# Patient Record
Sex: FEMALE | Race: BLACK OR AFRICAN AMERICAN | Employment: OTHER | URBAN - METROPOLITAN AREA
[De-identification: names, ages, dates, MRNs, and addresses within clinical notes are randomized per-mention and may not be internally consistent; named-entity substitution may affect disease eponyms.]

---

## 2017-02-24 ENCOUNTER — OFFICE VISIT (OUTPATIENT)
Dept: INTERNAL MEDICINE CLINIC | Age: 62
End: 2017-02-24

## 2017-02-24 VITALS
TEMPERATURE: 98 F | DIASTOLIC BLOOD PRESSURE: 84 MMHG | WEIGHT: 178 LBS | BODY MASS INDEX: 32.76 KG/M2 | SYSTOLIC BLOOD PRESSURE: 148 MMHG | OXYGEN SATURATION: 98 % | HEIGHT: 62 IN | HEART RATE: 60 BPM | RESPIRATION RATE: 18 BRPM

## 2017-02-24 DIAGNOSIS — M79.7 FIBROMYOSITIS: ICD-10-CM

## 2017-02-24 DIAGNOSIS — R73.03 PREDIABETES: ICD-10-CM

## 2017-02-24 DIAGNOSIS — R26.81 GAIT INSTABILITY: ICD-10-CM

## 2017-02-24 DIAGNOSIS — G89.4 CHRONIC PAIN SYNDROME: ICD-10-CM

## 2017-02-24 DIAGNOSIS — I10 ESSENTIAL HYPERTENSION, BENIGN: ICD-10-CM

## 2017-02-24 DIAGNOSIS — Z98.890 S/P COLONOSCOPY: ICD-10-CM

## 2017-02-24 DIAGNOSIS — F32.A DEPRESSION, UNSPECIFIED DEPRESSION TYPE: ICD-10-CM

## 2017-02-24 DIAGNOSIS — I26.99 PULMONARY EMBOLISM ON RIGHT (HCC): ICD-10-CM

## 2017-02-24 DIAGNOSIS — Z13.39 SCREENING FOR ALCOHOLISM: ICD-10-CM

## 2017-02-24 DIAGNOSIS — K21.9 GASTROESOPHAGEAL REFLUX DISEASE WITHOUT ESOPHAGITIS: ICD-10-CM

## 2017-02-24 DIAGNOSIS — Z00.00 ROUTINE GENERAL MEDICAL EXAMINATION AT A HEALTH CARE FACILITY: Primary | ICD-10-CM

## 2017-02-24 DIAGNOSIS — K58.9 IRRITABLE BOWEL SYNDROME WITHOUT DIARRHEA: ICD-10-CM

## 2017-02-24 DIAGNOSIS — G25.81 RESTLESS LEG SYNDROME: ICD-10-CM

## 2017-02-24 DIAGNOSIS — N64.4 MASTODYNIA: ICD-10-CM

## 2017-02-24 RX ORDER — ZOSTER VACCINE LIVE 19400 [PFU]/.65ML
INJECTION, POWDER, LYOPHILIZED, FOR SUSPENSION SUBCUTANEOUS
Refills: 0 | COMMUNITY
Start: 2016-12-06 | End: 2017-06-22 | Stop reason: ALTCHOICE

## 2017-02-24 RX ORDER — TETANUS TOXOID, REDUCED DIPHTHERIA TOXOID AND ACELLULAR PERTUSSIS VACCINE, ADSORBED 5; 2.5; 8; 8; 2.5 [IU]/.5ML; [IU]/.5ML; UG/.5ML; UG/.5ML; UG/.5ML
SUSPENSION INTRAMUSCULAR
Refills: 0 | COMMUNITY
Start: 2016-12-06 | End: 2017-06-22 | Stop reason: ALTCHOICE

## 2017-02-24 RX ORDER — LORAZEPAM 1 MG/1
TABLET ORAL
COMMUNITY
Start: 2016-12-21 | End: 2017-02-24 | Stop reason: DRUGHIGH

## 2017-02-24 RX ORDER — TRAZODONE HYDROCHLORIDE 50 MG/1
TABLET ORAL
COMMUNITY
Start: 2016-12-21 | End: 2017-02-24 | Stop reason: SDUPTHER

## 2017-02-24 RX ORDER — TRAZODONE HYDROCHLORIDE 50 MG/1
50 TABLET ORAL
Qty: 90 TAB | Refills: 2 | Status: SHIPPED | OUTPATIENT
Start: 2017-02-24 | End: 2017-06-22 | Stop reason: SDUPTHER

## 2017-02-24 RX ORDER — LORAZEPAM 0.5 MG/1
0.5 TABLET ORAL
Qty: 90 TAB | Refills: 2 | Status: SHIPPED | OUTPATIENT
Start: 2017-02-24 | End: 2017-06-22

## 2017-02-24 RX ORDER — ZALEPLON 10 MG/1
CAPSULE ORAL
COMMUNITY
Start: 2016-12-21 | End: 2017-02-24 | Stop reason: ALTCHOICE

## 2017-02-24 NOTE — PROGRESS NOTES
1. Have you been to the ER, urgent care clinic since your last visit? Hospitalized since your last visit? No    2. Have you seen or consulted any other health care providers outside of the [address] [place] [address] since your last visit? Include any pap smears or colon screening. No    This is a \"Welcome to [employer]"  Initial Preventive Physical Examination (IPPE) providing Personalized Prevention Plan Services (Performed in the first 12 months of enrollment)    I have reviewed the patient's medical history in detail and updated the computerized patient record. HistoryPatient returns today ambulatory with assistive device, alert and appropriate and has the capacity to give an accurate history. She comes in for a welcome to Medicare initial preventive physical examination. Since we last saw her Dr. Rito Vazquez who is her psychiatrist is no longer in practice and apparently writes a prescription for Lorazepam 1 mg, Trazodone. She was also on Sonata but is not requesting that medication. She is not particularly interested in seeing the psychiatrist.  However she needs the Lorazepam.  She is willing to try to get off of it if possible and go to a different drug with the same qualities. We are in agreement with that. Patient's neurologist, Dr. Janeth Nugent has been supplying her with her Roxicet for pain management however she cannot get through to his office. The most recent medication that he gave her was sulfa compound. She is allergic to sulfa. She resorted to acupuncture which she found to be somewhat helpful. Insurance was initially paying for it but now they are no longer paying for it. The acupuncturist is attempting to help her get insurance payment. Patient is also concerned about her balance. If she did not have the cane or the rollabout walker she feels that she would have frequent falls. Patient is seen for evaluation. Patient remains under stress as discussed previously. Past Medical History:   Diagnosis Date    Depression with anxiety     Fibromyalgia     Gait instability     GERD (gastroesophageal reflux disease)     Hypertension     IBS (irritable bowel syndrome)     Mastodynia, left greater than right 8/17/2011    Normal cardiac stress test 3.14    Positive D dimer 9-23-15    Prediabetes     Pulmonary embolism ([facility] [address] 03/2013    rll    Restless leg syndrome     S/P colonoscopy 01/27/2011    kenny hernandez md    Sleep apnea     cpap 9cm    SOB (shortness of breath)     White coat hypertension       Past Surgical History:   Procedure Laterality Date    BREAST SURGERY PROCEDURE UNLISTED  2006    left breast [id number]    HX COLONOSCOPY      HX ORTHOPAEDIC  2009    foot surgery-left    HX OTHER SURGICAL      right and left leg muscle biopsies     Current Outpatient Prescriptions   Medication Sig Dispense Refill    traZODone (DESYREL) 50 mg tablet Take 1 Tab by mouth nightly. 90 Tab 2    LORazepam (ATIVAN) 0.5 mg tablet Take 1 Tab by mouth three (3) times daily as needed for Anxiety. 90 Tab 2    bisoprolol-hydroCHLOROthiazide (ZIAC) 10-6.25 mg per tablet Take 1 Tab by mouth daily. 90 Tab 2    amLODIPine (NORVASC) 10 mg tablet Take 1 Tab by mouth daily. 90 Tab 2    MICROLET LANCET misc USE TO TEST BLOOD SUGAR EVERY  Each 11    CONTOUR NEXT STRIPS strip USE TO TEST BLOOD SUGARS ONCE DAILY 50 Strip 11    cyanocobalamin (VITAMIN B-12) 1,000 mcg tablet Take 1,000 mcg by mouth daily.  mometasone (NASONEX) 50 mcg/actuation nasal spray 2 sprays daily.  magnesium hydroxide (MILK OF MAGNESIA) 400 mg/5 mL suspension Take 30 mL by mouth as needed for Constipation.  118 mL 11    BOOSTRIX TDAP 2.5-8-5 Lf-mcg-Lf/0.5mL syrg ADM 0.5ML IM UTD  0    VARICELLA-ZOSTER VACINE LIVE 19,400 unit/0.65 mL susr injection ADM 0.65ML SC UTD  0     Allergies   Allergen Reactions    Aleve [Naproxen Sodium] Hoarseness    Dilaudid [Hydromorphone (Bulk)] Anaphylaxis     Respiratory arrest and throat closes    Diovan [Valsartan] Palpitations    Morphine Other (comments)     Patch-vomiting,weakness, fainting    Sulfa (Sulfonamide Antibiotics) Hives, Rash and Other (comments)     fainting     Family History   Problem Relation Age of Onset    Hypertension Mother     Kidney Disease Mother      1 kidney    Heart Disease Father      Social History   Substance Use Topics    Smoking status: Never Smoker    Smokeless tobacco: Not on file    Alcohol use No     Diet, Lifestyle: generally follows a low fat low cholesterol diet    Exercise level: moderately active    Depression Risk Screen     PHQ 2 / 9, over the last two weeks 2/24/2017   Little interest or pleasure in doing things Not at all   Feeling down, depressed or hopeless Not at all   Total Score PHQ 2 0     Alcohol Risk Screen   On any occasion during the past 3 months, have you had more than 3 drinks containing alcohol? No    Do you average more than 7 drinks per week? No    Functional Ability and Level of Safety     Hearing Loss   borderline normal    Activities of Daily Living   Partial assistance     Fall Risk Screen   No flowsheet data found. Abuse Screen   None  Patient is not abused    Review of Systems   A comprehensive review of systems was negative except for that written in the HPI. Physical Examination     No exam data present     Visit Vitals    /84 (BP 1 Location: Left arm, BP Patient Position: Sitting)    Pulse 60    Temp 98 °F (36.7 °C) (Oral)    Resp 18    Ht 5' 2\" (1.575 m)    Wt 178 lb (80.7 kg)    SpO2 98%    BMI 32.56 kg/m2     General:  Alert, cooperative, no distress, appears stated age. Head:  Normocephalic, without obvious abnormality, atraumatic. Eyes:  Conjunctivae/corneas clear. PERRL, EOMs intact. Fundi benign. Ears:  Normal TMs and external ear canals both ears. Nose: Nares normal. Septum midline. Mucosa normal. No drainage or sinus tenderness. Throat: Lips, mucosa, and tongue normal. Teeth and gums normal.   Neck: Supple, symmetrical, trachea midline, no adenopathy, thyroid: no enlargement/tenderness/nodules, no carotid bruit and no JVD. Back:   Symmetric, no curvature. ROM normal. No CVA tenderness. Lungs:   Clear to auscultation bilaterally. Chest wall:  No tenderness or deformity. Heart:  Regular rate and rhythm, S1, S2 normal, no murmur, click, rub or gallop. Breast Exam:  Na   Abdomen:   Soft, non-tender. Bowel sounds normal. No masses,  No organomegaly. Genitalia:  na   Rectal:     Extremities: Extremities normal, atraumatic, no cyanosis or edema. Pulses: 2+ and symmetric all extremities. Skin: Skin color, texture, turgor normal. No rashes or lesions. Lymph nodes: Cervical, supraclavicular, and axillary nodes normal.   Neurologic: CNII-XII intact. Normal strength, sensation and reflexes throughout. .     Patient Care Team:  Wilbert Sims MD as PCP - General (Internal Medicine)     End-of-life planning  Advanced Directive discussed and documented: YES      Assessment/Plan   Education and counseling provided:  Are appropriate based on today's review and evaluation    ICD-10-CM ICD-9-CM    1. Routine general medical examination at a health care facility Z00.00 V70.0    2. Screening for alcoholism Z13.89 V79.1    3. Pulmonary embolism on right ([address] I26.99 415.19    4. Chronic pain syndrome G89.4 338.4    5. Fibromyositis M79.7 729.1    6. Essential hypertension, benign I10 401.1    7. Gastroesophageal reflux disease without esophagitis K21.9 530.81    8. Restless leg syndrome G25.81 333.94    9. Mastodynia, left greater than right N64.4 611.71    10. Prediabetes R73.03 790.29    11. Irritable bowel syndrome without diarrhea K58.9 564.1    12. Gait instability R26.81 781.2    13. Normal cardiac stress test Z13.6 V81.2    14. White coat hypertension R03.0 796.2    15. S/P colonoscopy Z98.890 V45.89    16.  Depression, unspecified depression type F32.9 [place] [id number]   . Advance Care Planning (ACP) Provider Conversation Snapshot    Date of ACP Conversation: 02/24/17  Persons included in Conversation:  patient  Length of ACP Conversation in minutes:  <16 minutes (Non-Billable)    Authorized Decision Maker (if patient is incapable of making informed decisions): This person is:   Healthcare Agent/Medical Power of  under Advance Directive  Surrogate decision makers are her  and daughter. For Patients with Decision Making Capacity: Patient is requesting a full code status. If there is permanent brain damage or eminent death she would still like to be resuscitated. Conversation Outcomes / Follow-Up Plan:   Recommended completion of Advance Directive form after review of ACP materials and conversation with prospective healthcare agent       Patient's medical status is stable. We continue to encourage activity for at least 30 minutes five days a week and keep with recommendations regarding treatment of fibromyalgia. We advise her that we will refer her to the psychiatrist realizing that it will be several months before she she can see another psychiatrist.  She does not want to particularly be referred as she has had a 19 year relationship with Dr. Gary Hamilton. She is willing to try to get off the Ativan and we will therefore give her a dose of Ativan 0.5 mg b.i.d until we see her again. Any time we see her if she is in agreement we will gradually decrease the dose until she is off of it completely. After she has been off of it if she finds that she still needs medicine for anxiety we will use Buspar. We will continue the Trazodone at its current dose. We suggest she actually go by   regarding pain management since he gave her a prescription that was not appropriate because of her sulfa allergy since she is not able to get him on the phone.   She will return to our office in two months. At that time we will make another change in the Lorazepam if she is in agreement at that time.

## 2017-02-24 NOTE — MR AVS SNAPSHOT
Visit Information Date & Time Provider Department Dept. Phone Encounter #  
 2/24/2017 11:00 AM [facility] [phone] [id number] Follow-up Instructions Follow-up and Disposition History Your Appointments 3/14/2017  9:45 AM  
Any with Sonja Jara MD  
[address] [place] [address] and Primary Care [facility]) Appt Note: f/u 3 months [address] [place]  
  
   
 [address] [id number] Upcoming Health Maintenance Date Due DTaP/Tdap/Td series (1 - Tdap) 12/5/1974 FOBT Q 1 YEAR AGE 50-75 4/26/2017 PAP AKA CERVICAL CYTOLOGY 9/9/2017 BREAST CANCER SCRN MAMMOGRAM 10/12/2018 Allergies as of 2/24/2017  Review Complete On: 2/24/2017 By: Sonja Jara MD  
  
 Severity Noted Reaction Type Reactions Aleve [Naproxen Sodium] High 07/22/2016    Hoarseness Dilaudid [Hydromorphone (Bulk)] High 08/15/2011    Anaphylaxis Respiratory arrest and throat closes Diovan [Valsartan]  09/22/2015    Palpitations Morphine  08/15/2011    Other (comments) Patch-vomiting,weakness, fainting Sulfa (Sulfonamide Antibiotics)  08/15/2011    Hives, Rash, Other (comments)  
 fainting Current Immunizations  Reviewed on 4/2/2014 No immunizations on file. Not reviewed this visit You Were Diagnosed With   
  
 Codes Comments Routine general medical examination at a health care facility    -  Primary ICD-10-CM: Z00.00 ICD-9-CM: V70.0 Screening for alcoholism     ICD-10-CM: Z13.89 ICD-9-CM: V79.1 Pulmonary embolism on right [facility])     ICD-10-CM: I26.99 
ICD-9-CM: 415.19 Chronic pain syndrome     ICD-10-CM: G89.4 ICD-9-CM: 338.4 Fibromyositis     ICD-10-CM: M79.7 ICD-9-CM: 729.1 Essential hypertension, benign     ICD-10-CM: I10 
ICD-9-CM: 401.1 Gastroesophageal reflux disease without esophagitis     ICD-10-CM: K21.9 ICD-9-CM: 530.81 Restless leg syndrome     ICD-10-CM: G25.81 ICD-9-CM: 333.94 Mastodynia     ICD-10-CM: N64.4 ICD-9-CM: 611.71 Prediabetes     ICD-10-CM: R73.03 
ICD-9-CM: 790.29 Irritable bowel syndrome without diarrhea     ICD-10-CM: K58.9 ICD-9-CM: 949.8 Gait instability     ICD-10-CM: R26.81 
ICD-9-CM: 781.2 Normal cardiac stress test     ICD-10-CM: Z13.6 ICD-9-CM: V81.2 White coat hypertension     ICD-10-CM: R03.0 ICD-9-CM: 796.2 S/P colonoscopy     ICD-10-CM: J38.038 ICD-9-CM: V45.89 Depression, unspecified depression type     ICD-10-CM: F32.9 ICD-9-CM: 160 Vitals BP  
  
  
  
  
  
 148/84 (BP 1 Location: Left arm, BP Patient Position: Sitting) BMI and BSA Data Body Mass Index Body Surface Area 32.56 kg/m 2 1.88 m 2 Preferred Pharmacy Pharmacy Name Phone [address] [facility] [phone] Your Updated Medication List  
  
   
This list is accurate as of: 2/24/17  1:40 PM.  Always use your most recent med list. amLODIPine 10 mg tablet Commonly known as:  [facility] Take 1 Tab by mouth daily. bisoprolol-hydroCHLOROthiazide 10-6.25 mg per tablet Commonly known as:  Pending sale to [facility] Take 1 Tab by mouth daily. BOOSTRIX TDAP 2.5-8-5 Lf-mcg-Lf/0.5mL Syrg Generic drug:  Diphth, Pertus(Acell), Tetanus ADM 0.5ML IM UTD CONTOUR NEXT STRIPS strip Generic drug:  glucose blood VI test strips USE TO TEST BLOOD SUGARS ONCE DAILY LORazepam 0.5 mg tablet Commonly known as:  ATIVAN Take 1 Tab by mouth three (3) times daily as needed for Anxiety. magnesium hydroxide 400 mg/5 mL suspension Commonly known as:  MILK OF MAGNESIA Take 30 mL by mouth as needed for Constipation. [place] Generic drug:  Lancets USE TO TEST BLOOD SUGAR EVERY DAY  
 NASONEX 50 mcg/actuation nasal spray Generic drug:  mometasone 2 sprays daily. traZODone 50 mg tablet Commonly known as:  Fahad Daniel Take 1 Tab by mouth nightly. varicella-zoster vacine live 19,400 unit/0.65 mL Susr injection Generic drug:  varicella zoster vacine live ADM 0.65ML SC UTD  
  
 VITAMIN B-12 1,000 mcg tablet Generic drug:  cyanocobalamin Take 1,000 mcg by mouth daily. Prescriptions Printed Refills LORazepam (ATIVAN) 0.5 mg tablet 2 Sig: Take 1 Tab by mouth three (3) times daily as needed for Anxiety. Class: Print Route: Oral  
  
Prescriptions Sent to Pharmacy Refills  
 traZODone (DESYREL) 50 mg tablet 2 Sig: Take 1 Tab by mouth nightly. Class: Normal  
 Pharmacy: [address], [address] Ph #: [phone] Route: Oral  
  
We Performed the Following REFERRAL TO PSYCHIATRY [REF91 Custom] Comments: PLEASE SCHEDULE WITH DR Jose Angel Russo Referral Information Referral ID Referred By Referred To  
  
 [id number] Ralph Hutchins MD   
   [address] [place] [address] Phone: [phone] Fax: [phone] Visits Status Start Date End Date 1 New Request 2/24/17 2/24/18 If your referral has a status of pending review or denied, additional information will be sent to support the outcome of this decision. Introducing hospitals & HEALTH SERVICES! Cris Yadav introduces [employer] patient portal. Now you can access parts of your medical record, email your doctor's office, and request medication refills online. 1. In your internet browser, go to [email or web address] "[email or web address]/Privacy Analytics 2. Click on the First Time User? Click Here link in the Sign In box. You will see the New Member Sign Up page. 3. Enter your [employer] Access Code exactly as it appears below.  You will not need to use this code after youve completed the sign-up process. If you do not sign up before the expiration date, you must request a new code. · [employer] Access Code: [email or web address] Expires: 3/6/2017  9:49 AM 
 
4. Enter the last four digits of your Social Security Number (xxxx) and Date of Birth (mm/dd/yyyy) as indicated and click Submit. You will be taken to the next sign-up page. 5. Create a [employer] ID. This will be your [employer] login ID and cannot be changed, so think of one that is secure and easy to remember. 6. Create a [employer] password. You can change your password at any time. 7. Enter your Password Reset Question and Answer. This can be used at a later time if you forget your password. 8. Enter your e-mail address. You will receive e-mail notification when new information is available in [address] 9. Click Sign Up. You can now view and download portions of your medical record. 10. Click the Download Summary menu link to download a portable copy of your medical information. If you have questions, please visit the Frequently Asked Questions section of the [employer] website. Remember, [employer] is NOT to be used for urgent needs. For medical emergencies, dial 911. Now available from your iPhone and Android! Please provide this summary of care documentation to your next provider. Your primary care clinician is listed as Carlos Dunn. If you have any questions after today's visit, please call [phone].

## 2017-03-14 ENCOUNTER — OFFICE VISIT (OUTPATIENT)
Dept: INTERNAL MEDICINE CLINIC | Age: 62
End: 2017-03-14

## 2017-03-14 VITALS
WEIGHT: 176 LBS | HEIGHT: 62 IN | TEMPERATURE: 98.4 F | RESPIRATION RATE: 17 BRPM | SYSTOLIC BLOOD PRESSURE: 135 MMHG | HEART RATE: 72 BPM | BODY MASS INDEX: 32.39 KG/M2 | OXYGEN SATURATION: 96 % | DIASTOLIC BLOOD PRESSURE: 74 MMHG

## 2017-03-14 DIAGNOSIS — M79.7 FIBROMYOSITIS: Primary | ICD-10-CM

## 2017-03-14 DIAGNOSIS — K58.9 IRRITABLE BOWEL SYNDROME WITHOUT DIARRHEA: ICD-10-CM

## 2017-03-14 DIAGNOSIS — I10 ESSENTIAL HYPERTENSION, BENIGN: ICD-10-CM

## 2017-03-14 DIAGNOSIS — G89.4 CHRONIC PAIN SYNDROME: ICD-10-CM

## 2017-03-14 NOTE — PROGRESS NOTES
SPORTS MEDICINE AND PRIMARY CARE  Roxanna Escalante MD, [place] [address] [address]  Phone:  [phone]  Fax: [phone]       Chief Complaint   Patient presents with    Follow-up     3 month visit   . SUBJECTIVE:    Ryan Gill is a 59 y.o. female Patient returns today ambulatory, alert and appropriate and has the capacity to give an accurate history. She is ambulatory with assistive device. She has a known history of fibromyalgia, gait instability, GERD, primary hypertension, irritable bowel syndrome and prediabetes. Patient returns today stating she indeed tried acupuncture and it has helped a little she states, it is better than the narcotics and particularly since there are no side effects. However she is having a problem with the insurance paying for it. Each acupuncture visit cost around 150 dollars but it really depends on what she gets she states. Patient is requesting a letter to appeal because the insurance company does not want to pay for acupuncture and the letter we dictate is attached. Patient notes that her tolerance level is not good lately. That is to say she notes she is more irritable lately as she is trying to get off the Lorazepam.  We had suggested that she wean herself off the Lorazepam and not stop abruptly because of that very side effect and other effects from stopping the Benzodiazepines quickly. Patient would like to stop seeing Dr. Seun Quiros and would like to try a neurologist with [facility]. The new neurologist she would like to see is DASH Uriostegui. Patient states that she continues to \"struggle with the pain. \"             Current Outpatient Prescriptions   Medication Sig Dispense Refill    traZODone (DESYREL) 50 mg tablet Take 1 Tab by mouth nightly. 90 Tab 2    LORazepam (ATIVAN) 0.5 mg tablet Take 1 Tab by mouth three (3) times daily as needed for Anxiety.  90 Tab 2    bisoprolol-hydroCHLOROthiazide [facility]) 10-6.25 mg per tablet Take 1 Tab by mouth daily. 90 Tab 2    amLODIPine (NORVASC) 10 mg tablet Take 1 Tab by mouth daily. 90 Tab 2    MICROLET LANCET misc USE TO TEST BLOOD SUGAR EVERY  Each 11    CONTOUR NEXT STRIPS strip USE TO TEST BLOOD SUGARS ONCE DAILY 50 Strip 11    cyanocobalamin (VITAMIN B-12) 1,000 mcg tablet Take 1,000 mcg by mouth daily.  mometasone (NASONEX) 50 mcg/actuation nasal spray 2 sprays daily.  magnesium hydroxide (MILK OF MAGNESIA) 400 mg/5 mL suspension Take 30 mL by mouth as needed for Constipation. 118 mL 11    BOOSTRIX TDAP 2.5-8-5 Lf-mcg-Lf/0.5mL syrg ADM 0.5ML IM UTD  0    VARICELLA-ZOSTER VACINE LIVE 19,400 unit/0.65 mL susr injection ADM 0.65ML SC UTD  0     Past Medical History:   Diagnosis Date    Depression with anxiety     Fibromyalgia     Gait instability     GERD (gastroesophageal reflux disease)     Hypertension     IBS (irritable bowel syndrome)     Mastodynia, left greater than right 8/17/2011    Normal cardiac stress test 3.14    Positive D dimer 9-23-15    Prediabetes     Pulmonary embolism ([facility] [address] 03/2013    rll    Restless leg syndrome     S/P colonoscopy 01/27/2011    kenny hernandez md    Sleep apnea     cpap 9cm    SOB (shortness of breath)     White coat hypertension      Past Surgical History:   Procedure Laterality Date    BREAST SURGERY PROCEDURE UNLISTED  2006    left breast [id number]-LKDR. Josie Ramires    HX COLONOSCOPY      HX ORTHOPAEDIC  2009    foot surgery-left    HX OTHER SURGICAL      right and left leg muscle biopsies     Allergies   Allergen Reactions    Aleve [Naproxen Sodium] Hoarseness    Dilaudid [Hydromorphone (Bulk)] Anaphylaxis     Respiratory arrest and throat closes    Diovan [Valsartan] Palpitations    Morphine Other (comments)     Patch-vomiting,weakness, fainting    Sulfa (Sulfonamide Antibiotics) Hives, Rash and Other (comments)     fainting         REVIEW OF SYSTEMS:  General: negative for - chills or fever  ENT: negative for - headaches, nasal congestion or tinnitus  Respiratory: negative for - cough, hemoptysis, shortness of breath or wheezing  Cardiovascular : negative for - chest pain, edema, palpitations or shortness of breath  Gastrointestinal: negative for - abdominal pain, blood in stools, heartburn or nausea/vomiting  Genito-Urinary: no dysuria, trouble voiding, or hematuria  Musculoskeletal: negative for - gait disturbance, joint pain, joint stiffness or joint swelling  Neurological: no TIA or stroke symptoms  Hematologic: no bruises, no bleeding, no swollen glands  Integument: no lumps, mole changes, nail changes or rash  Endocrine: no malaise/lethargy or unexpected weight changes      Social History     Social History    Marital status:      Spouse name: N/A    Number of children: N/A    Years of education: N/A     Social History Main Topics    Smoking status: Never Smoker    Smokeless tobacco: None    Alcohol use No    Drug use: None    Sexual activity: Not Asked     Other Topics Concern    None     Social History Narrative         Family History: Mother: alive 80 yrs, High Blood Pressure, cva with cognitive deficitsFather:  36 yrs, myocardial infarctionSister(s): aliveBrother(s): unknow n2    sister(s) . 40 daughter no choildren -  walked out works for board [employer] for [employer] . Social History: Alcohol Use Patient does not use alcohol. Smoking Status Patient is a never smoker. Caffeine: occasional. Drugs:    prescription narcotics. Diet: Regular. Exercise: None. Marital Status: . Lives w ith: spouse. Children: children. Sikh: Lindle Anna. Patient is  living w ith her  she is on disability related to her fibromyositis.      Family History   Problem Relation Age of Onset    Hypertension Mother     Kidney Disease Mother      1 kidney    Heart Disease Father        OBJECTIVE:    Visit Vitals    /74 (BP 1 Location: Left arm, BP Patient Position: Sitting)    Pulse 72    Temp 98.4 °F (36.9 °C) (Oral)    Resp 17    Ht 5' 2\" (1.575 m)    Wt 176 lb (79.8 kg)    SpO2 96%    BMI 32.19 kg/m2     CONSTITUTIONAL: well , well nourished, appears age appropriate  EYES: perrla, eom intact  ENMT:moist mucous membranes, pharynx clear  NECK: supple. Thyroid normal  RESPIRATORY: Chest: clear bilaterally   CARDIOVASCULAR: Heart: regular rate and rhythm  GASTROINTESTINAL: Abdomen: soft, bowel sounds active  HEMATOLOGIC: no pathological lymph nodes palpated  MUSCULOSKELETAL: Extremities: no edema, pulse 1+   INTEGUMENT: No unusual rashes or suspicious skin lesions noted. Nails appear normal.  NEUROLOGIC: non-focal exam   MENTAL STATUS: alert and oriented, appropriate affect           ASSESSMENT:  1. Fibromyositis    2. Chronic pain syndrome    3. Essential hypertension, benign    4. Irritable bowel syndrome without diarrhea      See attached letter that we have dictated regarding the acupuncture. We give her a referral to see the neurologist at [facility]. Blood pressure control is adequate. BMI is 32.19 which reflects a two pound weight loss since we last saw her and we congratulate her. She will return to see us in three or four months, sooner if she needs to. PLAN:  .  Orders Placed This Encounter    REFERRAL TO NEUROLOGY       Follow-up Disposition:  Return in about 4 months (around 7/14/2017). ATTENTION:   This medical record was transcribed using an electronic medical records system. Although proofread, it may and can contain electronic and spelling errors. Other human spelling and other errors may be present. Corrections may be executed at a later time. Please feel free to contact us for any clarifications as needed.

## 2017-03-14 NOTE — LETTER
3/14/2017 11:22 AM 
 
Ms. Jerone Goldmann [address] [place] [address] [address] [id number] To Whom it May Concern: The above named patient is under my care for multiple medical problems including primary hypertension, irritable bowel syndrome, prediabetes, and fibromyalgia. Patient has attempted numerous therapies for treatment of her fibromyalgia including a narcotics, physical therapy, occupational therapy, NSAID's, acetaminophen, all without adequate success. She is currently attempting to use acupuncture which she has found to be more effective than the narcotics. It is the intent of this letter to respectfully request authorization of acupuncture for treatment of her fibromyalgia. If I can be of any further assistance to not hesitate to call. Sincerely,  
 
 
RALPH Harrison MD, FACP, CMD.

## 2017-03-14 NOTE — PROGRESS NOTES
1. Have you been to the ER, urgent care clinic since your last visit? Hospitalized since your last visit? No    2. Have you seen or consulted any other health care providers outside of the [address] [place] [address] since your last visit? Include any pap smears or colon screening.  No

## 2017-03-14 NOTE — MR AVS SNAPSHOT
Visit Information Date & Time Provider Department Dept. Phone Encounter #  
 3/14/2017  9:45 AM Anthony Knight MD [address] [place] [address] Sports Medicine and Primary Care [phone] [id number] Follow-up Instructions Return in about 4 months (around 7/14/2017). Follow-up and Disposition History Upcoming Health Maintenance Date Due DTaP/Tdap/Td series (1 - Tdap) 12/5/1974 FOBT Q 1 YEAR AGE 50-75 4/26/2017 PAP AKA CERVICAL CYTOLOGY 9/9/2017 BREAST CANCER SCRN MAMMOGRAM 10/12/2018 Allergies as of 3/14/2017  Review Complete On: 3/14/2017 By: Anthony Knight MD  
  
 Severity Noted Reaction Type Reactions Aleve [Naproxen Sodium] High 07/22/2016    Hoarseness Dilaudid [Hydromorphone (Bulk)] High 08/15/2011    Anaphylaxis Respiratory arrest and throat closes Diovan [Valsartan]  09/22/2015    Palpitations Morphine  08/15/2011    Other (comments) Patch-vomiting,weakness, fainting Sulfa (Sulfonamide Antibiotics)  08/15/2011    Hives, Rash, Other (comments)  
 fainting Current Immunizations  Reviewed on 4/2/2014 No immunizations on file. Not reviewed this visit You Were Diagnosed With   
  
 Codes Comments Fibromyositis    -  Primary ICD-10-CM: M79.7 ICD-9-CM: 729.1 Chronic pain syndrome     ICD-10-CM: G89.4 ICD-9-CM: 338.4 Essential hypertension, benign     ICD-10-CM: I10 
ICD-9-CM: 401.1 Irritable bowel syndrome without diarrhea     ICD-10-CM: K58.9 ICD-9-CM: 440.5 Vitals BP Pulse Temp Resp Height(growth percentile) Weight(growth percentile) 135/74 (BP 1 Location: Left arm, BP Patient Position: Sitting) 72 98.4 °F (36.9 °C) (Oral) 17 5' 2\" (1.575 m) 176 lb (79.8 kg) SpO2 BMI OB Status Smoking Status 96% 32.19 kg/m2 Postmenopausal Never Smoker BMI and BSA Data Body Mass Index Body Surface Area  
 32.19 kg/m 2 1.87 m 2 [phone] Your Updated Medication List  
  
   
This list is accurate as of: 3/14/17 11:30 AM.  Always use your most recent med list. amLODIPine 10 mg tablet Commonly known as:  Elyn Coffer Take 1 Tab by mouth daily. bisoprolol-hydroCHLOROthiazide 10-6.25 mg per tablet Commonly known as:  [facility] Take 1 Tab by mouth daily. BOOSTRIX TDAP 2.5-8-5 Lf-mcg-Lf/0.5mL Syrg Generic drug:  Diphth, Pertus(Acell), Tetanus ADM 0.5ML IM UTD CONTOUR NEXT STRIPS strip Generic drug:  glucose blood VI test strips USE TO TEST BLOOD SUGARS ONCE DAILY LORazepam 0.5 mg tablet Commonly known as:  ATIVAN Take 1 Tab by mouth three (3) times daily as needed for Anxiety. magnesium hydroxide 400 mg/5 mL suspension Commonly known as:  MILK OF MAGNESIA Take 30 mL by mouth as needed for Constipation. [facility] Generic drug:  Lancets USE TO TEST BLOOD SUGAR EVERY DAY  
  
 NASONEX 50 mcg/actuation nasal spray Generic drug:  mometasone 2 sprays daily. traZODone 50 mg tablet Commonly known as:  [facility] [place] Take 1 Tab by mouth nightly. varicella-zoster vacine live 19,400 unit/0.65 mL Susr injection Generic drug:  varicella zoster vacine live ADM 0.65ML SC UTD  
  
 VITAMIN B-12 1,000 mcg tablet Generic drug:  cyanocobalamin Take 1,000 mcg by mouth daily. We Performed the Following REFERRAL TO NEUROLOGY [EMN50 Custom] Comments:  
 Please evaluate patient for fibromyalgia. Follow-up Instructions Return in about 4 months (around 7/14/2017). Referral Information Referral ID Referred By Referred To  
  
 [id number] Karyle Abed, MD   
   [address] [place] [address] Phone: [phone] Fax: [phone] Visits Status Start Date End Date 1 New Request 3/14/17 3/14/18 If your referral has a status of pending review or denied, additional information will be sent to support the outcome of this decision. Introducing [facility] & HEALTH SERVICES! Bill Escalona introduces [employer] patient portal. Now you can access parts of your medical record, email your doctor's office, and request medication refills online. 1. In your internet browser, go to [email or web address] 2. Click on the First Time User? Click Here link in the Sign In box. You will see the New Member Sign Up page. 3. Enter your [employer] Access Code exactly as it appears below. You will not need to use this code after youve completed the sign-up process. If you do not sign up before the expiration date, you must request a new code. · [employer] Access Code: [email or web address] Expires: 6/12/2017 11:29 AM 
 
4. Enter the last four digits of your Social Security Number (xxxx) and Date of Birth (mm/dd/yyyy) as indicated and click Submit. You will be taken to the next sign-up page. 5. Create a [employer] ID. This will be your [employer] login ID and cannot be changed, so think of one that is secure and easy to remember. 6. Create a [employer] password. You can change your password at any time. 7. Enter your Password Reset Question and Answer. This can be used at a later time if you forget your password. 8. Enter your e-mail address. You will receive e-mail notification when new information is available in [address] 9. Click Sign Up. You can now view and download portions of your medical record. 10. Click the Download Summary menu link to download a portable copy of your medical information. If you have questions, please visit the Frequently Asked Questions section of the [employer] website. Remember, [employer] is NOT to be used for urgent needs. For medical emergencies, dial 911. Now available from your iPhone and Android! Please provide this summary of care documentation to your next provider. Your primary care clinician is listed as Jose Mccall. If you have any questions after today's visit, please call [phone].

## 2017-06-21 PROBLEM — I82.811: Status: ACTIVE | Noted: 2017-05-31

## 2017-06-22 ENCOUNTER — OFFICE VISIT (OUTPATIENT)
Dept: INTERNAL MEDICINE CLINIC | Age: 62
End: 2017-06-22

## 2017-06-22 VITALS
HEIGHT: 62 IN | HEART RATE: 71 BPM | SYSTOLIC BLOOD PRESSURE: 146 MMHG | WEIGHT: 181 LBS | DIASTOLIC BLOOD PRESSURE: 85 MMHG | BODY MASS INDEX: 33.31 KG/M2 | TEMPERATURE: 97.8 F | OXYGEN SATURATION: 97 % | RESPIRATION RATE: 16 BRPM

## 2017-06-22 DIAGNOSIS — R79.89 POSITIVE D DIMER: ICD-10-CM

## 2017-06-22 DIAGNOSIS — R79.9 ABNORMAL FINDING OF BLOOD CHEMISTRY: ICD-10-CM

## 2017-06-22 DIAGNOSIS — M79.7 FIBROMYOSITIS: ICD-10-CM

## 2017-06-22 DIAGNOSIS — R26.81 GAIT INSTABILITY: ICD-10-CM

## 2017-06-22 DIAGNOSIS — G89.4 CHRONIC PAIN SYNDROME: ICD-10-CM

## 2017-06-22 DIAGNOSIS — R45.82 WORRIES: ICD-10-CM

## 2017-06-22 DIAGNOSIS — E78.5 DYSLIPIDEMIA: ICD-10-CM

## 2017-06-22 DIAGNOSIS — I82.811: Primary | ICD-10-CM

## 2017-06-22 DIAGNOSIS — K58.9 IRRITABLE BOWEL SYNDROME WITHOUT DIARRHEA: ICD-10-CM

## 2017-06-22 RX ORDER — GLUCOSAMINE SULFATE 1500 MG
POWDER IN PACKET (EA) ORAL
COMMUNITY
End: 2018-04-09

## 2017-06-22 RX ORDER — BLOOD-GLUCOSE CONTROL, NORMAL
EACH MISCELLANEOUS
COMMUNITY
Start: 2017-03-31 | End: 2021-06-16

## 2017-06-22 RX ORDER — BISOPROLOL FUMARATE AND HYDROCHLOROTHIAZIDE 5; 6.25 MG/1; MG/1
TABLET ORAL
COMMUNITY
End: 2017-06-22 | Stop reason: DRUGHIGH

## 2017-06-22 RX ORDER — WARFARIN SODIUM 5 MG/1
TABLET ORAL
COMMUNITY
End: 2017-06-22 | Stop reason: ALTCHOICE

## 2017-06-22 RX ORDER — LORAZEPAM 0.5 MG/1
TABLET ORAL
COMMUNITY
End: 2018-03-14

## 2017-06-22 RX ORDER — OXYCODONE AND ACETAMINOPHEN 5; 325 MG/1; MG/1
TABLET ORAL
COMMUNITY
End: 2017-06-22

## 2017-06-22 RX ORDER — TRAZODONE HYDROCHLORIDE 50 MG/1
TABLET ORAL
COMMUNITY
End: 2017-06-22 | Stop reason: SDUPTHER

## 2017-06-22 RX ORDER — WARFARIN 2.5 MG/1
TABLET ORAL
COMMUNITY
End: 2017-06-22 | Stop reason: ALTCHOICE

## 2017-06-22 RX ORDER — RABEPRAZOLE SODIUM 20 MG/1
TABLET, DELAYED RELEASE ORAL
COMMUNITY
End: 2017-06-22

## 2017-06-22 RX ORDER — TIZANIDINE HYDROCHLORIDE 2 MG/1
CAPSULE, GELATIN COATED ORAL
Refills: 1 | COMMUNITY
Start: 2017-05-04 | End: 2018-03-14 | Stop reason: ALTCHOICE

## 2017-06-22 RX ORDER — TRAZODONE HYDROCHLORIDE 50 MG/1
50 TABLET ORAL
Qty: 30 TAB | Refills: 5 | Status: SHIPPED | OUTPATIENT
Start: 2017-06-22 | End: 2018-01-16 | Stop reason: SDUPTHER

## 2017-06-22 RX ORDER — LANCETS 33 GAUGE
EACH MISCELLANEOUS
COMMUNITY
Start: 2017-03-31 | End: 2021-06-16

## 2017-06-22 RX ORDER — DIPHENHYDRAMINE HCL 25 MG
CAPSULE ORAL
COMMUNITY
End: 2017-06-22

## 2017-06-22 RX ORDER — BLOOD-GLUCOSE METER
KIT MISCELLANEOUS
COMMUNITY
Start: 2017-03-31

## 2017-06-22 RX ORDER — AMLODIPINE BESYLATE 2.5 MG/1
TABLET ORAL
COMMUNITY
End: 2017-06-22 | Stop reason: SDUPTHER

## 2017-06-22 RX ORDER — DICLOFENAC SODIUM 10 MG/G
GEL TOPICAL
Refills: 1 | COMMUNITY
Start: 2017-05-31 | End: 2018-03-26 | Stop reason: ALTCHOICE

## 2017-06-22 NOTE — MR AVS SNAPSHOT
Visit Information Date & Time Provider Department Dept. Phone Encounter #  
 6/22/2017  9:15 AM Merlinda Brandt, [address] Sports Medicine and Primary Care [phone] [id number] Follow-up Instructions Return in about 2 months (around 8/22/2017). Follow-up and Disposition History Your Appointments 8/17/2017  9:00 AM  
Any with Merlinda Brandt, MD  
[address] [place] [address] and Primary Care [address] [place] [address]) Appt Note: 4 MONTH FOLLOW UP; 2 week follow up  
 [address]mie Johns [address] [facility]  
  
   
 [address] [id number]  
  
    
 10/18/2017  9:00 AM  
Any with Merlinda Brandt, MD  
[address] [place] [address] and Primary Care [address] [place] [address]) Appt Note: 3 month follow up  
 [address] [place] [address]  
[phone] Upcoming Health Maintenance Date Due DTaP/Tdap/Td series (1 - Tdap) 12/5/1974 FOBT Q 1 YEAR AGE 50-75 4/26/2017 PAP AKA CERVICAL CYTOLOGY 9/9/2017 INFLUENZA AGE 9 TO ADULT 8/1/2017 BREAST CANCER SCRN MAMMOGRAM 10/12/2018 Allergies as of 6/22/2017  Review Complete On: 6/22/2017 By: Merlinda Brandt, MD  
  
 Severity Noted Reaction Type Reactions Aleve [Naproxen Sodium] High 07/22/2016    Hoarseness Dilaudid [Hydromorphone (Bulk)] High 08/15/2011    Anaphylaxis Respiratory arrest and throat closes Diovan [Valsartan]  09/22/2015    Palpitations Morphine  08/15/2011    Other (comments) Patch-vomiting,weakness, fainting Sulfa (Sulfonamide Antibiotics)  08/15/2011    Hives, Rash, Other (comments)  
 fainting Current Immunizations  Reviewed on 4/2/2014 No immunizations on file. Not reviewed this visit You Were Diagnosed With   
  
 Codes Comments Saphenous vein occlusion, right    -  Primary ICD-10-CM: X25.276 ICD-9-CM: 453.6 Positive D dimer     ICD-10-CM: R79.89 ICD-9-CM: 790.92   
 Gait instability     ICD-10-CM: R26.81 
ICD-9-CM: 781.2 Irritable bowel syndrome without diarrhea     ICD-10-CM: K58.9 ICD-9-CM: 002.2 Chronic pain syndrome     ICD-10-CM: G89.4 ICD-9-CM: 338.4 Fibromyositis     ICD-10-CM: M79.7 ICD-9-CM: 729.1 Dyslipidemia     ICD-10-CM: E78.5 ICD-9-CM: 272.4 Worries     ICD-10-CM: R45.82 ICD-9-CM: 799.89 Abnormal finding of blood chemistry     ICD-10-CM: R79.9 ICD-9-CM: 790.6 Vitals BP Pulse Temp Resp Height(growth percentile) Weight(growth percentile) 146/85 (BP 1 Location: Right arm, BP Patient Position: Sitting) 71 97.8 °F (36.6 °C) (Oral) 16 5' 2\" (1.575 m) 181 lb (82.1 kg) SpO2 BMI OB Status Smoking Status 97% 33.11 kg/m2 Postmenopausal Never Smoker Vitals History BMI and BSA Data Body Mass Index Body Surface Area  
 33.11 kg/m 2 1.9 m 2 Preferred Pharmacy Pharmacy Name Phone [address] Ch[address] Dagoberto [facility] [phone] Your Updated Medication List  
  
   
This list is accurate as of: 6/22/17 10:32 AM.  Always use your most recent med list. amLODIPine 10 mg tablet Commonly known as:  Benjiman Floro Take 1 Tab by mouth daily. bisoprolol-hydroCHLOROthiazide 10-6.25 mg per tablet Commonly known as:  [facility] Take 1 Tab by mouth daily. cholecalciferol 1,000 unit Cap Commonly known as:  VITAMIN D3 Take 1 tablet by mouth daily, CONTOUR NEXT STRIPS strip Generic drug:  glucose blood VI test strips USE TO TEST BLOOD SUGARS ONCE DAILY  
  
 diclofenac 1 % Gel Commonly known as:  VOLTAREN  
APPLY 2 GRAMS TO THE AFFECTED JOINT 3 TO 4 TIMES PER DAY LORazepam 0.5 mg tablet Commonly known as:  ATIVAN Take 0.5 mg by mouth every 8 hours as needed for Anxiety,  
  
 * [facility] Generic drug:  Lancets USE TO TEST BLOOD SUGAR EVERY DAY  
  
 * One Touch Delica 33 gauge Misc Generic drug:  lancets ONETOUCH ULTRA2 monitoring kit Generic drug:  Blood-Glucose Meter OT Ultra/FastTrack Control Soln Generic drug:  Blood Glucose Control, Normal  
  
 tiZANidine 2 mg capsule Commonly known as:  Echo Hodge TK 1 C PO BID PRN  
  
 traZODone 50 mg tablet Commonly known as:  Jose Benton Take 1 Tab by mouth nightly. VITAMIN B-12 1,000 mcg tablet Generic drug:  cyanocobalamin Take 1,000 mcg by mouth daily. * Notice: This list has 2 medication(s) that are the same as other medications prescribed for you. Read the directions carefully, and ask your doctor or other care provider to review them with you. Prescriptions Sent to Pharmacy Refills  
 traZODone (DESYREL) 50 mg tablet 5 Sig: Take 1 Tab by mouth nightly. Class: Normal  
 Pharmacy: [address], [address] Ph #: [phone] Route: Oral  
  
We Performed the Following D DIMER [id number] CPT(R)] HEMOGLOBIN A1C WITH EAG [42996 CPT(R)] LIPID PANEL [31022 CPT(R)] MAGNESIUM [id number] CPT(R)] OCCULT BLOOD, IMMUNOASSAY (FIT) [id number] CPT(R)] TSH 3RD GENERATION [11051 CPT(R)] URINALYSIS W/ RFLX MICROSCOPIC [92512 CPT(R)] Follow-up Instructions Return in about 2 months (around 8/22/2017). Introducing hospitals & HEALTH SERVICES! [place] [employer] introduces [employer] patient portal. Now you can access parts of your medical record, email your doctor's office, and request medication refills online. 1. In your internet browser, go to [email or web address] 2. Click on the First Time User? Click Here link in the Sign In box. You will see the New Member Sign Up page. 3. Enter your [employer] Access Code exactly as it appears below. You will not need to use this code after youve completed the sign-up process. If you do not sign up before the expiration date, you must request a new code. · [employer] Access Code: [email or web address] Expires: 9/20/2017 10:32 AM 
 
4. Enter the last four digits of your Social Security Number (xxxx) and Date of Birth (mm/dd/yyyy) as indicated and click Submit. You will be taken to the next sign-up page. 5. Create a [employer] ID. This will be your [employer] login ID and cannot be changed, so think of one that is secure and easy to remember. 6. Create a [employer] password. You can change your password at any time. 7. Enter your Password Reset Question and Answer. This can be used at a later time if you forget your password. 8. Enter your e-mail address. You will receive e-mail notification when new information is available in [address] 9. Click Sign Up. You can now view and download portions of your medical record. 10. Click the Download Summary menu link to download a portable copy of your medical information. If you have questions, please visit the Frequently Asked Questions section of the [employer] website. Remember, [employer] is NOT to be used for urgent needs. For medical emergencies, dial 911. Now available from your iPhone and Android! Please provide this summary of care documentation to your next provider. Your primary care clinician is listed as Jass Kinsey. If you have any questions after today's visit, please call [phone].

## 2017-06-22 NOTE — PROGRESS NOTES
SPORTS MEDICINE AND PRIMARY CARE  Shantal Major MD, [address] [address]  Phone:  [phone]  Fax: [phone]       Chief Complaint   Patient presents with    Follow-up     3 month visit    Leg Swelling     wants to discuss test results from vein center   . SUBJECTIVE:    Elva Ansari is a 59 y.o. female Patient returns today ambulatory, alert and appropriate and has the capacity to give an accurate history. Since we last saw her she was seen at [facility] for evaluation and treatment of her leg pain and swelling. She had a right leg superficial venous study performed that revealed thrombus greater saphenous vein from the mid calf to the proximal thigh. They applied compression bandage to the infected area and recommended one week of conservative therapy for superficial thrombophlebitis. They were going to work her up for venous ablation of the proximal segment of the GSV to prevent further propagation of symptoms. She was seen by Hannah Zimmerman MD for that evaluation. She saw Brandee Knapp MD who was also concerned about the swelling and referred her to Rafita Desai. He was follow her for fibromyalgia and elevated CPK, polyneuropathy, depression. Patient states that Dr. Josep Funez did not give her analgesics. He gave her a cream to use but the cream she noted had side effects of a stroke or heart attack. He has also requested a serologic evaluation for rheumatologic issues. We therefore will not reproduce the labs that have already been requested. Patient is making a decision of a recommendation for a right and left VV closure and endovenous ablation. Also complains of extreme fatigue in spite of the use of her CPAP machine. She also notes discomfort and swelling of the medial aspect of her right ankle.       In 2014 we recall that she had a positive D-Dimer with a right lower lobe acute pulmonary embolus that was treated with three months of anticoagulants. At that time repeatedly the venous doppler exams were negative. Patient would like to have a D-Dimer performed. We advise that it is likely to be positive with the findings of a superficial saphenous vein thrombosis. Patient is seen for evaluation. Current Outpatient Prescriptions   Medication Sig Dispense Refill    cholecalciferol (VITAMIN D3) 1,000 unit cap Take 1 tablet by mouth daily,      LORazepam (ATIVAN) 0.5 mg tablet Take 0.5 mg by mouth every 8 hours as needed for Anxiety,      OT ULTRA/FASTTRACK CONTROL soln       ONETOUCH ULTRA2 monitoring kit       diclofenac (VOLTAREN) 1 % gel APPLY 2 GRAMS TO THE AFFECTED JOINT 3 TO 4 TIMES PER DAY  1    ONE TOUCH DELICA 33 gauge misc       tiZANidine (ZANAFLEX) 2 mg capsule TK 1 C PO BID PRN  1    traZODone (DESYREL) 50 mg tablet Take 1 Tab by mouth nightly. 30 Tab 5    bisoprolol-hydroCHLOROthiazide (ZIAC) 10-6.25 mg per tablet Take 1 Tab by mouth daily. 90 Tab 2    amLODIPine (NORVASC) 10 mg tablet Take 1 Tab by mouth daily. 90 Tab 2    MICROLET LANCET misc USE TO TEST BLOOD SUGAR EVERY  Each 11    CONTOUR NEXT STRIPS strip USE TO TEST BLOOD SUGARS ONCE DAILY 50 Strip 11    cyanocobalamin (VITAMIN B-12) 1,000 mcg tablet Take 1,000 mcg by mouth daily.        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 Nelta Penton    HX COLONOSCOPY      HX ORTHOPAEDIC  2009    foot surgery-left    HX OTHER SURGICAL      right and left leg muscle biopsies     Allergies   Allergen Reactions    Aleve [Naproxen Sodium] Hoarseness    Dilaudid [Hydromorphone (Bulk)] Anaphylaxis     Respiratory arrest and throat closes    Diovan [Valsartan] Palpitations    Morphine Other (comments)     Patch-vomiting,weakness, fainting    Sulfa (Sulfonamide Antibiotics) Hives, Rash and Other (comments)     fainting         REVIEW OF SYSTEMS:  General: negative for - chills or fever  ENT: negative for - headaches, nasal congestion or tinnitus  Respiratory: negative for - cough, hemoptysis, shortness of breath or wheezing  Cardiovascular : negative for - chest pain, edema, palpitations or shortness of breath  Gastrointestinal: negative for - abdominal pain, blood in stools, heartburn or nausea/vomiting  Genito-Urinary: no dysuria, trouble voiding, or hematuria  Musculoskeletal: negative for - gait disturbance, joint pain, joint stiffness or joint swelling  Neurological: no TIA or stroke symptoms  Hematologic: no bruises, no bleeding, no swollen glands  Integument: no lumps, mole changes, nail changes or rash  Endocrine: no malaise/lethargy or unexpected weight changes      Social History     Social History    Marital status:      Spouse name: N/A    Number of children: N/A    Years of education: N/A     Social History Main Topics    Smoking status: Never Smoker    Smokeless tobacco: None    Alcohol use No    Drug use: None    Sexual activity: Not Asked     Other Topics Concern    None     Social History Narrative         Family History:  Mother: alive 80 yrs, High Blood Pressure, cva with cognitive deficitsFather:  36 yrs, myocardial infarctionSister(s): aliveBrother(s): unknow n2    sister(s) . 40 daughter no choildren -  walked out works for board of directors for [employer] . Social History: Alcohol Use Patient does not use alcohol. Smoking Status Patient is a never smoker. Caffeine: occasional. Drugs:    prescription narcotics. Diet: Regular. Exercise: None. Marital Status: . Lives w ith: spouse. Children: children. Scientologist: Danial Spurling. Patient is  living w ith her  she is on disability related to her fibromyositis. Family History   Problem Relation Age of Onset    Hypertension Mother     Kidney Disease Mother      1 kidney    Heart Disease Father        OBJECTIVE:    Visit Vitals    /85 (BP 1 Location: Right arm, BP Patient Position: Sitting)    Pulse 71    Temp 97.8 °F (36.6 °C) (Oral)    Resp 16    Ht 5' 2\" (1.575 m)    Wt 181 lb (82.1 kg)    SpO2 97%    BMI 33.11 kg/m2     CONSTITUTIONAL: well , well nourished, appears age appropriate  EYES: perrla, eom intact  ENMT:moist mucous membranes, pharynx clear  NECK: supple. Thyroid normal  RESPIRATORY: Chest: clear bilaterally   CARDIOVASCULAR: Heart: regular rate and rhythm  GASTROINTESTINAL: Abdomen: soft, bowel sounds active  HEMATOLOGIC: no pathological lymph nodes palpated  MUSCULOSKELETAL: Extremities: no edema, pulse 1+   INTEGUMENT: No unusual rashes or suspicious skin lesions noted. Nails appear normal.  NEUROLOGIC: non-focal exam   MENTAL STATUS: alert and oriented, appropriate affect           ASSESSMENT:  1. Saphenous vein occlusion, right    2. Positive D dimer    3. Gait instability    4. Irritable bowel syndrome without diarrhea    5. Chronic pain syndrome    6. Fibromyositis    7. Dyslipidemia    8. Worries     9. Abnormal finding of blood chemistry       We advise her that the D-Dimer would likely be positive because of the superficial saphenous vein thrombosis.   We will do it at her request.  We will also check a lipid panel, urinalysis and a hemoglobin A1C since those are studies that have not been ordered by her rheumatologist, Dr. Romy Scott. Regarding the ablation we advise her of the questions that she should ask and concerns she should have. They apparently have recommended a blood thinner but she declined. I think the chronic fatigue she is complaining of is probably related to fibromyalgia in addition to that diagnosis. We encourage her to be active as much as she can for 30 minutes five days a week and to continue to use a CPAP at night. PLAN:  .  Orders Placed This Encounter    OCCULT BLOOD, IMMUNOASSAY (FIT)    LIPID PANEL    TSH 3RD GENERATION    URINALYSIS W/ RFLX MICROSCOPIC    HEMOGLOBIN A1C WITH EAG    MAGNESIUM    D DIMER    cholecalciferol (VITAMIN D3) 1,000 unit cap    DISCONTD: diphenhydrAMINE (BENADRYL) 25 mg capsule    DISCONTD: oxyCODONE-acetaminophen (PERCOCET) 5-325 mg per tablet    DISCONTD: RABEprazole (ACIPHEX) 20 mg tablet    DISCONTD: warfarin (COUMADIN) 2.5 mg tablet    DISCONTD: warfarin (COUMADIN) 5 mg tablet    DISCONTD: amLODIPine (NORVASC) 2.5 mg tablet    DISCONTD: bisoprolol-hydroCHLOROthiazide (ZIAC) 5-6.25 mg per tablet    LORazepam (ATIVAN) 0.5 mg tablet    DISCONTD: traZODone (DESYREL) 50 mg tablet    OT ULTRA/FASTTRACK CONTROL soln    ONETOUCH ULTRA2 monitoring kit    diclofenac (VOLTAREN) 1 % gel    ONE TOUCH DELICA 33 gauge misc    tiZANidine (ZANAFLEX) 2 mg capsule    traZODone (DESYREL) 50 mg tablet       Follow-up Disposition:  Return in about 2 months (around 8/22/2017). ATTENTION:   This medical record was transcribed using an electronic medical records system. Although proofread, it may and can contain electronic and spelling errors. Other human spelling and other errors may be present. Corrections may be executed at a later time. Please feel free to contact us for any clarifications as needed.

## 2017-06-22 NOTE — PROGRESS NOTES
1. Have you been to the ER, urgent care clinic since your last visit? Hospitalized since your last visit? No    2. Have you seen or consulted any other health care providers outside of the [address] [place] [address] since your last visit? Include any pap smears or colon screening.  No

## 2017-06-23 DIAGNOSIS — I82.811: Primary | ICD-10-CM

## 2017-06-23 LAB
CHOLEST SERPL-MCNC: 194 MG/DL (ref 100–199)
D DIMER PPP FEU-MCNC: 1.92 MG/L FEU (ref 0–0.49)
EST. AVERAGE GLUCOSE BLD GHB EST-MCNC: 128 MG/DL
HBA1C MFR BLD: 6.1 % (ref 4.8–5.6)
HDLC SERPL-MCNC: 53 MG/DL
LDLC SERPL CALC-MCNC: 119 MG/DL (ref 0–99)
MAGNESIUM SERPL-MCNC: 2.1 MG/DL (ref 1.6–2.3)
TRIGL SERPL-MCNC: 110 MG/DL (ref 0–149)
TSH SERPL DL<=0.005 MIU/L-ACNC: 1.48 UIU/ML (ref 0.45–4.5)
VLDLC SERPL CALC-MCNC: 22 MG/DL (ref 5–40)

## 2017-06-23 RX ORDER — DABIGATRAN ETEXILATE 150 MG/1
150 CAPSULE ORAL 2 TIMES DAILY
Qty: 60 CAP | Refills: 3 | Status: SHIPPED | OUTPATIENT
Start: 2017-06-23 | End: 2018-03-14 | Stop reason: CLARIF

## 2017-06-28 PROBLEM — Z12.11 ENCOUNTER FOR HEMOCCULT SCREENING: Status: ACTIVE | Noted: 2017-06-28

## 2017-06-28 LAB — HEMOCCULT STL QL IA: NEGATIVE

## 2017-08-17 ENCOUNTER — OFFICE VISIT (OUTPATIENT)
Dept: INTERNAL MEDICINE CLINIC | Age: 62
End: 2017-08-17

## 2017-08-17 VITALS
TEMPERATURE: 97.1 F | WEIGHT: 180 LBS | HEART RATE: 67 BPM | DIASTOLIC BLOOD PRESSURE: 86 MMHG | HEIGHT: 62 IN | SYSTOLIC BLOOD PRESSURE: 143 MMHG | RESPIRATION RATE: 16 BRPM | OXYGEN SATURATION: 96 % | BODY MASS INDEX: 33.13 KG/M2

## 2017-08-17 DIAGNOSIS — I26.99 PULMONARY EMBOLISM ON RIGHT (HCC): ICD-10-CM

## 2017-08-17 DIAGNOSIS — I10 ESSENTIAL HYPERTENSION: ICD-10-CM

## 2017-08-17 DIAGNOSIS — E78.5 DYSLIPIDEMIA: ICD-10-CM

## 2017-08-17 DIAGNOSIS — M79.7 FIBROMYOSITIS: Primary | ICD-10-CM

## 2017-08-17 DIAGNOSIS — I82.811: ICD-10-CM

## 2017-08-17 DIAGNOSIS — G89.4 CHRONIC PAIN SYNDROME: ICD-10-CM

## 2017-08-17 NOTE — PROGRESS NOTES
SPORTS MEDICINE AND PRIMARY CARE  Cristiano Siegel MD, [address] [place] [address],[address] [address]  Phone:  [phone]  Fax: [phone]       Chief Complaint   Patient presents with    Blood Clot     elevated D dimer. Patient has questions reguarding lab results    Fibromyalgia     follow up    . SUBJECTIVE:    Mariella Howell is a 59 y.o. female Patient returns today alert, appropriate, ambulatory, with an assistive device, and has the capacity to give an accurate history. Since we last saw her she was seen by rheumatology, Daren Noe M.D. with saphenous vein phlebitis, history of pulmonary embolus, fibromyalgia and hypertension. Blood studies were requested and apparently were unremarkable. She has a known history of white coat hypertension in addition, sleep apnea, restless leg syndrome, prediabetes and primary hypertension. The rheumatologist recommended hematology referral to \"find out where the blood clots are coming from\". Patient is concerned because they found clots below her knee, as well as a superficial saphenous vein phlebitis and wonders if she should be on a blood thinner. We recall that she had an idiopathic pulmonary embolus in 2013, for which she was on Coumadin for six months. Hematological referral .    Patient continues to have fatigue and tiredness. She continues to have pain in her extremities and balance issues. The balance issues are the reason she is using the roll-about walker. Patient is seen for evaluation.            Current Outpatient Prescriptions   Medication Sig Dispense Refill    cholecalciferol (VITAMIN D3) 1,000 unit cap Take 1 tablet by mouth daily,      LORazepam (ATIVAN) 0.5 mg tablet Take 0.5 mg by mouth every 8 hours as needed for Anxiety,      OT ULTRA/FASTTRACK CONTROL soln       ONETOUCH ULTRA2 monitoring kit       diclofenac (VOLTAREN) 1 % gel APPLY 2 GRAMS TO THE AFFECTED JOINT 3 TO 4 TIMES PER DAY  1    ONE TOUCH DELICA 33 gauge misc       tiZANidine (ZANAFLEX) 2 mg capsule TK 1 C PO BID PRN  1    traZODone (DESYREL) 50 mg tablet Take 1 Tab by mouth nightly. 30 Tab 5    bisoprolol-hydroCHLOROthiazide (ZIAC) 10-6.25 mg per tablet Take 1 Tab by mouth daily. 90 Tab 2    amLODIPine (NORVASC) 10 mg tablet Take 1 Tab by mouth daily. 90 Tab 2    MICROLET LANCET misc USE TO TEST BLOOD SUGAR EVERY  Each 11    CONTOUR NEXT STRIPS strip USE TO TEST BLOOD SUGARS ONCE DAILY 50 Strip 11    cyanocobalamin (VITAMIN B-12) 1,000 mcg tablet Take 1,000 mcg by mouth daily.  dabigatran etexilate (PRADAXA) 150 mg capsule Take 1 Cap by mouth two (2) times a day. 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 Francis Esteves    HX COLONOSCOPY      HX ORTHOPAEDIC  2009    foot surgery-left    HX OTHER SURGICAL      right and left leg muscle biopsies     Allergies   Allergen Reactions    Aleve [Naproxen Sodium] Hoarseness    Dilaudid [Hydromorphone (Bulk)] Anaphylaxis     Respiratory arrest and throat closes    Diovan [Valsartan] Palpitations    Morphine Other (comments)     Patch-vomiting,weakness, fainting    Sulfa (Sulfonamide Antibiotics) Hives, Rash and Other (comments)     fainting         REVIEW OF SYSTEMS:  General: negative for - chills or fever  ENT: negative for - headaches, nasal congestion or tinnitus  Respiratory: negative for - cough, hemoptysis, shortness of breath or wheezing  Cardiovascular : negative for - chest pain, edema, palpitations or shortness of breath  Gastrointestinal: negative for - abdominal pain, blood in stools, heartburn or nausea/vomiting  Genito-Urinary: no dysuria, trouble voiding, or hematuria  Musculoskeletal: negative for - gait disturbance, joint pain, joint stiffness or joint swelling  Neurological: no TIA or stroke symptoms  Hematologic: no bruises, no bleeding, no swollen glands  Integument: no lumps, mole changes, nail changes or rash  Endocrine: no malaise/lethargy or unexpected weight changes      Social History     Social History    Marital status:      Spouse name: N/A    Number of children: N/A    Years of education: N/A     Social History Main Topics    Smoking status: Never Smoker    Smokeless tobacco: Never Used    Alcohol use No    Drug use: No    Sexual activity: Not Asked     Other Topics Concern    None     Social History Narrative         Family History: Mother: alive 80 yrs, High Blood Pressure, cva with cognitive deficitsFather:  36 yrs, myocardial infarctionSister(s): aliveBrother(s): unknow n2    sister(s) . 40 daughter no choildren -  walked out works for board of directors for [employer] . Social History: Alcohol Use Patient does not use alcohol. Smoking Status Patient is a never smoker. Caffeine: occasional. Drugs:    prescription narcotics. Diet: Regular. Exercise: None. Marital Status: . Lives w ith: spouse. Children: children. Temple: [employer]. Patient is  living w ith her  she is on disability related to her fibromyositis.      Family History   Problem Relation Age of Onset    Hypertension Mother     Kidney Disease Mother      1 kidney    Heart Disease Father        OBJECTIVE:    Visit Vitals    /86 (BP 1 Location: Left arm, BP Patient Position: Sitting)    Pulse 67    Temp 97.1 °F (36.2 °C) (Oral)    Resp 16    Ht 5' 2\" (1.575 m)    Wt 180 lb (81.6 kg)    SpO2 96%    BMI 32.92 kg/m2     CONSTITUTIONAL: well , well nourished, appears age appropriate  EYES: perrla, eom intact  ENMT:moist mucous membranes, pharynx clear  NECK: supple. Thyroid normal  RESPIRATORY: Chest: clear bilaterally   CARDIOVASCULAR: Heart: regular rate and rhythm  GASTROINTESTINAL: Abdomen: soft, bowel sounds active  HEMATOLOGIC: no pathological lymph nodes palpated  MUSCULOSKELETAL: Extremities: no edema, pulse 1+   INTEGUMENT: No unusual rashes or suspicious skin lesions noted. Nails appear normal.  NEUROLOGIC: non-focal exam   MENTAL STATUS: alert and oriented, appropriate affect           ASSESSMENT:  1. Fibromyositis    2. Pulmonary embolism on right ([address]    3. Essential hypertension    4. Chronic pain syndrome    5. Saphenous vein occlusion, right    6. Dyslipidemia      Patient has know history pulmonary embolus and a saphenous vein phlebitis. Question of lifetime anticoagulant is raised and we will ask the hematologist to assist in making that decision. Blood pressure is at the upper limits of normal.    She is tolerating the pain. She'd prefer not to be on chronic narcotics. She'll return to see us in two months, sooner if needed. We note that her Hgb A1c is compatible with prediabetes and appropriate precautions are noted. PLAN:  .  Orders Placed This Encounter    REFERRAL TO HEMATOLOGY ONCOLOGY       Follow-up Disposition:  Return in about 3 months (around 11/17/2017). ATTENTION:   This medical record was transcribed using an electronic medical records system. Although proofread, it may and can contain electronic and spelling errors.   Other human spelling and other errors may be present. Corrections may be executed at a later time. Please feel free to contact us for any clarifications as needed.

## 2017-08-17 NOTE — MR AVS SNAPSHOT
Visit Information Date & Time Provider Department Dept. Phone Encounter #  
 8/17/2017  9:00 AM Martínez Balderramadenise Bean 80 Sports Medicine and [place] [id number] Follow-up Instructions Return in about 3 months (around 11/17/2017). Follow-up and Disposition History Your Appointments 10/18/2017  9:00 AM  
Any with Loraine Arriaga MD  
[address] [place] [address] and Primary Care [facility] Appt Note: 3 month follow up  
 [facility]  
  
   
 Kimmie Johns 90 [id number] Upcoming Health Maintenance Date Due  
 PAP AKA CERVICAL CYTOLOGY 9/9/2017 DTaP/Tdap/Td series (1 - Tdap) 12/31/2017* INFLUENZA AGE 9 TO ADULT 12/31/2017* FOBT Q 1 YEAR AGE 50-75 6/22/2018 BREAST CANCER SCRN MAMMOGRAM 10/12/2018 *Topic was postponed. The date shown is not the original due date. Allergies as of 8/17/2017  Review Complete On: 8/17/2017 By: Loraine Arriaga MD  
  
 Severity Noted Reaction Type Reactions Aleve [Naproxen Sodium] High 07/22/2016    Hoarseness Dilaudid [Hydromorphone (Bulk)] High 08/15/2011    Anaphylaxis Respiratory arrest and throat closes Diovan [Valsartan]  09/22/2015    Palpitations Morphine  08/15/2011    Other (comments) Patch-vomiting,weakness, fainting Sulfa (Sulfonamide Antibiotics)  08/15/2011    Hives, Rash, Other (comments)  
 fainting Current Immunizations  Reviewed on 4/2/2014 No immunizations on file. Not reviewed this visit You Were Diagnosed With   
  
 Codes Comments Fibromyositis    -  Primary ICD-10-CM: M79.7 ICD-9-CM: 729.1 Pulmonary embolism on right [facility])     ICD-10-CM: I26.99 
ICD-9-CM: 415.19 Essential hypertension     ICD-10-CM: I10 
ICD-9-CM: 401.9 Chronic pain syndrome     ICD-10-CM: G89.4 ICD-9-CM: 338.4  Saphenous vein occlusion, right     ICD-10-CM: J03.926 
 ICD-9-CM: 453.6 Dyslipidemia     ICD-10-CM: E78.5 ICD-9-CM: 272.4 Vitals BP Pulse Temp Resp Height(growth percentile) Weight(growth percentile) 143/86 (BP 1 Location: Left arm, BP Patient Position: Sitting) 67 97.1 °F (36.2 °C) (Oral) 16 5' 2\" (1.575 m) 180 lb (81.6 kg) SpO2 BMI OB Status Smoking Status 96% 32.92 kg/m2 Postmenopausal Never Smoker BMI and BSA Data Body Mass Index Body Surface Area  
 32.92 kg/m 2 1.89 m 2 Preferred Pharmacy Pharmacy Name Phone [facility] [phone] - [address], [address] AT [place] [address] [phone] Your Updated Medication List  
  
   
This list is accurate as of: 8/17/17 10:44 AM.  Always use your most recent med list. amLODIPine 10 mg tablet Commonly known as:  Erenest Renetta Take 1 Tab by mouth daily. bisoprolol-hydroCHLOROthiazide 10-6.25 mg per tablet Commonly known as:  [facility] Take 1 Tab by mouth daily. cholecalciferol 1,000 unit Cap Commonly known as:  VITAMIN D3 Take 1 tablet by mouth daily, CONTOUR NEXT STRIPS strip Generic drug:  glucose blood VI test strips USE TO TEST BLOOD SUGARS ONCE DAILY  
  
 dabigatran etexilate 150 mg capsule Commonly known as:  PRADAXA Take 1 Cap by mouth two (2) times a day. diclofenac 1 % Gel Commonly known as:  VOLTAREN  
APPLY 2 GRAMS TO THE AFFECTED JOINT 3 TO 4 TIMES PER DAY LORazepam 0.5 mg tablet Commonly known as:  ATIVAN Take 0.5 mg by mouth every 8 hours as needed for Anxiety,  
  
 * [facility] Generic drug:  Lancets USE TO TEST BLOOD SUGAR EVERY DAY  
  
 * One Touch Delica 33 gauge Misc Generic drug:  lancets ONETOUCH ULTRA2 monitoring kit Generic drug:  Blood-Glucose Meter OT Ultra/FastTrack Control Soln Generic drug:  Blood Glucose Control, Normal  
  
 tiZANidine 2 mg capsule Commonly known as:  Bleacher Report Player TK 1 C PO BID PRN  
  
 traZODone 50 mg tablet Commonly known as:  Samule Grills Take 1 Tab by mouth nightly. VITAMIN B-12 1,000 mcg tablet Generic drug:  cyanocobalamin Take 1,000 mcg by mouth daily. * Notice: This list has 2 medication(s) that are the same as other medications prescribed for you. Read the directions carefully, and ask your doctor or other care provider to review them with you. We Performed the Following REFERRAL TO HEMATOLOGY ONCOLOGY [WUV24 Custom] Comments:  
 Please evaluate patient for dvt. Follow-up Instructions Return in about 3 months (around 11/17/2017). Referral Information Referral ID Referred By Referred To  
  
 [id number] Clyde Hutchins MD   
   [address] [facility], [address] Phone: [phone] Fax: [phone] Visits Status Start Date End Date 1 New Request 8/17/17 8/17/18 If your referral has a status of pending review or denied, additional information will be sent to support the outcome of this decision. Introducing [facility] & HEALTH SERVICES! Sivakumar Toscano introduces [employer] patient portal. Now you can access parts of your medical record, email your doctor's office, and request medication refills online. 1. In your internet browser, go to [email or web address] 2. Click on the First Time User? Click Here link in the Sign In box. You will see the New Member Sign Up page. 3. Enter your [employer] Access Code exactly as it appears below. You will not need to use this code after youve completed the sign-up process. If you do not sign up before the expiration date, you must request a new code. · [employer] Access Code: [email or web address] Expires: 9/20/2017 10:32 AM 
 
4. Enter the last four digits of your Social Security Number (xxxx) and Date of Birth (mm/dd/yyyy) as indicated and click Submit.  You will be taken to the next sign-up page. 5. Create a [employer] ID. This will be your [employer] login ID and cannot be changed, so think of one that is secure and easy to remember. 6. Create a [employer] password. You can change your password at any time. 7. Enter your Password Reset Question and Answer. This can be used at a later time if you forget your password. 8. Enter your e-mail address. You will receive e-mail notification when new information is available in [address] 9. Click Sign Up. You can now view and download portions of your medical record. 10. Click the Download Summary menu link to download a portable copy of your medical information. If you have questions, please visit the Frequently Asked Questions section of the [employer] website. Remember, [employer] is NOT to be used for urgent needs. For medical emergencies, dial 911. Now available from your iPhone and Android! Please provide this summary of care documentation to your next provider. Your primary care clinician is listed as [place]. If you have any questions after today's visit, please call [phone].

## 2017-10-18 ENCOUNTER — OFFICE VISIT (OUTPATIENT)
Dept: INTERNAL MEDICINE CLINIC | Age: 62
End: 2017-10-18

## 2017-10-18 VITALS
WEIGHT: 186 LBS | RESPIRATION RATE: 18 BRPM | DIASTOLIC BLOOD PRESSURE: 81 MMHG | HEIGHT: 62 IN | BODY MASS INDEX: 34.23 KG/M2 | OXYGEN SATURATION: 95 % | SYSTOLIC BLOOD PRESSURE: 122 MMHG | TEMPERATURE: 97.9 F | HEART RATE: 68 BPM

## 2017-10-18 DIAGNOSIS — R79.89 POSITIVE D DIMER: ICD-10-CM

## 2017-10-18 DIAGNOSIS — M79.7 FIBROMYOSITIS: Primary | ICD-10-CM

## 2017-10-18 DIAGNOSIS — I10 ESSENTIAL HYPERTENSION, BENIGN: ICD-10-CM

## 2017-10-18 DIAGNOSIS — I82.811: ICD-10-CM

## 2017-10-18 DIAGNOSIS — G89.4 CHRONIC PAIN SYNDROME: ICD-10-CM

## 2017-10-18 NOTE — MR AVS SNAPSHOT
Visit Information Date & Time Provider Department Dept. Phone Encounter #  
 10/18/2017  9:00 AM Marely Godinez 80 Sports Medicine and Primary Care [phone] [id number] Follow-up Instructions Return in about 2 months (around 12/18/2017). Your Appointments 12/13/2017  9:00 AM  
Any with Domingo Granger MD  
[address] [place] [address] and Primary Care [facility]) Appt Note: f/u check up Kimmie Johns [address] [facility]  
  
   
 Kimmie Johns 90 [id number] Upcoming Health Maintenance Date Due  
 PAP AKA CERVICAL CYTOLOGY 9/9/2017 DTaP/Tdap/Td series (1 - Tdap) 12/31/2017* INFLUENZA AGE 9 TO ADULT 12/31/2017* FOBT Q 1 YEAR AGE 50-75 6/22/2018 BREAST CANCER SCRN MAMMOGRAM 10/12/2018 *Topic was postponed. The date shown is not the original due date. Allergies as of 10/18/2017  Review Complete On: 10/18/2017 By: Domingo Granger MD  
  
 Severity Noted Reaction Type Reactions Aleve [Naproxen Sodium] High 07/22/2016    Hoarseness Dilaudid [Hydromorphone (Bulk)] High 08/15/2011    Anaphylaxis Respiratory arrest and throat closes Diovan [Valsartan]  09/22/2015    Palpitations Morphine  08/15/2011    Other (comments) Patch-vomiting,weakness, fainting Sulfa (Sulfonamide Antibiotics)  08/15/2011    Hives, Rash, Other (comments)  
 fainting Current Immunizations  Reviewed on 4/2/2014 No immunizations on file. Not reviewed this visit You Were Diagnosed With   
  
 Codes Comments Fibromyositis    -  Primary ICD-10-CM: M79.7 ICD-9-CM: 729.1 Essential hypertension, benign     ICD-10-CM: I10 
ICD-9-CM: 412. 1 Chronic pain syndrome     ICD-10-CM: G89.4 ICD-9-CM: 338.4 Saphenous vein occlusion, right     ICD-10-CM: H45.942 ICD-9-CM: 453.6 Positive D dimer     ICD-10-CM: R79.89 ICD-9-CM: 790.92 Vitals BP Pulse Temp Resp Height(growth percentile) Weight(growth percentile) 122/81 68 97.9 °F (36.6 °C) (Oral) 18 5' 2\" (1.575 m) 186 lb (84.4 kg) SpO2 BMI OB Status Smoking Status 95% 34.02 kg/m2 Postmenopausal Never Smoker BMI and BSA Data Body Mass Index Body Surface Area 34.02 kg/m 2 1.92 m 2 Preferred Pharmacy Pharmacy Name Phone [facility] [address] [phone] - [address], [address] AT [place] [address] [phone] Your Updated Medication List  
  
   
This list is accurate as of: 10/18/17 10:11 AM.  Always use your most recent med list. amLODIPine 10 mg tablet Commonly known as:  Colin Crump Take 1 Tab by mouth daily. bisoprolol-hydroCHLOROthiazide 10-6.25 mg per tablet Commonly known as:  [facility] Take 1 Tab by mouth daily. cholecalciferol 1,000 unit Cap Commonly known as:  VITAMIN D3 Take 1 tablet by mouth daily, CONTOUR NEXT STRIPS strip Generic drug:  glucose blood VI test strips USE TO TEST BLOOD SUGARS ONCE DAILY  
  
 dabigatran etexilate 150 mg capsule Commonly known as:  PRADAXA Take 1 Cap by mouth two (2) times a day. diclofenac 1 % Gel Commonly known as:  VOLTAREN  
APPLY 2 GRAMS TO THE AFFECTED JOINT 3 TO 4 TIMES PER DAY LORazepam 0.5 mg tablet Commonly known as:  ATIVAN Take 0.5 mg by mouth every 8 hours as needed for Anxiety,  
  
 * [facility] Generic drug:  Lancets USE TO TEST BLOOD SUGAR EVERY DAY  
  
 * One Touch Delica 33 gauge Misc Generic drug:  lancets ONETOUCH ULTRA2 monitoring kit Generic drug:  Blood-Glucose Meter OT Ultra/FastTrack Control Soln Generic drug:  Blood Glucose Control, Normal  
  
 tiZANidine 2 mg capsule Commonly known as:  Gerald Crespo TK 1 C PO BID PRN  
  
 traZODone 50 mg tablet Commonly known as:  Jack Joe Take 1 Tab by mouth nightly. VITAMIN B-12 1,000 mcg tablet Generic drug:  cyanocobalamin Take 1,000 mcg by mouth daily. * Notice: This list has 2 medication(s) that are the same as other medications prescribed for you. Read the directions carefully, and ask your doctor or other care provider to review them with you. Follow-up Instructions Return in about 2 months (around 12/18/2017). Introducing [facility] SERVICES! Raymond Spencer introduces [employer] patient portal. Now you can access parts of your medical record, email your doctor's office, and request medication refills online. 1. In your internet browser, go to [email or web address] 2. Click on the First Time User? Click Here link in the Sign In box. You will see the New Member Sign Up page. 3. Enter your [employer] Access Code exactly as it appears below. You will not need to use this code after youve completed the sign-up process. If you do not sign up before the expiration date, you must request a new code. · [employer] Access Code: [email or web address] Expires: 1/16/2018 10:10 AM 
 
4. Enter the last four digits of your Social Security Number (xxxx) and Date of Birth (mm/dd/yyyy) as indicated and click Submit. You will be taken to the next sign-up page. 5. Create a [employer] ID. This will be your [employer] login ID and cannot be changed, so think of one that is secure and easy to remember. 6. Create a [employer] password. You can change your password at any time. 7. Enter your Password Reset Question and Answer. This can be used at a later time if you forget your password. 8. Enter your e-mail address. You will receive e-mail notification when new information is available in [address] 9. Click Sign Up. You can now view and download portions of your medical record. 10. Click the Download Summary menu link to download a portable copy of your medical information.  
 
If you have questions, please visit the Frequently Asked Questions section of the [employer] Remember, [facility] is NOT to be used for urgent needs. For medical emergencies, dial 911. Now available from your iPhone and Android! Please provide this summary of care documentation to your next provider. Your primary care clinician is listed as Hemant Lizarraga. If you have any questions after today's visit, please call [phone].

## 2017-10-18 NOTE — PROGRESS NOTES
SPORTS MEDICINE AND PRIMARY CARE  Stephan Wise MD, [address] [address]  Phone:  [phone]  Fax: [phone]       Chief Complaint   Patient presents with    Fibromyalgia     f/u   . SUBJECTIVE:    Joni Koyanagi is a 59 y.o. female Patient returns today alert, appropriate, ambulatory and has the capacity to give an accurate history. She is ambulatory with an assistive device. Patient has a known history of depression, anxiety, fibromyalgia, gait instability, hypertension, IBS, white coat hypertension, and is seen for evaluation. Since we last saw her she was seen by RICCARDO Robles, noting that Dr. Haylie Peng had been considering lifelong anticoagulation and doppler studies on 09/21/17 showed right greater saphenous vein thrombosis, as well as DVT in the left tibial vein, both of the thrombosis appeared acute. D-dimer was also elevated at 1.89 and  Eliquis for anticoagulation. Patient's primary concern is related to pain. She is using Tylenol, but that is not really doing the trick. She is still having the pain. She is walking, but is not walking as well as she used to. Because of the number of medical problems that she has and other issues in her life, at times she notes she gets despondent. Current Outpatient Prescriptions   Medication Sig Dispense Refill    cholecalciferol (VITAMIN D3) 1,000 unit cap Take 1 tablet by mouth daily,      OT ULTRA/FASTTRACK CONTROL soln       ONETOUCH ULTRA2 monitoring kit       diclofenac (VOLTAREN) 1 % gel APPLY 2 GRAMS TO THE AFFECTED JOINT 3 TO 4 TIMES PER DAY  1    ONE TOUCH DELICA 33 gauge misc       tiZANidine (ZANAFLEX) 2 mg capsule TK 1 C PO BID PRN  1    traZODone (DESYREL) 50 mg tablet Take 1 Tab by mouth nightly. 30 Tab 5    bisoprolol-hydroCHLOROthiazide (ZIAC) 10-6.25 mg per tablet Take 1 Tab by mouth daily. 90 Tab 2    amLODIPine (NORVASC) 10 mg tablet Take 1 Tab by mouth daily.  90 Tab 2    MICROLET LANCET misc USE TO TEST BLOOD SUGAR EVERY  Each 11    CONTOUR NEXT STRIPS strip USE TO TEST BLOOD SUGARS ONCE DAILY 50 Strip 11    cyanocobalamin (VITAMIN B-12) 1,000 mcg tablet Take 1,000 mcg by mouth daily.  dabigatran etexilate (PRADAXA) 150 mg capsule Take 1 Cap by mouth two (2) times a day. 60 Cap 3    LORazepam (ATIVAN) 0.5 mg tablet Take 0.5 mg by mouth every 8 hours as needed for Anxiety,       Past Medical History:   Diagnosis Date    Depression with anxiety     Dyslipidemia     Encounter for Hemoccult screening 06/28/2017    neg    Fibromyalgia     Gait instability     GERD (gastroesophageal reflux disease)     Hypertension     IBS (irritable bowel syndrome)     Mastodynia, left greater than right 8/17/2011    Normal cardiac stress test 3.14    Positive D dimer 9-23-15    Prediabetes     Pulmonary embolism ([facility] [address] 03/2013    rll    Restless leg syndrome     S/P colonoscopy 01/27/2011    kenny hernandez md    Saphenous vein occlusion, 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 Namita Waters    HX COLONOSCOPY      HX ORTHOPAEDIC  2009    foot surgery-left    HX OTHER SURGICAL      right and left leg muscle biopsies     Allergies   Allergen Reactions    Aleve [Naproxen Sodium] Hoarseness    Dilaudid [Hydromorphone (Bulk)] Anaphylaxis     Respiratory arrest and throat closes    Diovan [Valsartan] Palpitations    Morphine Other (comments)     Patch-vomiting,weakness, fainting    Sulfa (Sulfonamide Antibiotics) Hives, Rash and Other (comments)     fainting         REVIEW OF SYSTEMS:  General: negative for - chills or fever  ENT: negative for - headaches, nasal congestion or tinnitus  Respiratory: negative for - cough, hemoptysis, shortness of breath or wheezing  Cardiovascular : negative for - chest pain, edema, palpitations or shortness of breath  Gastrointestinal: negative for - abdominal pain, blood in stools, heartburn or nausea/vomiting  Genito-Urinary: no dysuria, trouble voiding, or hematuria  Musculoskeletal: negative for - gait disturbance, joint pain, joint stiffness or joint swelling  Neurological: no TIA or stroke symptoms  Hematologic: no bruises, no bleeding, no swollen glands  Integument: no lumps, mole changes, nail changes or rash  Endocrine: no malaise/lethargy or unexpected weight changes      Social History     Social History    Marital status:      Spouse name: N/A    Number of children: N/A    Years of education: N/A     Social History Main Topics    Smoking status: Never Smoker    Smokeless tobacco: Never Used    Alcohol use No    Drug use: No    Sexual activity: Not Asked     Other Topics Concern    None     Social History Narrative         Family History: Mother: alive 80 yrs, High Blood Pressure, cva with cognitive deficitsFather:  36 yrs, myocardial infarctionSister(s): aliveBrother(s): unknow n2    sister(s) . 40 daughter no choildren -  walked out works for board of directors for [employer] . Social History: Alcohol Use Patient does not use alcohol. Smoking Status Patient is a never smoker. Caffeine: occasional. Drugs:    prescription narcotics. Diet: Regular. Exercise: None. Marital Status: . Lives w ith: spouse. Children: children. Yarsani: Loel Maul. Patient is  living w ith her  she is on disability related to her fibromyositis.      Family History   Problem Relation Age of Onset    Hypertension Mother     Kidney Disease Mother      1 kidney    Heart Disease Father        OBJECTIVE:    Visit Vitals    /81    Pulse 68    Temp 97.9 °F (36.6 °C) (Oral)    Resp 18    Ht 5' 2\" (1.575 m)    Wt 186 lb (84.4 kg)    SpO2 95%    BMI 34.02 kg/m2     CONSTITUTIONAL: well , well nourished, appears age appropriate  EYES: perrla, eom intact  ENMT:moist mucous membranes, pharynx clear  NECK: supple. Thyroid normal  RESPIRATORY: Chest: clear bilaterally   CARDIOVASCULAR: Heart: regular rate and rhythm  GASTROINTESTINAL: Abdomen: soft, bowel sounds active  HEMATOLOGIC: no pathological lymph nodes palpated  MUSCULOSKELETAL: Extremities: no edema, pulse 1+   INTEGUMENT: No unusual rashes or suspicious skin lesions noted. Nails appear normal.  NEUROLOGIC: non-focal exam   MENTAL STATUS: alert and oriented, appropriate affect           ASSESSMENT:  1. Fibromyositis    2. Essential hypertension, benign    3. Chronic pain syndrome    4. Saphenous vein occlusion, right    5. Positive D dimer      We agree with the need for lifetime anticoagulation with two separate episodes of DVT. The more recent study is noted above. The newer OACs would be the drugs of choice rather than Warfarin , and we discussed this with her. The lower extremity pain is related to her fibromyositis and will continue her management with Tylenol. Her blood pressure control is excellent. BMI continues to increase. We encourage continued physical activity and a heart healthy, weight reducing diet. She'll return to the office as previously scheduled. PLAN:    Follow-up Disposition:  Return in about 2 months (around 12/18/2017). ATTENTION:   This medical record was transcribed using an electronic medical records system. Although proofread, it may and can contain electronic and spelling errors. Other human spelling and other errors may be present. Corrections may be executed at a later time. Please feel free to contact us for any clarifications as needed.

## 2017-10-18 NOTE — PROGRESS NOTES
1. Have you been to the ER, urgent care clinic since your last visit? Hospitalized since your last visit? No    2. Have you seen or consulted any other health care providers outside of the [address] [place] [address] since your last visit? Include any pap smears or colon screening.  No     Complaints of body pain

## 2017-12-13 ENCOUNTER — OFFICE VISIT (OUTPATIENT)
Dept: INTERNAL MEDICINE CLINIC | Age: 62
End: 2017-12-13

## 2017-12-13 VITALS
BODY MASS INDEX: 34.17 KG/M2 | HEART RATE: 68 BPM | HEIGHT: 62 IN | SYSTOLIC BLOOD PRESSURE: 142 MMHG | OXYGEN SATURATION: 95 % | WEIGHT: 185.7 LBS | RESPIRATION RATE: 18 BRPM | DIASTOLIC BLOOD PRESSURE: 86 MMHG | TEMPERATURE: 97.9 F

## 2017-12-13 DIAGNOSIS — M79.7 FIBROMYOSITIS: Primary | ICD-10-CM

## 2017-12-13 DIAGNOSIS — I26.99 PULMONARY EMBOLISM ON RIGHT (HCC): ICD-10-CM

## 2017-12-13 DIAGNOSIS — M19.042 PRIMARY OSTEOARTHRITIS OF LEFT HAND: ICD-10-CM

## 2017-12-13 DIAGNOSIS — R73.02 IGT (IMPAIRED GLUCOSE TOLERANCE): ICD-10-CM

## 2017-12-13 NOTE — PROGRESS NOTES
SPORTS MEDICINE AND PRIMARY CARE  Khoi Briones MD, [address] [address]  Phone:  [phone]  Fax: [phone]       Chief Complaint   Patient presents with    Hypertension     f/u   . SUBJECTIVE:    Onelia Byrnes is a 57 y.o. female Patient returns today with known history of dyslipidemia, depression with anxiety, fibromyalgia, gait instability, GERD, hypertension, IBS, pulmonary embolism and obstructive sleep apnea, on CPAP, and white coat hypertension. Patient returns today not feeling well this morning. She notes that her left thumb is painful and does not have full ROM. She continues to have leg pains and shooting pains in her feet. She gets exhausted when she tries to do exercise, for example walking, and she remains on a blood thinner, Eliquis. Current Outpatient Prescriptions   Medication Sig Dispense Refill    cholecalciferol (VITAMIN D3) 1,000 unit cap Take 1 tablet by mouth daily,      OT ULTRA/FASTTRACK CONTROL soln       ONETOUCH ULTRA2 monitoring kit       diclofenac (VOLTAREN) 1 % gel APPLY 2 GRAMS TO THE AFFECTED JOINT 3 TO 4 TIMES PER DAY  1    ONE TOUCH DELICA 33 gauge misc       tiZANidine (ZANAFLEX) 2 mg capsule TK 1 C PO BID PRN  1    traZODone (DESYREL) 50 mg tablet Take 1 Tab by mouth nightly. 30 Tab 5    bisoprolol-hydroCHLOROthiazide (ZIAC) 10-6.25 mg per tablet Take 1 Tab by mouth daily. 90 Tab 2    amLODIPine (NORVASC) 10 mg tablet Take 1 Tab by mouth daily. 90 Tab 2    MICROLET LANCET misc USE TO TEST BLOOD SUGAR EVERY  Each 11    CONTOUR NEXT STRIPS strip USE TO TEST BLOOD SUGARS ONCE DAILY 50 Strip 11    cyanocobalamin (VITAMIN B-12) 1,000 mcg tablet Take 1,000 mcg by mouth daily.  dabigatran etexilate (PRADAXA) 150 mg capsule Take 1 Cap by mouth two (2) times a day.  60 Cap 3    LORazepam (ATIVAN) 0.5 mg tablet Take 0.5 mg by mouth every 8 hours as needed for Anxiety,       Past Medical History: Diagnosis Date    Depression with anxiety     DJD (degenerative joint disease)     Dyslipidemia     Encounter for Hemoccult screening 06/28/2017    neg    Fibromyalgia     Gait instability     GERD (gastroesophageal reflux disease)     Hypertension     IBS (irritable bowel syndrome)     Mastodynia, left greater than right 8/17/2011    Normal cardiac stress test 3.14    Positive D dimer 9-23-15    Prediabetes     Pulmonary embolism ([facility] [address] 03/2013    rll    Restless leg syndrome     S/P colonoscopy 01/27/2011    kenny hernandez md    Saphenous vein occlusion, 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 Shannon Rosas    HX COLONOSCOPY      HX ORTHOPAEDIC  2009    foot surgery-left    HX OTHER SURGICAL      right and left leg muscle biopsies     Allergies   Allergen Reactions    Aleve [Naproxen Sodium] Hoarseness    Dilaudid [Hydromorphone (Bulk)] Anaphylaxis     Respiratory arrest and throat closes    Diovan [Valsartan] Palpitations    Morphine Other (comments)     Patch-vomiting,weakness, fainting    Sulfa (Sulfonamide Antibiotics) Hives, Rash and Other (comments)     fainting         REVIEW OF SYSTEMS:  General: negative for - chills or fever  ENT: negative for - headaches, nasal congestion or tinnitus  Respiratory: negative for - cough, hemoptysis, shortness of breath or wheezing  Cardiovascular : negative for - chest pain, edema, palpitations or shortness of breath  Gastrointestinal: negative for - abdominal pain, blood in stools, heartburn or nausea/vomiting  Genito-Urinary: no dysuria, trouble voiding, or hematuria  Musculoskeletal: negative for - gait disturbance, joint pain, joint stiffness or joint swelling  Neurological: no TIA or stroke symptoms  Hematologic: no bruises, no bleeding, no swollen glands  Integument: no lumps, mole changes, nail changes or rash  Endocrine: no malaise/lethargy or unexpected weight changes      Social History     Social History    Marital status:      Spouse name: N/A    Number of children: N/A    Years of education: N/A     Social History Main Topics    Smoking status: Never Smoker    Smokeless tobacco: Never Used    Alcohol use No    Drug use: No    Sexual activity: Not Asked     Other Topics Concern    None     Social History Narrative         Family History: Mother: alive 80 yrs, High Blood Pressure, cva with cognitive deficitsFather:  36 yrs, myocardial infarctionSister(s): aliveBrother(s): unknow n2    sister(s) . 40 daughter no choildren -  walked out works for board of directors for [employer] . Social History: Alcohol Use Patient does not use alcohol. Smoking Status Patient is a never smoker. Caffeine: occasional. Drugs:    prescription narcotics. Diet: Regular. Exercise: None. Marital Status: . Lives w ith: spouse. Children: children. Anabaptism: Kulwatn Prier. Patient is  living w ith her  she is on disability related to her fibromyositis. Family History   Problem Relation Age of Onset    Hypertension Mother     Kidney Disease Mother      1 kidney    Heart Disease Father        OBJECTIVE:    Visit Vitals    /86    Pulse 68    Temp 97.9 °F (36.6 °C) (Oral)    Resp 18    Ht 5' 2\" (1.575 m)    Wt 185 lb 11.2 oz (84.2 kg)    SpO2 95%    BMI 33.96 kg/m2     CONSTITUTIONAL: well , well nourished, appears age appropriate  EYES: perrla, eom intact  ENMT:moist mucous membranes, pharynx clear  NECK: supple.  Thyroid normal  RESPIRATORY: Chest: clear bilaterally   CARDIOVASCULAR: Heart: regular rate and rhythm  GASTROINTESTINAL: Abdomen: soft, bowel sounds active  HEMATOLOGIC: no pathological lymph nodes palpated  MUSCULOSKELETAL: Extremities: no edema, pulse 1+   INTEGUMENT: No unusual rashes or suspicious skin lesions noted. Nails appear normal.  NEUROLOGIC: non-focal exam   MENTAL STATUS: alert and oriented, appropriate affect           ASSESSMENT:  1. Fibromyositis    2. Primary osteoarthritis of left hand    3. Pulmonary embolism on right ([address]    4. IGT (impaired glucose tolerance)      Patient's thumb is compatible with osteoarthritis. She has a Heberden's node and limitation of motion I think related to the osteoarthritis. It is unusual in that it is affecting only the thumb and therefore will actually ask for an xray of the thumb and left hand. Unfortunately she is also left handed, which has impeded her function. Her blood pressure is at the upper limits of normal.  Reminded the normal blood pressure is 130/80. BMI is 33.96, which represents a 1 pound weight loss since we last saw her. She'd like to know the status of her prediabetes and therefore will ask for a Hgb A1c. She'll return to see us in three to four months, sooner if she needs to. PLAN:  .  Orders Placed This Encounter    XR THUMB LT MIN 2 V    XR HAND LT MIN 3 V    HEMOGLOBIN A1C WITH EAG       Follow-up Disposition:  Return in about 3 months (around 3/13/2018). ATTENTION:   This medical record was transcribed using an electronic medical records system. Although proofread, it may and can contain electronic and spelling errors. Other human spelling and other errors may be present. Corrections may be executed at a later time. Please feel free to contact us for any clarifications as needed.

## 2017-12-13 NOTE — MR AVS SNAPSHOT
Visit Information Date & Time Provider Department Dept. Phone Encounter #  
 12/13/2017  9:00 AM Marely Leigh [facility] Sports Medicine and Primary Care [phone] [id number] Follow-up Instructions Return in about 3 months (around 3/13/2018). Follow-up and Disposition History Your Appointments 3/14/2018  9:30 AM  
Any with Shaggy Cheek MD  
[address] [place] [address] and Primary Care [facility] Appt Note: f/u  
 [address] Johns [address] [facility]  
  
   
 [address] [id number] Upcoming Health Maintenance Date Due  
 PAP AKA CERVICAL CYTOLOGY 9/9/2017 DTaP/Tdap/Td series (1 - Tdap) 12/31/2017* Influenza Age 5 to Adult 12/31/2017* FOBT Q 1 YEAR AGE 50-75 6/22/2018 *Topic was postponed. The date shown is not the original due date. Allergies as of 12/13/2017  Review Complete On: 12/13/2017 By: Shaggy Cheek MD  
  
 Severity Noted Reaction Type Reactions Aleve [Naproxen Sodium] High 07/22/2016    Hoarseness Dilaudid [Hydromorphone (Bulk)] High 08/15/2011    Anaphylaxis Respiratory arrest and throat closes Diovan [Valsartan]  09/22/2015    Palpitations Morphine  08/15/2011    Other (comments) Patch-vomiting,weakness, fainting Sulfa (Sulfonamide Antibiotics)  08/15/2011    Hives, Rash, Other (comments)  
 fainting Current Immunizations  Reviewed on 4/2/2014 No immunizations on file. Not reviewed this visit You Were Diagnosed With   
  
 Codes Comments Fibromyositis    -  Primary ICD-10-CM: M79.7 ICD-9-CM: 729.1 Primary osteoarthritis of left hand     ICD-10-CM: M19.042 ICD-9-CM: 715.14 Pulmonary embolism on right [facility])     ICD-10-CM: I26.99 
ICD-9-CM: 415.19 IGT (impaired glucose tolerance)     ICD-10-CM: R73.02 
ICD-9-CM: 790.22 Vitals BP Pulse Temp Resp Height(growth percentile) Weight(growth percentile) 142/86 68 97.9 °F (36.6 °C) (Oral) 18 5' 2\" (1.575 m) 185 lb 11.2 oz (84.2 kg) SpO2 BMI OB Status Smoking Status 95% 33.96 kg/m2 Postmenopausal Never Smoker Vitals History BMI and BSA Data Body Mass Index Body Surface Area  
 33.96 kg/m 2 1.92 m 2 Preferred Pharmacy Pharmacy Name Phone [facility] [phone] - [phone], [address] AT [place] [address] [phone] Your Updated Medication List  
  
   
This list is accurate as of: 12/13/17 10:32 AM.  Always use your most recent med list. amLODIPine 10 mg tablet Commonly known as:  Saulo Suero Take 1 Tab by mouth daily. bisoprolol-hydroCHLOROthiazide 10-6.25 mg per tablet Commonly known as:  [facility] Take 1 Tab by mouth daily. cholecalciferol 1,000 unit Cap Commonly known as:  VITAMIN D3 Take 1 tablet by mouth daily, CONTOUR NEXT STRIPS strip Generic drug:  glucose blood VI test strips USE TO TEST BLOOD SUGARS ONCE DAILY  
  
 dabigatran etexilate 150 mg capsule Commonly known as:  PRADAXA Take 1 Cap by mouth two (2) times a day. diclofenac 1 % Gel Commonly known as:  VOLTAREN  
APPLY 2 GRAMS TO THE AFFECTED JOINT 3 TO 4 TIMES PER DAY LORazepam 0.5 mg tablet Commonly known as:  ATIVAN Take 0.5 mg by mouth every 8 hours as needed for Anxiety,  
  
 * [facility] Generic drug:  Lancets USE TO TEST BLOOD SUGAR EVERY DAY  
  
 * One Touch Delica 33 gauge Misc Generic drug:  lancets ONETOUCH ULTRA2 monitoring kit Generic drug:  Blood-Glucose Meter OT Ultra/FastTrack Control Soln Generic drug:  Blood Glucose Control, Normal  
  
 tiZANidine 2 mg capsule Commonly known as:  Ophelia Peace TK 1 C PO BID PRN  
  
 traZODone 50 mg tablet Commonly known as:  Freddy Lipps Take 1 Tab by mouth nightly. VITAMIN B-12 1,000 mcg tablet Generic drug:  cyanocobalamin Take 1,000 mcg by mouth daily. * Notice: This list has 2 medication(s) that are the same as other medications prescribed for you. Read the directions carefully, and ask your doctor or other care provider to review them with you. We Performed the Following HEMOGLOBIN A1C WITH EAG [21102 CPT(R)] MO COLLECTION VENOUS BLOOD,VENIPUNCTURE N3217816 CPT(R)] Follow-up Instructions Return in about 3 months (around 3/13/2018). To-Do List   
 12/13/2017 Imaging:  XR HAND LT MIN 3 V   
  
 12/13/2017 Imaging:  XR THUMB LT MIN 2 V Introducing hospitals & HEALTH SERVICES! Cris Yadav introduces [employer] patient portal. Now you can access parts of your medical record, email your doctor's office, and request medication refills online. 1. In your internet browser, go to [email or web address] 2. Click on the First Time User? Click Here link in the Sign In box. You will see the New Member Sign Up page. 3. Enter your [employer] Access Code exactly as it appears below. You will not need to use this code after youve completed the sign-up process. If you do not sign up before the expiration date, you must request a new code. · [employer] Access Code: [email or web address] Expires: 1/16/2018  9:10 AM 
 
4. Enter the last four digits of your Social Security Number (xxxx) and Date of Birth (mm/dd/yyyy) as indicated and click Submit. You will be taken to the next sign-up page. 5. Create a [employer] ID. This will be your [employer] login ID and cannot be changed, so think of one that is secure and easy to remember. 6. Create a [employer] password. You can change your password at any time. 7. Enter your Password Reset Question and Answer. This can be used at a later time if you forget your password. 8. Enter your e-mail address. You will receive e-mail notification when new information is available in [address] 9. Click Sign Up.  You can now view and download portions of your medical record. 10. Click the Download Summary menu link to download a portable copy of your medical information. If you have questions, please visit the Frequently Asked Questions section of the [email or web address] website. Remember, [email or web address] is NOT to be used for urgent needs. For medical emergencies, dial 911. Now available from your iPhone and Android! Please provide this summary of care documentation to your next provider. Your primary care clinician is listed as Lobito Kaur. If you have any questions after today's visit, please call [phone].

## 2017-12-13 NOTE — PROGRESS NOTES
1. Have you been to the ER, urgent care clinic since your last visit? Hospitalized since your last visit? No    2. Have you seen or consulted any other health care providers outside of the [address] [place] [address] since your last visit? Include any pap smears or colon screening. No     Complaints of pain all over. Pain in left thumb.  SOB, fatigue

## 2017-12-14 LAB
EST. AVERAGE GLUCOSE BLD GHB EST-MCNC: 126 MG/DL
HBA1C MFR BLD: 6 % (ref 4.8–5.6)

## 2017-12-28 DIAGNOSIS — I10 ESSENTIAL HYPERTENSION: ICD-10-CM

## 2017-12-28 RX ORDER — AMLODIPINE BESYLATE 10 MG/1
TABLET ORAL
Qty: 90 TAB | Refills: 2 | Status: SHIPPED | OUTPATIENT
Start: 2017-12-28 | End: 2018-09-17 | Stop reason: SDUPTHER

## 2017-12-28 RX ORDER — BISOPROLOL FUMARATE AND HYDROCHLOROTHIAZIDE 10; 6.25 MG/1; MG/1
TABLET ORAL
Qty: 90 TAB | Refills: 2 | Status: SHIPPED | OUTPATIENT
Start: 2017-12-28 | End: 2018-11-17 | Stop reason: SDUPTHER

## 2018-01-16 RX ORDER — TRAZODONE HYDROCHLORIDE 50 MG/1
TABLET ORAL
Qty: 90 TAB | Refills: 2 | Status: SHIPPED | OUTPATIENT
Start: 2018-01-16 | End: 2018-04-09

## 2018-03-14 ENCOUNTER — OFFICE VISIT (OUTPATIENT)
Dept: INTERNAL MEDICINE CLINIC | Age: 63
End: 2018-03-14

## 2018-03-14 VITALS
BODY MASS INDEX: 35.02 KG/M2 | RESPIRATION RATE: 18 BRPM | HEART RATE: 71 BPM | HEIGHT: 62 IN | DIASTOLIC BLOOD PRESSURE: 86 MMHG | OXYGEN SATURATION: 97 % | WEIGHT: 190.3 LBS | SYSTOLIC BLOOD PRESSURE: 138 MMHG | TEMPERATURE: 97.7 F

## 2018-03-14 DIAGNOSIS — Z13.39 SCREENING FOR ALCOHOLISM: ICD-10-CM

## 2018-03-14 DIAGNOSIS — Z13.31 SCREENING FOR DEPRESSION: ICD-10-CM

## 2018-03-14 DIAGNOSIS — I10 ESSENTIAL HYPERTENSION: ICD-10-CM

## 2018-03-14 DIAGNOSIS — M79.7 FIBROMYALGIA: ICD-10-CM

## 2018-03-14 DIAGNOSIS — I10 ESSENTIAL HYPERTENSION, BENIGN: ICD-10-CM

## 2018-03-14 DIAGNOSIS — Z98.890 S/P COLONOSCOPY: ICD-10-CM

## 2018-03-14 DIAGNOSIS — Z00.00 MEDICARE ANNUAL WELLNESS VISIT, SUBSEQUENT: Primary | ICD-10-CM

## 2018-03-14 DIAGNOSIS — G89.4 CHRONIC PAIN SYNDROME: ICD-10-CM

## 2018-03-14 DIAGNOSIS — G25.81 RESTLESS LEG SYNDROME: ICD-10-CM

## 2018-03-14 DIAGNOSIS — R73.03 PREDIABETES: ICD-10-CM

## 2018-03-14 DIAGNOSIS — K21.9 GASTROESOPHAGEAL REFLUX DISEASE WITHOUT ESOPHAGITIS: ICD-10-CM

## 2018-03-14 DIAGNOSIS — M79.7 FIBROMYOSITIS: ICD-10-CM

## 2018-03-14 NOTE — PATIENT INSTRUCTIONS
Medicare Wellness Visit, Female    The best way to live healthy is to have a healthy lifestyle by eating a well-balanced diet, exercising regularly, limiting alcohol and stopping smoking. Regular physical exams and screening tests are another way to keep healthy. Preventive exams provided by your health care provider can find health problems before they become diseases or illnesses. Preventive services including immunizations, screening tests, monitoring and exams can help you take care of your own health. All people over age 72 should have a pneumovax  and and a prevnar shot to prevent pneumonia. These are once in a lifetime unless you and your provider decide differently. All people over 65 should have a yearly flu shot and a tetanus vaccine every 10 years. A bone mass density to screen for osteoporosis or thinning of the bones should be done every 2 years after 65. Screening for diabetes mellitus with a blood sugar test should be done every year. Glaucoma is a disease of the eye due to increased ocular pressure that can lead to blindness and it should be done every year by an eye professional.    Cardiovascular screening tests that check for elevated lipids (fatty part of blood) which can lead to heart disease and strokes should be done every 5 years. Colorectal screening that evaluates for blood or polyps in your colon should be done yearly as a stool test or every five years as a flexible sigmoidoscope or every 10 years as a colonoscopy up to age 76. Breast cancer screening with a mammogram is recommended biennially  for women age 54-69. Screening for cervical cancer with a pap smear and pelvic exam is recommended for women after age 72 years every 2 years up to age 79 or when the provider and patient decide to stop. If there is a history of cervical abnormalities or other increased risk for cancer then the test is recommended yearly.     Hepatitis C screening is also recommended for anyone born between [birth year] [facility]. A shingles vaccine is also recommended once in a lifetime after age 61. Your Medicare Wellness Exam is recommended annually. Here is a list of your current Health Maintenance items with a due date:  Health Maintenance Due   Topic Date Due    Cervical Cancer Screening  09/09/2017              Body Mass Index: Care Instructions  Your Care Instructions    Body mass index (BMI) can help you see if your weight is raising your risk for health problems. It uses a formula to compare how much you weigh with how tall you are. · A BMI lower than 18.5 is considered underweight. · A BMI between 18.5 and 24.9 is considered healthy. · A BMI between 25 and 29.9 is considered overweight. A BMI of 30 or higher is considered obese. If your BMI is in the normal range, it means that you have a lower risk for weight-related health problems. If your BMI is in the overweight or obese range, you may be at increased risk for weight-related health problems, such as high blood pressure, heart disease, stroke, arthritis or joint pain, and diabetes. If your BMI is in the underweight range, you may be at increased risk for health problems such as fatigue, lower protection (immunity) against illness, muscle loss, bone loss, hair loss, and hormone problems. BMI is just one measure of your risk for weight-related health problems. You may be at higher risk for health problems if you are not active, you eat an unhealthy diet, or you drink too much alcohol or use tobacco products. Follow-up care is a key part of your treatment and safety. Be sure to make and go to all appointments, and call your doctor if you are having problems. It's also a good idea to know your test results and keep a list of the medicines you take. How can you care for yourself at home? · Practice healthy eating habits. This includes eating plenty of fruits, vegetables, whole grains, lean protein, and low-fat dairy.   · If your doctor recommends it, get more exercise. Walking is a good choice. Bit by bit, increase the amount you walk every day. Try for at least 30 minutes on most days of the week. · Do not smoke. Smoking can increase your risk for health problems. If you need help quitting, talk to your doctor about stop-smoking programs and medicines. These can increase your chances of quitting for good. · Limit alcohol to 2 drinks a day for men and 1 drink a day for women. Too much alcohol can cause health problems. If you have a BMI higher than 25  · Your doctor may do other tests to check your risk for weight-related health problems. This may include measuring the distance around your waist. A waist measurement of more than 40 inches in men or 35 inches in women can increase the risk of weight-related health problems. · Talk with your doctor about steps you can take to stay healthy or improve your health. You may need to make lifestyle changes to lose weight and stay healthy, such as changing your diet and getting regular exercise. If you have a BMI lower than 18.5  · Your doctor may do other tests to check your risk for health problems. · Talk with your doctor about steps you can take to stay healthy or improve your health. You may need to make lifestyle changes to gain or maintain weight and stay healthy, such as getting more healthy foods in your diet and doing exercises to build muscle. Where can you learn more? Go to http://henri-moon.info/. Enter S176 in the search box to learn more about \"Body Mass Index: Care Instructions. \"  Current as of: October 13, 2016  Content Version: 11.4  © 1167-5796 [employer]. Care instructions adapted under license by [employer] (which disclaims liability or warranty for this information).  If you have questions about a medical condition or this instruction, always ask your healthcare professional. Olivia Monzon any warranty or liability for your use of this information.

## 2018-03-14 NOTE — PROGRESS NOTES
Chief Complaint   Patient presents with   [facility] Annual Wellness Visit     1. Have you been to the ER, urgent care clinic since your last visit? Hospitalized since your last visit? No    2. Have you seen or consulted any other health care providers outside of the [address] [place] [address] since your last visit? Include any pap smears or colon screening. No      This is the Subsequent Medicare Annual Wellness Exam, performed 12 months or more after the Initial AWV or the last Subsequent AWV    I have reviewed the patient's medical history in detail and updated the computerized patient record. History     Past Medical History:   Diagnosis Date    Depression with anxiety     DJD (degenerative joint disease)     Dyslipidemia     Encounter for Hemoccult screening 06/28/2017    neg    Fibromyalgia     Gait instability     GERD (gastroesophageal reflux disease)     Hypertension     IBS (irritable bowel syndrome)     Mastodynia, left greater than right 8/17/2011    Normal cardiac stress test 3.14    Positive D dimer 9-23-15    Prediabetes     Pulmonary embolism (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 Udell Stabs    HX COLONOSCOPY      HX ORTHOPAEDIC  2009    foot surgery-left    HX OTHER SURGICAL      right and left leg muscle biopsies     Current Outpatient Prescriptions   Medication Sig Dispense Refill    apixaban (ELIQUIS) 5 mg tablet Take 1 Tab by mouth two (2) times a day. 60 Tab 6    traZODone (DESYREL) 50 mg tablet TAKE 1 TABLET NIGHTLY 90 Tab 2    bisoprolol-hydroCHLOROthiazide (ZIAC) 10-6.25 mg per tablet TAKE 1 TABLET DAILY 90 Tab 2    amLODIPine (NORVASC) 10 mg tablet TAKE 1 TABLET DAILY 90 Tab 2    cholecalciferol (VITAMIN D3) 1,000 unit cap Take 1 tablet by mouth daily,      OT ULTRA/FASTTRACK CONTROL soln       ONETOUCH ULTRA2 monitoring kit       diclofenac (VOLTAREN) 1 % gel APPLY 2 GRAMS TO THE AFFECTED JOINT 3 TO 4 TIMES PER DAY  1    ONE TOUCH DELICA 33 gauge misc       MICROLET LANCET misc USE TO TEST BLOOD SUGAR EVERY  Each 11    CONTOUR NEXT STRIPS strip USE TO TEST BLOOD SUGARS ONCE DAILY 50 Strip 11    cyanocobalamin (VITAMIN B-12) 1,000 mcg tablet Take 1,000 mcg by mouth daily.        Allergies   Allergen Reactions    Aleve [Naproxen Sodium] Hoarseness    Dilaudid [Hydromorphone (Bulk)] Anaphylaxis     Respiratory arrest and throat closes    Diovan [Valsartan] Palpitations    Morphine Other (comments)     Patch-vomiting,weakness, fainting    Sulfa (Sulfonamide Antibiotics) Hives, Rash and Other (comments)     fainting     Family History   Problem Relation Age of Onset    Hypertension Mother     Kidney Disease Mother      1 kidney    Heart Disease Father      Social History   Substance Use Topics    Smoking status: Never Smoker    Smokeless tobacco: Never Used    Alcohol use No     Patient Active Problem List   Diagnosis Code    Sleep apnea G47.30    Hypertension I10    GERD (gastroesophageal reflux disease) K21.9    Restless leg syndrome G25.81    Fibromyalgia M79.7    Mastodynia, left greater than right N64.4    Altered mental status R41.82    Chronic pain G89.29    Fibromyositis M79.7    Essential hypertension, benign I10    Prediabetes R73.03    IBS (irritable bowel syndrome) K58.9    Gait instability R26.81    Normal cardiac stress test Z13.6    White coat hypertension [id number]    Positive D dimer R79.89    SOB (shortness of breath) R06.02    S/P colonoscopy Z98.890    Saphenous vein occlusion, right I82.811    Dyslipidemia E78.5    Encounter for Hemoccult screening Z12.11    DJD (degenerative joint disease) M19.90       Depression Risk Factor Screening:     PHQ over the last two weeks 3/14/2018   PHQ Not Done -   Little interest or pleasure in doing things Several days   Feeling down, depressed or hopeless Several days   Total Score PHQ 2 2     Alcohol Risk Factor Screening: You do not drink alcohol or very rarely. Functional Ability and Level of Safety:   Hearing Loss  Hearing is good. Activities of Daily Living  The home contains: grab bars  Patient does total self care    Fall Risk  No flowsheet data found. Abuse Screen  Patient is not abused    Cognitive Screening   Evaluation of Cognitive Function:  Has your family/caregiver stated any concerns about your memory: no  Normal    Patient Care Team   Patient Care Team:  Cristóbal Jauregui MD as PCP - General (Internal Medicine)    Assessment/Plan   Education and counseling provided:  Are appropriate based on today's review and evaluation    Diagnoses and all orders for this visit:    1. Medicare annual wellness visit, subsequent    2. Screening for alcoholism  -     Annual  Alcohol Screen 15 min ()    3. Screening for depression  -     Depression Screen Annual    4. Chronic pain syndrome    5. Fibromyositis    6. Essential hypertension, benign    7. Essential hypertension    8. Gastroesophageal reflux disease without esophagitis    9. Restless leg syndrome    10. Fibromyalgia    11. Prediabetes    12. S/P colonoscopy  -     REFERRAL TO GASTROENTEROLOGY    Other orders  -     apixaban (ELIQUIS) 5 mg tablet; Take 1 Tab by mouth two (2) times a day. Health Maintenance Due   Topic Date Due    PAP AKA CERVICAL CYTOLOGY  09/09/2017   Diagnoses and all orders for this visit:    1.  Medicare annual wellness visit, subsequent    2. Screening for alcoholism  -     Annual  Alcohol Screen 15 min ()    3. Screening for depression  -     Depression Screen Annual    4. Chronic pain syndrome    5. Fibromyositis    6. Essential hypertension, benign    7. Essential hypertension    8. Gastroesophageal reflux disease without esophagitis    9. Restless leg syndrome    10. Fibromyalgia    11. Prediabetes    12. S/P colonoscopy  -     REFERRAL TO GASTROENTEROLOGY    Other orders  -     apixaban (ELIQUIS) 5 mg tablet; Take 1 Tab by mouth two (2) times a day. [facility]  Asuncion Conn MD, [address] [address]  Phone:  [phone]  Fax: [phone]       Chief Complaint   Patient presents with   [facility] Wellness Visit   . SUBJECTIVE:    Ilya Bradshaw is a 57 y.o. female Patient returns today with known history of dyslipidemia, degenerative joint disease, fibromyalgia, gait instability, GERD, hypertension, IBS, MAURIZIO, white coat hypertension. Since we last saw her she had bilateral lower extremity venous duplex exam on 02/09/18 at [facility] that revealed no evidence of thrombosis. She was seen by Vicente Spear MD.  Lab studies from the [facility] that were ordered by her hematologist are reviewed and are remarkable for a creatinine of 1.07 and ALT of 37. Patient returns today concerned about her ALT elevation. She is no longer taking Pradaxa, is now on Eliquis. Wonders if she has to take it from now and and we advised her that yes she does. She is turning 70 and is concerned about her disability insurance. She remains totally and completely disabled permanently. She ambulates only with the assistance of a walker and is at increased risk for falls. Current Outpatient Prescriptions   Medication Sig Dispense Refill    apixaban (ELIQUIS) 5 mg tablet Take 1 Tab by mouth two (2) times a day.  60 Tab 6    traZODone (DESYREL) 50 mg tablet TAKE 1 TABLET NIGHTLY 90 Tab 2    bisoprolol-hydroCHLOROthiazide (ZIAC) 10-6.25 mg per tablet TAKE 1 TABLET DAILY 90 Tab 2    amLODIPine (NORVASC) 10 mg tablet TAKE 1 TABLET DAILY 90 Tab 2    cholecalciferol (VITAMIN D3) 1,000 unit cap Take 1 tablet by mouth daily,      OT ULTRA/FASTTRACK CONTROL soln       ONETOUCH ULTRA2 monitoring kit       diclofenac (VOLTAREN) 1 % gel APPLY 2 GRAMS TO THE AFFECTED JOINT 3 TO 4 TIMES PER DAY  1    ONE TOUCH DELICA 33 gauge misc       MICROLET LANCET misc USE TO TEST BLOOD SUGAR EVERY  Each 11    CONTOUR NEXT STRIPS strip USE TO TEST BLOOD SUGARS ONCE DAILY 50 Strip 11    cyanocobalamin (VITAMIN B-12) 1,000 mcg tablet Take 1,000 mcg by mouth daily. Past Medical History:   Diagnosis Date    Depression with anxiety     DJD (degenerative joint disease)     Dyslipidemia     Encounter for Hemoccult screening 06/28/2017    neg    Fibromyalgia     Gait instability     GERD (gastroesophageal reflux disease)     Hypertension     IBS (irritable bowel syndrome)     Mastodynia, left greater than right 8/17/2011    Normal cardiac stress test 3.14    Positive D dimer 9-23-15    Prediabetes     Pulmonary embolism ([facility] [address] 03/2013    rll    Restless leg syndrome     S/P colonoscopy 01/27/2011    kenny hernandez md    Saphenous vein occlusion, 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 Caro Hoof    HX COLONOSCOPY      HX ORTHOPAEDIC  2009    foot surgery-left    HX OTHER SURGICAL      right and left leg muscle biopsies     Allergies   Allergen Reactions    Aleve [Naproxen Sodium] Hoarseness    Dilaudid [Hydromorphone (Bulk)] Anaphylaxis     Respiratory arrest and throat closes    Diovan [Valsartan] Palpitations    Morphine Other (comments)     Patch-vomiting,weakness, fainting    Sulfa (Sulfonamide Antibiotics) Hives, Rash and Other (comments)     fainting         REVIEW OF SYSTEMS:  General: negative for - chills or fever  ENT: negative for - headaches, nasal congestion or tinnitus  Respiratory: negative for - cough, hemoptysis, shortness of breath or wheezing  Cardiovascular : negative for - chest pain, edema, palpitations or shortness of breath  Gastrointestinal: negative for - abdominal pain, blood in stools, heartburn or nausea/vomiting  Genito-Urinary: no dysuria, trouble voiding, or hematuria  Musculoskeletal: negative for - gait disturbance, joint pain, joint stiffness or joint swelling  Neurological: no TIA or stroke symptoms  Hematologic: no bruises, no bleeding, no swollen glands  Integument: no lumps, mole changes, nail changes or rash  Endocrine: no malaise/lethargy or unexpected weight changes      Social History     Social History    Marital status:      Spouse name: N/A    Number of children: N/A    Years of education: N/A     Social History Main Topics    Smoking status: Never Smoker    Smokeless tobacco: Never Used    Alcohol use No    Drug use: No    Sexual activity: Not Currently     Other Topics Concern    None     Social History Narrative         Family History: Mother: alive 80 yrs, High Blood Pressure, cva with cognitive deficitsFather:  36 yrs, myocardial infarctionSister(s): aliveBrother(s): unknow n2    sister(s) . 40 daughter no choildren -  walked out works for board of directors for [employer] . Social History: Alcohol Use Patient does not use alcohol. Smoking Status Patient is a never smoker.  Caffeine: occasional. Drugs: prescription narcotics. Diet: Regular. Exercise: None. Marital Status: . Lives w ith: spouse. Children: children. Mosque: [employer]. Patient is  living w ith her  she is on disability related to her fibromyositis. Family History   Problem Relation Age of Onset    Hypertension Mother     Kidney Disease Mother      1 kidney    Heart Disease Father        OBJECTIVE:    Visit Vitals    /86 (BP 1 Location: Left arm, BP Patient Position: Sitting)    Pulse 71    Temp 97.7 °F (36.5 °C) (Oral)    Resp 18    Ht 5' 2\" (1.575 m)    Wt 190 lb 4.8 oz (86.3 kg)    SpO2 97%    BMI 34.81 kg/m2     CONSTITUTIONAL: well , well nourished, appears age appropriate  EYES: perrla, eom intact  ENMT:moist mucous membranes, pharynx clear  NECK: supple. Thyroid normal  RESPIRATORY: Chest: clear bilaterally   CARDIOVASCULAR: Heart: regular rate and rhythm  GASTROINTESTINAL: Abdomen: soft, bowel sounds active  HEMATOLOGIC: no pathological lymph nodes palpated  MUSCULOSKELETAL: Extremities: no edema, pulse 1+   INTEGUMENT: No unusual rashes or suspicious skin lesions noted. Nails appear normal.  NEUROLOGIC: non-focal exam   MENTAL STATUS: alert and oriented, appropriate affect           ASSESSMENT:  1. Medicare annual wellness visit, subsequent    2. Screening for alcoholism    3. Screening for depression    4. Chronic pain syndrome    5. Fibromyositis    6. Essential hypertension, benign    7. Essential hypertension    8. Gastroesophageal reflux disease without esophagitis    9. Restless leg syndrome    10. Fibromyalgia    11. Prediabetes    12. S/P colonoscopy      Patient's medical status is stable. We have completed her wellness visit. We note that her BMI is discussed as listed below. BP control is approaching goal, 493 systolic. We certainly would like to see it less than 130.     We have a discussion regarding the use of NOACs and whether she needs to be on them and we suggest that with two separate episodes of DVT she should be on them for life. Discussed her disability and the need to continue the disability as she is completely incapacitated  walker. She'll return to the office in four months, sooner if she has any problems. We discussed the use of Lorazepam.  She wonders if she  rx. We had weaned her off it and stopped it completely and are not willing to restart it  such a medication we suggest she see a psychiatrist. She prefers not at this time. Her previous GI physician recommended a repeat colonoscopy in five years because a polyp was removed. Will schedule that for her. We also advised her of the 10% risk of GI bleed with the use of Voltaren, even in gel form. She declines to stop using it as she states she uses it very rarely. Discussed the patient's BMI with her. The BMI follow up plan is as follows:     dietary management education, guidance, and counseling  encourage exercise  monitor weight  prescribed dietary intake    An After Visit Summary was printed and given to the patient. PLAN:  .  Orders Placed This Encounter    Depression Screen Annual    REFERRAL TO GASTROENTEROLOGY    apixaban (ELIQUIS) 5 mg tablet       Follow-up Disposition:  Return in about 4 months (around 7/14/2018). ATTENTION:   This medical record was transcribed using an electronic medical records system. Although proofread, it may and can contain electronic and spelling errors. Other human spelling and other errors may be present. Corrections may be executed at a later time. Please feel free to contact us for any clarifications as needed.

## 2018-03-14 NOTE — MR AVS SNAPSHOT
[address] [facility] 
 
 
 [address] [id number] 
[phone] Patient: Jacobo Montelongo MRN: [id number] NHL:[id number] Visit Information Date & Time Provider Department Dept. Phone Encounter #  
 3/14/2018  9:30 AM Marely Hart [facility] [address] [id number] Your Appointments 6/20/2018  9:30 AM  
Any with Nellie Oropeza MD  
[address] [place] [address] and Primary Care [address] [place] [address]) Appt Note: 3 month f/u  
 [address] [facility]  
  
   
 [address] [id number]  
  
    
 9/19/2018  9:30 AM  
Any with Nellie Oropeza MD  
[address] [place] [address] and Primary Care [address] [place] [address]) Appt Note: 3 month f/u  
 [address] [place] [address]  
[phone]  
  
    
 12/19/2018  9:30 AM  
Any with Nellie Oropeza MD  
[address] [place] [address] and Primary Care [address] [place] [address]) Appt Note: 3 month f/u  
 [address] [place] [address]  
[phone] Upcoming Health Maintenance Date Due  
 PAP AKA CERVICAL CYTOLOGY 9/9/2017 BREAST CANCER SCRN MAMMOGRAM 10/13/2019 COLONOSCOPY 1/27/2021 DTaP/Tdap/Td series (2 - Td) 3/14/2028 Allergies as of 3/14/2018  Review Complete On: 3/14/2018 By: Aj Preston Severity Noted Reaction Type Reactions Aleve [Naproxen Sodium] High 07/22/2016    Hoarseness Dilaudid [Hydromorphone (Bulk)] High 08/15/2011    Anaphylaxis Respiratory arrest and throat closes Diovan [Valsartan]  09/22/2015    Palpitations Morphine  08/15/2011    Other (comments) Patch-vomiting,weakness, fainting Sulfa (Sulfonamide Antibiotics)  08/15/2011    Hives, Rash, Other (comments)  
 fainting Current Immunizations  Reviewed on 4/2/2014 No immunizations on file. Not reviewed this visit You Were Diagnosed With   
  
 Codes Comments Chronic pain syndrome    -  Primary ICD-10-CM: G89.4 ICD-9-CM: 338.4 Medicare annual wellness visit, subsequent     ICD-10-CM: Z00.00 ICD-9-CM: V70.0 Screening for alcoholism     ICD-10-CM: Z13.89 ICD-9-CM: V79.1 Screening for depression     ICD-10-CM: Z13.89 ICD-9-CM: V79.0 Fibromyositis     ICD-10-CM: M79.7 ICD-9-CM: 729.1 Vitals BP Pulse Temp Resp Height(growth percentile) Weight(growth percentile) 138/86 (BP 1 Location: Left arm, BP Patient Position: Sitting) 71 97.7 °F (36.5 °C) (Oral) 18 5' 2\" (1.575 m) 190 lb 4.8 oz (86.3 kg) SpO2 BMI OB Status Smoking Status 97% 34.81 kg/m2 Postmenopausal Never Smoker Vitals History BMI and BSA Data Body Mass Index Body Surface Area 34.81 kg/m 2 1.94 m 2 Preferred Pharmacy Pharmacy Name Phone [address] [facility] [phone] Your Updated Medication List  
  
   
This list is accurate as of 3/14/18 10:37 AM.  Always use your most recent med list. amLODIPine 10 mg tablet Commonly known as:  Myrna Serra TAKE 1 TABLET DAILY  
  
 bisoprolol-hydroCHLOROthiazide 10-6.25 mg per tablet Commonly known as:  [facility] TAKE 1 TABLET DAILY  
  
 cholecalciferol 1,000 unit Cap Commonly known as:  VITAMIN D3 Take 1 tablet by mouth daily, CONTOUR NEXT STRIPS strip Generic drug:  glucose blood VI test strips USE TO TEST BLOOD SUGARS ONCE DAILY  
  
 diclofenac 1 % Gel Commonly known as:  VOLTAREN  
APPLY 2 GRAMS TO THE AFFECTED JOINT 3 TO 4 TIMES PER DAY  
  
 * MICROLET LANCET Misc Generic drug:  Lancets USE TO TEST BLOOD SUGAR EVERY DAY  
  
 * One Touch Delica 33 gauge Misc Generic drug:  lancets ONETOUCH ULTRA2 monitoring kit Generic drug:  Blood-Glucose Meter OT Ultra/FastTrack Control Soln Generic drug:  Blood Glucose Control, Normal  
  
 traZODone 50 mg tablet Commonly known as:  Artemio Mendiola  
 TAKE 1 TABLET NIGHTLY  
  
 VITAMIN B-12 1,000 mcg tablet Generic drug:  cyanocobalamin Take 1,000 mcg by mouth daily. * Notice: This list has 2 medication(s) that are the same as other medications prescribed for you. Read the directions carefully, and ask your doctor or other care provider to review them with you. Patient Instructions Medicare Wellness Visit, Female The best way to live healthy is to have a healthy lifestyle by eating a well-balanced diet, exercising regularly, limiting alcohol and stopping smoking. Regular physical exams and screening tests are another way to keep healthy. Preventive exams provided by your health care provider can find health problems before they become diseases or illnesses. Preventive services including immunizations, screening tests, monitoring and exams can help you take care of your own health. All people over age 72 should have a pneumovax  and and a prevnar shot to prevent pneumonia. These are once in a lifetime unless you and your provider decide differently. All people over 65 should have a yearly flu shot and a tetanus vaccine every 10 years. A bone mass density to screen for osteoporosis or thinning of the bones should be done every 2 years after 65. Screening for diabetes mellitus with a blood sugar test should be done every year. Glaucoma is a disease of the eye due to increased ocular pressure that can lead to blindness and it should be done every year by an eye professional. 
 
Cardiovascular screening tests that check for elevated lipids (fatty part of blood) which can lead to heart disease and strokes should be done every 5 years. Colorectal screening that evaluates for blood or polyps in your colon should be done yearly as a stool test or every five years as a flexible sigmoidoscope or every 10 years as a colonoscopy up to age 76.  
 
Breast cancer screening with a mammogram is recommended biennially  for women age 54-69. Screening for cervical cancer with a pap smear and pelvic exam is recommended for women after age 72 years every 2 years up to age 79 or when the provider and patient decide to stop. If there is a history of cervical abnormalities or other increased risk for cancer then the test is recommended yearly. Hepatitis C screening is also recommended for anyone born between 80 through [address]. A shingles vaccine is also recommended once in a lifetime after age 61. Your Medicare Wellness Exam is recommended annually. Here is a list of your current Health Maintenance items with a due date: 
Health Maintenance Due Topic Date Due  Cervical Cancer Screening  09/09/2017 Body Mass Index: Care Instructions Your Care Instructions Body mass index (BMI) can help you see if your weight is raising your risk for health problems. It uses a formula to compare how much you weigh with how tall you are. · A BMI lower than 18.5 is considered underweight. · A BMI between 18.5 and 24.9 is considered healthy. · A BMI between 25 and 29.9 is considered overweight. A BMI of 30 or higher is considered obese. If your BMI is in the normal range, it means that you have a lower risk for weight-related health problems. If your BMI is in the overweight or obese range, you may be at increased risk for weight-related health problems, such as high blood pressure, heart disease, stroke, arthritis or joint pain, and diabetes. If your BMI is in the underweight range, you may be at increased risk for health problems such as fatigue, lower protection (immunity) against illness, muscle loss, bone loss, hair loss, and hormone problems. BMI is just one measure of your risk for weight-related health problems. You may be at higher risk for health problems if you are not active, you eat an unhealthy diet, or you drink too much alcohol or use tobacco products. Follow-up care is a key part of your treatment and safety. Be sure to make and go to all appointments, and call your doctor if you are having problems. It's also a good idea to know your test results and keep a list of the medicines you take. How can you care for yourself at home? · Practice healthy eating habits. This includes eating plenty of fruits, vegetables, whole grains, lean protein, and low-fat dairy. · If your doctor recommends it, get more exercise. Walking is a good choice. Bit by bit, increase the amount you walk every day. Try for at least 30 minutes on most days of the week. · Do not smoke. Smoking can increase your risk for health problems. If you need help quitting, talk to your doctor about stop-smoking programs and medicines. These can increase your chances of quitting for good. · Limit alcohol to 2 drinks a day for men and 1 drink a day for women. Too much alcohol can cause health problems. If you have a BMI higher than 25 · Your doctor may do other tests to check your risk for weight-related health problems. This may include measuring the distance around your waist. A waist measurement of more than 40 inches in men or 35 inches in women can increase the risk of weight-related health problems. · Talk with your doctor about steps you can take to stay healthy or improve your health. You may need to make lifestyle changes to lose weight and stay healthy, such as changing your diet and getting regular exercise. If you have a BMI lower than 18.5 · Your doctor may do other tests to check your risk for health problems. · Talk with your doctor about steps you can take to stay healthy or improve your health. You may need to make lifestyle changes to gain or maintain weight and stay healthy, such as getting more healthy foods in your diet and doing exercises to build muscle. Where can you learn more? Go to http://henri-moon.info/. Enter S176 in the search box to learn more about \"Body Mass Index: Care Instructions. \" Current as of: October 13, 2016 Content Version: 11.4 © 7788-9782 Healthwise, [employer]. Care instructions adapted under license by [employer] (which disclaims liability or warranty for this information). If you have questions about a medical condition or this instruction, always ask your healthcare professional. [address] any warranty or liability for your use of this information. Introducing [facility]! Russ Dukes introduces OB10 patient portal. Now you can access parts of your medical record, email your doctor's office, and request medication refills online. 1. In your internet browser, go to [email or web address] 2. Click on the First Time User? Click Here link in the Sign In box. You will see the New Member Sign Up page. 3. Enter your OB10 Access Code exactly as it appears below. You will not need to use this code after youve completed the sign-up process. If you do not sign up before the expiration date, you must request a new code. · OB10 Access Code: [email or web address] Expires: 6/12/2018 10:10 AM 
 
4. Enter the last four digits of your Social Security Number (xxxx) and Date of Birth (mm/dd/yyyy) as indicated and click Submit. You will be taken to the next sign-up page. 5. Create a OB10 ID. This will be your OB10 login ID and cannot be changed, so think of one that is secure and easy to remember. 6. Create a OB10 password. You can change your password at any time. 7. Enter your Password Reset Question and Answer. This can be used at a later time if you forget your password. 8. Enter your e-mail address. You will receive e-mail notification when new information is available in [address] 9. Click Sign Up. You can now view and download portions of your medical record. 10. Click the Download Summary menu link to download a portable copy of your medical information. If you have questions, please visit the Frequently Asked Questions section of the [employer] website. Remember, [employer] is NOT to be used for urgent needs. For medical emergencies, dial 911. Now available from your iPhone and Android! Please provide this summary of care documentation to your next provider. Your primary care clinician is listed as Andreia Manriquez. If you have any questions after today's visit, please call [phone].

## 2018-03-19 ENCOUNTER — PATIENT OUTREACH (OUTPATIENT)
Dept: INTERNAL MEDICINE CLINIC | Age: 63
End: 2018-03-19

## 2018-03-22 ENCOUNTER — PATIENT OUTREACH (OUTPATIENT)
Dept: INTERNAL MEDICINE CLINIC | Age: 63
End: 2018-03-22

## 2018-03-22 NOTE — LETTER
3/22/2018 9:33 PM 
 
Ms. Alexei Bradley [address] [facility] [address] [id number] Dear Ms. Lyonsue Denver am the [facility] Nurse Navigator working with Dr. Jair Gregory. We are glad to hear you are home and hope you are feeling much better. Part of my job is to follow up with patients who have been to the hospital to see how they are feeling, answer any questions they may have about their visit and also make sure they have a follow-up appointment to see their primary care doctor. I have been unable to reach you by telephone and wanted to make sure that you scheduled a follow-up appointment to come in and talk to Dr. Jair Gregory about your recent visit to the hospital.  Please call our office to schedule your appointment at the number above. In the meantime, if you have any questions or concerns, please feel free to call me on my direct line at [phone]. Thank you for allowing us to provide you with excellent care. Our goal is to address all of your concerns and needs. We strive to provide excellent quality care at our Medical Practice here at [facility]. We strive to be rated as excellent, as anything less than excellent does not meet our expectations. Thank you again for choosing us for your continued health care needs. And remember, Sincerely, Stu Cabezas RN R.N. Nurse Navigator

## 2018-03-23 ENCOUNTER — PATIENT OUTREACH (OUTPATIENT)
Dept: INTERNAL MEDICINE CLINIC | Age: 63
End: 2018-03-23

## 2018-03-23 RX ORDER — FAMOTIDINE 40 MG/1
40 TABLET, FILM COATED ORAL DAILY
COMMUNITY
End: 2018-03-26 | Stop reason: ALTCHOICE

## 2018-03-23 NOTE — PROGRESS NOTES
Yolanda Hargrove Discharge Follow-Up      Date/Time:  3/23/2018 4:08 PM    Patient listed on dischargeOutside Facilities report on 3/19/2018. Patient discharged from [facility]  3/18/2018  for Chest Pain/epigastric pain. Medical History:     Past Medical History:   Diagnosis Date    Depression with anxiety     DJD (degenerative joint disease)     Dyslipidemia     Encounter for Hemoccult screening 2017    neg    Fibromyalgia     Gait instability     GERD (gastroesophageal reflux disease)     Hypertension     IBS (irritable bowel syndrome)     Mastodynia, left greater than right 2011    Normal cardiac stress test 3.14    Positive D dimer 9-23-15    Prediabetes     Pulmonary embolism ([facility] [address] 2013    rll    Restless leg syndrome     S/P colonoscopy 2011    kenny hernandez md    Saphenous vein occlusion, right 2017    [facility] acute occlusive superificial vein thrombosis - Melody Dyer md    Saphenous vein thrombophlebitis, right 2018    and acute l posterior tibial vein - 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 Verified  and address with patient as identifiers. Provided introduction to self, and explanation of the Nurses Navigator role. Diet:   Patient reports: Diabetic Diet    Activity:    Patient reports: mostly moving around the house    Medication:   Performed medication reconciliation with patient, and patient verbalizes understanding of administration of home medications. There were no barriers to obtaining medications identified at this time. Support system:  patient and spouse    Discharge Instructions :  Reviewed discharge instructions with patient.   Patient verbalizes understanding of discharge instructions and follow-up care. Red Flags:  Chest Pain. SOB    [facility] Labs Reviewed:  INR 1.2  PT 13.8    [facility]:  Imaging results reviewed:  Chest CT:  Mild cardiomegally; Hepatic Steatosis. Advance Care Planning:   Patient was offered the opportunity to discuss advance care planning:  yes     Does patient have an Advance Directive:  no   If no, did you provide information on Caring Connections? yes     PCP/Specialist follow up: Patient scheduled to follow up with Doreen Burgos MD on 3/27/2018. Case management Impression/ plan:    Goals Addressed             Most Recent     Self-schedules and keeps appointments    On track (3/23/2018)             Patient is scheduled to f/u with  4/10/2018. Patient also asked to keep 4/11/2018 w/ PCP if needed not, then will cancel on 3/27.             Goal completion date:  4/11/2018

## 2018-03-23 NOTE — PROGRESS NOTES
[facility]ED [facility] 3/18/2018:  Chest Pain     Patient on Outside Facilities Report dated 3/19/2018. Left message on voicemail. Will attempt to contact again. Need to complete post-discharge assessment.

## 2018-03-26 ENCOUNTER — OFFICE VISIT (OUTPATIENT)
Dept: INTERNAL MEDICINE CLINIC | Age: 63
End: 2018-03-26

## 2018-03-26 VITALS
TEMPERATURE: 98.2 F | HEIGHT: 62 IN | SYSTOLIC BLOOD PRESSURE: 126 MMHG | RESPIRATION RATE: 16 BRPM | DIASTOLIC BLOOD PRESSURE: 81 MMHG | HEART RATE: 63 BPM | WEIGHT: 187.3 LBS | BODY MASS INDEX: 34.47 KG/M2

## 2018-03-26 DIAGNOSIS — K21.9 GASTROESOPHAGEAL REFLUX DISEASE WITHOUT ESOPHAGITIS: Primary | ICD-10-CM

## 2018-03-26 DIAGNOSIS — M79.7 FIBROMYOSITIS: ICD-10-CM

## 2018-03-26 DIAGNOSIS — I10 ESSENTIAL HYPERTENSION: ICD-10-CM

## 2018-03-26 RX ORDER — PANTOPRAZOLE SODIUM 40 MG/1
40 TABLET, DELAYED RELEASE ORAL 2 TIMES DAILY
Qty: 60 TAB | Refills: 3 | Status: SHIPPED | OUTPATIENT
Start: 2018-03-26 | End: 2019-04-25 | Stop reason: ALTCHOICE

## 2018-03-26 NOTE — PROGRESS NOTES
[facility]  Lisa Lee MD, [address] [address]  Phone:  [phone]  Fax: [phone]       Chief Complaint   Patient presents with   [facility] Follow Up   . SUBJECTIVE:    Mateo Casillas is a 57 y.o. female Patient returns today with known history of primary hypertension, dyslipidemia, fibromyalgia, GERD, IBS, pulmonary embolus, restless leg syndrome, obstructive sleep apnea and white coat hypertension. Since we last saw her she's been to [facility] ER on [facility] and had a CT of her chest with IV contrast, which revealed no evidence of pulmonary embolus, mild cardiomegaly, a fluid filled  esophagus versus esophageal diverticula, hepatic steatosis, chest xray that revealed no acute process and final diagnosis of esophageal foreign body. She was referred to Dr. Sunil Sanchez for follow up. She's going to have an upper endoscopy by Dr. Sunil Sanchez April 10th. Patient returns today saying she is just miserable. She cannot eat solid foods, if it touches her stomach she has severe pain. She also has a burning sensation in her esophagus. Patient believes that the Eliquis is the cause for her reflux and symptomatology that she has. Patient is seen for evaluation. Current Outpatient Prescriptions   Medication Sig Dispense Refill    pantoprazole (PROTONIX) 40 mg tablet Take 1 Tab by mouth two (2) times a day. 60 Tab 3    apixaban (ELIQUIS) 5 mg tablet Take 1 Tab by mouth two (2) times a day.  60 Tab 6    traZODone (DESYREL) 50 mg tablet TAKE 1 TABLET NIGHTLY 90 Tab 2    amLODIPine (NORVASC) 10 mg tablet TAKE 1 TABLET DAILY 90 Tab 2    cholecalciferol (VITAMIN D3) 1,000 unit cap Take 1 tablet by mouth daily,      OT ULTRA/FASTTRACK CONTROL soln       ONETOUCH ULTRA2 monitoring kit       ONE TOUCH DELICA 33 gauge misc       MICROLET LANCET misc USE TO TEST BLOOD SUGAR EVERY  Each 11    CONTOUR NEXT STRIPS strip USE TO TEST BLOOD SUGARS ONCE DAILY 50 Strip 11    cyanocobalamin (VITAMIN B-12) 1,000 mcg tablet Take 1,000 mcg by mouth daily.  bisoprolol-hydroCHLOROthiazide (ZIAC) 10-6.25 mg per tablet TAKE 1 TABLET DAILY 90 Tab 2     Past Medical History:   Diagnosis Date    Depression with anxiety     DJD (degenerative joint disease)     Dyslipidemia     Encounter for Hemoccult screening 06/28/2017    neg    Fibromyalgia     Gait instability     GERD (gastroesophageal reflux disease)     Hypertension     IBS (irritable bowel syndrome)     Mastodynia, left greater than right 8/17/2011    Normal cardiac stress test 3.14    Positive D dimer 9-23-15    Prediabetes     Pulmonary embolism ([facility] [address] 03/2013    rll    Restless leg syndrome     S/P colonoscopy 01/27/2011    kenny hernandez md    Saphenous vein occlusion, 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    [facility] acute occlusive superificial vein thrombosis - Melody Dyer md    Saphenous vein thrombophlebitis, right 02/2018    and acute l posterior tibial vein - this is the 2nd DVT putting her on lifelong anticoagualtion / Vassar Salvia    Sleep apnea     cpap 9cm    SOB (shortness of breath)     White coat hypertension      Past Surgical History:   Procedure Laterality Date    BREAST SURGERY PROCEDURE UNLISTED  2006    left breast [id number]. Mike Tirado    HX COLONOSCOPY      HX ORTHOPAEDIC  2009    foot surgery-left    HX OTHER SURGICAL      right and left leg muscle biopsies     Allergies   Allergen Reactions    Aleve [Naproxen Sodium] Hoarseness    Dilaudid [Hydromorphone (Bulk)] Anaphylaxis     Respiratory arrest and throat closes    Diovan [Valsartan] Palpitations    Morphine Other (comments)     Patch-vomiting,weakness, fainting    Sulfa (Sulfonamide Antibiotics) Hives, Rash and Other (comments)     fainting         REVIEW OF SYSTEMS:  General: negative for - chills or fever  ENT: negative for - headaches, nasal congestion or tinnitus  Respiratory: negative for - cough, hemoptysis, shortness of breath or wheezing  Cardiovascular : negative for - chest pain, edema, palpitations or shortness of breath  Gastrointestinal: negative for - abdominal pain, blood in stools, heartburn or nausea/vomiting  Genito-Urinary: no dysuria, trouble voiding, or hematuria  Musculoskeletal: negative for - gait disturbance, joint pain, joint stiffness or joint swelling  Neurological: no TIA or stroke symptoms  Hematologic: no bruises, no bleeding, no swollen glands  Integument: no lumps, mole changes, nail changes or rash  Endocrine: no malaise/lethargy or unexpected weight changes      Social History     Social History    Marital status:      Spouse name: N/A    Number of children: N/A    Years of education: N/A     Social History Main Topics    Smoking status: Never Smoker    Smokeless tobacco: Never Used    Alcohol use No    Drug use: No    Sexual activity: Not Currently     Other Topics Concern    None     Social History Narrative         Family History: Mother: alive 80 yrs, High Blood Pressure, cva with cognitive deficitsFather:  36 yrs, myocardial infarctionSister(s): aliveBrother(s): unknow n2    sister(s) . 40 daughter no choildren -  walked out works for board of directors for [employer] . Social History: Alcohol Use Patient does not use alcohol. Smoking Status Patient is a never smoker. Caffeine: occasional. Drugs:    prescription narcotics. Diet: Regular. Exercise: None. Marital Status: . Lives w ith: spouse. Children: children. Mosque: [employer]. Patient is  living w ith her  she is on disability related to her fibromyositis.      Family History   Problem Relation Age of Onset    Hypertension Mother     Kidney Disease Mother      1 kidney    Heart Disease Father        OBJECTIVE:    Visit Vitals    /81    Pulse 63    Temp 98.2 °F (36.8 °C) (Oral)    Resp 16    Ht 5' 2\" (1.575 m)    Wt 187 lb 4.8 oz (85 kg)    BMI 34.26 kg/m2     CONSTITUTIONAL: well , well nourished, appears age appropriate  EYES: perrla, eom intact  ENMT:moist mucous membranes, pharynx clear  NECK: supple. Thyroid normal  RESPIRATORY: Chest: clear bilaterally   CARDIOVASCULAR: Heart: regular rate and rhythm  GASTROINTESTINAL: Abdomen: soft, bowel sounds active  HEMATOLOGIC: no pathological lymph nodes palpated  MUSCULOSKELETAL: Extremities: no edema, pulse 1+   INTEGUMENT: No unusual rashes or suspicious skin lesions noted. Nails appear normal.  NEUROLOGIC: non-focal exam   MENTAL STATUS: alert and oriented, appropriate affect           ASSESSMENT:  1. Gastroesophageal reflux disease without esophagitis    2. Essential hypertension    3. Fibromyositis      She has esophagitis and will begin with Protonix 40 mg twice daily. If that fails to respond adequately will add Carafate. We tell her it would be ill advised to stop Eliquis and give her the reasoning. We agree with the EGD. Her blood pressure control is at goal.  She'll return to the office as previously scheduled after she's had her EGD. Patient has an area of muscle spasm in the upper right arm. She actually has a knot-like area, consistent with an area of muscle spasm. We suggest local treatment with Biofreeze and heat. PLAN:  .  Orders Placed This Encounter    pantoprazole (PROTONIX) 40 mg tablet       Follow-up Disposition: Not on File      ATTENTION:   This medical record was transcribed using an electronic medical records system. Although proofread, it may and can contain electronic and spelling errors. Other human spelling and other errors may be present. Corrections may be executed at a later time. Please feel free to contact us for any clarifications as needed.

## 2018-03-26 NOTE — PROGRESS NOTES
1. Have you been to the ER, urgent care clinic since your last visit? Hospitalized since your last visit? Yes When: 3-18-18 Reason for visit: throat pain    2. Have you seen or consulted any other health care providers outside of the [facility] since your last visit? Include any pap smears or colon screening.  Yes Where: [facility]     Complaints of throat and right arm pain

## 2018-03-26 NOTE — MR AVS SNAPSHOT
[address] [facility], [place] 
 
 
 Kimmie [address] [address] 
[phone] Patient: Acie Baumgarten MRN: [id number] KRX:76[id number] Visit Information Date & Time Provider Department Dept. Phone Encounter #  
 3/26/2018  4:30 PM Norma Mendosa, [facility] [phone] [id number] Follow-up Instructions Follow-up and Disposition History Your Appointments 4/11/2018  9:45 AM  
Any with Norma Mendosa MD  
[address] [place] [address] and Primary Care [address] [place] [address]) Appt Note: EDG/Colonoscopy f/u  
 Kimmie Jess[address]ona [address] 1 [facility]  
  
   
 Kimmie Andreas [address] [address]  
  
    
 6/20/2018  9:30 AM  
Any with Norma Mendosa MD  
[address] [place] [address] and Primary Care [address] [place] [address]) Appt Note: 3 month f/u  
 [address]  
[phone] 9/19/2018  9:30 AM  
Any with Norma Mendosa MD  
[address] [place] [address] and Primary Care [address] [place] [address]) Appt Note: 3 month f/u  
 [address]  
[phone]  
  
    
 12/19/2018  9:30 AM  
Any with Norma Mendosa MD  
[address] [place] [address] and Primary Care [address] [place] [address]) Appt Note: 3 month f/u  
 [address]  
[phone] Upcoming Health Maintenance Date Due  
 PAP AKA CERVICAL CYTOLOGY 9/9/2017 MEDICARE YEARLY EXAM 3/15/2019 BREAST CANCER SCRN MAMMOGRAM 10/13/2019 COLONOSCOPY 1/27/2021 DTaP/Tdap/Td series (2 - Td) 3/14/2028 Allergies as of 3/26/2018  Review Complete On: 3/26/2018 By: Norma Mendosa MD  
  
 Severity Noted Reaction Type Reactions Aleve [Naproxen Sodium] High 07/22/2016    Hoarseness Dilaudid [Hydromorphone (Bulk)] High 08/15/2011    Anaphylaxis Respiratory arrest and throat closes Diovan [Valsartan]  09/22/2015    Palpitations Morphine  08/15/2011    Other (comments) Patch-vomiting,weakness, fainting Sulfa (Sulfonamide Antibiotics)  08/15/2011    Hives, Rash, Other (comments)  
 fainting Current Immunizations  Reviewed on 4/2/2014 No immunizations on file. Not reviewed this visit You Were Diagnosed With   
  
 Codes Comments Gastroesophageal reflux disease without esophagitis    -  Primary ICD-10-CM: K21.9 ICD-9-CM: 530.81 Essential hypertension     ICD-10-CM: I10 
ICD-9-CM: 401.9 Fibromyositis     ICD-10-CM: M79.7 ICD-9-CM: 729.1 Vitals BP Pulse Temp Resp Height(growth percentile) Weight(growth percentile) 126/81 63 98.2 °F (36.8 °C) (Oral) 16 5' 2\" (1.575 m) 187 lb 4.8 oz (85 kg) BMI OB Status Smoking Status 34.26 kg/m2 Postmenopausal Never Smoker Vitals History BMI and BSA Data Body Mass Index Body Surface Area  
 34.26 kg/m 2 1.93 m 2 Preferred Pharmacy Pharmacy Name [facility], [address] [phone] Your Updated Medication List  
  
   
This list is accurate as of 3/26/18  5:25 PM.  Always use your most recent med list. amLODIPine 10 mg tablet Commonly known as:  Saintclair Rickers TAKE 1 TABLET DAILY  
  
 apixaban 5 mg tablet Commonly known as:  Michelle Beckford Take 1 Tab by mouth two (2) times a day. bisoprolol-hydroCHLOROthiazide 10-6.25 mg per tablet Commonly known as:  Pending sale to [facility] TAKE 1 TABLET DAILY  
  
 cholecalciferol 1,000 unit Cap Commonly known as:  VITAMIN D3 Take 1 tablet by mouth daily, CONTOUR NEXT STRIPS strip Generic drug:  glucose blood VI test strips USE TO TEST BLOOD SUGARS ONCE DAILY  
  
 * [place] LANCET Misc Generic drug:  Lancets USE TO TEST BLOOD SUGAR EVERY DAY  
  
 * One Touch Delica 33 gauge Misc Generic drug:  lancets ONETOUCH ULTRA2 monitoring kit Generic drug:  Blood-Glucose Meter OT Ultra/FastTrack Control Soln Generic drug:  Blood Glucose Control, Normal  
  
 pantoprazole 40 mg tablet Commonly known as:  PROTONIX Take 1 Tab by mouth two (2) times a day. traZODone 50 mg tablet Commonly known as:  DESYREL  
TAKE 1 TABLET NIGHTLY  
  
 VITAMIN B-12 1,000 mcg tablet Generic drug:  cyanocobalamin Take 1,000 mcg by mouth daily. * Notice: This list has 2 medication(s) that are the same as other medications prescribed for you. Read the directions carefully, and ask your doctor or other care provider to review them with you. Prescriptions Sent to Pharmacy Refills  
 pantoprazole (PROTONIX) 40 mg tablet 3 Sig: Take 1 Tab by mouth two (2) times a day. Class: Normal  
 Pharmacy: [address] [place], [place] [address] [facility] [address] #: [phone] Route: Oral  
  
Introducing [facility]! [employer] introduces Pic[employer]um patient portal. Now you can access parts of your medical record, email your doctor's office, and request medication refills online. 1. In your internet browser, go to [email or web address] 2. Click on the First Time User? Click Here link in the Sign In box. You will see the New Member Sign Up page. 3. Enter your Picplum Access Code exactly as it appears below. You will not need to use this code after youve completed the sign-up process. If you do not sign up before the expiration date, you must request a new code. · Picplum Access Code: [email or web address] Expires: 6/12/2018 10:10 AM 
 
4. Enter the last four digits of your Social Security Number (xxxx) and Date of Birth (mm/dd/yyyy) as indicated and click Submit. You will be taken to the next sign-up page. 5. Create a TechDevilst ID. This will be your Picplum login ID and cannot be changed, so think of one that is secure and easy to remember. 6. Create a Tech[employer] password. You can change your password at any time. 7. Enter your Password Reset Question and Answer. This can be used at a later time if you forget your password. 8. Enter your e-mail address. You will receive e-mail notification when new information is available in [address] 9. Click Sign Up. You can now view and download portions of your medical record. 10. Click the Download Summary menu link to download a portable copy of your medical information. If you have questions, please visit the Frequently Asked Questions section of the [employer] website. Remember, [employer] is NOT to be used for urgent needs. For medical emergencies, dial 911. Now available from your iPhone and Android! Please provide this summary of care documentation to your next provider. Your primary care clinician is listed as Roel Sanchez. If you have any questions after today's visit, please call [phone].

## 2018-04-09 RX ORDER — ASCORBIC ACID 500 MG
500 TABLET ORAL AS NEEDED
COMMUNITY
End: 2019-05-07 | Stop reason: ALTCHOICE

## 2018-04-09 RX ORDER — TRAZODONE HYDROCHLORIDE 50 MG/1
50 TABLET ORAL
COMMUNITY
End: 2018-11-19 | Stop reason: SDUPTHER

## 2018-04-09 RX ORDER — GABAPENTIN 100 MG/1
100 CAPSULE ORAL
COMMUNITY
End: 2019-05-07 | Stop reason: ALTCHOICE

## 2018-04-09 RX ORDER — DICLOFENAC SODIUM 10 MG/G
GEL TOPICAL AS NEEDED
COMMUNITY
End: 2019-05-07 | Stop reason: ALTCHOICE

## 2018-04-09 RX ORDER — PAPAYA
TABLET ORAL
COMMUNITY

## 2018-04-10 ENCOUNTER — ANESTHESIA EVENT (OUTPATIENT)
Dept: ENDOSCOPY | Age: 63
End: 2018-04-10
Payer: MEDICARE

## 2018-04-10 ENCOUNTER — ANESTHESIA (OUTPATIENT)
Dept: ENDOSCOPY | Age: 63
End: 2018-04-10
Payer: MEDICARE

## 2018-04-10 ENCOUNTER — HOSPITAL ENCOUNTER (OUTPATIENT)
Age: 63
Setting detail: OUTPATIENT SURGERY
Discharge: HOME | End: 2018-04-10
Attending: INTERNAL MEDICINE | Admitting: INTERNAL MEDICINE
Payer: MEDICARE

## 2018-04-10 VITALS
TEMPERATURE: 97 F | HEIGHT: 62 IN | OXYGEN SATURATION: 97 % | SYSTOLIC BLOOD PRESSURE: 122 MMHG | BODY MASS INDEX: 33.31 KG/M2 | DIASTOLIC BLOOD PRESSURE: 70 MMHG | HEART RATE: 59 BPM | WEIGHT: 181 LBS | RESPIRATION RATE: 17 BRPM

## 2018-04-10 LAB
GLUCOSE BLD STRIP.AUTO-MCNC: 103 MG/DL (ref 65–100)
GLUCOSE BLD STRIP.AUTO-MCNC: 66 MG/DL (ref 65–100)
SERVICE CMNT-IMP: ABNORMAL
SERVICE CMNT-IMP: NORMAL

## 2018-04-10 PROCEDURE — 74011250636 HC RX REV CODE- 250/636: Performed by: INTERNAL MEDICINE

## 2018-04-10 PROCEDURE — 74011000250 HC RX REV CODE- 250

## 2018-04-10 PROCEDURE — 88305 TISSUE EXAM BY PATHOLOGIST: CPT | Performed by: INTERNAL MEDICINE

## 2018-04-10 PROCEDURE — 74011250636 HC RX REV CODE- 250/636

## 2018-04-10 PROCEDURE — C1726 CATH, BAL DIL, NON-VASCULAR: HCPCS | Performed by: INTERNAL MEDICINE

## 2018-04-10 PROCEDURE — 76040000007: Performed by: INTERNAL MEDICINE

## 2018-04-10 PROCEDURE — 82962 GLUCOSE BLOOD TEST: CPT

## 2018-04-10 PROCEDURE — 77030018712 HC DEV BLLN INFL BSC -B: Performed by: INTERNAL MEDICINE

## 2018-04-10 PROCEDURE — 76060000032 HC ANESTHESIA 0.5 TO 1 HR: Performed by: INTERNAL MEDICINE

## 2018-04-10 PROCEDURE — 77030009426 HC FCPS BIOP ENDOSC BSC -B: Performed by: INTERNAL MEDICINE

## 2018-04-10 PROCEDURE — 77030027957 HC TBNG IRR ENDOGTR BUSS -B: Performed by: INTERNAL MEDICINE

## 2018-04-10 PROCEDURE — 74011000250 HC RX REV CODE- 250: Performed by: ANESTHESIOLOGY

## 2018-04-10 RX ORDER — SODIUM CHLORIDE 9 MG/ML
100 INJECTION, SOLUTION INTRAVENOUS CONTINUOUS
Status: DISCONTINUED | OUTPATIENT
Start: 2018-04-10 | End: 2018-04-10 | Stop reason: HOSPADM

## 2018-04-10 RX ORDER — MIDAZOLAM HYDROCHLORIDE 1 MG/ML
.25-1 INJECTION, SOLUTION INTRAMUSCULAR; INTRAVENOUS
Status: DISCONTINUED | OUTPATIENT
Start: 2018-04-10 | End: 2018-04-10 | Stop reason: HOSPADM

## 2018-04-10 RX ORDER — ATROPINE SULFATE 0.1 MG/ML
0.5 INJECTION INTRAVENOUS
Status: DISCONTINUED | OUTPATIENT
Start: 2018-04-10 | End: 2018-04-10 | Stop reason: HOSPADM

## 2018-04-10 RX ORDER — FENTANYL CITRATE 50 UG/ML
200 INJECTION, SOLUTION INTRAMUSCULAR; INTRAVENOUS
Status: DISCONTINUED | OUTPATIENT
Start: 2018-04-10 | End: 2018-04-10 | Stop reason: HOSPADM

## 2018-04-10 RX ORDER — PROPOFOL 10 MG/ML
INJECTION, EMULSION INTRAVENOUS AS NEEDED
Status: DISCONTINUED | OUTPATIENT
Start: 2018-04-10 | End: 2018-04-10 | Stop reason: HOSPADM

## 2018-04-10 RX ORDER — SODIUM CHLORIDE 9 MG/ML
INJECTION, SOLUTION INTRAVENOUS
Status: DISCONTINUED | OUTPATIENT
Start: 2018-04-10 | End: 2018-04-10 | Stop reason: HOSPADM

## 2018-04-10 RX ORDER — DEXTROMETHORPHAN/PSEUDOEPHED 2.5-7.5/.8
1.2 DROPS ORAL
Status: DISCONTINUED | OUTPATIENT
Start: 2018-04-10 | End: 2018-04-10 | Stop reason: HOSPADM

## 2018-04-10 RX ORDER — NALOXONE HYDROCHLORIDE 0.4 MG/ML
0.4 INJECTION, SOLUTION INTRAMUSCULAR; INTRAVENOUS; SUBCUTANEOUS
Status: DISCONTINUED | OUTPATIENT
Start: 2018-04-10 | End: 2018-04-10 | Stop reason: HOSPADM

## 2018-04-10 RX ORDER — SODIUM CHLORIDE 0.9 % (FLUSH) 0.9 %
5-10 SYRINGE (ML) INJECTION AS NEEDED
Status: DISCONTINUED | OUTPATIENT
Start: 2018-04-10 | End: 2018-04-10 | Stop reason: HOSPADM

## 2018-04-10 RX ORDER — FLUMAZENIL 0.1 MG/ML
0.2 INJECTION INTRAVENOUS
Status: DISCONTINUED | OUTPATIENT
Start: 2018-04-10 | End: 2018-04-10 | Stop reason: HOSPADM

## 2018-04-10 RX ORDER — EPINEPHRINE 0.1 MG/ML
1 INJECTION INTRACARDIAC; INTRAVENOUS
Status: DISCONTINUED | OUTPATIENT
Start: 2018-04-10 | End: 2018-04-10 | Stop reason: HOSPADM

## 2018-04-10 RX ORDER — LIDOCAINE HYDROCHLORIDE 20 MG/ML
INJECTION, SOLUTION EPIDURAL; INFILTRATION; INTRACAUDAL; PERINEURAL AS NEEDED
Status: DISCONTINUED | OUTPATIENT
Start: 2018-04-10 | End: 2018-04-10 | Stop reason: HOSPADM

## 2018-04-10 RX ORDER — SODIUM CHLORIDE 0.9 % (FLUSH) 0.9 %
5-10 SYRINGE (ML) INJECTION EVERY 8 HOURS
Status: DISCONTINUED | OUTPATIENT
Start: 2018-04-10 | End: 2018-04-10 | Stop reason: HOSPADM

## 2018-04-10 RX ORDER — DEXMEDETOMIDINE HYDROCHLORIDE 4 UG/ML
INJECTION, SOLUTION INTRAVENOUS AS NEEDED
Status: DISCONTINUED | OUTPATIENT
Start: 2018-04-10 | End: 2018-04-10 | Stop reason: HOSPADM

## 2018-04-10 RX ORDER — DEXTROSE 50 % IN WATER (D50W) INTRAVENOUS SYRINGE
25
Status: COMPLETED | OUTPATIENT
Start: 2018-04-10 | End: 2018-04-10

## 2018-04-10 RX ADMIN — DEXTROSE MONOHYDRATE 12.5 G: 25 INJECTION, SOLUTION INTRAVENOUS at 10:17

## 2018-04-10 RX ADMIN — LIDOCAINE HYDROCHLORIDE 80 MG: 20 INJECTION, SOLUTION EPIDURAL; INFILTRATION; INTRACAUDAL; PERINEURAL at 09:14

## 2018-04-10 RX ADMIN — DEXMEDETOMIDINE HYDROCHLORIDE 4 MCG: 4 INJECTION, SOLUTION INTRAVENOUS at 09:18

## 2018-04-10 RX ADMIN — PROPOFOL 30 MG: 10 INJECTION, EMULSION INTRAVENOUS at 09:33

## 2018-04-10 RX ADMIN — DEXMEDETOMIDINE HYDROCHLORIDE 4 MCG: 4 INJECTION, SOLUTION INTRAVENOUS at 09:15

## 2018-04-10 RX ADMIN — DEXMEDETOMIDINE HYDROCHLORIDE 4 MCG: 4 INJECTION, SOLUTION INTRAVENOUS at 09:13

## 2018-04-10 RX ADMIN — PROPOFOL 30 MG: 10 INJECTION, EMULSION INTRAVENOUS at 09:26

## 2018-04-10 RX ADMIN — PROPOFOL 30 MG: 10 INJECTION, EMULSION INTRAVENOUS at 09:23

## 2018-04-10 RX ADMIN — PROPOFOL 30 MG: 10 INJECTION, EMULSION INTRAVENOUS at 09:14

## 2018-04-10 RX ADMIN — SODIUM CHLORIDE: 9 INJECTION, SOLUTION INTRAVENOUS at 08:49

## 2018-04-10 RX ADMIN — PROPOFOL 30 MG: 10 INJECTION, EMULSION INTRAVENOUS at 09:30

## 2018-04-10 RX ADMIN — PROPOFOL 30 MG: 10 INJECTION, EMULSION INTRAVENOUS at 09:36

## 2018-04-10 RX ADMIN — PROPOFOL 30 MG: 10 INJECTION, EMULSION INTRAVENOUS at 09:16

## 2018-04-10 RX ADMIN — PROPOFOL 30 MG: 10 INJECTION, EMULSION INTRAVENOUS at 09:21

## 2018-04-10 RX ADMIN — PROPOFOL 30 MG: 10 INJECTION, EMULSION INTRAVENOUS at 09:19

## 2018-04-10 NOTE — PERIOP NOTES
Patient has been evaluated by anesthesia pre-procedure. Patient alert and oriented. Vital signs will not be charted by the Endoscopy nurse. All vitals, airway, and loc are monitored by anesthesia staff throughout procedure. CRE balloon dilatation of the esophagus   15  mm Balloon inflated to 3 ATMs and held for 05 seconds. 16.5 mm Balloon inflated to 4.5 ATMs and held for 10 seconds. 18 mm Balloon inflated to 7 ATMs and held for 60 seconds. No subcutaneous crepitus of the chest or cervical region was noted post dilatation.     .Endoscopes  were pre-cleaned at bedside immediately following procedure by Jere Le

## 2018-04-10 NOTE — H&P
The patient is a 57year old female who presents with a complaint of Dysphagia. The patient presents for due to new symptoms. The onset of the dysphagia has been sudden and has been occurring in a persistent pattern for 5 days. The course has been increasing and  is described as moderate .  The dysphagia is described as being located in the neck and throat and substernal  area. The symptoms are aggravated by both liquids and solids. The symptoms have been associated with abdominal pain, chest pain, heartburn (nausea) and odynophagia, while the symptoms have not been associated with weight loss. The patient has been using no medications for this particular complaint/condition (has slight improvement on PPI in the past) and H2 antagonist (just started yesterday). The dysphagia is characterized as worse. The patient had an EGD 6 year(s) ago. Previous diagnostic tests have included CT scan (fluid-filled distal esophagus versus distal esophageal diverticulum), Hemoglobin/hematocrit/WBC/HCV/platelet count and basic metabolic panel. The patient has normal/adequate nutrition. Note for \"Dysphagia\": She was told to have gastroparesis by Dr. Vargas All several years ago. She reports that her last colonoscopy was 5 years ago and told to have every 5 years. She has abdominal pain, early satiety, and bloating. She feels like she wants to belch but can't. She is having regular bowel movements, she took a laxative 2 weeks ago. She was seen in the ER yesterday for these issues. She was given Pepcid. Problem List/Past Medical (Valescia A. 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 Norvasc  (10MG Tablet, 1 Oral daily) Active. Ziac  (10-6.25MG Tablet, 1 Oral daily) Active. Famotidine  (40MG Tablet, 1 Oral qhs) Active. Gabapentin  (100MG Tablet, 1 Oral tid) Active. Vitamin D3  (1000UNIT Tablet, Oral) Active. Vitamin B-12  (1000MCG Tablet, Oral as needed) Active. Probiotic  (250MG Capsule, Oral) Active. Diclofenac Sodium  (1% Gel, External) Active. Papaya and Enzymes  (Oral) Active. Family History (Valescia A. [place]; 3/19/2018 2:54 PM)  Hypertension   Mother. Heart attack (410.90  I21.9)   Father. Kidney Disease   Mother. Social History (Valescia A. [place]; 3/19/2018 2:54 PM)  Tobacco Use   Never smoker. Alcohol Use   Has never drank. Marital status   . Employment status   Disabled. Blood Transfusion   No.    Diagnostic Studies History (Valescia A. [place]; 3/19/2018 2:54 PM)  Endoscopy   Date: 5/2004. Colonoscopy   Date: 8/2007. Health Maintenance History (Valescia A. [place]; 3/19/2018 2:54 PM)  Flu Vaccine   Date: 2017. Pneumovax   none        Review of Systems (Valescia A. [place]; 3/19/2018 2:54 PM)  General Present- Weight Gain. Not Present- Chronic Fatigue, Poor Appetite and Weight Loss. Skin Not Present- Itching, Rash and Skin Color Changes. HEENT Not Present- Hearing Loss and Vertigo. Respiratory Not Present- TB exposure. Cardiovascular Present- Chest Pain, Hypertension, Shortness of Breath and Use of Blood Thinners.  Not Present- Use of Antibiotics before Dental Procedures. Gastrointestinal Present- See HPI. Musculoskeletal Not Present- Arthritis, Hip Replacement Surgery and Knee Replacement Surgery. Neurological Not Present- Weakness. Psychiatric Present- Anxiety, Depression and Difficulty sleeping. Endocrine Not Present- Diabetes and Thyroid Problems. Hematology Not Present- Anemia. Vitals (Boo Cherry; 3/19/2018 3:00 PM)  3/19/2018 2:53 PM  Weight: 185 lb   Height: 62 in   Body Surface Area: 1.85 m²   Body Mass Index: 33.84 kg/m²    Pulse: 60 (Regular)    Resp.: 20 (Unlabored)     BP: 106/60 (Sitting, Left Arm, Standard)              Physical Exam Janet Boyd AGAP; 3/19/2018 4:44 PM)  General  Mental Status - Alert. General Appearance - Cooperative, Pleasant, Not in acute distress. Orientation - Oriented X3. Build & Nutrition - Well nourished and Well developed. Note:  walks with a rollator      Integumentary  General Characteristics  Color - normal coloration of skin. Temperature - normal warmth is noted. Mobility & Turgor - normal mobility and turgor. Head and Neck  Head - normocephalic, atraumatic with no lesions or palpable masses. Neck  Global Assessment - full range of motion and supple. Trachea - midline. Eye  Eyeball - Bilateral - No Exophthalmos. Sclera/Conjunctiva - Left - No Jaundice. Sclera/Conjunctiva - Right - No Jaundice. Chest and Lung Exam  Chest and lung exam reveals  - quiet, even and easy respiratory effort with no use of accessory muscles. Auscultation  Breath sounds - Normal. Adventitious sounds - No Adventitious sounds. Cardiovascular  Auscultation  Rhythm - Regular, No Tachycardia, No Bradycardia . Heart Sounds - Normal heart sounds , S1 WNL and S2 WNL, No S3, No Summation Gallop. Murmurs & Other Heart Sounds - Auscultation of the heart reveals - No Murmurs. Abdomen  Inspection  Inspection of the abdomen reveals - Soft and Obese.   Palpation/Percussion  Tenderness - Epigastrium and Generalized. Rebound tenderness - No rebound. Rigidity (guarding) - No Rigidity. Dullness to percussion - No abnormal dullness to percussion. Liver - No hepatosplenomegaly. Abdominal Mass Palpable - No masses. Other Characteristics - No Ascites. Auscultation  Auscultation of the abdomen reveals - Bowel sounds normal, No Abdominal bruits and No Succussion splash. Rectal - Did not examine. Peripheral Vascular  Upper Extremity  Inspection - Left - Normal - No Clubbing, No Cyanosis, No Edema, Pulses Intact. Right - Normal - No Clubbing, No Cyanosis, No Edema, Pulses Intact. Palpation - Edema - Left - No edema. Right - No edema. Neurologic  Neurologic evaluation reveals  - Cranial nerves grossly intact, no focal neurologic deficits. Musculoskeletal  Global Assessment  Gait and Station - normal gait and station. Assessment & Plan (Caryle Pander. [facility]; 3/19/2018 4:48 PM)  Dysphagia (787.20  R13.10)  Story: CT Scan 3/18/18: fluid-filled distal esophagus versus distal esophageal diverticulum  Reportedly diagnosed with gastroparesis several years ago by Dr. Ivan Dewitt  reported colonoscopy around 5-6 years ago with polyps, recommended 5 year follow-up  Several EGD in the past, reportedly requiring stretching, last 5-6 years ago  2004 EGD: grade 1 reflux esophagitis, hiatal hernia, gastritis  2001 Colonoscopy: internal hemorrhoid; rectosigmoid small 1-2 mm hyperplastic polyps, mild diverticulosis  Impression: She has been having progressive dysphagia to solids and liquids for the past 7-10 days. She will continue Pepcid, can try PPI if no improvement. Will further evaluate with EGD. Will obtain clearance to hold Eliquis. Current Plans  Pt Education - How to access health information online: discussed with patient and provided information.   Endoscopy (097[address]) (Discussed risks and benefits with the patient to include: perforation,or post biopsy bleeding, missed lesions, and sedation reactions.)  Patient is to call me for any questions or concerns. Case discussed personally with a physician in the office regarding the presentation, pertinent physical findings and development of a diagnostic and therapeutic plan. Outside medical records reviewed and incorporated into the medical decision-making. Follow up office visit after procedure  This patient was reviewed and seen in collaboration with Dr. Nita Quintanilla. Personal history of colonic polyps (V12.72  Z86.010)  Impression: Reportedly due for colonoscopy. Last 5-6 years ago, told to have repeat every 5 years for polyps. Will obtain clearance to hold Eliquis. Current Plans  Colonoscopy ([address]1960) (Discussed risks and benefits with the patient to include:; perforation, post polypectomy, or post biopsy bleeding, missed lesions, and sedation reactions.)  Started PEG 3350/Electrolytes 240GM, 4000 Milliliter as directed by physician, 1 day starting 03/19/2018, No Refill. Local Order: Please purchase Bisacodyl tablets over the counter. Acid reflux (530.81  K21.9)  Impression: She will continue Pepcid. She was given GERD lifestyle modifications. Epigastric pain (789.06  R10.13)  Hepatic steatosis (571.8  K76.0)  Impression: noted on CT scan; LFT's normal  Pulmonary Embolism  Impression: On Eliquis with mildly reduced creatinine clearance. Will contact hematologist for clearance to stop Eliquis for 3 days prior to procedures. Early satiety (780.94  R68.81)  Current Plans  Next test to be considered:  gastric emptying scan  Bloating (787.3  R14.0)  Date of Surgery Update:  Yelena Daniels was seen and examined. History and physical has been reviewed. The patient has been examined.  There have been no significant clinical changes since the completion of the originally dated History and Physical.    Signed By: Nita Quintanilla MD     April 10, 2018 9:10 AM

## 2018-04-10 NOTE — PROCEDURES
[facility]  [place], [address]        Esophago- Gastroduodenoscopy (EGD) Procedure Note    Claudia Aiken  1955  [id number]      Procedure: Endoscopic Gastroduodenoscopy with biopsy, esophageal dilation    Indication:  Dysphagia/odynophagia     Pre-operative Diagnosis: see indication above    Post-operative Diagnosis: see findings below    : Alexandro Nava MD    Referring Provider:  Mike Lopez MD      Anesthesia/Sedation:  MAC anesthesia Propofol        Procedure Details     After infomed consent was obtained for the procedure, with all risks and benefits of procedure explained the patient was taken to the endoscopy suite and placed in the left lateral decubitus position. Following sequential administration of sedation as per above, the endoscope was inserted into the mouth and advanced under direct vision to third portion of the duodenum. A careful inspection was made as the gastroscope was withdrawn, including a retroflexed view of the proximal stomach; findings and interventions are described below. Findings:   Esophagus:there was schatzki ring at G-E junction, dilated gradually with CRE balloon 15, 16.5 then 18 mm, kept at 18 mm for 1 minute    There was around 4 cm in diameter diverticulum in distal esophagus, non obstructing, no food seen in it, located around 4 cm above G-E junction    Random esophageal biopsies taken to r/o eosinophilic esophagitis  Stomach: normal   Duodenum/jejunum: normal      Therapies:  As above    Specimens: as above         EBL: None      Complications:   None; patient tolerated the procedure well. Impression:    -See post-procedure diagnoses.     Recommendations:  -colonoscopy today  -if her swallowing does not improve then will need surgical removal of the esophageal divertiulum    Signed By: Alexandro Nava MD     4/10/2018  9:28 AM

## 2018-04-10 NOTE — DISCHARGE INSTRUCTIONS
[address] [address]  [address] [facility], [address] [place] [address]    EGD and COLON DISCHARGE INSTRUCTIONS    Denae Ritter  [id number]  1955    Discomfort:  Redness at IV site- apply warm compress to area; if redness or soreness persist- contact your physician  There may be a slight amount of blood passed from the rectum  Gaseous discomfort- walking, belching will help relieve any discomfort  You may not operate a vehicle for 12 hours  You may not engage in an occupation involving machinery or appliances for rest of today  You may not drink alcoholic beverages for at least 12 hours  Avoid making any critical decisions for at least 24 hour  DIET:  You may resume your regular diet - however -  remember your colon is empty and a heavy meal will produce gas. Avoid these foods:  vegetables, fried / greasy foods, carbonated drinks     ACTIVITY:  You may  resume your normal daily activities it is recommended that you spend the remainder of the day resting -  avoid any strenuous activity. CALL M.D. ANY SIGN OF:   Increasing pain, nausea, vomiting  Abdominal distension (swelling)  New increased bleeding (oral or rectal)  Fever (chills)  Pain in chest area  Bloody discharge from nose or mouth  Shortness of breath      Follow-up Instructions:   Call Dr. Aleksander Eastman for any questions or problems at [phone] and follow up with him in 2 months   Resume eliquis this Thursday on 4/12/18  High fiber diet          ENDOSCOPY FINDINGS:   Your colonoscopy showed diverticulosis   Your endoscopy showed esophageal ring/ stricture I dilated, there was also diverticulum in distal portion of your esophagus, biopsies taken from esophagus to r/o any inflammation .   Telephone # [phone]      Signed By: Aleksander Eastman MD     4/10/2018  9:53 AM       DISCHARGE SUMMARY from Nurse    The following personal items collected during your admission are returned to you:   Dental Appliance: Dental Appliances: None  Vision:    Hearing Aid:    Jewelry:    Clothing:    Other Valuables:    Valuables sent to safe:

## 2018-04-10 NOTE — ROUTINE PROCESS
Kira Munoz  1955  [id number]    Situation:  Verbal report received from: Tahir Almeida RN  Procedure: Procedure(s):  COLONOSCOPY,EGD  ESOPHAGOGASTRODUODENOSCOPY (EGD)  ESOPHAGEAL DILATION  ESOPHAGOGASTRODUODENAL (EGD) BIOPSY    Background:    Preoperative diagnosis: DIFFICULTY SWALLOWING, ABDOMINAL PAIN  Postoperative diagnosis: Esophagus diverticulum, Schatzki's ring, divertiuclosis    :  Dr. Melani Tsai  Assistant(s): Endoscopy Technician-1: Chidi Tripathi  Endoscopy RN-1: Shashank Luna RN    Specimens:   ID Type Source Tests Collected by Time Destination   1 : random esophagus bx Preservative   Merary Jenkins MD 4/10/2018 8972 Pathology     H. Pylori  no    Assessment:  Intra-procedure medications         Anesthesia gave intra-procedure sedation and medications, see anesthesia flow sheet yes    Intravenous fluids: NS@ KVO     Vital signs stable     Abdominal assessment: round and soft     Recommendation:  Discharge patient per MD order  Family or Friend Instructions given to pt   Permission to share finding with family or friend yes

## 2018-04-10 NOTE — ANESTHESIA PREPROCEDURE EVALUATION
Anesthetic History               Review of Systems / Medical History  Patient summary reviewed, nursing notes reviewed and pertinent labs reviewed    Pulmonary        Sleep apnea: CPAP           Neuro/Psych             Comments: fibromyalgia Cardiovascular    Hypertension              Exercise tolerance: >4 METS     GI/Hepatic/Renal     GERD          Comments: Dysphagia  Ab pain Endo/Other        Arthritis     Other Findings            Physical Exam    Airway  Mallampati: II  TM Distance: > 6 cm  Neck ROM: normal range of motion   Mouth opening: Normal     Cardiovascular    Rhythm: regular  Rate: normal         Dental  No notable dental hx       Pulmonary  Breath sounds clear to auscultation               Abdominal         Other Findings            Anesthetic Plan    ASA: 3  Anesthesia type: MAC          Induction: Intravenous  Anesthetic plan and risks discussed with: Patient

## 2018-04-10 NOTE — PROCEDURES
[address]  174 [facility], [address] [place] [address]      Colonoscopy Operative Report    Aldair Eastman  [id number]  1955      Procedure Type:   Colonoscopy --diagnostic     Indications:    Personal history of colon polyps (screening only), Screening colonoscopy   Date of last colonoscopy: 5 YEARS AGO, Polyps  Yes    Pre-operative Diagnosis: see indication above    Post-operative Diagnosis:  See findings below    :  Mercedes Israel MD      Referring Provider: Riaz Bustamante MD      Sedation:  MAC anesthesia Propofol      Procedure Details:  After informed consent was obtained with all risks and benefits of procedure explained and preoperative exam completed, the patient was taken to the endoscopy suite and placed in the left lateral decubitus position. Upon sequential sedation as per above, a digital rectal exam was performed demonstrating internal hemorrhoids. The Olympus videocolonoscope  was inserted in the rectum and carefully advanced to the cecum, which was identified by the ileocecal valve and appendiceal orifice. The cecum was identified by the ileocecal valve and appendiceal orifice. The quality of preparation was good. The colonoscope was slowly withdrawn with careful evaluation between folds. Retroflexion in the rectum was completed . Findings:   Rectum: normal  Sigmoid: MODERATE DIVERTICULOSIS  Descending Colon: MILD DIVERTICULOSIS  Transverse Colon: normal  Ascending Colon: normal  Cecum: normal  Terminal Ileum: normal      Specimen Removed:  none    Complications: None. EBL:  None. Impression:    SEE FINDINGS    Recommendations: --Repeat colonoscopy in 5 years.       HIGH FIBER DIET  F/U IN 2 MONTHS  RESUME ELIQUIS ON 4/12/18  Signed By: Mercedes Israel MD     4/10/2018  9:47 AM

## 2018-04-10 NOTE — ANESTHESIA POSTPROCEDURE EVALUATION
Post-Anesthesia Evaluation and Assessment    Patient: Stefani Chang MRN: [id number]  SSN: [id number]    YOB: 1955  Age: 57 y.o. Sex: female       Cardiovascular Function/Vital Signs  Visit Vitals    Pulse (!) 58    Temp 36.4 °C (97.5 °F)    Resp 16    Ht 5' 2\" (1.575 m)    Wt 82.1 kg (181 lb)    BMI 33.11 kg/m2       Patient is status post MAC anesthesia for Procedure(s):  COLONOSCOPY,EGD  ESOPHAGOGASTRODUODENOSCOPY (EGD)  ESOPHAGEAL DILATION  ESOPHAGOGASTRODUODENAL (EGD) BIOPSY. Nausea/Vomiting: None    Postoperative hydration reviewed and adequate. Pain:  Pain Scale 1: Numeric (0 - 10) (04/10/18 0850)  Pain Intensity 1: 5 (04/10/18 0850)   Managed    Neurological Status: At baseline    Mental Status and Level of Consciousness: Arousable    Pulmonary Status:   O2 Device: Room air (04/10/18 0850)   Adequate oxygenation and airway patent    Complications related to anesthesia: None    Post-anesthesia assessment completed.  No concerns    Signed By: Heidy Denney MD     April 10, 2018

## 2018-04-10 NOTE — IP AVS SNAPSHOT
[address] 
[phone] Patient: Tyler Oconnor MRN: [id number] About your hospitalization You were admitted on:  April 10, 2018 You last received care in the:  [facility] ENDOSCOPY You were discharged on:  April 10, 2018 Why you were hospitalized Your primary diagnosis was:  Not on File Follow-up Information None Discharge Orders None A check sergio indicates which time of day the medication should be taken. My Medications CONTINUE taking these medications Instructions Each Dose to Equal  
 Morning Noon Evening Bedtime  
 amLODIPine 10 mg tablet Commonly known as:  Jennifer Chao Your last dose was: Your next dose is: TAKE 1 TABLET DAILY  
     
   
   
   
  
 bisoprolol-hydroCHLOROthiazide 10-6.25 mg per tablet Commonly known as:  [facility] Your last dose was: Your next dose is: TAKE 1 TABLET DAILY CONTOUR NEXT STRIPS strip Generic drug:  glucose blood VI test strips Your last dose was: Your next dose is:    
   
   
 USE TO TEST BLOOD SUGARS ONCE DAILY  
     
   
   
   
  
 diclofenac 1 % Gel Commonly known as:  VOLTAREN Your last dose was: Your next dose is:    
   
   
 Apply  to affected area as needed. gabapentin 100 mg capsule Commonly known as:  NEURONTIN Your last dose was: Your next dose is: Take 100 mg by mouth every eight (8) hours as needed. 100 mg * [place] Generic drug:  Lancets Your last dose was: Your next dose is:    
   
   
 USE TO TEST BLOOD SUGAR EVERY DAY  
     
   
   
   
  
 * One Touch Delica 33 gauge Misc Generic drug:  lancets Your last dose was: Your next dose is: ONETOUCH ULTRA2 monitoring kit Generic drug:  Blood-Glucose Meter Your last dose was: Your next dose is:    
   
   
      
   
   
   
  
 OT Ultra/FastTrack Control Soln Generic drug:  Blood Glucose Control, Normal  
   
Your last dose was: Your next dose is: OTHER Your last dose was: Your next dose is:    
   
   
 Arnicare cream ex as needed. OTC for pain. OTHER Your last dose was: Your next dose is:    
   
   
 [place] sore muscle with cayenne and ginger topical cream as needed. OTC for pain. pantoprazole 40 mg tablet Commonly known as:  PROTONIX Your last dose was: Your next dose is: Take 1 Tab by mouth two (2) times a day. 40 mg  
    
   
   
   
  
 PAPAYA ENZYME Tab Generic drug:  carica papaya Your last dose was: Your next dose is: Take  by mouth. Takes 4 tabs po 1-2 times daily after meals. PROBIOTIC PO Your last dose was: Your next dose is: Take 1 Cap by mouth as needed. 1 Cap  
    
   
   
   
  
 traZODone 50 mg tablet Commonly known as:  Cedrick Arrieta Your last dose was: Your next dose is: Take 50 mg by mouth nightly as needed for Sleep. 50 mg  
    
   
   
   
  
 VITAMIN B-12 1,000 mcg tablet Generic drug:  cyanocobalamin Your last dose was: Your next dose is: Take 1,000 mcg by mouth as needed. 1000 mcg VITAMIN C 500 mg tablet Generic drug:  ascorbic acid (vitamin C) Your last dose was: Your next dose is: Take 500 mg by mouth as needed for Other (if getting sick). 500 mg  
    
   
   
   
  
 VITAMIN D3 PO Your last dose was: Your next dose is: Take  by mouth. Liquid form. 400 units per 4 drops. Takes 4 drops po once daily. * Notice: This list has 2 medication(s) that are the same as other medications prescribed for you. Read the directions carefully, and ask your doctor or other care provider to review them with you. STOP taking these medications   
 apixaban 5 mg tablet Commonly known as:  Billy Ga Discharge Instructions [address] 
[facility] [address] EGD and COLON DISCHARGE INSTRUCTIONS Jeana Randle [id number] 
1955 Discomfort: 
Redness at IV site- apply warm compress to area; if redness or soreness persist- contact your physician There may be a slight amount of blood passed from the rectum Gaseous discomfort- walking, belching will help relieve any discomfort You may not operate a vehicle for 12 hours You may not engage in an occupation involving machinery or appliances for rest of today You may not drink alcoholic beverages for at least 12 hours Avoid making any critical decisions for at least 24 hour DIET: 
You may resume your regular diet  however -  remember your colon is empty and a heavy meal will produce gas. Avoid these foods:  vegetables, fried / greasy foods, carbonated drinks ACTIVITY: 
You may  resume your normal daily activities it is recommended that you spend the remainder of the day resting -  avoid any strenuous activity. CALL M.D. ANY SIGN OF: Increasing pain, nausea, vomiting Abdominal distension (swelling) New increased bleeding (oral or rectal) Fever (chills) Pain in chest area Bloody discharge from nose or mouth Shortness of breath Follow-up Instructions: 
 Call Dr. Yoan Golden for any questions or problems at [phone] and follow up with him in 2 months Resume eliquis this Thursday on 4/12/18 High fiber diet ENDOSCOPY FINDINGS: 
 Your colonoscopy showed diverticulosis Your endoscopy showed esophageal ring/ stricture I dilated, there was also diverticulum in distal portion of your esophagus, biopsies taken from esophagus to r/o any inflammation . Telephone # [phone] Signed By: Beata Schwartz MD   
 4/10/2018  9:53 AM 
  
 
DISCHARGE SUMMARY from Nurse The following personal items collected during your admission are returned to you:  
Dental Appliance: Dental Appliances: None Vision:   
Hearing Aid:   
Jewelry:   
Clothing:   
Other Valuables:   
Valuables sent to safe:   
 
 
 
  
  
  
Introducing [facility] HEALTH [facility]! Tiffany Abreu introduces [employer] patient portal. Now you can access parts of your medical record, email your doctor's office, and request medication refills online. 1. In your internet browser, go to [email or web address] 2. Click on the First Time User? Click Here link in the Sign In box. You will see the New Member Sign Up page. 3. Enter your [employer] Access Code exactly as it appears below. You will not need to use this code after youve completed the sign-up process. If you do not sign up before the expiration date, you must request a new code. · [employer] Access Code: [email or web address] Expires: 6/12/2018 10:10 AM 
 
4. Enter the last four digits of your Social Security Number (xxxx) and Date of Birth (mm/dd/yyyy) as indicated and click Submit. You will be taken to the next sign-up page. 5. Create a [employer] ID. This will be your [employer] login ID and cannot be changed, so think of one that is secure and easy to remember. 6. Create a [employer] password. You can change your password at any time. 7. Enter your Password Reset Question and Answer. This can be used at a later time if you forget your password. 8. Enter your e-mail address. You will receive e-mail notification when new information is available in [address] 9. Click Sign Up. You can now view and download portions of your medical record. 10. Click the Download Summary menu link to download a portable copy of your medical information. If you have questions, please visit the Frequently Asked Questions section of the [employer] website. Remember, [employer] is NOT to be used for urgent needs. For medical emergencies, dial 911. Now available from your iPhone and Android! Introducing Hernesto Kirk As a [facility] patient, I wanted to make you aware of our electronic visit tool called Hernesto Kirk. [facility] [employer] allows you to connect within minutes with a medical provider 24 hours a day, seven days a week via a mobile device or tablet or logging into a secure website from your computer. You can access Hernesto Kirk from anywhere in the [place]. A virtual visit might be right for you when you have a simple condition and feel like you just dont want to get out of bed, or cant get away from work for an appointment, when your regular [facility] provider is not available (evenings, weekends or holidays), or when youre out of town and need minor care. Electronic visits cost only $49 and if the [facility] provider determines a prescription is needed to treat your condition, one can be electronically transmitted to a nearby pharmacy*. Please take a moment to enroll today if you have not already done so. The enrollment process is free and takes just a few minutes. To enroll, please download the [facility] [employer] shane to your tablet or phone, or visit [email or web address] to enroll on your computer. And, as an [address] [place] [address] patient with a [employer] account, the results of your visits will be scanned into your electronic medical record and your primary care provider will be able to view the scanned results.    
We urge you to continue to see your regular [facility] provider for your ongoing medical care. And while your primary care provider may not be the one available when you seek a Hernesto Nicholsfin virtual visit, the peace of mind you get from getting a real diagnosis real time can be priceless. For more information on Hernesto Kirk, view our Frequently Asked Questions (FAQs) at [email or web address]. Sincerely, 
 
Jordan Sullivan MD 
Chief Medical Officer Alma Arenas *:  certain medications cannot be prescribed via Hernesto Montoyabrooklynnfin Providers Seen During Your Hospitalization Provider Specialty Primary office phone Alexandro Nava MD Gastroenterology [phone] Your Primary Care Physician (PCP) Primary Care Physician Office Phone Office Fax Chani Mcgee [phone] You are allergic to the following Allergen Reactions Aleve (Naproxen Sodium) Hoarseness Dilaudid (Hydromorphone (Bulk)) Anaphylaxis Respiratory arrest and throat closes Diovan (Valsartan) Palpitations Morphine Other (comments) Patch-vomiting,weakness, fainting Sulfa (Sulfonamide Antibiotics) Hives Rash Other (comments)  
 fainting Recent Documentation Height Weight BMI OB Status Smoking Status 1.575 m 82.1 kg 33.11 kg/m2 Postmenopausal Never Smoker Emergency Contacts Name Discharge Info Relation Home Work Mobile [address] [facility] CAREGIVER [3] Spouse [3] [phone] Shama Dawkins DISCHARGE CAREGIVER [3] Daughter [21] [phone] Patient Belongings The following personal items are in your possession at time of discharge: 
  Dental Appliances: None Please provide this summary of care documentation to your next provider. Signatures-by signing, you are acknowledging that this After Visit Summary has been reviewed with you and you have received a copy. Patient Signature:  ____________________________________________________________ Date:  ____________________________________________________________  
  
Coco Fines Provider Signature:  ____________________________________________________________ Date:  ____________________________________________________________

## 2018-04-26 ENCOUNTER — PATIENT OUTREACH (OUTPATIENT)
Dept: INTERNAL MEDICINE CLINIC | Age: 63
End: 2018-04-26

## 2018-05-03 ENCOUNTER — PATIENT OUTREACH (OUTPATIENT)
Dept: INTERNAL MEDICINE CLINIC | Age: 63
End: 2018-05-03

## 2018-06-20 ENCOUNTER — OFFICE VISIT (OUTPATIENT)
Dept: INTERNAL MEDICINE CLINIC | Age: 63
End: 2018-06-20

## 2018-06-20 VITALS
BODY MASS INDEX: 33.2 KG/M2 | WEIGHT: 180.4 LBS | RESPIRATION RATE: 16 BRPM | SYSTOLIC BLOOD PRESSURE: 128 MMHG | OXYGEN SATURATION: 96 % | HEIGHT: 62 IN | HEART RATE: 60 BPM | DIASTOLIC BLOOD PRESSURE: 80 MMHG | TEMPERATURE: 97.9 F

## 2018-06-20 DIAGNOSIS — I10 ESSENTIAL HYPERTENSION, BENIGN: ICD-10-CM

## 2018-06-20 DIAGNOSIS — Z51.81 ENCOUNTER FOR MONITORING COUMADIN THERAPY: ICD-10-CM

## 2018-06-20 DIAGNOSIS — E78.5 DYSLIPIDEMIA: ICD-10-CM

## 2018-06-20 DIAGNOSIS — M19.042 PRIMARY OSTEOARTHRITIS OF LEFT HAND: ICD-10-CM

## 2018-06-20 DIAGNOSIS — G89.4 CHRONIC PAIN SYNDROME: ICD-10-CM

## 2018-06-20 DIAGNOSIS — Z79.01 ENCOUNTER FOR MONITORING COUMADIN THERAPY: ICD-10-CM

## 2018-06-20 DIAGNOSIS — M79.7 FIBROMYOSITIS: Primary | ICD-10-CM

## 2018-06-20 LAB
INR BLD: 2
PT POC: 24 SECONDS
VALID INTERNAL CONTROL?: YES

## 2018-06-20 RX ORDER — WARFARIN SODIUM 5 MG/1
TABLET ORAL
Refills: 3 | COMMUNITY
Start: 2018-05-17 | End: 2021-06-16

## 2018-06-20 NOTE — PROGRESS NOTES
1. Have you been to the ER, urgent care clinic since your last visit? Hospitalized since your last visit? No    2. Have you seen or consulted any other health care providers outside of the [address] [place] [address] since your last visit? Include any pap smears or colon screening.  No    Wants to discuss pain,  Coumadin, depression

## 2018-06-20 NOTE — PROGRESS NOTES
SPORTS MEDICINE AND PRIMARY CARE  Jose Samuel MD, [address] [address]  Phone:  [phone]  Fax: [phone]       Chief Complaint   Patient presents with    Hypertension   . SUBJECTIVE:    Jeana Randle is a 57 y.o. female Patient returns today with known history of fibromyalgia, chronic pain related to that, depression with anxiety, GERD, hypertension, IBS, history of pulmonary embolus, restless leg syndrome, and is seen for evaluation. Patient returns today complaining of a knot in the medial aspect of her right upper arm that is becoming uncomfortable for her. Her fibromyalgia continues to be a challenge. She is just living with the pain, she doesn't want to get back on narcotics and NSAIDs are out of order as she's on Coumadin. She stopped the Eliquis because of side effects she felt with GERD. She's now on Coumadin through Dr. Aby Hagen, her hematologist, who is adjusting her Coumadin to induce a therapeutic range INR. It is today. She also complains of ankle swelling, worse at the end of the day, left greater than right, and patient is seen for evaluation. Because of everything that is going on she becomes despondent, discouraged, other people would use the word depressed. Current Outpatient Prescriptions   Medication Sig Dispense Refill    warfarin (COUMADIN) 5 mg tablet TAKE ONE TABLET BY MOUTH ONCE DAILY  3    gabapentin (NEURONTIN) 100 mg capsule Take 100 mg by mouth every eight (8) hours as needed.  traZODone (DESYREL) 50 mg tablet Take 50 mg by mouth nightly as needed for Sleep.  LACTOBACILLUS ACIDOPHILUS (PROBIOTIC PO) Take 1 Cap by mouth as needed.  diclofenac (VOLTAREN) 1 % gel Apply  to affected area as needed.  OTHER Arnicare cream ex as needed. OTC for pain.  OTHER [place] sore muscle with cayenne and ginger topical cream as needed. OTC for pain.       CHOLECALCIFEROL, VITAMIN D3, (VITAMIN D3 PO) Take  by mouth. Liquid form. 400 units per 4 drops. Takes 4 drops po once daily.  ascorbic acid, vitamin C, (VITAMIN C) 500 mg tablet Take 500 mg by mouth as needed for Other (if getting sick).  pantoprazole (PROTONIX) 40 mg tablet Take 1 Tab by mouth two (2) times a day. 60 Tab 3    bisoprolol-hydroCHLOROthiazide (ZIAC) 10-6.25 mg per tablet TAKE 1 TABLET DAILY 90 Tab 2    amLODIPine (NORVASC) 10 mg tablet TAKE 1 TABLET DAILY 90 Tab 2    OT ULTRA/FASTTRACK CONTROL soln       ONETOUCH ULTRA2 monitoring kit       ONE TOUCH DELICA 33 gauge misc       MICROLET LANCET misc USE TO TEST BLOOD SUGAR EVERY  Each 11    CONTOUR NEXT STRIPS strip USE TO TEST BLOOD SUGARS ONCE DAILY 50 Strip 11    cyanocobalamin (VITAMIN B-12) 1,000 mcg tablet Take 1,000 mcg by mouth as needed.  carica papaya (PAPAYA ENZYME) tab Take  by mouth. Takes 4 tabs po 1-2 times daily after meals.        Past Medical History:   Diagnosis Date    Adverse effect of anesthesia     \"hard time waking me up\"    Chronic pain     d/t fibromyalgia    Coagulation disorder ([address]     on eliquis    Depression with anxiety     DJD (degenerative joint disease)     Dyslipidemia     Encounter for Hemoccult screening 06/28/2017    neg    Fibromyalgia     Gait instability     GERD (gastroesophageal reflux disease)     Hypertension     IBS (irritable bowel syndrome)     Ill-defined condition     gastroparesis    Ill-defined condition     CT - 3/18/2018 - esophageal diverticulum    Ill-defined condition     pericarditis    Mastodynia, left greater than right 8/17/2011    Normal cardiac stress test 3.14    Positive D dimer 9-23-15    Prediabetes     Pulmonary embolism (HCC) 03/2013    rll    Restless leg syndrome     S/P colonoscopy 01/27/2011    kenny hernandez md    Saphenous vein occlusion, 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 Amira Ghosh    COLONOSCOPY N/A 4/10/2018    COLONOS[facility]GD [facility] ENDOSCOPY    HX COLONOSCOPY      HX ORTHOPAEDIC  2009    foot surgery-left - bunionectomy    HX ORTHOPAEDIC      cyst removed from left hand - ganglion cyst    HX OTHER SURGICAL      right and left leg muscle biopsies - elevated CPK - muscle enzymes were elevated    HX TUBAL LIGATION       Allergies   Allergen Reactions    Aleve [Naproxen Sodium] Hoarseness    Dilaudid [Hydromorphone (Bulk)] Anaphylaxis     Respiratory arrest and throat closes    Diovan [Valsartan] Palpitations    Morphine Other (comments)     Patch-vomiting,weakness, fainting    Sulfa (Sulfonamide Antibiotics) Hives, Rash and Other (comments)     fainting         REVIEW OF SYSTEMS:  General: negative for - chills or fever  ENT: negative for - headaches, nasal congestion or tinnitus  Respiratory: negative for - cough, hemoptysis, shortness of breath or wheezing  Cardiovascular : negative for - chest pain, edema, palpitations or shortness of breath  Gastrointestinal: negative for - abdominal pain, blood in stools, heartburn or nausea/vomiting  Genito-Urinary: no dysuria, trouble voiding, or hematuria  Musculoskeletal: negative for - gait disturbance, joint pain, joint stiffness or joint swelling  Neurological: no TIA or stroke symptoms  Hematologic: no bruises, no bleeding, no swollen glands  Integument: no lumps, mole changes, nail changes or rash  Endocrine: no malaise/lethargy or unexpected weight changes      Social History Social History    Marital status:      Spouse name: N/A    Number of children: N/A    Years of education: N/A     Social History Main Topics    Smoking status: Never Smoker    Smokeless tobacco: Never Used    Alcohol use No    Drug use: No    Sexual activity: Yes     Partners: Male     Other Topics Concern    None     Social History Narrative         Family History: Mother: alive 80 yrs, High Blood Pressure, cva with cognitive deficitsFather:  36 yrs, myocardial infarctionSister(s): aliveBrother(s): unknow n2    sister(s) . 40 daughter no choildren -  walked out works for board of directors for [employer] . Social History: Alcohol Use Patient does not use alcohol. Smoking Status Patient is a never smoker. Caffeine: occasional. Drugs:    prescription narcotics. Diet: Regular. Exercise: None. Marital Status: . Lives w ith: spouse. Children: children. Orthodox: Haiclaudia Casillas. Patient is  living w ith her  she is on disability related to her fibromyositis. Family History   Problem Relation Age of Onset    Hypertension Mother     Kidney Disease Mother      1 kidney    Heart Disease Father     Heart Attack Father      late 35s or early 45s    Heart Attack Maternal Grandmother     Cancer Maternal Grandfather      ? lung or stomach       OBJECTIVE:    Visit Vitals    /80    Pulse 60    Temp 97.9 °F (36.6 °C) (Oral)    Resp 16    Ht 5' 2\" (1.575 m)    Wt 180 lb 6.4 oz (81.8 kg)    SpO2 96%    BMI 33 kg/m2     CONSTITUTIONAL: well , well nourished, appears age appropriate  EYES: perrla, eom intact  ENMT:moist mucous membranes, pharynx clear  NECK: supple.  Thyroid normal  RESPIRATORY: Chest: clear bilaterally   CARDIOVASCULAR: Heart: regular rate and rhythm  GASTROINTESTINAL: Abdomen: soft, bowel sounds active  HEMATOLOGIC: no pathological lymph nodes palpated  MUSCULOSKELETAL: Extremities: no edema, pulse 1+   INTEGUMENT: No unusual rashes or suspicious skin lesions noted. Nails appear normal.  NEUROLOGIC: non-focal exam   MENTAL STATUS: alert and oriented, appropriate affect           ASSESSMENT:  1. Fibromyositis    2. Encounter for monitoring coumadin therapy    3. Chronic pain syndrome    4. Essential hypertension, benign    5. Primary osteoarthritis of left hand    6. Dyslipidemia      She has subcutaneous nodules that are tender. We suggest she  from Dr. Colton Arreola since she sees him so frequently for adjustment of her Coumadin. Except for Tylenol, I have no other suggestion for pain management. Today her INR is therapeutic. BP control is adequate, but at the upper limits of normal.  Will ask her to check it outside the office to be sure it drops to less than 130/80. BP is usually 120/80s. She'll return to see us in about three months, sooner if she has any problems. Laboratory studies have been requested today. PLAN:  .  Orders Placed This Encounter    URINALYSIS W/ RFLX MICROSCOPIC    CBC WITH AUTOMATED DIFF    METABOLIC PANEL, COMPREHENSIVE    LIPID PANEL    TSH 3RD GENERATION    HEMOGLOBIN A1C WITH EAG    AMB POC PT/INR    warfarin (COUMADIN) 5 mg tablet       Follow-up Disposition:  Return in about 3 months (around 9/20/2018). ATTENTION:   This medical record was transcribed using an electronic medical records system. Although proofread, it may and can contain electronic and spelling errors. Other human spelling and other errors may be present. Corrections may be executed at a later time. Please feel free to contact us for any clarifications as needed.

## 2018-06-20 NOTE — MR AVS SNAPSHOT
[facility] 
 
 
 [address] [address] 
[phone] Patient: Claudia Aiken MRN: [id number] ZNS:[id number] Visit Information Date & Time Provider Department Dept. Phone Encounter #  
 6/20/2018  9:30 AM Marely Ellsworth 80 [facility] and [facility] [id number] Follow-up Instructions Return in about 3 months (around 9/20/2018). Follow-up and Disposition History Your Appointments 9/19/2018  9:30 AM  
Any with Mike Lopez MD  
[address] [place] [address] and Primary Care [address] [place] [address]) Appt Note: 3 month f/u  
 Kimmie Johns [address] 1 [facility]  
  
   
 [address]mie Johns [address] [id number]  
  
    
 12/19/2018  9:30 AM  
Any with Mike Lopez MD  
[address] [place] [address] and Primary Care [address] [place] [address]) Appt Note: 3 month f/u  
 [address] [facility]  
[phone] Upcoming Health Maintenance Date Due  
 PAP AKA CERVICAL CYTOLOGY 9/9/2017 Influenza Age 5 to Adult 8/1/2018 MEDICARE YEARLY EXAM 3/15/2019 BREAST CANCER SCRN MAMMOGRAM 10/13/2019 COLONOSCOPY 4/10/2023 DTaP/Tdap/Td series (2 - Td) 3/14/2028 Allergies as of 6/20/2018  Review Complete On: 6/20/2018 By: Mike Lopez MD  
  
 Severity Noted Reaction Type Reactions Aleve [Naproxen Sodium] High 07/22/2016    Hoarseness Dilaudid [Hydromorphone (Bulk)] High 08/15/2011    Anaphylaxis Respiratory arrest and throat closes Diovan [Valsartan]  09/22/2015    Palpitations Morphine  08/15/2011    Other (comments) Patch-vomiting,weakness, fainting Sulfa (Sulfonamide Antibiotics)  08/15/2011    Hives, Rash, Other (comments)  
 fainting Current Immunizations  Reviewed on 4/2/2014 No immunizations on file. Not reviewed this visit You Were Diagnosed With   
  
 Codes Comments Fibromyositis    -  Primary ICD-10-CM: M79.7 ICD-9-CM: 729.1 Encounter for monitoring coumadin therapy     ICD-10-CM: Z51.81, Z79.01 
ICD-9-CM: V58.83, V58.61 Chronic pain syndrome     ICD-10-CM: G89.4 ICD-9-CM: 338.4 Essential hypertension, benign     ICD-10-CM: I10 
ICD-9-CM: 401.1 Primary osteoarthritis of left hand     ICD-10-CM: M19.042 ICD-9-CM: 715.14 Dyslipidemia     ICD-10-CM: E78.5 ICD-9-CM: 272.4 Vitals BP Pulse Temp Resp Height(growth percentile) Weight(growth percentile) 143/80 60 97.9 °F (36.6 °C) (Oral) 16 5' 2\" (1.575 m) 180 lb 6.4 oz (81.8 kg) SpO2 BMI OB Status Smoking Status 96% 33 kg/m2 Postmenopausal Never Smoker Vitals History BMI and BSA Data Body Mass Index Body Surface Area  
 33 kg/m 2 1.89 m 2 Preferred Pharmacy Pharmacy Name Phone Damir Zuluaga, [facility] [phone] Your Updated Medication List  
  
   
This list is accurate as of 6/20/18 10:51 AM.  Always use your most recent med list. amLODIPine 10 mg tablet Commonly known as:  Derik Wesley TAKE 1 TABLET DAILY  
  
 bisoprolol-hydroCHLOROthiazide 10-6.25 mg per tablet Commonly known as:  [facility] TAKE 1 TABLET DAILY CONTOUR NEXT TEST STRIPS strip Generic drug:  glucose blood VI test strips USE TO TEST BLOOD SUGARS ONCE DAILY  
  
 diclofenac 1 % Gel Commonly known as:  VOLTAREN Apply  to affected area as needed. gabapentin 100 mg capsule Commonly known as:  NEURONTIN Take 100 mg by mouth every eight (8) hours as needed. * [place] [facility] Generic drug:  Lancets USE TO TEST BLOOD SUGAR EVERY DAY  
  
 * One Touch Delica 33 gauge Misc Generic drug:  lancets ONETOUCH ULTRA2 monitoring kit Generic drug:  Blood-Glucose Meter OT Ultra/FastTrack Control Soln Generic drug:  Blood Glucose Control, Normal  
  
 OTHER  
 Arnicare cream ex as needed. OTC for pain. OTHER Rheems sore muscle with cayenne and ginger topical cream as needed. OTC for pain. pantoprazole 40 mg tablet Commonly known as:  PROTONIX Take 1 Tab by mouth two (2) times a day. PAPAYA ENZYME Tab Generic drug:  carica papaya Take  by mouth. Takes 4 tabs po 1-2 times daily after meals. PROBIOTIC PO Take 1 Cap by mouth as needed. traZODone 50 mg tablet Commonly known as:  [place] Pradeep Take 50 mg by mouth nightly as needed for Sleep. VITAMIN B-12 1,000 mcg tablet Generic drug:  cyanocobalamin Take 1,000 mcg by mouth as needed. VITAMIN C 500 mg tablet Generic drug:  ascorbic acid (vitamin C) Take 500 mg by mouth as needed for Other (if getting sick). VITAMIN D3 PO Take  by mouth. Liquid form. 400 units per 4 drops. Takes 4 drops po once daily. warfarin 5 mg tablet Commonly known as:  COUMADIN  
TAKE ONE TABLET BY MOUTH ONCE DAILY * Notice: This list has 2 medication(s) that are the same as other medications prescribed for you. Read the directions carefully, and ask your doctor or other care provider to review them with you. We Performed the Following AMB POC PT/INR [22582 CPT(R)] CBC WITH AUTOMATED DIFF [42761 CPT(R)] COLLECTION VENOUS BLOOD,VENIPUNCTURE [id number] CPT(R)] HEMOGLOBIN A1C WITH EAG [66685 CPT(R)] LIPID PANEL [36409 CPT(R)] METABOLIC PANEL, COMPREHENSIVE [11077 CPT(R)] TSH 3RD GENERATION [78411 CPT(R)] URINALYSIS W/ RFLX MICROSCOPIC [50102 CPT(R)] Follow-up Instructions Return in about 3 months (around 9/20/2018). To-Do List   
 07/10/2018 1:00 PM  
  Appointment with [facility] NM RM 5 at [facility] ([phone]) NM GASTRIC STUDY - The patient must not eat or drink anything 4 hours prior to the procedure.   FOR [facility] Patients- The patient must not eat or drink anything after midnight prior to the procedure. The Patient will be given eggs and if allergic will be given oatmeal, then scanned to see how the digestive system is working. If patient takes Reglan, it is up to the physician to take or not take on the day of the procedure. [facility] -OATMEAL ONLY  1. Do not schedule an Upper GI or Small Bowel on the same day as this procedure. 2.  Do not schedule an Endoscopy procedure the same day as this procedure. 3.  A patient can only have one Nuclear Medicine test per day (don't schedule Pet Scan and Nuc Med on same day). If patient needs multiple Nuclear Medicine tests, do not schedule on consecutive days - there must be at least one full day in between the tasks. 4.  If U/S requested, should go BEFORE Gastric Emptying Introducing [facility] SERVICES! [facility] introduces [employer] patient portal. Now you can access parts of your medical record, email your doctor's office, and request medication refills online. 1. In your internet browser, go to [email or web address] 2. Click on the First Time User? Click Here link in the Sign In box. You will see the New Member Sign Up page. 3. Enter your [employer] Access Code exactly as it appears below. You will not need to use this code after youve completed the sign-up process. If you do not sign up before the expiration date, you must request a new code. · [employer] Access Code: [email or web address] Expires: 9/18/2018 10:51 AM 
 
4. Enter the last four digits of your Social Security Number (xxxx) and Date of Birth (mm/dd/yyyy) as indicated and click Submit. You will be taken to the next sign-up page. 5. Create a [employer] ID. This will be your [employer] login ID and cannot be changed, so think of one that is secure and easy to remember. 6. Create a [employer] password. You can change your password at any time. 7. Enter your Password Reset Question and Answer.  This can be used at a later time if you forget your password. 8. Enter your e-mail address. You will receive e-mail notification when new information is available in [address] 9. Click Sign Up. You can now view and download portions of your medical record. 10. Click the Download Summary menu link to download a portable copy of your medical information. If you have questions, please visit the Frequently Asked Questions section of the [employer] website. Remember, [employer] is NOT to be used for urgent needs. For medical emergencies, dial 911. Now available from your iPhone and Android! Please provide this summary of care documentation to your next provider. Your primary care clinician is listed as Laith Dos Santos. If you have any questions after today's visit, please call [phone].

## 2018-06-21 LAB
ALBUMIN SERPL-MCNC: 4.5 G/DL (ref 3.6–4.8)
ALBUMIN/GLOB SERPL: 1.6 {RATIO} (ref 1.2–2.2)
ALP SERPL-CCNC: 49 IU/L (ref 39–117)
ALT SERPL-CCNC: 33 IU/L (ref 0–32)
APPEARANCE UR: CLEAR
AST SERPL-CCNC: 24 IU/L (ref 0–40)
BACTERIA #/AREA URNS HPF: NORMAL /[HPF]
BASOPHILS # BLD AUTO: 0 X10E3/UL (ref 0–0.2)
BASOPHILS NFR BLD AUTO: 1 %
BILIRUB SERPL-MCNC: 0.2 MG/DL (ref 0–1.2)
BILIRUB UR QL STRIP: NEGATIVE
BUN SERPL-MCNC: 16 MG/DL (ref 8–27)
BUN/CREAT SERPL: 15 (ref 12–28)
CALCIUM SERPL-MCNC: 9.6 MG/DL (ref 8.7–10.3)
CASTS URNS QL MICRO: NORMAL /LPF
CHLORIDE SERPL-SCNC: 107 MMOL/L (ref 96–106)
CHOLEST SERPL-MCNC: 187 MG/DL (ref 100–199)
CO2 SERPL-SCNC: 21 MMOL/L (ref 20–29)
COLOR UR: YELLOW
CREAT SERPL-MCNC: 1.09 MG/DL (ref 0.57–1)
EOSINOPHIL # BLD AUTO: 0 X10E3/UL (ref 0–0.4)
EOSINOPHIL NFR BLD AUTO: 1 %
EPI CELLS #/AREA URNS HPF: NORMAL /HPF
ERYTHROCYTE [DISTWIDTH] IN BLOOD BY AUTOMATED COUNT: 15.2 % (ref 12.3–15.4)
EST. AVERAGE GLUCOSE BLD GHB EST-MCNC: 126 MG/DL
GFR SERPLBLD CREATININE-BSD FMLA CKD-EPI: 54 ML/MIN/1.73
GFR SERPLBLD CREATININE-BSD FMLA CKD-EPI: 62 ML/MIN/1.73
GLOBULIN SER CALC-MCNC: 2.9 G/DL (ref 1.5–4.5)
GLUCOSE SERPL-MCNC: 92 MG/DL (ref 65–99)
GLUCOSE UR QL: NEGATIVE
HBA1C MFR BLD: 6 % (ref 4.8–5.6)
HCT VFR BLD AUTO: 43.8 % (ref 34–46.6)
HDLC SERPL-MCNC: 55 MG/DL
HGB BLD-MCNC: 13.9 G/DL (ref 11.1–15.9)
HGB UR QL STRIP: NEGATIVE
IMM GRANULOCYTES # BLD: 0 X10E3/UL (ref 0–0.1)
IMM GRANULOCYTES NFR BLD: 0 %
KETONES UR QL STRIP: NEGATIVE
LDLC SERPL CALC-MCNC: 111 MG/DL (ref 0–99)
LEUKOCYTE ESTERASE UR QL STRIP: NEGATIVE
LYMPHOCYTES # BLD AUTO: 1.6 X10E3/UL (ref 0.7–3.1)
LYMPHOCYTES NFR BLD AUTO: 45 %
MCH RBC QN AUTO: 28.7 PG (ref 26.6–33)
MCHC RBC AUTO-ENTMCNC: 31.7 G/DL (ref 31.5–35.7)
MCV RBC AUTO: 90 FL (ref 79–97)
MICRO URNS: ABNORMAL
MONOCYTES # BLD AUTO: 0.4 X10E3/UL (ref 0.1–0.9)
MONOCYTES NFR BLD AUTO: 11 %
MUCOUS THREADS URNS QL MICRO: PRESENT
NEUTROPHILS # BLD AUTO: 1.5 X10E3/UL (ref 1.4–7)
NEUTROPHILS NFR BLD AUTO: 42 %
NITRITE UR QL STRIP: NEGATIVE
PH UR STRIP: 5.5 [PH] (ref 5–7.5)
PLATELET # BLD AUTO: 206 X10E3/UL (ref 150–379)
POTASSIUM SERPL-SCNC: 4.2 MMOL/L (ref 3.5–5.2)
PROT SERPL-MCNC: 7.4 G/DL (ref 6–8.5)
PROT UR QL STRIP: ABNORMAL
RBC # BLD AUTO: 4.85 X10E6/UL (ref 3.77–5.28)
RBC #/AREA URNS HPF: NORMAL /HPF
SODIUM SERPL-SCNC: 146 MMOL/L (ref 134–144)
SP GR UR: 1.02 (ref 1–1.03)
TRIGL SERPL-MCNC: 104 MG/DL (ref 0–149)
TSH SERPL DL<=0.005 MIU/L-ACNC: 1.2 UIU/ML (ref 0.45–4.5)
UROBILINOGEN UR STRIP-MCNC: 0.2 MG/DL (ref 0.2–1)
VLDLC SERPL CALC-MCNC: 21 MG/DL (ref 5–40)
WBC # BLD AUTO: 3.6 X10E3/UL (ref 3.4–10.8)
WBC #/AREA URNS HPF: NORMAL /HPF

## 2018-07-03 ENCOUNTER — HOSPITAL ENCOUNTER (OUTPATIENT)
Dept: ULTRASOUND IMAGING | Age: 63
Discharge: HOME | End: 2018-07-03
Attending: INTERNAL MEDICINE
Payer: MEDICARE

## 2018-07-03 DIAGNOSIS — M79.601 RIGHT ARM PAIN: ICD-10-CM

## 2018-07-03 PROCEDURE — 76882 US LMTD JT/FCL EVL NVASC XTR: CPT

## 2018-07-10 ENCOUNTER — HOSPITAL ENCOUNTER (OUTPATIENT)
Dept: NUCLEAR MEDICINE | Age: 63
Discharge: HOME | End: 2018-07-10
Attending: INTERNAL MEDICINE
Payer: MEDICARE

## 2018-07-10 DIAGNOSIS — R13.10 DYSPHAGIA: ICD-10-CM

## 2018-07-10 DIAGNOSIS — R14.0 BLOATING: ICD-10-CM

## 2018-07-10 DIAGNOSIS — R68.81 EARLY SATIETY: ICD-10-CM

## 2018-07-10 DIAGNOSIS — K21.9 ACID REFLUX: ICD-10-CM

## 2018-07-10 PROCEDURE — 78264 GASTRIC EMPTYING IMG STUDY: CPT

## 2018-08-29 ENCOUNTER — PATIENT OUTREACH (OUTPATIENT)
Dept: INTERNAL MEDICINE CLINIC | Age: 63
End: 2018-08-29

## 2018-08-29 NOTE — PROGRESS NOTES
Goals Addressed Most Recent  Knowledge and adherence of prescribed medication (ie. action, side effects, missed dose, etc.). On track (8/29/2018)  
       
  5/3/2018:   NN spoke with Rosalinda Varghese then transferred to [facility] who consulted w/ pharmacy re Trazedone refill. Stated there was a request submitted on 4/18/2018 that was needed to be picked up. Spoke with PharmD Junior Borjas  that amitriptyline is no more being taken;  Confirmation # invoice # [id number]; stated her confirmation ; stated medication w/ require a $10 co-pay but medication w/ be ready in 3-5 days and delivered; stated the process is completed. NN called patient to inform of the same. Bobby Jenkins Self-schedules and keeps appointments    On track (3/23/2018) Patient is scheduled to f/u with  4/10/2018. Patient also asked to keep 4/11/2018 w/ PCP if needed not, then will cancel on 3/27. 
 
8/29/2018:  Patient LM on VM while NN out of office;  States she has already gotten her answer; she is scheduled for 9/20/2018 office f/u. LOV 6/20/2018. Patient states she is seeing an endocrinologist for INR but the copays are high; requesting to get INRs through PCP's  Office. All questions answered: denied further need. EW 
 
 
  
  
Goal completion 9/20/2018

## 2018-09-17 DIAGNOSIS — I10 ESSENTIAL HYPERTENSION: ICD-10-CM

## 2018-09-17 RX ORDER — AMLODIPINE BESYLATE 10 MG/1
TABLET ORAL
Qty: 90 TAB | Refills: 2 | Status: SHIPPED | OUTPATIENT
Start: 2018-09-17 | End: 2018-09-19 | Stop reason: SDUPTHER

## 2018-09-19 ENCOUNTER — OFFICE VISIT (OUTPATIENT)
Dept: INTERNAL MEDICINE CLINIC | Age: 63
End: 2018-09-19

## 2018-09-19 VITALS
DIASTOLIC BLOOD PRESSURE: 80 MMHG | WEIGHT: 183.1 LBS | TEMPERATURE: 98.3 F | RESPIRATION RATE: 18 BRPM | BODY MASS INDEX: 33.69 KG/M2 | HEIGHT: 62 IN | OXYGEN SATURATION: 98 % | SYSTOLIC BLOOD PRESSURE: 130 MMHG | HEART RATE: 65 BPM

## 2018-09-19 DIAGNOSIS — M79.7 FIBROMYALGIA: ICD-10-CM

## 2018-09-19 DIAGNOSIS — R73.03 PREDIABETES: ICD-10-CM

## 2018-09-19 DIAGNOSIS — M79.7 FIBROMYOSITIS: ICD-10-CM

## 2018-09-19 DIAGNOSIS — I10 ESSENTIAL HYPERTENSION, BENIGN: ICD-10-CM

## 2018-09-19 DIAGNOSIS — M79.621 AXILLARY PAIN, RIGHT: ICD-10-CM

## 2018-09-19 DIAGNOSIS — K58.9 IRRITABLE BOWEL SYNDROME WITHOUT DIARRHEA: ICD-10-CM

## 2018-09-19 DIAGNOSIS — I10 ESSENTIAL HYPERTENSION: ICD-10-CM

## 2018-09-19 DIAGNOSIS — R35.0 FREQUENCY OF URINATION: ICD-10-CM

## 2018-09-19 DIAGNOSIS — Z79.01 ENCOUNTER FOR MONITORING COUMADIN THERAPY: Primary | ICD-10-CM

## 2018-09-19 DIAGNOSIS — Z51.81 ENCOUNTER FOR MONITORING COUMADIN THERAPY: Primary | ICD-10-CM

## 2018-09-19 DIAGNOSIS — M19.042 PRIMARY OSTEOARTHRITIS OF LEFT HAND: ICD-10-CM

## 2018-09-19 DIAGNOSIS — L65.9 HAIR LOSS: ICD-10-CM

## 2018-09-19 DIAGNOSIS — K21.9 GASTROESOPHAGEAL REFLUX DISEASE WITHOUT ESOPHAGITIS: ICD-10-CM

## 2018-09-19 DIAGNOSIS — E78.5 DYSLIPIDEMIA: ICD-10-CM

## 2018-09-19 LAB
INR BLD: 1.8
PT POC: 22.1 SECONDS
VALID INTERNAL CONTROL?: YES

## 2018-09-19 RX ORDER — AMLODIPINE BESYLATE 10 MG/1
TABLET ORAL
Qty: 90 TAB | Refills: 2 | Status: SHIPPED | OUTPATIENT
Start: 2018-09-19 | End: 2019-06-24 | Stop reason: SDUPTHER

## 2018-09-19 NOTE — PROGRESS NOTES
SPORTS MEDICINE AND PRIMARY CARE Debbie Mauricio MD, [address] [address] Phone:  [phone]  Fax: [phone] Chief Complaint Patient presents with  Hypertension  
  f/u Christal Carey SUBJECTIVE: 
  Paul Bah is a 57 y.o. female Patient returns today with known history of primary hypertension, fibromyalgia, depression with anxiety, degenerative joint disease, unstable gait, GERD, IBS, pulmonary embolus, repeated DVT, now under the care of Dr. Diana Fields for Coumadin adjustments, and is seen for evaluation. Patient comes in today disgusted. She's losing her hair and relates it to the Coumadin, although the hematologist did not necessarily believe it, though does note it does occur. She's wondering about her INR today. Patient's had ultrasound of her right arm, looking for DVT, and it was negative. She's also had an ultrasound of her right breast, which was negative. Mckinley Bourne at [facility] was advised to have her yearly breast MRI or ultrasound screening in addition to her normal mammography. The breast imaging studies on 08/22/18 were negative. Patient desire MRI of breast.  She's concerned about the knot that was palpated by her hematologist in her right axilla. Current Outpatient Prescriptions Medication Sig Dispense Refill  amLODIPine (NORVASC) 10 mg tablet TAKE 1 TABLET DAILY 90 Tab 2  warfarin (COUMADIN) 5 mg tablet TAKE ONE TABLET BY MOUTH ONCE DAILY  3  
 traZODone (DESYREL) 50 mg tablet Take 50 mg by mouth nightly as needed for Sleep.  LACTOBACILLUS ACIDOPHILUS (PROBIOTIC PO) Take 1 Cap by mouth as needed.  OTHER Arnicare cream ex as needed. OTC for pain.  OTHER Patricia sore muscle with cayenne and ginger topical cream as needed. OTC for pain.  CHOLECALCIFEROL, VITAMIN D3, (VITAMIN D3 PO) Take  by mouth. Liquid form. 400 units per 4 drops. Takes 4 drops po once daily.  carica papaya (PAPAYA ENZYME) tab Take  by mouth. Takes 4 tabs po 1-2 times daily after meals.  pantoprazole (PROTONIX) 40 mg tablet Take 1 Tab by mouth two (2) times a day. 60 Tab 3  
 bisoprolol-hydroCHLOROthiazide (ZIAC) 10-6.25 mg per tablet TAKE 1 TABLET DAILY 90 Tab 2  
 OT ULTRA/FASTTRACK CONTROL soln  ONETOUCH ULTRA2 monitoring kit 43 [facility] 33 gauge misc  MICROLET LANCET misc USE TO TEST BLOOD SUGAR EVERY  Each 11  
 CONTOUR NEXT STRIPS strip USE TO TEST BLOOD SUGARS ONCE DAILY 50 Strip 11  
 cyanocobalamin (VITAMIN B-12) 1,000 mcg tablet Take 1,000 mcg by mouth as needed.  gabapentin (NEURONTIN) 100 mg capsule Take 100 mg by mouth every eight (8) hours as needed.  diclofenac (VOLTAREN) 1 % gel Apply  to affected area as needed.  ascorbic acid, vitamin C, (VITAMIN C) 500 mg tablet Take 500 mg by mouth as needed for Other (if getting sick). Past Medical History:  
Diagnosis Date  Adverse effect of anesthesia \"hard time waking me up\"  Axillary pain, right  Chronic pain d/t fibromyalgia  Coagulation disorder ([address]   
 on eliquis  Depression with anxiety  DJD (degenerative joint disease)  Dyslipidemia  Encounter for Hemoccult screening 06/28/2017  
 neg  Fibromyalgia  Gait instability  GERD (gastroesophageal reflux disease)  Hypertension  IBS (irritable bowel syndrome)  Ill-defined condition   
 gastroparesis  Ill-defined condition CT - 3/18/2018 - esophageal diverticulum  Ill-defined condition   
 pericarditis  Mastodynia, left greater than right 8/17/2011  Normal cardiac stress test 3.14  
 Positive D dimer 9-23-15  Prediabetes  Pulmonary embolism ([address] 03/2013  
 rll  Restless leg syndrome  S/P colonoscopy 01/27/2011  
 kenny hernandez md  
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  
 [facility] acute occlusive superificial vein thrombosis - [facility] md Manisha  
 Saphenous vein thrombophlebitis, right 02/2018  
 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  
 Sleep apnea   
 cpap 9cm  SOB (shortness of breath)  White coat hypertension Past Surgical History:  
Procedure Laterality Date  ABDOMEN SURGERY PROC UNLISTED    
 exploratory years ago  BREAST SURGERY PROCEDURE UNLISTED  2006  
 left breast [id number]. Aldo Skains  COLONOSCOPY N/A 4/10/2018 COLONOSCOPY,EGD performed by Judi Gutierrez MD at [facility] ENDOSCOPY  
 HX COLONOSCOPY    
 HX ORTHOPAEDIC  2009  
 foot surgery-left - bunionectomy  HX ORTHOPAEDIC    
 cyst removed from left hand - ganglion cyst  
 HX OTHER SURGICAL    
 right and left leg muscle biopsies - elevated CPK - muscle enzymes were elevated  HX TUBAL LIGATION Allergies Allergen Reactions  Aleve [Naproxen Sodium] Hoarseness  Dilaudid [Hydromorphone (Bulk)] Anaphylaxis Respiratory arrest and throat closes  Diovan [Valsartan] Palpitations  Morphine Other (comments) Patch-vomiting,weakness, fainting  Sulfa (Sulfonamide Antibiotics) Hives, Rash and Other (comments)  
  fainting REVIEW OF SYSTEMS: 
General: negative for - chills or fever ENT: negative for - headaches, nasal congestion or tinnitus Respiratory: negative for - cough, hemoptysis, shortness of breath or wheezing Cardiovascular : negative for - chest pain, edema, palpitations or shortness of breath Gastrointestinal: negative for - abdominal pain, blood in stools, heartburn or nausea/vomiting Genito-Urinary: no dysuria, trouble voiding, or hematuria Musculoskeletal: negative for - gait disturbance, joint pain, joint stiffness or joint swelling Neurological: no TIA or stroke symptoms Hematologic: no bruises, no bleeding, no swollen glands Integument: no lumps, mole changes, nail changes or rash Endocrine: no malaise/lethargy or unexpected weight changes Social History Social History  Marital status:  Spouse name: N/A  
 Number of children: N/A  
 Years of education: N/A Social History Main Topics  Smoking status: Never Smoker  Smokeless tobacco: Never Used  Alcohol use No  
 Drug use: No  
 Sexual activity: Yes  
  Partners: Male Birth control/ protection: None Other Topics Concern  None Social History Narrative Family History: Mother: alive 80 yrs, High Blood Pressure, cva with cognitive deficitsFather:  36 yrs, myocardial infarctionSister(s): aliveBrother(s): unknow n2  
 sister(s) . 40 daughter no choildren -  walked out works for board of directors for [employer] . Social History: Alcohol Use Patient does not use alcohol. Smoking Status Patient is a never smoker. Caffeine: occasional. Drugs:  
 prescription narcotics. Diet: Regular. Exercise: None. Marital Status: . Lives w ith: spouse. Children: children. Church: [employer]. Patient is  living w ith her  she is on disability related to her fibromyositis. Family History Problem Relation Age of Onset  Hypertension Mother  Kidney Disease Mother 1 kidney  Heart Disease Father  Heart Attack Father   
  late 35s or early 45s  Heart Attack Maternal Grandmother  Cancer Maternal Grandfather   
  ? lung or stomach OBJECTIVE: 
 
Visit Vitals  /81  Pulse 65  Temp 98.3 °F (36.8 °C) (Oral)  Resp 18  Ht 5' 2\" (1.575 m)  Wt 183 lb 1.6 oz (83.1 kg)  SpO2 98%  BMI 33.49 kg/m2 CONSTITUTIONAL: well , well nourished, appears age appropriate EYES: perrla, eom intact ENMT:moist mucous membranes, pharynx clear NECK: supple. Thyroid normal 
RESPIRATORY: Chest: clear bilaterally CARDIOVASCULAR: Heart: regular rate and rhythm GASTROINTESTINAL: Abdomen: soft, bowel sounds active HEMATOLOGIC: no pathological lymph nodes palpated MUSCULOSKELETAL: Extremities: no edema, pulse 1+ INTEGUMENT: No unusual rashes or suspicious skin lesions noted. Nails appear normal. 
NEUROLOGIC: non-focal exam  
MENTAL STATUS: alert and oriented, appropriate affect ASSESSMENT: 
1. Encounter for monitoring coumadin therapy 2. Fibromyositis 3. Essential hypertension, benign 4. Essential hypertension 5. Gastroesophageal reflux disease without esophagitis 6. Dyslipidemia 7. Primary osteoarthritis of left hand 8. Fibromyalgia 9. Prediabetes 10. Irritable bowel syndrome without diarrhea 11. Axillary pain, right 12. Frequency of urination 13. Hair loss Patient returns today saying she'd like us to follow her Coumadin. She likes to avoid the co-pay with the hematologist.  We advised her of our rules regarding Coumadin, would like her INR to be between 1.8 and 2.8. She states it's been like that for the last two months through the hematologist.  She's taking Coumadin 2.5 mg daily and Coumadin tablet at that dosage. She is requesting an MRI of her breast, in particular looking at the right axilla where lumps have been palpated. BP is elevated, but she hasn't taken her BP pills this morning. Encouraged to check her blood pressure at least twice a month,b ring us the readings so we can make an adjustment to induce a blood pressure of 130/80 or less. We continue to encourage physical activity as much as possible for 30 minutes five days a week. She'll return to the office in one month for PT check, three months to see me. We also refer her to dermatology for hair loss. Patient also notes urinary frequency, for which we'll ask for UA and urine C&S. Her left ankle is puffy. I have discussed the diagnosis with the patient and the intended plan as seen in the 
orders above. The patient understands and agees with the plan. The patient has received an after visit summary and questions were answered concerning 
future plans Patient labs and/or xrays were reviewed Past records were reviewed. PLAN: 
. Orders Placed This Encounter  CULTURE, URINE  
 GLO MAMMO RT DX INCL CAD  MRI BREAST BI W CONT  
 URINALYSIS W/ RFLX MICROSCOPIC  REFERRAL TO DERMATOLOGY  AMB POC PT/INR  amLODIPine (NORVASC) 10 mg tablet Follow-up Disposition: 
Return in about 4 weeks (around 10/17/2018) for bp check, pt/inr. ATTENTION:  
This medical record was transcribed using an electronic medical records system. Although proofread, it may and can contain electronic and spelling errors. Other human spelling and other errors may be present. Corrections may be executed at a later time. Please feel free to contact us for any clarifications as needed.

## 2018-09-19 NOTE — MR AVS SNAPSHOT
Russ Clonts 
 
 
 [address] [id number] 
[phone] Patient: Jana Alanis MRN: [id number] OJF:[id number] Visit Information Date & Time Provider Department Dept. Phone Encounter #  
 9/19/2018  9:30 AM Marely Zamarripa 80 Sports Medicine and [facility] 34 [id number] Follow-up Instructions Return in about 4 weeks (around 10/17/2018) for bp check, pt/inr. Follow-up and Disposition History Your Appointments 12/19/2018  9:30 AM  
Any with Juwan Olson MD  
[address] [place] [address] and Primary Care [address] [place] [address]) Appt Note: 3 month f/u  
 [address] [phone]  
  
   
 [address] [id number]  
  
    
 3/13/2019  9:30 AM  
Any with Juwan Olson MD  
[address] [place] [address] and Primary Care [address] [place] [address]) Appt Note: 3 Month Follow Up  
 [address] [id number]  
[phone] Upcoming Health Maintenance Date Due  
 PAP AKA CERVICAL CYTOLOGY 9/9/2017 Bone Densitometry (Dexa) Screening 12/5/2018 Influenza Age 5 to Adult 9/19/2019* MEDICARE YEARLY EXAM 3/15/2019 BREAST CANCER SCRN MAMMOGRAM 10/13/2019 COLONOSCOPY 4/10/2023 DTaP/Tdap/Td series (2 - Td) 3/14/2028 *Topic was postponed. The date shown is not the original due date. Allergies as of 9/19/2018  Review Complete On: 9/19/2018 By: Juwan Olson MD  
  
 Severity Noted Reaction Type Reactions Aleve [Naproxen Sodium] High 07/22/2016    Hoarseness Dilaudid [Hydromorphone (Bulk)] High 08/15/2011    Anaphylaxis Respiratory arrest and throat closes Diovan [Valsartan]  09/22/2015    Palpitations Morphine  08/15/2011    Other (comments) Patch-vomiting,weakness, fainting Sulfa (Sulfonamide Antibiotics)  08/15/2011    Hives, Rash, Other (comments)  
 fainting Current Immunizations  Reviewed on 4/2/2014 No immunizations on file. Not reviewed this visit You Were Diagnosed With   
  
 Codes Comments Encounter for monitoring coumadin therapy    -  Primary ICD-10-CM: Z51.81, Z79.01 
ICD-9-CM: V58.83, V58.61 Fibromyositis     ICD-10-CM: M79.7 ICD-9-CM: 729.1 Essential hypertension, benign     ICD-10-CM: I10 
ICD-9-CM: 401.1 Essential hypertension     ICD-10-CM: I10 
ICD-9-CM: 401.9 Gastroesophageal reflux disease without esophagitis     ICD-10-CM: K21.9 ICD-9-CM: 530.81 Dyslipidemia     ICD-10-CM: E78.5 ICD-9-CM: 272.4 Primary osteoarthritis of left hand     ICD-10-CM: M19.042 ICD-9-CM: 715.14 Fibromyalgia     ICD-10-CM: M79.7 ICD-9-CM: 729.1 Prediabetes     ICD-10-CM: R73.03 
ICD-9-CM: 790.29 Irritable bowel syndrome without diarrhea     ICD-10-CM: K58.9 ICD-9-CM: 303.0 Axillary pain, right     ICD-10-CM: J27.424 ICD-9-CM: 729.5 Frequency of urination     ICD-10-CM: R35.0 ICD-9-CM: 788.41 Hair loss     ICD-10-CM: L65.9 ICD-9-CM: 704.00 Vitals BP Pulse Temp Resp Height(growth percentile) Weight(growth percentile) 152/81 65 98.3 °F (36.8 °C) (Oral) 18 5' 2\" (1.575 m) 183 lb 1.6 oz (83.1 kg) SpO2 BMI OB Status Smoking Status 98% 33.49 kg/m2 Postmenopausal Never Smoker Vitals History BMI and BSA Data Body Mass Index Body Surface Area  
 33.49 kg/m 2 1.91 m 2 Preferred Pharmacy Pharmacy Name Phone Damir Zuluaga, [facility] [phone] Your Updated Medication List  
  
   
This list is accurate as of 9/19/18 10:53 AM.  Always use your most recent med list. amLODIPine 10 mg tablet Commonly known as:  Ted Garner TAKE 1 TABLET DAILY  
  
 bisoprolol-hydroCHLOROthiazide 10-6.25 mg per tablet Commonly known as:  [facility] TAKE 1 TABLET DAILY CONTOUR NEXT TEST STRIPS strip Generic drug:  glucose blood VI test strips USE TO TEST BLOOD SUGARS ONCE DAILY  
  
 diclofenac 1 % Gel Commonly known as:  VOLTAREN Apply  to affected area as needed. gabapentin 100 mg capsule Commonly known as:  NEURONTIN Take 100 mg by mouth every eight (8) hours as needed. * [facility] Generic drug:  Lancets USE TO TEST BLOOD SUGAR EVERY DAY  
  
 * One Touch Delica 33 gauge Misc Generic drug:  lancets ONETOUCH ULTRA2 monitoring kit Generic drug:  Blood-Glucose Meter OT Ultra/FastTrack Control Soln Generic drug:  Blood Glucose Control, Normal  
  
 OTHER Arnicare cream ex as needed. OTC for pain. OTHER [place] sore muscle with cayenne and ginger topical cream as needed. OTC for pain. pantoprazole 40 mg tablet Commonly known as:  PROTONIX Take 1 Tab by mouth two (2) times a day. PAPAYA ENZYME Tab Generic drug:  carica papaya Take  by mouth. Takes 4 tabs po 1-2 times daily after meals. PROBIOTIC PO Take 1 Cap by mouth as needed. traZODone 50 mg tablet Commonly known as:  Jack Joe Take 50 mg by mouth nightly as needed for Sleep. VITAMIN B-12 1,000 mcg tablet Generic drug:  cyanocobalamin Take 1,000 mcg by mouth as needed. VITAMIN C 500 mg tablet Generic drug:  ascorbic acid (vitamin C) Take 500 mg by mouth as needed for Other (if getting sick). VITAMIN D3 PO Take  by mouth. Liquid form. 400 units per 4 drops. Takes 4 drops po once daily. warfarin 5 mg tablet Commonly known as:  COUMADIN  
TAKE ONE TABLET BY MOUTH ONCE DAILY * Notice: This list has 2 medication(s) that are the same as other medications prescribed for you. Read the directions carefully, and ask your doctor or other care provider to review them with you. Prescriptions Sent to Pharmacy Refills  
 amLODIPine (NORVASC) 10 mg tablet 2 Sig: TAKE 1 TABLET DAILY  Class: Normal  
 Pharmacy: [address], [address] [facility] #: [phone] We Performed the Following AMB POC PT/INR [66704 CPT(R)] CULTURE, URINE [id number] CPT(R)] REFERRAL TO DERMATOLOGY [REF19 Custom] Comments:  
 Please evaluate patient for hair loss. URINALYSIS W/ RFLX MICROSCOPIC [47500 CPT(R)] Follow-up Instructions Return in about 4 weeks (around 10/17/2018) for bp check, pt/inr. To-Do List   
 09/19/2018 Imaging:  GLO MAMMO RT DX INCL CAD   
  
 09/19/2018 Imaging:  MRI BREAST BI W CONT Referral Information Referral ID Referred By Referred To  
  
 [id number] Tyrell saucedo, [address] [place] [address], MD   
   [address] Phone: [phone] Fax: [phone] Visits Status Start Date End Date 1 New Request 9/19/18 9/19/19 If your referral has a status of pending review or denied, additional information will be sent to support the outcome of this decision. Introducing [facility]! Ciera Dewey introduces Populis patient portal. Now you can access parts of your medical record, email your doctor's office, and request medication refills online. 1. In your internet browser, go to [email or web address] 2. Click on the First Time User? Click Here link in the Sign In box. You will see the New Member Sign Up page. 3. Enter your Populis Access Code exactly as it appears below. You will not need to use this code after youve completed the sign-up process. If you do not sign up before the expiration date, you must request a new code. · Populis Access Code: [email or web address] Expires: 12/18/2018 10:53 AM 
 
4. Enter the last four digits of your Social Security Number (xxxx) and Date of Birth (mm/dd/yyyy) as indicated and click Submit. You will be taken to the next sign-up page. 5. Create a Stylitics ID. This will be your Stylitics login ID and cannot be changed, so think of one that is secure and easy to remember. 6. Create a Stylitics password. You can change your password at any time. 7. Enter your Password Reset Question and Answer. This can be used at a later time if you forget your password. 8. Enter your e-mail address. You will receive e-mail notification when new information is available in [address] 9. Click Sign Up. You can now view and download portions of your medical record. 10. Click the Download Summary menu link to download a portable copy of your medical information. If you have questions, please visit the Frequently Asked Questions section of the Sty[employer]ics website. Remember, Sty[employer]ics is NOT to be used for urgent needs. For medical emergencies, dial 911. Now available from your iPhone and Android! Please provide this summary of care documentation to your next provider. Your primary care clinician is listed as Viv Patricio. If you have any questions after today's visit, please call [phone].

## 2018-09-19 NOTE — PROGRESS NOTES
1. Have you been to the ER, urgent care clinic since your last visit? Hospitalized since your last visit? No 
 
2. Have you seen or consulted any other health care providers outside of the [address] [place] [address] since your last visit? Include any pap smears or colon screening. No  
 
Wants to discuss hair loss, coumadin, pain in legs, right arm pain

## 2018-09-20 LAB
APPEARANCE UR: CLEAR
BACTERIA #/AREA URNS HPF: NORMAL /[HPF]
BACTERIA UR CULT: NORMAL
BILIRUB UR QL STRIP: NEGATIVE
CASTS URNS QL MICRO: NORMAL /LPF
COLOR UR: YELLOW
EPI CELLS #/AREA URNS HPF: NORMAL /HPF
GLUCOSE UR QL: NEGATIVE
HGB UR QL STRIP: NEGATIVE
KETONES UR QL STRIP: NEGATIVE
LEUKOCYTE ESTERASE UR QL STRIP: NEGATIVE
MICRO URNS: ABNORMAL
NITRITE UR QL STRIP: NEGATIVE
PH UR STRIP: 7 [PH] (ref 5–7.5)
PROT UR QL STRIP: ABNORMAL
RBC #/AREA URNS HPF: NORMAL /HPF
SP GR UR: 1.01 (ref 1–1.03)
UROBILINOGEN UR STRIP-MCNC: 0.2 MG/DL (ref 0.2–1)
WBC #/AREA URNS HPF: NORMAL /HPF

## 2018-10-10 DIAGNOSIS — M79.621 AXILLARY PAIN, RIGHT: Primary | ICD-10-CM

## 2018-10-10 DIAGNOSIS — N64.4 MASTODYNIA: ICD-10-CM

## 2018-10-12 ENCOUNTER — HOSPITAL ENCOUNTER (OUTPATIENT)
Dept: MRI IMAGING | Age: 63
Discharge: HOME | End: 2018-10-12
Attending: INTERNAL MEDICINE
Payer: MEDICARE

## 2018-10-12 VITALS — WEIGHT: 175 LBS | BODY MASS INDEX: 32.01 KG/M2

## 2018-10-12 DIAGNOSIS — M79.621 AXILLARY PAIN, RIGHT: ICD-10-CM

## 2018-10-12 DIAGNOSIS — N64.4 MASTODYNIA: ICD-10-CM

## 2018-10-12 LAB — CREAT BLD-MCNC: 1.1 MG/DL (ref 0.6–1.3)

## 2018-10-12 PROCEDURE — 77059 MRI BREAST BI W WO CONT: CPT

## 2018-10-12 PROCEDURE — 82565 ASSAY OF CREATININE: CPT

## 2018-10-12 PROCEDURE — A9585 GADOBUTROL INJECTION: HCPCS

## 2018-10-12 PROCEDURE — 74011250636 HC RX REV CODE- 250/636

## 2018-10-17 ENCOUNTER — OFFICE VISIT (OUTPATIENT)
Dept: INTERNAL MEDICINE CLINIC | Age: 63
End: 2018-10-17

## 2018-10-17 VITALS — HEIGHT: 62 IN | BODY MASS INDEX: 32.2 KG/M2 | WEIGHT: 175 LBS

## 2018-10-17 DIAGNOSIS — Z79.01 ANTICOAGULATED ON COUMADIN: Primary | ICD-10-CM

## 2018-10-17 LAB
INR BLD: 22.7
PT POC: 1.9 SECONDS
VALID INTERNAL CONTROL?: YES

## 2018-10-17 NOTE — PROGRESS NOTES
Patient presented to office for PT/INR check. PT 1.9 INR 22.7. Provider notified and instructed that no changes to medication are needed.

## 2018-10-26 ENCOUNTER — HOSPITAL ENCOUNTER (OUTPATIENT)
Dept: MAMMOGRAPHY | Age: 63
Discharge: HOME | End: 2018-10-26
Attending: INTERNAL MEDICINE
Payer: MEDICARE

## 2018-10-26 DIAGNOSIS — M79.621 AXILLARY PAIN, RIGHT: ICD-10-CM

## 2018-10-26 PROCEDURE — 77067 SCR MAMMO BI INCL CAD: CPT

## 2018-11-07 ENCOUNTER — OFFICE VISIT (OUTPATIENT)
Dept: INTERNAL MEDICINE CLINIC | Age: 63
End: 2018-11-07

## 2018-11-07 ENCOUNTER — HOSPITAL ENCOUNTER (OUTPATIENT)
Dept: GENERAL RADIOLOGY | Age: 63
Discharge: HOME | End: 2018-11-07
Attending: INTERNAL MEDICINE
Payer: MEDICARE

## 2018-11-07 VITALS
WEIGHT: 182 LBS | RESPIRATION RATE: 18 BRPM | OXYGEN SATURATION: 96 % | SYSTOLIC BLOOD PRESSURE: 133 MMHG | HEIGHT: 62 IN | BODY MASS INDEX: 33.49 KG/M2 | HEART RATE: 64 BPM | TEMPERATURE: 97.9 F | DIASTOLIC BLOOD PRESSURE: 75 MMHG

## 2018-11-07 DIAGNOSIS — R06.02 SOB (SHORTNESS OF BREATH): Primary | ICD-10-CM

## 2018-11-07 DIAGNOSIS — K21.9 GASTROESOPHAGEAL REFLUX DISEASE WITHOUT ESOPHAGITIS: ICD-10-CM

## 2018-11-07 DIAGNOSIS — K21.9 ACID REFLUX: ICD-10-CM

## 2018-11-07 DIAGNOSIS — M79.7 FIBROMYALGIA: ICD-10-CM

## 2018-11-07 DIAGNOSIS — R68.81 EARLY SATIETY: ICD-10-CM

## 2018-11-07 DIAGNOSIS — R13.10 DYSPHAGIA: ICD-10-CM

## 2018-11-07 DIAGNOSIS — I10 ESSENTIAL HYPERTENSION: ICD-10-CM

## 2018-11-07 DIAGNOSIS — G89.4 CHRONIC PAIN SYNDROME: ICD-10-CM

## 2018-11-07 DIAGNOSIS — K58.9 IRRITABLE BOWEL SYNDROME WITHOUT DIARRHEA: ICD-10-CM

## 2018-11-07 PROCEDURE — 74241 XR UPPER GI SERIES W KUB: CPT

## 2018-11-07 RX ORDER — RANITIDINE 150 MG/1
TABLET, FILM COATED ORAL
Refills: 12 | COMMUNITY
Start: 2018-10-25 | End: 2019-04-25 | Stop reason: ALTCHOICE

## 2018-11-07 RX ORDER — LIDOCAINE 50 MG/G
PATCH TOPICAL
Qty: 30 EACH | Refills: 11 | Status: SHIPPED | OUTPATIENT
Start: 2018-11-07 | End: 2018-11-08 | Stop reason: SDUPTHER

## 2018-11-07 NOTE — PROGRESS NOTES
1. Have you been to the ER, urgent care clinic since your last visit? Hospitalized since your last visit? No 
 
2. Have you seen or consulted any other health care providers outside of the [address] [place] [address] since your last visit? Include any pap smears or colon screening. No  
 
Complaints of SOB, chest pain, and left and right leg pain

## 2018-11-07 NOTE — PROGRESS NOTES
SPORTS MEDICINE AND PRIMARY CARE Chalo Jade MD, [address] [address] Phone:  [phone]  Fax: [phone] Chief Complaint Patient presents with  Breathing Problem Diann Eugene SUBJECTIVE: 
  Collette Jack is a 57 y.o. female Patient returns today with known history of normal echo dobutamine stress test 03/27/14, with no evidence of ischemia or MI, baseline ejection fraction 45%, increasing to 60% at peak dobutamine infusion, CT scan on 03/28/14 positive for right lower lobe acute pulmonary embolus, CT scan 07/22/16 negative, fibromyalgia, gastroesophageal reflux disease, primary hypertension, irritable bowel syndrome, chronic pain, shortness of breath and is seen for evaluation. Patient returns today with several concerns. For the past two days she has had sternal chest discomfort, intermittent, lasts for 10-15 minutes, associated with shortness of breath. It is aggravated by change in position, for example bending over or twisting to left or right. She complains of leg swelling and notes that the left calf muscle seems to be larger than the right calf muscles. They feel tight and there is some redness associated with it. She has had some swelling when she wakes up in the morning although she raises her legs up at night. She also complains of hair loss and associates that with the NOAC that she takes for prevention of a PE. She then goes on to say she wants to get off the blood thinner and wants me to stop the blood thinner and we have a discussion regarding that. Patient is seen for evaluation. Current Outpatient Medications Medication Sig Dispense Refill  raNITIdine (ZANTAC) 150 mg tablet TAKE 1 TABLET BY MOUTH ONCE DAILY AT BEDTIME  12  
 lidocaine (LIDODERM) 5 % Apply patch to the affected area for 12 hours a day and remove for 12 hours a day.  30 Each 11  
 amLODIPine (NORVASC) 10 mg tablet TAKE 1 TABLET DAILY 90 Tab 2  
  warfarin (COUMADIN) 5 mg tablet TAKE ONE TABLET BY MOUTH ONCE DAILY  3  
 gabapentin (NEURONTIN) 100 mg capsule Take 100 mg by mouth every eight (8) hours as needed.  traZODone (DESYREL) 50 mg tablet Take 50 mg by mouth nightly as needed for Sleep.  LACTOBACILLUS ACIDOPHILUS (PROBIOTIC PO) Take 1 Cap by mouth as needed.  diclofenac (VOLTAREN) 1 % gel Apply  to affected area as needed.  OTHER Arnicare cream ex as needed. OTC for pain.  OTHER [place] sore muscle with cayenne and ginger topical cream as needed. OTC for pain.  CHOLECALCIFEROL, VITAMIN D3, (VITAMIN D3 PO) Take  by mouth. Liquid form. 400 units per 4 drops. Takes 4 drops po once daily.  ascorbic acid, vitamin C, (VITAMIN C) 500 mg tablet Take 500 mg by mouth as needed for Other (if getting sick).  carica papaya (PAPAYA ENZYME) tab Take  by mouth. Takes 4 tabs po 1-2 times daily after meals.  pantoprazole (PROTONIX) 40 mg tablet Take 1 Tab by mouth two (2) times a day. 60 Tab 3  
 bisoprolol-hydroCHLOROthiazide (ZIAC) 10-6.25 mg per tablet TAKE 1 TABLET DAILY 90 Tab 2  
 OT ULTRA/FASTTRACK CONTROL soln  ONETOUCH ULTRA2 monitoring kit [facility] 33 gauge misc  MICROLET LANCET misc USE TO TEST BLOOD SUGAR EVERY  Each 11  
 CONTOUR NEXT STRIPS strip USE TO TEST BLOOD SUGARS ONCE DAILY 50 Strip 11  
 cyanocobalamin (VITAMIN B-12) 1,000 mcg tablet Take 1,000 mcg by mouth as needed. Past Medical History:  
Diagnosis Date  Adverse effect of anesthesia \"hard time waking me up\"  Axillary pain, right  Chronic pain d/t fibromyalgia  Coagulation disorder ([facility] [address]   
 on eliquis -d/c due to gi upset - now on coumadin previously followed by dr. Karey Anton  Depression with anxiety  DJD (degenerative joint disease)  Dyslipidemia  Encounter for Hemoccult screening 06/28/2017  
 neg  Fibromyalgia  Gait instability  GERD (gastroesophageal reflux disease)  Hypertension  IBS (irritable bowel syndrome)  Ill-defined condition   
 gastroparesis  Ill-defined condition CT - 3/18/2018 - esophageal diverticulum  Ill-defined condition   
 pericarditis  Mastodynia, left greater than right 8/17/2011  Normal cardiac stress test 3.14  
 Positive D dimer 9-23-15  Prediabetes  Pulmonary embolism ([facility] [address] 03/2013  
 rll  Restless leg syndrome  S/P colonoscopy 01/27/2011  
 kenny hernandez md  
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  
 [facility] acute occlusive superificial vein thrombosis - Melody Dyer md  
 Saphenous vein thrombophlebitis, right 02/2018  
 and acute l posterior tibial vein - this is the 2nd DVT putting her on lifelong anticoagualtion / Abida Denier  
 Sleep apnea   
 cpap 9cm  SOB (shortness of breath)  White coat hypertension Past Surgical History:  
Procedure Laterality Date  ABDOMEN SURGERY PROC UNLISTED    
 exploratory years ago  BREAST SURGERY PROCEDURE UNLISTED  2006  
 left breast [id number]. Avel Crosby  HX BREAST BIOPSY Left  HX COLONOSCOPY    
 HX ORTHOPAEDIC  2009  
 foot surgery-left - bunionectomy  HX ORTHOPAEDIC    
 cyst removed from left hand - ganglion cyst  
 HX OTHER SURGICAL    
 right and left leg muscle biopsies - elevated CPK - muscle enzymes were elevated  HX TUBAL LIGATION Allergies Allergen Reactions  Aleve [Naproxen Sodium] Hoarseness  Dilaudid [Hydromorphone (Bulk)] Anaphylaxis Respiratory arrest and throat closes  Diovan [Valsartan] Palpitations  Morphine Other (comments) Patch-vomiting,weakness, fainting  Sulfa (Sulfonamide Antibiotics) Hives, Rash and Other (comments)  
  fainting REVIEW OF SYSTEMS: 
General: negative for - chills or fever ENT: negative for - headaches, nasal congestion or tinnitus Respiratory: negative for - cough, hemoptysis, shortness of breath or wheezing Cardiovascular : negative for - chest pain, edema, palpitations or shortness of breath Gastrointestinal: negative for - abdominal pain, blood in stools, heartburn or nausea/vomiting Genito-Urinary: no dysuria, trouble voiding, or hematuria Musculoskeletal: negative for - gait disturbance, joint pain, joint stiffness or joint swelling Neurological: no TIA or stroke symptoms Hematologic: no bruises, no bleeding, no swollen glands Integument: no lumps, mole changes, nail changes or rash Endocrine: no malaise/lethargy or unexpected weight changes Social History Socioeconomic History  Marital status:  Spouse name: Not on file  Number of children: Not on file  Years of education: Not on file  Highest education level: Not on file Social Needs  Financial resource strain: Not on file  Food insecurity - worry: Not on file  Food insecurity - inability: Not on file  Transportation needs - medical: Not on file  Transportation needs - non-medical: Not on file Occupational History  Not on file Tobacco Use  Smoking status: Never Smoker  Smokeless tobacco: Never Used Substance and Sexual Activity  Alcohol use: No  
 Drug use: No  
 Sexual activity: Yes  
  Partners: Male Birth control/protection: None Other Topics Concern  Not on file Social History Narrative Family History: Mother: alive 80 yrs, High Blood Pressure, cva with cognitive deficitsFather:  36 yrs, myocardial infarctionSister(s): aliveBrother(s): unknow n2  
 sister(s) . 40 daughter no choildren -  walked out works for board of directors for [employer] . Social History: Alcohol Use Patient does not use alcohol. Smoking Status Patient is a never smoker. Caffeine: occasional. Drugs:  
 prescription narcotics. Diet: Regular. Exercise: None.  Marital Status: . Lives w ith: spouse. Children: children. Restorationist: Leanor . Patient is  living w ith her  she is on disability related to her fibromyositis. Family History Problem Relation Age of Onset  Hypertension Mother  Kidney Disease Mother 1 kidney  Heart Disease Father  Heart Attack Father   
     late 35s or early 45s  Heart Attack Maternal Grandmother  Cancer Maternal Grandfather   
     ? lung or stomach OBJECTIVE: 
 
Visit Vitals /75 Pulse 64 Temp 97.9 °F (36.6 °C) (Oral) Resp 18 Ht 5' 2\" (1.575 m) Wt 182 lb (82.6 kg) SpO2 96% BMI 33.29 kg/m² CONSTITUTIONAL: well , well nourished, appears age appropriate EYES: perrla, eom intact ENMT:moist mucous membranes, pharynx clear NECK: supple. Thyroid normal 
RESPIRATORY: Chest: clear bilaterally CARDIOVASCULAR: Heart: regular rate and rhythm GASTROINTESTINAL: Abdomen: soft, bowel sounds active HEMATOLOGIC: no pathological lymph nodes palpated MUSCULOSKELETAL: Extremities: no edema, pulse 1+ INTEGUMENT: No unusual rashes or suspicious skin lesions noted. Nails appear normal. 
NEUROLOGIC: non-focal exam  
MENTAL STATUS: alert and oriented, appropriate affect ASSESSMENT: 
1. SOB (shortness of breath) 2. Irritable bowel syndrome without diarrhea 3. Essential hypertension 4. Gastroesophageal reflux disease without esophagitis 5. Fibromyalgia 6. Chronic pain syndrome Patient has been under the care of Dr. Mesfin Paredes with the known history of left lower extremity DVT and right lower lobe PE in 2014, on coumadin. She was previously on [address], (Eliquis), but was discontinued due to it causing her to have GI upset. INR was previously monitored by Dr. Mesfin Paredes and now she prefers that we monitor her coumadin and we will do so.    
 
Patient has multiple trigger points suggesting a flare of her fibromyalgia. Tylenol is the only medication we have a recommendation for regarding the fibromyalgia, the others we cannot agree with. She has costochondritis. She has tenderness of a costochondral junction in the sub parasternal area, as well as in the left anterior axillary line. This reproduces the discomfort. We suggest Lidoderm cream or patch to make her more comfortable. The hair loss I do not think is related to coumadin. We offer dermatological referral.  She prefers not. Her blood pressure control is at goal. 
 
The multiple trigger points include the lower extremities, which I think is the etiology of the discomfort in her calves, I think this is musculoskeletal pain related to fibromyalgia. I do not find any evidence to support a diagnosis of PE or DVT. To that end we do not recommend discontinuing the Coumadin, although she states she would like to stop the Coumadin we cannot agree with that decision. We will check a PT-INR today and will continue to monitor the PT-INR on a regular basis to ensure that the INR is between 1.8 and 2.7. She will return to the office for the PT-INR checks and every three months to see me. I have discussed the diagnosis with the patient and the intended plan as seen in the 
orders above. The patient understands and agees with the plan. The patient has  
received an after visit summary and questions were answered concerning 
future plans Patient labs and/or xrays were reviewed Past records were reviewed. PLAN: 
. Orders Placed This Encounter  PROTHROMBIN TIME + INR  raNITIdine (ZANTAC) 150 mg tablet  lidocaine (LIDODERM) 5 % Follow-up Disposition: 
Return in about 2 weeks (around 11/21/2018) for pt/inr. ATTENTION:  
This medical record was transcribed using an electronic medical records system.   Although proofread, it may and can contain electronic and spelling errors. Other human spelling and other errors may be present. Corrections may be executed at a later time. Please feel free to contact us for any clarifications as needed.

## 2018-11-08 ENCOUNTER — TELEPHONE (OUTPATIENT)
Dept: INTERNAL MEDICINE CLINIC | Age: 63
End: 2018-11-08

## 2018-11-08 LAB
INR PPP: 1.6 (ref 0.8–1.2)
PROTHROMBIN TIME: 15.7 SEC (ref 9.1–12)

## 2018-11-09 ENCOUNTER — TELEPHONE (OUTPATIENT)
Dept: INTERNAL MEDICINE CLINIC | Age: 63
End: 2018-11-09

## 2018-11-09 RX ORDER — LIDOCAINE 50 MG/G
PATCH TOPICAL
Qty: 30 EACH | Refills: 11 | Status: SHIPPED | OUTPATIENT
Start: 2018-11-09 | End: 2018-11-12 | Stop reason: SDUPTHER

## 2018-11-09 NOTE — TELEPHONE ENCOUNTER
Patient notified by phone and ask to increase her coumadin to 5mg on mon,wed, fri and continue 2.5mg all other days. repeat 1 week.

## 2018-11-12 RX ORDER — LIDOCAINE 50 MG/G
PATCH TOPICAL
Qty: 90 EACH | Refills: 3 | Status: SHIPPED | OUTPATIENT
Start: 2018-11-12 | End: 2022-07-27 | Stop reason: SDUPTHER

## 2018-11-16 ENCOUNTER — CLINICAL SUPPORT (OUTPATIENT)
Dept: INTERNAL MEDICINE CLINIC | Age: 63
End: 2018-11-16

## 2018-11-16 DIAGNOSIS — I82.811: Primary | ICD-10-CM

## 2018-11-16 LAB
INR BLD: 2.5
PT POC: 30.2 SECONDS
VALID INTERNAL CONTROL?: YES

## 2018-11-16 NOTE — PROGRESS NOTES
Chief Complaint   Patient presents with    Coagulation disorder     Patient in office for pt/inr check. Patient states that she is taking Coumadin M, W, F 5 mg and 2.5 mg all other days. Results for orders placed or performed in visit on 11/16/18   AMB POC PT/INR   Result Value Ref Range    VALID INTERNAL CONTROL POC Yes     Prothrombin time (POC) 30.2 seconds    INR POC 2.5      Per Dr. Meenu Levy, no changes and return to office in one month for pt/inr check.

## 2018-11-18 RX ORDER — WARFARIN 2.5 MG/1
TABLET ORAL
Qty: 180 TAB | Refills: 11 | Status: SHIPPED | OUTPATIENT
Start: 2018-11-18 | End: 2020-03-18 | Stop reason: SDUPTHER

## 2018-11-19 RX ORDER — BISOPROLOL FUMARATE AND HYDROCHLOROTHIAZIDE 10; 6.25 MG/1; MG/1
TABLET ORAL
Qty: 90 TAB | Refills: 2 | Status: SHIPPED | OUTPATIENT
Start: 2018-11-19 | End: 2018-11-19 | Stop reason: SDUPTHER

## 2018-11-20 ENCOUNTER — TELEPHONE (OUTPATIENT)
Dept: INTERNAL MEDICINE CLINIC | Age: 63
End: 2018-11-20

## 2018-11-20 RX ORDER — TRAZODONE HYDROCHLORIDE 50 MG/1
50 TABLET ORAL
Qty: 90 TAB | Refills: 3 | Status: SHIPPED | OUTPATIENT
Start: 2018-11-20 | End: 2020-01-26

## 2018-11-20 RX ORDER — BISOPROLOL FUMARATE AND HYDROCHLOROTHIAZIDE 10; 6.25 MG/1; MG/1
TABLET ORAL
Qty: 90 TAB | Refills: 3 | Status: SHIPPED | OUTPATIENT
Start: 2018-11-20 | End: 2020-01-19

## 2018-11-20 NOTE — TELEPHONE ENCOUNTER
Medication is not covered under Medicare D but is covered under her other enhancement benefits and she is able to get it through that. Prior auth for medicare part d was denied so patient will need to get it through the other enhancement portion.

## 2018-11-20 NOTE — TELEPHONE ENCOUNTER
----- Message from [address] [place] [address] sent at 11/20/2018  1:12 PM EST -----  Regarding: FW: Dr. Noemy Turner      ----- Message -----  From: Vivek Serrano  Sent: 11/20/2018  12:57 PM  To: [facility] Front Office  Subject: Dr. Bandar Townsend from [place] [address] requesting a call back in regards to pt lidocaine patch Rx. Best contact [phone] Reference # [id number].

## 2018-12-12 ENCOUNTER — CLINICAL SUPPORT (OUTPATIENT)
Dept: INTERNAL MEDICINE CLINIC | Age: 63
End: 2018-12-12

## 2018-12-12 DIAGNOSIS — Z51.81 ENCOUNTER FOR MONITORING COUMADIN THERAPY: Primary | ICD-10-CM

## 2018-12-12 DIAGNOSIS — Z79.01 ENCOUNTER FOR MONITORING COUMADIN THERAPY: Primary | ICD-10-CM

## 2018-12-12 LAB
INR BLD: 3.3
PT POC: 39.8 SECONDS
VALID INTERNAL CONTROL?: YES

## 2018-12-12 NOTE — PROGRESS NOTES
Nicole Patient is a 70 y.o. female who presents today for Anticoagulation monitoring. Current dose:  Coumadin 2.5 mg 2 tabs PO on M W F and 1 tab PO on S T Th Sat  Medication Changes:  yes - coumadin 2.5 mg 1 tab PO everyday except on Sun & Thur 2 tabs PO  Dietary Changes:  no    Latest INR:  Results for orders placed or performed in visit on 12/12/18   AMB POC PT/INR   Result Value Ref Range    VALID INTERNAL CONTROL POC Yes     Prothrombin time (POC) 39.8 seconds    INR POC 3.3          New Coumadin dose:.orders as documented in EMR.     Next check to be scheduled for  Dec 21, 2018  Per Dr. Lucia Dejesus, DIANN

## 2018-12-21 ENCOUNTER — CLINICAL SUPPORT (OUTPATIENT)
Dept: INTERNAL MEDICINE CLINIC | Age: 63
End: 2018-12-21

## 2018-12-21 DIAGNOSIS — Z51.81 ENCOUNTER FOR MONITORING COUMADIN THERAPY: Primary | ICD-10-CM

## 2018-12-21 DIAGNOSIS — Z79.01 ENCOUNTER FOR MONITORING COUMADIN THERAPY: Primary | ICD-10-CM

## 2018-12-21 LAB
INR BLD: 3.5
PT POC: 41.6 SECONDS
VALID INTERNAL CONTROL?: YES

## 2018-12-21 NOTE — PROGRESS NOTES
Pt came in today for PT check.  Pt states she is taking Coumadin 2.5 mg   2 tabs Sunday and Thursday  1 tab Mon, Tues, Wed, Fri, Sat  PT- 41.6  INR- 3.5  Per Dr. Francis Bautista pt is to hold coumadin today December 21st 2018 and start taking her Coumadin 2.5 mg   1 tab Mon-Sat  2 tabs on Sunday  Pt is to return in 2 weeks for PT check    [facility]

## 2019-01-08 ENCOUNTER — CLINICAL SUPPORT (OUTPATIENT)
Dept: INTERNAL MEDICINE CLINIC | Age: 64
End: 2019-01-08

## 2019-01-08 DIAGNOSIS — Z79.01 ENCOUNTER FOR MONITORING COUMADIN THERAPY: Primary | ICD-10-CM

## 2019-01-08 DIAGNOSIS — Z51.81 ENCOUNTER FOR MONITORING COUMADIN THERAPY: Primary | ICD-10-CM

## 2019-01-08 LAB
INR BLD: 2.2
PT POC: 26.1 SECONDS
VALID INTERNAL CONTROL?: YES

## 2019-01-08 NOTE — PROGRESS NOTES
Larry Brennan is a 70 y.o. female who presents today for Anticoagulation monitoring. Current dose:  Coumadin 2.5mg 1 tab PO everyday except on Sundays 2 tabs PO  Medication Changes:  no  Dietary Changes:  no    Latest INR:  Results for orders placed or performed in visit on 01/08/19   AMB POC PT/INR   Result Value Ref Range    VALID INTERNAL CONTROL POC Yes     Prothrombin time (POC) 26.1 seconds    INR POC 2.2          New Coumadin dose:.current treatment plan is effective, no change in therapy. Next check to be scheduled for  4 weeks.   Per Dr. Enoc Chang, LPN

## 2019-01-11 ENCOUNTER — DOCUMENTATION ONLY (OUTPATIENT)
Dept: INTERNAL MEDICINE CLINIC | Age: 64
End: 2019-01-11

## 2019-02-06 ENCOUNTER — OFFICE VISIT (OUTPATIENT)
Dept: INTERNAL MEDICINE CLINIC | Age: 64
End: 2019-02-06

## 2019-02-06 VITALS
OXYGEN SATURATION: 94 % | RESPIRATION RATE: 18 BRPM | HEIGHT: 62 IN | WEIGHT: 183.5 LBS | DIASTOLIC BLOOD PRESSURE: 89 MMHG | TEMPERATURE: 97.8 F | BODY MASS INDEX: 33.77 KG/M2 | HEART RATE: 63 BPM | SYSTOLIC BLOOD PRESSURE: 134 MMHG

## 2019-02-06 DIAGNOSIS — Z51.81 ENCOUNTER FOR MONITORING COUMADIN THERAPY: Primary | ICD-10-CM

## 2019-02-06 DIAGNOSIS — G25.81 RESTLESS LEG SYNDROME: ICD-10-CM

## 2019-02-06 DIAGNOSIS — N64.4 MASTODYNIA: ICD-10-CM

## 2019-02-06 DIAGNOSIS — G89.4 CHRONIC PAIN SYNDROME: ICD-10-CM

## 2019-02-06 DIAGNOSIS — M79.7 FIBROMYOSITIS: ICD-10-CM

## 2019-02-06 DIAGNOSIS — Z79.01 ENCOUNTER FOR MONITORING COUMADIN THERAPY: Primary | ICD-10-CM

## 2019-02-06 DIAGNOSIS — K58.9 IRRITABLE BOWEL SYNDROME WITHOUT DIARRHEA: ICD-10-CM

## 2019-02-06 DIAGNOSIS — Z78.0 POST-MENOPAUSAL: ICD-10-CM

## 2019-02-06 DIAGNOSIS — I10 ESSENTIAL HYPERTENSION: ICD-10-CM

## 2019-02-06 LAB
INR BLD: 1.9
PT POC: 22.6 SECONDS
VALID INTERNAL CONTROL?: YES

## 2019-02-06 NOTE — PROGRESS NOTES
1. Have you been to the ER, urgent care clinic since your last visit? Hospitalized since your last visit? No 
 
2. Have you seen or consulted any other health care providers outside of the [facility] [place] since your last visit? Include any pap smears or colon screening. No  
 
Wants to discuss SOB, right breast issues and dizziness

## 2019-02-06 NOTE — PATIENT INSTRUCTIONS
Body Mass Index: Care Instructions Your Care Instructions Body mass index (BMI) can help you see if your weight is raising your risk for health problems. It uses a formula to compare how much you weigh with how tall you are. · A BMI lower than 18.5 is considered underweight. · A BMI between 18.5 and 24.9 is considered healthy. · A BMI between 25 and 29.9 is considered overweight. A BMI of 30 or higher is considered obese. If your BMI is in the normal range, it means that you have a lower risk for weight-related health problems. If your BMI is in the overweight or obese range, you may be at increased risk for weight-related health problems, such as high blood pressure, heart disease, stroke, arthritis or joint pain, and diabetes. If your BMI is in the underweight range, you may be at increased risk for health problems such as fatigue, lower protection (immunity) against illness, muscle loss, bone loss, hair loss, and hormone problems. BMI is just one measure of your risk for weight-related health problems. You may be at higher risk for health problems if you are not active, you eat an unhealthy diet, or you drink too much alcohol or use tobacco products. Follow-up care is a key part of your treatment and safety. Be sure to make and go to all appointments, and call your doctor if you are having problems. It's also a good idea to know your test results and keep a list of the medicines you take. How can you care for yourself at home? · Practice healthy eating habits. This includes eating plenty of fruits, vegetables, whole grains, lean protein, and low-fat dairy. · If your doctor recommends it, get more exercise. Walking is a good choice. Bit by bit, increase the amount you walk every day. Try for at least 30 minutes on most days of the week. · Do not smoke. Smoking can increase your risk for health problems.  If you need help quitting, talk to your doctor about stop-smoking programs and medicines. These can increase your chances of quitting for good. · Limit alcohol to 2 drinks a day for men and 1 drink a day for women. Too much alcohol can cause health problems. If you have a BMI higher than 25 · Your doctor may do other tests to check your risk for weight-related health problems. This may include measuring the distance around your waist. A waist measurement of more than 40 inches in men or 35 inches in women can increase the risk of weight-related health problems. · Talk with your doctor about steps you can take to stay healthy or improve your health. You may need to make lifestyle changes to lose weight and stay healthy, such as changing your diet and getting regular exercise. If you have a BMI lower than 18.5 · Your doctor may do other tests to check your risk for health problems. · Talk with your doctor about steps you can take to stay healthy or improve your health. You may need to make lifestyle changes to gain or maintain weight and stay healthy, such as getting more healthy foods in your diet and doing exercises to build muscle. Where can you learn more? Go to http://henri-moon.info/. Enter S176 in the search box to learn more about \"Body Mass Index: Care Instructions. \" Current as of: October 13, 2016 Content Version: 11.4 © 0272-1639 [employer]. Care instructions adapted under license by [employer] (which disclaims liability or warranty for this information). If you have questions about a medical condition or this instruction, always ask your healthcare professional. [address] any warranty or liability for your use of this information.

## 2019-02-06 NOTE — PROGRESS NOTES
SPORTS MEDICINE AND PRIMARY CARE Silvio Gonzalez MD, [address] [address] Phone:  [phone]  Fax: [phone] Chief Complaint Patient presents with  Hypertension  
  f/u Porfirio Alvarez SUBJECTIVE: 
  Jason Santiago is a 70 y.o. female Patient returns today with known history of fibromyalgia, chronic pain, mastodynia, previously evaluated by Dr. Kristine Lorenz, with unremarkable findings, and comes in today for follow up of her PT and complains of right breast pain with area of redness on the right breast.  Notes that she falls off to sleep while just sitting easily. She is still using her CPAP machine regularly. Complains of fatigue, frequency of nocturia, 5-6 times, for the past two weeks. She notes hair loss with Coumadin and Dr. Saul Delvalle wants her to consider a biopsy because of an elevated CPK. We recall that she has had three muscle biopsies for the elevated CPK, one at [facility], one at [facility], which were all unremarkable. We will get her paper chart and send him those results for his records. Patient is seen for evaluation. Current Outpatient Medications Medication Sig Dispense Refill  bisoprolol-hydroCHLOROthiazide (ZIAC) 10-6.25 mg per tablet TAKE 1 TABLET DAILY 90 Tab 3  
 traZODone (DESYREL) 50 mg tablet Take 1 Tab by mouth nightly as needed for Sleep. 90 Tab 3  warfarin (COUMADIN) 2.5 mg tablet 1 to 2 tabs daily 180 Tab 11  
 lidocaine (LIDODERM) 5 % Apply patch to the affected area for 12 hours a day and remove for 12 hours a day. DX.M79.7 90 Each 3  
 raNITIdine (ZANTAC) 150 mg tablet TAKE 1 TABLET BY MOUTH ONCE DAILY AT BEDTIME  12  
 amLODIPine (NORVASC) 10 mg tablet TAKE 1 TABLET DAILY 90 Tab 2  warfarin (COUMADIN) 5 mg tablet TAKE ONE TABLET BY MOUTH ONCE DAILY  3  
 LACTOBACILLUS ACIDOPHILUS (PROBIOTIC PO) Take 1 Cap by mouth as needed.  diclofenac (VOLTAREN) 1 % gel Apply  to affected area as needed.  OTHER Arnicare cream ex as needed. OTC for pain.  OTHER Turpin sore muscle with cayenne and ginger topical cream as needed. OTC for pain.  CHOLECALCIFEROL, VITAMIN D3, (VITAMIN D3 PO) Take  by mouth. Liquid form. 400 units per 4 drops. Takes 4 drops po once daily.  ascorbic acid, vitamin C, (VITAMIN C) 500 mg tablet Take 500 mg by mouth as needed for Other (if getting sick).  carica papaya (PAPAYA ENZYME) tab Take  by mouth. Takes 4 tabs po 1-2 times daily after meals.  pantoprazole (PROTONIX) 40 mg tablet Take 1 Tab by mouth two (2) times a day. 60 Tab 3  
 OT ULTRA/FASTTRACK CONTROL soln  ONETOUCH ULTRA2 monitoring kit [facility] 33 gauge misc  MICROLET LANCET misc USE TO TEST BLOOD SUGAR EVERY  Each 11  
 CONTOUR NEXT STRIPS strip USE TO TEST BLOOD SUGARS ONCE DAILY 50 Strip 11  
 cyanocobalamin (VITAMIN B-12) 1,000 mcg tablet Take 1,000 mcg by mouth as needed.  gabapentin (NEURONTIN) 100 mg capsule Take 100 mg by mouth every eight (8) hours as needed. Past Medical History:  
Diagnosis Date  Adverse effect of anesthesia \"hard time waking me up\"  Axillary pain, right  Chronic pain d/t fibromyalgia  Coagulation disorder ([facility] [address]   
 on eliquis -d/c due to gi upset - now on coumadin previously followed by dr. Nelson Robbins  Depression with anxiety  DJD (degenerative joint disease)  Dyslipidemia  Encounter for Hemoccult screening 06/28/2017  
 neg  Fibromyalgia  Gait instability  GERD (gastroesophageal reflux disease)  Hypertension  IBS (irritable bowel syndrome)  Ill-defined condition   
 gastroparesis  Ill-defined condition CT - 3/18/2018 - esophageal diverticulum  Ill-defined condition   
 pericarditis  Mastodynia, left greater than right 8/17/2011  Normal cardiac stress test 3.14  
 Positive D dimer 9-23-15  Prediabetes  Pulmonary embolism ([facility] [address] 03/2013 rll  
 Restless leg syndrome  S/P colonoscopy 01/27/2011  
 kenny hernandez md  
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  
 [facility] acute occlusive superificial vein thrombosis - Melody Dyer md  
 Saphenous vein thrombophlebitis, right 02/2018  
 and acute l posterior tibial vein - this is the 2nd DVT putting her on lifelong anticoagualtion / Brittney Cayetano  
 Sleep apnea   
 cpap 9cm  SOB (shortness of breath)  White coat hypertension Past Surgical History:  
Procedure Laterality Date  ABDOMEN SURGERY PROC UNLISTED    
 exploratory years ago  BREAST SURGERY PROCEDURE UNLISTED  2006  
 left breast [id number]. Arvin Orona  COLONOSCOPY N/A 4/10/2018 COLONOSCOPY,EGD performed by Ifrah Lomeli MD at [facility] ENDOSCOPY  
 HX BREAST BIOPSY Left  HX COLONOSCOPY    
 HX ORTHOPAEDIC  2009  
 foot surgery-left - bunionectomy  HX ORTHOPAEDIC    
 cyst removed from left hand - ganglion cyst  
 HX OTHER SURGICAL    
 right and left leg muscle biopsies - elevated CPK - muscle enzymes were elevated  HX TUBAL LIGATION Allergies Allergen Reactions  Aleve [Naproxen Sodium] Hoarseness  Dilaudid [Hydromorphone (Bulk)] Anaphylaxis Respiratory arrest and throat closes  Diovan [Valsartan] Palpitations  Morphine Other (comments) Patch-vomiting,weakness, fainting  Sulfa (Sulfonamide Antibiotics) Hives, Rash and Other (comments)  
  fainting REVIEW OF SYSTEMS: 
General: negative for - chills or fever ENT: negative for - headaches, nasal congestion or tinnitus Respiratory: negative for - cough, hemoptysis, shortness of breath or wheezing Cardiovascular : negative for - chest pain, edema, palpitations or shortness of breath Gastrointestinal: negative for - abdominal pain, blood in stools, heartburn or nausea/vomiting Genito-Urinary: no dysuria, trouble voiding, or hematuria Musculoskeletal: negative for - gait disturbance, joint pain, joint stiffness or joint swelling Neurological: no TIA or stroke symptoms Hematologic: no bruises, no bleeding, no swollen glands Integument: no lumps, mole changes, nail changes or rash Endocrine: no malaise/lethargy or unexpected weight changes Social History Socioeconomic History  Marital status:  Spouse name: Not on file  Number of children: Not on file  Years of education: Not on file  Highest education level: Not on file Tobacco Use  Smoking status: Never Smoker  Smokeless tobacco: Never Used Substance and Sexual Activity  Alcohol use: No  
 Drug use: No  
 Sexual activity: Yes  
  Partners: Male Birth control/protection: None Social History Narrative Family History: Mother: alive 80 yrs, High Blood Pressure, cva with cognitive deficitsFather:  36 yrs, myocardial infarctionSister(s): aliveBrother(s): unknow n2  
 sister(s) . 40 daughter no choildren -  walked out works for board of directors for [employer] . Social History: Alcohol Use Patient does not use alcohol. Smoking Status Patient is a never smoker. Caffeine: occasional. Drugs:  
 prescription narcotics. Diet: Regular. Exercise: None. Marital Status: . Lives w ith: spouse. Children: children. Hinduism: Lorrin Sheerer. Patient is  living w ith her  she is on disability related to her fibromyositis. Family History Problem Relation Age of Onset  Hypertension Mother  Kidney Disease Mother 1 kidney  Heart Disease Father  Heart Attack Father   
     late 35s or early 45s  Heart Attack Maternal Grandmother  Cancer Maternal Grandfather   
     ? lung or stomach OBJECTIVE: 
 
Visit Vitals /89 Pulse 63 Temp 97.8 °F (36.6 °C) (Oral) Resp 18 Ht 5' 2\" (1.575 m) Wt 183 lb 8 oz (83.2 kg) SpO2 94% BMI 33.56 kg/m² CONSTITUTIONAL: well , well nourished, appears age appropriate EYES: perrla, eom intact ENMT:moist mucous membranes, pharynx clear NECK: supple. Thyroid normal 
RESPIRATORY: Chest: clear bilaterally CARDIOVASCULAR: Heart: regular rate and rhythm GASTROINTESTINAL: Abdomen: soft, bowel sounds active HEMATOLOGIC: no pathological lymph nodes palpated MUSCULOSKELETAL: Extremities: no edema, pulse 1+ INTEGUMENT: No unusual rashes or suspicious skin lesions noted. Nails appear normal. 
NEUROLOGIC: non-focal exam  
MENTAL STATUS: alert and oriented, appropriate affect ASSESSMENT: 
1. Encounter for monitoring Coumadin therapy 2. Essential hypertension 3. Restless leg syndrome 4. Mastodynia, left greater than right 5. Irritable bowel syndrome without diarrhea 6. Chronic pain syndrome 7. Fibromyositis 8. Post-menopausal   
 
Both breasts are tender without discreet masses. There is a 6 mm circumscribed area of redness of the right breast at 9:00. No drainage. She had a similar one on the left breast that has now resolved spontaneously. We offer referral to rheumatologist.  She would like to wait to see if this will also resolve. She is up to date with mammograms and therefore we do not have any further recommendations in that regard. We suggest that she see her sleep specialist to determine if an adjustment needs to be made in her CPAP machine. Frequency of nocturia needs to be evaluated with UA and urine C&S, which will be done on the next visit. She would like an osteoporosis screen, which has been requested. She will return to the office in one month for a PT check. Discussed the patient's BMI with her. The BMI follow up plan is as follows:  
 
dietary management education, guidance, and counseling 
encourage exercise 
monitor weight 
prescribed dietary intake I have discussed the diagnosis with the patient and the intended plan as seen in the 
 orders above. The patient understands and agees with the plan. The patient has  
received an after visit summary and questions were answered concerning 
future plans Patient labs and/or xrays were reviewed Past records were reviewed. PLAN: 
. Orders Placed This Encounter  DEXA BONE DENSITY APPENDICULAR  
 AMB POC PT/INR Follow-up Disposition: 
Return in about 4 weeks (around 3/6/2019) for pt/inr. ATTENTION:  
This medical record was transcribed using an electronic medical records system. Although proofread, it may and can contain electronic and spelling errors. Other human spelling and other errors may be present. Corrections may be executed at a later time. Please feel free to contact us for any clarifications as needed. oliver.

## 2019-03-06 ENCOUNTER — CLINICAL SUPPORT (OUTPATIENT)
Dept: INTERNAL MEDICINE CLINIC | Age: 64
End: 2019-03-06

## 2019-03-06 DIAGNOSIS — Z51.81 ENCOUNTER FOR MONITORING COUMADIN THERAPY: Primary | ICD-10-CM

## 2019-03-06 DIAGNOSIS — Z79.01 ENCOUNTER FOR MONITORING COUMADIN THERAPY: Primary | ICD-10-CM

## 2019-03-06 LAB
INR BLD: 2.5
PT POC: 29.4 SECONDS
VALID INTERNAL CONTROL?: YES

## 2019-03-06 NOTE — PROGRESS NOTES
Larry Brennan is a 70 y.o. female who presents today for Anticoagulation monitoring. Current dose:  Coumadin 2.5 mg 1 tab PO everyday except on Sunday 2 tabs PO  Medication Changes:  no  Dietary Changes:  no    Latest INR:  Results for orders placed or performed in visit on 03/06/19   AMB POC PT/INR   Result Value Ref Range    VALID INTERNAL CONTROL POC Yes     Prothrombin time (POC) 29.4 seconds    INR POC 2.5          New Coumadin dose:.current treatment plan is effective, no change in therapy. Next check to be scheduled for  4 weeks.   Per Dr. Enoc Chang, LPN

## 2019-03-28 ENCOUNTER — HOSPITAL ENCOUNTER (OUTPATIENT)
Dept: BONE DENSITY | Age: 64
Discharge: HOME | End: 2019-03-28
Attending: INTERNAL MEDICINE
Payer: MEDICARE

## 2019-03-28 DIAGNOSIS — Z78.0 POST-MENOPAUSAL: ICD-10-CM

## 2019-03-28 PROCEDURE — 77080 DXA BONE DENSITY AXIAL: CPT

## 2019-04-10 ENCOUNTER — CLINICAL SUPPORT (OUTPATIENT)
Dept: INTERNAL MEDICINE CLINIC | Age: 64
End: 2019-04-10

## 2019-04-10 DIAGNOSIS — Z79.01 ENCOUNTER FOR MONITORING COUMADIN THERAPY: Primary | ICD-10-CM

## 2019-04-10 DIAGNOSIS — Z51.81 ENCOUNTER FOR MONITORING COUMADIN THERAPY: Primary | ICD-10-CM

## 2019-04-10 LAB
INR BLD: 2.6
PT POC: 31.4 SECONDS
VALID INTERNAL CONTROL?: YES

## 2019-04-10 NOTE — PROGRESS NOTES
Patient return to office today for PT/INR. Chief Complaint   Patient presents with    Coagulation disorder     Results for orders placed or performed in visit on 04/10/19   AMB POC PT/INR   Result Value Ref Range    VALID INTERNAL CONTROL POC Yes     Prothrombin time (POC) 31.4 seconds    INR POC 2.6      Patient states she's taking Coumadin 2.5mg 2 tabs Sunday, 1 tab Mon - Sat, Per Dr Demarco Moore patient instructed to make no adjustment continue current dose return to the office 1 month for PT/INR.

## 2019-04-11 ENCOUNTER — DOCUMENTATION ONLY (OUTPATIENT)
Dept: SLEEP MEDICINE | Age: 64
End: 2019-04-11

## 2019-04-11 NOTE — PROGRESS NOTES
[facility] 4[facility]   [phone]

## 2019-04-25 ENCOUNTER — OFFICE VISIT (OUTPATIENT)
Dept: SLEEP MEDICINE | Age: 64
End: 2019-04-25

## 2019-04-25 VITALS
OXYGEN SATURATION: 98 % | BODY MASS INDEX: 33.68 KG/M2 | SYSTOLIC BLOOD PRESSURE: 128 MMHG | HEIGHT: 62 IN | WEIGHT: 183 LBS | DIASTOLIC BLOOD PRESSURE: 75 MMHG | HEART RATE: 63 BPM | RESPIRATION RATE: 20 BRPM

## 2019-04-25 DIAGNOSIS — G47.33 OBSTRUCTIVE SLEEP APNEA (ADULT) (PEDIATRIC): Primary | ICD-10-CM

## 2019-04-25 DIAGNOSIS — I10 ESSENTIAL HYPERTENSION: ICD-10-CM

## 2019-04-25 DIAGNOSIS — G47.10 HYPERSOMNIA: ICD-10-CM

## 2019-04-25 DIAGNOSIS — E66.9 OBESITY (BMI 30.0-34.9): ICD-10-CM

## 2019-04-25 RX ORDER — DEXLANSOPRAZOLE 60 MG/1
CAPSULE, DELAYED RELEASE ORAL
COMMUNITY
Start: 2019-04-10 | End: 2021-06-16 | Stop reason: ALTCHOICE

## 2019-04-25 NOTE — PROGRESS NOTES
[address] [facility] [place], [address]  Tel.  [phone]  Fax. 100 [facility] [address]  [place], [address] [facility]  Tel.  [phone]  Fax. [phone] [address]   Tel.  [phone]  Fax. [phone]       Subjective:        Cecy Rubin is an 70 y.o. female self-referred for a sleep evaluation. She complains of excessive daytime sleepiness associated with despite using her CPAP nightly. Symptoms began 5 months ago, gradually worsening since that time. She usually can fall asleep in 60 minutes. Family or house members note no obvious snoring when wearing CPAP. She falls asleep watching TV. She denies falling asleep while driving. Cecy Rubin does wake up frequently at night. She is bothered by waking up too early and left unable to get back to sleep. She actually sleeps about 7 hours at night and wakes up about 4 times during the night. She does not work shifts:  .   Katey Rodriguez indicates she does get too little sleep at night. Her bedtime is variable (7-9pm)  . She awakens at 7:30-9 am)  . She does not take naps. . She has the following observed behaviors: Loud snoring, Light snoring, Pauses in breathing, Twitching of legs or feet; Nightmares. (snoring is without CPAP only)  Other remarks:    Medications reviewed. She takes the trazodone very rarely (less than once a month)   Download reviewed.   98% compliant , CPAP at 9 cm (resmed Alisa Gone)  [place] Sleepiness Score: 8        Allergies   Allergen Reactions    Aleve [Naproxen Sodium] Hoarseness    Dilaudid [Hydromorphone (Bulk)] Anaphylaxis     Respiratory arrest and throat closes    Diovan [Valsartan] Palpitations    Morphine Other (comments)     Patch-vomiting,weakness, fainting    Sulfa (Sulfonamide Antibiotics) Hives, Rash and Other (comments)     fainting         Current Outpatient Medications:     bisoprolol-hydroCHLOROthiazide (ZIAC) 10-6.25 mg per tablet, TAKE 1 TABLET DAILY, Disp: 90 Tab, Rfl: 3    traZODone (DESYREL) 50 mg tablet, Take 1 Tab by mouth nightly as needed for Sleep., Disp: 90 Tab, Rfl: 3    warfarin (COUMADIN) 2.5 mg tablet, 1 to 2 tabs daily, Disp: 180 Tab, Rfl: 11    lidocaine (LIDODERM) 5 %, Apply patch to the affected area for 12 hours a day and remove for 12 hours a day. DX.M79.7, Disp: 90 Each, Rfl: 3    amLODIPine (NORVASC) 10 mg tablet, TAKE 1 TABLET DAILY, Disp: 90 Tab, Rfl: 2    warfarin (COUMADIN) 5 mg tablet, TAKE ONE TABLET BY MOUTH ONCE DAILY, Disp: , Rfl: 3    LACTOBACILLUS ACIDOPHILUS (PROBIOTIC PO), Take 1 Cap by mouth as needed. , Disp: , Rfl:     diclofenac (VOLTAREN) 1 % gel, Apply  to affected area as needed. , Disp: , Rfl:     OTHER, Arnicare cream ex as needed. OTC for pain., Disp: , Rfl:     OTHER, Patricia sore muscle with cayenne and ginger topical cream as needed. OTC for pain., Disp: , Rfl:     CHOLECALCIFEROL, VITAMIN D3, (VITAMIN D3 PO), Take  by mouth. Liquid form. 400 units per 4 drops. Takes 4 drops po once daily. , Disp: , Rfl:     ascorbic acid, vitamin C, (VITAMIN C) 500 mg tablet, Take 500 mg by mouth as needed for Other (if getting sick). , Disp: , Rfl:     carica papaya (PAPAYA ENZYME) tab, Take  by mouth. Takes 4 tabs po 1-2 times daily after meals. , Disp: , Rfl:     OT ULTRA/FASTTRACK CONTROL soln, , Disp: , Rfl:     ONETOUCH ULTRA2 monitoring kit, , Disp: , Rfl:     ONE TOUCH DELICA 33 gauge misc, , Disp: , Rfl:     MICROLET LANCET misc, USE TO TEST BLOOD SUGAR EVERY DAY, Disp: 100 Each, Rfl: 11    CONTOUR NEXT STRIPS strip, USE TO TEST BLOOD SUGARS ONCE DAILY, Disp: 50 Strip, Rfl: 11    cyanocobalamin (VITAMIN B-12) 1,000 mcg tablet, Take 1,000 mcg by mouth as needed. , Disp: , Rfl:     DEXILANT 60 mg CpDB capsule (delayed release), , Disp: , Rfl:     gabapentin (NEURONTIN) 100 mg capsule, Take 100 mg by mouth every eight (8) hours as needed. , Disp: , Rfl:      She  has a past medical history of Adverse effect of anesthesia, Axillary pain, right, Chronic pain, Coagulation disorder ([facility] [address] Depression with anxiety, DJD (degenerative joint disease), Dyslipidemia, Encounter for Hemoccult screening (06/28/2017), Fibromyalgia, Gait instability, GERD (gastroesophageal reflux disease), Hypertension, IBS (irritable bowel syndrome), Ill-defined condition, Ill-defined condition, Ill-defined condition, Mastodynia, left greater than right (8/17/2011), Normal cardiac stress test (3.14), Positive D dimer (9-23-15), Prediabetes, Pulmonary embolism ([facility] [address] (03/2013), Restless leg syndrome, S/P colonoscopy (01/27/2011), Saphenous vein occlusion, 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 She  has a past surgical history that includes pr breast surgery procedure unlisted (2006); hx colonoscopy; hx other surgical; hx tubal ligation; pr abdomen surgery proc unlisted; hx orthopaedic (2009); hx orthopaedic; colonoscopy (N/A, 4/10/2018); and hx breast biopsy (Left). She family history includes Cancer in her maternal grandfather; Heart Attack in her father and maternal grandmother; Heart Disease in her father; Hypertension in her mother; Kidney Disease in her mother. She  reports that she has never smoked. She has never used smokeless tobacco. She reports that she does not drink alcohol or use drugs. Review of Systems:  Constitutional:15 pound weight gain within the past year  Eyes:  No blurred vision. CVS:  No significant chest pain  Pulm:  Occasional  shortness of breath  GI:  No significant nausea or vomiting  :  No significant nocturia  Musculoskeletal:  + significant joint pain at night (she has fibromyalgia.  If she is feeling bad, she may go to bed around 7 pm)  Skin:  No significant rashes  Neuro:  No significant dizziness   Psych:  History of depression    Sleep Review of Systems: notable for + difficulty falling asleep; +frequent awakenings at night;  rare dreaming noted; + nightmares ; no early morning headaches;  + memory problems; + concentration issues; no history of any automobile or occupational accidents due to daytime drowsiness. Objective:     Visit Vitals  /75 (BP 1 Location: Left arm, BP Patient Position: Sitting)   Pulse 63   Resp 20   Ht 5' 2\" (1.575 m)   Wt 183 lb (83 kg)   SpO2 98%   BMI 33.47 kg/m²         General:   Not in acute distress   Eyes:  Anicteric sclerae, no obvious strabismus   Nose:  No obvious nasal septum deviation    Oropharynx:   Class 3 oropharyngeal outlet, thick tongue base low-lying soft palate, narrow tonsilo-pharyngeal pilars   Tonsils:   tonsils are absent   Neck:   Neck circ. in \"inches\": 15; midline trachea   Chest/Lungs:  Equal lung expansion, clear on auscultation    CVS:  Normal rate, regular rhythm; no JVD   Skin:  Warm to touch; no obvious rashes   Neuro:  No focal deficits ; no obvious tremor    Psych:  Normal affect,  normal countenance;            Assessment:       ICD-10-CM ICD-9-CM    1. Obstructive sleep apnea (adult) (pediatric) G47.33 327.23 AMB SUPPLY ORDER   2. Hypersomnia G47.10 780.54    3. Essential hypertension I10 401.9    4. Obesity (BMI 30.0-34. 9) E66.9 278.00          Plan:     Previous records requested  * * She was provided information on sleep apnea including coresponding risk factors and the importance of proper treatment. * Counseling was provided regarding proper sleep hygiene and safe driving. *    Change setting to 9-13 cmH20   If no improvement in hypersomnia with setting changes, we will schedule for titration  she is compliant with PAP therapy and PAP continues to benefit patient and remains necessary for control of her sleep apnea. I have ordered replacement supplies  3. Hypertension - she continues on her current regimen. I have reviewed the relationship between hypertension as it relates to sleep-disordered breathing.    4. Obesity - I have counseled the patient regarding the benefits of weight loss.       Electronically signed by    Kassy Perez MD  Diplomate in Sleep Medicine  [facility]

## 2019-04-25 NOTE — PATIENT INSTRUCTIONS
[address] [place], [address]  Tel.  [phone]  Fax. [address] [facility] 60  [place], [address] [facility]  Tel.  [phone]  Fax. [phone] [address] [facility] Ruth Cristina  Tel.  [phone]  Fax. [phone]     PROPER SLEEP HYGIENE    What to avoid  · Do not have drinks with caffeine, such as coffee or black tea, for 8 hours before bed. · Do not smoke or use other types of tobacco near bedtime. Nicotine is a stimulant and can keep you awake. · Avoid drinking alcohol late in the evening, because it can cause you to wake in the middle of the night. · Do not eat a big meal close to bedtime. If you are hungry, eat a light snack. · Do not drink a lot of water close to bedtime, because the need to urinate may wake you up during the night. · Do not read or watch TV in bed. Use the bed only for sleeping and sexual activity. What to try  · Go to bed at the same time every night, and wake up at the same time every morning. Do not take naps during the day. · Keep your bedroom quiet, dark, and cool. · Get regular exercise, but not within 3 to 4 hours of your bedtime. .  · Sleep on a comfortable pillow and mattress. · If watching the clock makes you anxious, turn it facing away from you so you cannot see the time. · If you worry when you lie down, start a worry book. Well before bedtime, write down your worries, and then set the book and your concerns aside. · Try meditation or other relaxation techniques before you go to bed. · If you cannot fall asleep, get up and go to another room until you feel sleepy. Do something relaxing. Repeat your bedtime routine before you go to bed again. · Make your house quiet and calm about an hour before bedtime. Turn down the lights, turn off the TV, log off the computer, and turn down the volume on music. This can help you relax after a busy day.     Drowsy Driving  The [address] [place] [address] Road Traffic Safety Administration cites drowsiness as a causing factor in more than 757,915 police reported crashes annually, resulting in 76,000 injuries and 1,500 deaths. Other surveys suggest 55% of people polled have driven while drowsy in the past year, 23% had fallen asleep but not crashed, 3% crashed, and 2% had and accident due to drowsy driving. Who is at risk? Young Drivers: One study of drowsy driving accidents states that 55% of the drivers were under 25 years. Of those, 75% were male. Shift Workers and Travelers: People who work overnight or travel across time zones frequently are at higher risk of experiencing Circadian Rhythm Disorders. They are trying to work and function when their body is programed to sleep. Sleep Deprived: Lack of sleep has a serious impact on your ability to pay attention or focus on a task. Consistently getting less than the average of 8 hours your body needs creates partial or cumulative sleep deprivation. Untreated Sleep Disorders: Sleep Apnea, Narcolepsy, R.L.S., and other sleep disorders (untreated) prevent a person from getting enough restful sleep. This leads to excessive daytime sleepiness and increases the risk for drowsy driving accidents by up to 7 times. Medications / Alcohol: Even over the counter medications can cause drowsiness. Medications that impair a drivers attention should have a warning label. Alcohol naturally makes you sleepy and on its own can cause accidents. Combined with excessive drowsiness its effects are amplified. Signs of Drowsy Driving:   * You don't remember driving the last few miles   * You may drift out of your abrahan   * You are unable to focus and your thoughts wander   * You may yawn more often than normal   * You have difficulty keeping your eyes open / nodding off   * Missing traffic signs, speeding, or tailgating  Prevention-   Good sleep hygiene, lifestyle and behavioral choices have the most impact on drowsy driving.  There is no substitute for sleep and the average person requires 8 hours nightly. If you find yourself driving drowsy, stop and sleep. Consider the sleep hygiene tips provided during your visit as well. Medication Refill Policy: Refills for all medications require 1 week advance notice. Please have your pharmacy fax a refill request. We are unable to fax, or call in \"controled substance\" medications and you will need to pick these prescriptions up from our office. Re2you Activation    Thank you for requesting access to Re[employer]you. Please follow the instructions below to securely access and download your online medical record. Re2you allows you to send messages to your doctor, view your test results, renew your prescriptions, schedule appointments, and more. How Do I Sign Up? 1. In your internet browser, go to [email or web address]. 2. Click on the First Time User? Click Here link in the Sign In box. You will see the New Member Sign Up page. 3. Enter your Re2you Access Code exactly as it appears below. You will not need to use this code after youve completed the sign-up process. If you do not sign up before the expiration date, you must request a new code. Re2you Access Code: [email or web address]  Expires: 2019  3:33 PM (This is the date your Re2you access code will )    4. Enter the last four digits of your Social Security Number (xxxx) and Date of Birth (mm/dd/yyyy) as indicated and click Submit. You will be taken to the next sign-up page. 5. Create a Re2you ID. This will be your Re2you login ID and cannot be changed, so think of one that is secure and easy to remember. 6. Create a Re2you password. You can change your password at any time. 7. Enter your Password Reset Question and Answer. This can be used at a later time if you forget your password. 8. Enter your e-mail address. You will receive e-mail notification when new information is available in [address] 9. Click Sign Up.  You can now view and download portions of your medical record. 10. Click the Download Summary menu link to download a portable copy of your medical information. Additional Information    If you have questions, please call [phone]. Remember, [employer] is NOT to be used for urgent needs. For medical emergencies, dial 911.

## 2019-05-07 ENCOUNTER — OFFICE VISIT (OUTPATIENT)
Dept: INTERNAL MEDICINE CLINIC | Age: 64
End: 2019-05-07

## 2019-05-07 VITALS
TEMPERATURE: 98 F | SYSTOLIC BLOOD PRESSURE: 139 MMHG | HEART RATE: 62 BPM | DIASTOLIC BLOOD PRESSURE: 90 MMHG | HEIGHT: 62 IN | OXYGEN SATURATION: 95 % | WEIGHT: 186.1 LBS | BODY MASS INDEX: 34.24 KG/M2 | RESPIRATION RATE: 18 BRPM

## 2019-05-07 DIAGNOSIS — Z51.81 ENCOUNTER FOR MONITORING COUMADIN THERAPY: ICD-10-CM

## 2019-05-07 DIAGNOSIS — Z13.39 SCREENING FOR ALCOHOLISM: ICD-10-CM

## 2019-05-07 DIAGNOSIS — R26.81 GAIT INSTABILITY: ICD-10-CM

## 2019-05-07 DIAGNOSIS — Z13.31 SCREENING FOR DEPRESSION: ICD-10-CM

## 2019-05-07 DIAGNOSIS — I10 ESSENTIAL HYPERTENSION, BENIGN: ICD-10-CM

## 2019-05-07 DIAGNOSIS — K21.9 GASTROESOPHAGEAL REFLUX DISEASE WITHOUT ESOPHAGITIS: ICD-10-CM

## 2019-05-07 DIAGNOSIS — E66.9 OBESITY (BMI 30.0-34.9): ICD-10-CM

## 2019-05-07 DIAGNOSIS — M79.7 FIBROMYALGIA: ICD-10-CM

## 2019-05-07 DIAGNOSIS — Z00.00 MEDICARE ANNUAL WELLNESS VISIT, SUBSEQUENT: Primary | ICD-10-CM

## 2019-05-07 DIAGNOSIS — Z79.01 ENCOUNTER FOR MONITORING COUMADIN THERAPY: ICD-10-CM

## 2019-05-07 DIAGNOSIS — M79.605 PAIN OF LEFT LOWER EXTREMITY: ICD-10-CM

## 2019-05-07 DIAGNOSIS — I27.82 CHRONIC PULMONARY EMBOLISM WITHOUT ACUTE COR PULMONALE, UNSPECIFIED PULMONARY EMBOLISM TYPE (HCC): ICD-10-CM

## 2019-05-07 LAB
INR BLD: 2.9
PT POC: 34.6 SECONDS
VALID INTERNAL CONTROL?: YES

## 2019-05-07 NOTE — PROGRESS NOTES
1. Have you been to the ER, urgent care clinic since your last visit? Hospitalized since your last visit? No 
 
2. Have you seen or consulted any other health care providers outside of the [address] [place] [address] since your last visit? Include any pap smears or colon screening. No  
 
Wants to discuss left leg pain This is the Subsequent Medicare Annual Wellness Exam, performed 12 months or more after the Initial AWV or the last Subsequent AWV I have reviewed the patient's medical history in detail and updated the computerized patient record. History Past Medical History:  
Diagnosis Date  Adverse effect of anesthesia \"hard time waking me up\"  Axillary pain, right  Chronic pain d/t fibromyalgia  Coagulation disorder ([facility] [address]   
 on eliquis -d/c due to gi upset - now on coumadin previously followed by dr. Malou Velasquez  Depression with anxiety  DJD (degenerative joint disease)  Dyslipidemia  Encounter for Hemoccult screening 06/28/2017  
 neg  Fibromyalgia  Gait instability  GERD (gastroesophageal reflux disease)  Hypertension  IBS (irritable bowel syndrome)  Ill-defined condition   
 gastroparesis  Ill-defined condition CT - 3/18/2018 - esophageal diverticulum  Ill-defined condition   
 pericarditis  Mastodynia, left greater than right 8/17/2011  Normal cardiac stress test 3.14  
 Positive D dimer 9-23-15  Prediabetes  Pulmonary embolism ([facility] [place] [address] 03/2013  
 rll  Restless leg syndrome  S/P colonoscopy 01/27/2011  
 kenny hernandez md  
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  
 [facility] acute occlusive superificial vein thrombosis - Melody Dyer md  
 Saphenous vein thrombophlebitis, right 02/2018  
 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  
 Sleep apnea   
 cpap 9cm  SOB (shortness of breath)  White coat hypertension Past Surgical History:  
Procedure Laterality Date  ABDOMEN SURGERY PROC UNLISTED    
 exploratory years ago  BREAST SURGERY PROCEDURE UNLISTED  2006  
 left breast [id number]. Leavy Gemma  COLONOSCOPY N/A 4/10/2018 COLONOSCOPY,EGD performed by Majo Santos MD at [address] [place] [address] ENDOSCOPY  
 HX BREAST BIOPSY Left  HX COLONOSCOPY    
 HX ORTHOPAEDIC  2009  
 foot surgery-left - bunionectomy  HX ORTHOPAEDIC    
 cyst removed from left hand - ganglion cyst  
 HX OTHER SURGICAL    
 right and left leg muscle biopsies - elevated CPK - muscle enzymes were elevated  HX TUBAL LIGATION Current Outpatient Medications Medication Sig Dispense Refill  bisoprolol-hydroCHLOROthiazide (ZIAC) 10-6.25 mg per tablet TAKE 1 TABLET DAILY 90 Tab 3  
 traZODone (DESYREL) 50 mg tablet Take 1 Tab by mouth nightly as needed for Sleep. 90 Tab 3  warfarin (COUMADIN) 2.5 mg tablet 1 to 2 tabs daily 180 Tab 11  
 lidocaine (LIDODERM) 5 % Apply patch to the affected area for 12 hours a day and remove for 12 hours a day. DX.M79.7 90 Each 3  
 amLODIPine (NORVASC) 10 mg tablet TAKE 1 TABLET DAILY 90 Tab 2  warfarin (COUMADIN) 5 mg tablet TAKE ONE TABLET BY MOUTH ONCE DAILY  3  
 LACTOBACILLUS ACIDOPHILUS (PROBIOTIC PO) Take 1 Cap by mouth as needed.  OTHER Arnicare cream ex as needed. OTC for pain.  OTHER Patricia sore muscle with cayenne and ginger topical cream as needed. OTC for pain.  CHOLECALCIFEROL, VITAMIN D3, (VITAMIN D3 PO) Take  by mouth. Liquid form. 400 units per 4 drops. Takes 4 drops po once daily.  ascorbic acid, vitamin C, (VITAMIN C) 500 mg tablet Take 500 mg by mouth as needed for Other (if getting sick).  carica papaya (PAPAYA ENZYME) tab Take  by mouth. Takes 4 tabs po 1-2 times daily after meals.  OT ULTRA/FASTTRACK CONTROL soln  ONETOUCH ULTRA2 monitoring kit 43 [facility] 33 gauge [facility]  MICROLET LANCET misc USE TO TEST BLOOD SUGAR EVERY  Each 11  
 CONTOUR NEXT STRIPS strip USE TO TEST BLOOD SUGARS ONCE DAILY 50 Strip 11  
 cyanocobalamin (VITAMIN B-12) 1,000 mcg tablet Take 1,000 mcg by mouth as needed.  DEXILANT 60 mg CpDB capsule (delayed release)  gabapentin (NEURONTIN) 100 mg capsule Take 100 mg by mouth every eight (8) hours as needed.  diclofenac (VOLTAREN) 1 % gel Apply  to affected area as needed. Allergies Allergen Reactions  Aleve [Naproxen Sodium] Hoarseness  Dilaudid [Hydromorphone (Bulk)] Anaphylaxis Respiratory arrest and throat closes  Diovan [Valsartan] Palpitations  Morphine Other (comments) Patch-vomiting,weakness, fainting  Sulfa (Sulfonamide Antibiotics) Hives, Rash and Other (comments)  
  fainting Family History Problem Relation Age of Onset  Hypertension Mother  Kidney Disease Mother 1 kidney  Heart Disease Father  Heart Attack Father   
     late 35s or early 45s  Heart Attack Maternal Grandmother  Cancer Maternal Grandfather   
     ? lung or stomach Social History Tobacco Use  Smoking status: Never Smoker  Smokeless tobacco: Never Used Substance Use Topics  Alcohol use: No  
 
Patient Active Problem List  
Diagnosis Code  Sleep apnea G47.30  Hypertension I10  
 GERD (gastroesophageal reflux disease) K21.9  Restless leg syndrome G25.81  
 Fibromyalgia M79.7  Mastodynia, left greater than right N64.4  Altered mental status R41.82  Chronic pain G89.29  
 Fibromyositis M79.7  Essential hypertension, benign I10  
 Prediabetes R73.03  
 IBS (irritable bowel syndrome) K58.9  Gait instability R26.81  
 Normal cardiac stress test [id number]  White coat hypertension [id number]  Positive D dimer R79.89  
 SOB (shortness of breath) R06.02  
 S/P colonoscopy Z98.890  
 Saphenous vein occlusion, right I82.811  Dyslipidemia E78.5  Encounter for Hemoccult screening Z12.11  
 DJD (degenerative joint disease) M19.90  
 Axillary pain, right M79.621 Depression Risk Factor Screening:  
 
3 most recent PHQ Screens 5/7/2019 PHQ Not Done - Little interest or pleasure in doing things Not at all Feeling down, depressed, irritable, or hopeless Not at all Total Score PHQ 2 0 Alcohol Risk Factor Screening: You do not drink alcohol or very rarely. Functional Ability and Level of Safety:  
Hearing Loss Hearing is good. Activities of Daily Living The home contains: handrails and grab bars Patient needs help with:  phone, transportation, shopping, preparing meals, laundry, housework, managing medications, managing money, eating, dressing and bathing Fall Risk Fall Risk Assessment, last 12 mths 5/7/2019 Able to walk? Yes Fall in past 12 months? No  
 
 
Abuse Screen Patient is not abused Cognitive Screening Evaluation of Cognitive Function: 
Has your family/caregiver stated any concerns about your memory: no 
Normal 
 
Patient Care Team  
Patient Care Team: 
Mckinley Kumar MD as PCP - General (Internal Medicine) Assessment/Plan Education and counseling provided: 
Are appropriate based on today's review and evaluation Diagnoses and all orders for this visit: 1. Encounter for monitoring Coumadin therapy -     AMB POC PT/INR Other orders 
-     REFERRAL TO OPHTHALMOLOGY Health Maintenance Due Topic Date Due  GLAUCOMA SCREENING Q2Y  12/05/2018  MEDICARE YEARLY EXAM  03/15/2019

## 2019-05-07 NOTE — PATIENT INSTRUCTIONS
Medicare Wellness Visit, Female The best way to live healthy is to have a lifestyle where you eat a well-balanced diet, exercise regularly, limit alcohol use, and quit all forms of tobacco/nicotine, if applicable. Regular preventive services are another way to keep healthy. Preventive services (vaccines, screening tests, monitoring & exams) can help personalize your care plan, which helps you manage your own care. Screening tests can find health problems at the earliest stages, when they are easiest to treat. [facility] follows the current, evidence-based guidelines published by the [facility] Jasvir Mi ([facility]) when recommending preventive services for our patients. Because we follow these guidelines, sometimes recommendations change over time as research supports it. (For example, mammograms used to be recommended annually. Even though Medicare will still pay for an annual mammogram, the newer guidelines recommend a mammogram every two years for women of average risk.) Of course, you and your doctor may decide to screen more often for some diseases, based on your risk and your health status. Preventive services for you include: - Medicare offers their members a free annual wellness visit, which is time for you and your primary care provider to discuss and plan for your preventive service needs. Take advantage of this benefit every year! 
-All adults over the age of 72 should receive the recommended pneumonia vaccines. Current USPSTF guidelines recommend a series of two vaccines for the best pneumonia protection.  
-All adults should have a flu vaccine yearly and a tetanus vaccine every 10 years. All adults age 61 and older should receive a shingles vaccine once in their lifetime.   
-A bone mass density test is recommended when a woman turns 65 to screen for osteoporosis. This test is only recommended one time, as a screening. Some providers will use this same test as a disease monitoring tool if you already have osteoporosis. -All adults age 38-68 who are overweight should have a diabetes screening test once every three years.  
-Other screening tests and preventive services for persons with diabetes include: an eye exam to screen for diabetic retinopathy, a kidney function test, a foot exam, and stricter control over your cholesterol.  
-Cardiovascular screening for adults with routine risk involves an electrocardiogram (ECG) at intervals determined by your doctor.  
-Colorectal cancer screenings should be done for adults age 54-65 with no increased risk factors for colorectal cancer. There are a number of acceptable methods of screening for this type of cancer. Each test has its own benefits and drawbacks. Discuss with your doctor what is most appropriate for you during your annual wellness visit. The different tests include: colonoscopy (considered the best screening method), a fecal occult blood test, a fecal DNA test, and sigmoidoscopy. -Breast cancer screenings are recommended every other year for women of normal risk, age 54-69. 
-Cervical cancer screenings for women over age 72 are only recommended with certain risk factors.  
-All adults born between [facility] should be screened once for Hepatitis C. Here is a list of your current Health Maintenance items (your personalized list of preventive services) with a due date: 
Health Maintenance Due Topic Date Due  Glaucoma Screening   12/05/2018 [place] Annual Well Visit  03/15/2019

## 2019-05-07 NOTE — PROGRESS NOTES
SPORTS MEDICINE AND PRIMARY CARE Andry Meza MD, [address] [address] Phone:  [phone]  Fax: [phone] Chief Complaint Patient presents with [facility] Annual Wellness Visit SUBECTIVE: 
 
Jana Alanis is a 70 y.o. female Patient with known history of primary hypertension, GERD, IBS, fibromyalgia, pulmonary embolism, on chronic Coumadin, and is seen for evaluation. Patient returns today complaining of left leg discomfort that she has had now. In the morning when she first wakes up after elevating the leg she has discomfort in the left leg. It is a throbbing pain and severe. Since we last saw her she has had her CPAP machine adjusted to try to keep her from falling asleep during the day, which seems to be successful. Rheumatologist, Dr. Dayron Kwok, raised the question of an MRI of her left leg. She recalls she has never had MRI of the left leg. She states since she has insurance she wants an MRI of the left leg. Since we last saw her on March 6th her mom went home to be with the [facility] [address] She laid down, went to sleep and autopsy revealed a heart attack. Patient is seen for evaluation. Current Outpatient Medications Medication Sig Dispense Refill  bisoprolol-hydroCHLOROthiazide (ZIAC) 10-6.25 mg per tablet TAKE 1 TABLET DAILY 90 Tab 3  
 traZODone (DESYREL) 50 mg tablet Take 1 Tab by mouth nightly as needed for Sleep. 90 Tab 3  warfarin (COUMADIN) 2.5 mg tablet 1 to 2 tabs daily 180 Tab 11  
 lidocaine (LIDODERM) 5 % Apply patch to the affected area for 12 hours a day and remove for 12 hours a day. [id number] 90 Each 3  
 amLODIPine (NORVASC) 10 mg tablet TAKE 1 TABLET DAILY 90 Tab 2  warfarin (COUMADIN) 5 mg tablet TAKE ONE TABLET BY MOUTH ONCE DAILY  3  
 LACTOBACILLUS ACIDOPHILUS (PROBIOTIC PO) Take 1 Cap by mouth as needed.  OTHER Patricia sore muscle with cayenne and ginger topical cream as needed. OTC for pain.  CHOLECALCIFEROL, VITAMIN D3, (VITAMIN D3 PO) Take  by mouth. Liquid form. 400 units per 4 drops. Takes 4 drops po once daily.  carica papaya (PAPAYA ENZYME) tab Take  by mouth. Takes 4 tabs po 1-2 times daily after meals.  OT ULTRA/FASTTRACK CONTROL soln  ONETOUCH ULTRA2 monitoring kit 43 [facility] 33 gauge misc  MICROLET LANCET misc USE TO TEST BLOOD SUGAR EVERY  Each 11  
 CONTOUR NEXT STRIPS strip USE TO TEST BLOOD SUGARS ONCE DAILY 50 Strip 11  
 cyanocobalamin (VITAMIN B-12) 1,000 mcg tablet Take 1,000 mcg by mouth as needed.  DEXILANT 60 mg CpDB capsule (delayed release) Past Medical History:  
Diagnosis Date  Adverse effect of anesthesia \"hard time waking me up\"  Axillary pain, right  Bereavement 2019  
 77 yo - heart attack -  in sleep  Chronic pain d/t fibromyalgia  Coagulation disorder ([facility] [address]   
 on eliquis -d/c due to gi upset - now on coumadin previously followed by dr. Pollo Burr  Depression with anxiety  DJD (degenerative joint disease)  Dyslipidemia  Encounter for Hemoccult screening 2017  
 neg  Fibromyalgia  Gait instability  GERD (gastroesophageal reflux disease)  Hypertension  IBS (irritable bowel syndrome)  Ill-defined condition   
 gastroparesis  Ill-defined condition CT - 3/18/2018 - esophageal diverticulum  Ill-defined condition   
 pericarditis  Mastodynia, left greater than right 2011  Normal cardiac stress test 3.14  
 Positive D dimer 9-23-15  Prediabetes  Pulmonary embolism ([facility] [address] 2013  
 rll  Restless leg syndrome  S/P colonoscopy 2011  
 kenny hernandez md  
 Saphenous vein occlusion, right 2017  
 [facility] acute occlusive superificial vein thrombosis - Melody Dyer md  
 Saphenous vein thrombophlebitis, right 2018  
 and acute l posterior tibial vein - this is the 2nd DVT putting her on lifelong anticoagualtion / Chacho Manzanares  
 Sleep apnea   
 cpap 9cm  SOB (shortness of breath)  White coat hypertension Past Surgical History:  
Procedure Laterality Date  ABDOMEN SURGERY PROC UNLISTED    
 exploratory years ago  BREAST SURGERY PROCEDURE UNLISTED  2006  
 left breast [id number]. Sriram Tinsley  COLONOSCOPY N/A 4/10/2018 COLONOSCOPY,EGD performed by Joanne Hollis MD at [facility] ENDOSCOPY  
 HX BREAST BIOPSY Left  HX COLONOSCOPY    
 HX ORTHOPAEDIC  2009  
 foot surgery-left - bunionectomy  HX ORTHOPAEDIC    
 cyst removed from left hand - ganglion cyst  
 HX OTHER SURGICAL    
 right and left leg muscle biopsies - elevated CPK - muscle enzymes were elevated  HX TUBAL LIGATION Allergies Allergen Reactions  Aleve [Naproxen Sodium] Hoarseness  Dilaudid [Hydromorphone (Bulk)] Anaphylaxis Respiratory arrest and throat closes  Diovan [Valsartan] Palpitations  Morphine Other (comments) Patch-vomiting,weakness, fainting  Sulfa (Sulfonamide Antibiotics) Hives, Rash and Other (comments)  
  fainting REVIEW OF SYSTEMS: 
 Patient complains of shortness of breath and is concerned about her weight. Social History Socioeconomic History  Marital status:  Spouse name: Not on file  Number of children: Not on file  Years of education: Not on file  Highest education level: Not on file Tobacco Use  Smoking status: Never Smoker  Smokeless tobacco: Never Used Substance and Sexual Activity  Alcohol use: No  
 Drug use: No  
 Sexual activity: Yes  
  Partners: Male Birth control/protection: None Social History Narrative Family History: Mother: alive 80 yrs, High Blood Pressure, cva with cognitive deficitsFather:  36 yrs, myocardial infarctionSister(s): aliveBrother(s): unknow n2  
 sister(s) .  40 daughter no choildren -  walked out works for [employer] for [place] . Social History: Alcohol Use Patient does not use alcohol. Smoking Status Patient is a never smoker. Caffeine: occasional. Drugs:  
 prescription narcotics. Diet: Regular. Exercise: None. Marital Status: . Lives w ith: spouse. Children: children. [employer]: [place]. Patient is  living w ith her  she is on disability related to her fibromyositis. r 
Family History Problem Relation Age of Onset  Hypertension Mother  Kidney Disease Mother 1 kidney  Heart Disease Father  Heart Attack Father   
     late 35s or early 45s  Heart Attack Maternal Grandmother  Cancer Maternal Grandfather   
     ? lung or stomach OBJECTIVE: 
Visit Vitals /90 Pulse 62 Temp 98 °F (36.7 °C) (Oral) Resp 18 Ht 5' 2\" (1.575 m) Wt 186 lb 1.6 oz (84.4 kg) SpO2 95% BMI 34.04 kg/m² ENT: perrla,  eom intact NECK: supple. Thyroid normal 
CHEST: clear to ascultation and percussion HEART: regular rate and rhythm ABD: soft, bowel sounds active EXTREMITIES: no edema, pulse 1+ Office Visit on 05/07/2019 Component Date Value Ref Range Status  VALID INTERNAL CONTROL POC 05/07/2019 Yes   Final  
 Prothrombin time (POC) 05/07/2019 34.6  seconds Final  
 INR POC 05/07/2019 2.9   Final  
Clinical Support on 04/10/2019 Component Date Value Ref Range Status  VALID INTERNAL CONTROL POC 04/10/2019 Yes   Final  
 Prothrombin time (POC) 04/10/2019 31.4  seconds Final  
 INR POC 04/10/2019 2.6   Final  
Clinical Support on 03/06/2019 Component Date Value Ref Range Status  VALID INTERNAL CONTROL POC 03/06/2019 Yes   Final  
 Prothrombin time (POC) 03/06/2019 29.4  seconds Final  
 INR POC 03/06/2019 2.5   Final  
 
  
 
ASSESSMENT: 
1. Medicare annual wellness visit, subsequent 2. Encounter for monitoring Coumadin therapy 3. Screening for alcoholism 4. Screening for depression 5. Essential hypertension, benign 6. Fibromyalgia 7. Gait instability 8. Gastroesophageal reflux disease without esophagitis 9. Chronic pulmonary embolism without acute cor pulmonale, unspecified pulmonary embolism type ([address] 10. Pain of left lower extremity The leg pain is unrelenting. Usually when she elevates it at night the swelling goes down, as well as the pain resolves, but it is not working now. we do note that her INR is therapeutic. She does have a history of CPK elevation and therefore will ask for an MRI of the left leg. BP is at the upper limits of normal today. The  is concerned about his wife's continued weight gain. Wonders if we can put her on a diet. Her BMI reflects obesity and reflects another 3 lb weight gain. She is willing to see a nutritionist and will refer her. She will be back in one month for a P-T check, three months to see me. I have discussed the diagnosis with the patient and the intended plan as seen in the 
orders above. The patient understands and agees with the plan. The patient has  
received an after visit summary and questions were answered concerning 
future plans Patient labs and/or xrays were reviewed Past records were reviewed. PLAN: 
. Orders Placed This Encounter  Depression Screen Annual  
 MRI FEMUR LT W WO CONT  MRI TIB/FIB LT W WO CONT  CBC WITH AUTOMATED DIFF  
 RENAL FUNCTION PANEL  
 HEMOGLOBIN A1C WITH EAG  
 CK  REFERRAL TO OPHTHALMOLOGY  AMB POC PT/INR  AMB POC EKG ROUTINE W/ 12 LEADS, INTER & REP Follow-up and Dispositions · Return in about 1 month (around 6/4/2019) for pt/inr, bp check. ATTENTION:  
This medical record was transcribed using an electronic medical records system. Although proofread, it may and can contain electronic and spelling errors. Other human spelling and other errors may be present. Corrections may be executed at a later time. Please feel free to contact us for any clarifications as needed.

## 2019-05-08 LAB
ALBUMIN SERPL-MCNC: 4.4 G/DL (ref 3.6–4.8)
BASOPHILS # BLD AUTO: 0 X10E3/UL (ref 0–0.2)
BASOPHILS NFR BLD AUTO: 1 %
BUN SERPL-MCNC: 17 MG/DL (ref 8–27)
BUN/CREAT SERPL: 16 (ref 12–28)
CALCIUM SERPL-MCNC: 9.7 MG/DL (ref 8.7–10.3)
CHLORIDE SERPL-SCNC: 104 MMOL/L (ref 96–106)
CK SERPL-CCNC: 967 U/L (ref 24–173)
CO2 SERPL-SCNC: 25 MMOL/L (ref 20–29)
CREAT SERPL-MCNC: 1.07 MG/DL (ref 0.57–1)
EOSINOPHIL # BLD AUTO: 0 X10E3/UL (ref 0–0.4)
EOSINOPHIL NFR BLD AUTO: 1 %
ERYTHROCYTE [DISTWIDTH] IN BLOOD BY AUTOMATED COUNT: 15.5 % (ref 12.3–15.4)
EST. AVERAGE GLUCOSE BLD GHB EST-MCNC: 126 MG/DL
GLUCOSE SERPL-MCNC: 90 MG/DL (ref 65–99)
HBA1C MFR BLD: 6 % (ref 4.8–5.6)
HCT VFR BLD AUTO: 42.5 % (ref 34–46.6)
HGB BLD-MCNC: 14.2 G/DL (ref 11.1–15.9)
IMM GRANULOCYTES # BLD AUTO: 0 X10E3/UL (ref 0–0.1)
IMM GRANULOCYTES NFR BLD AUTO: 0 %
LYMPHOCYTES # BLD AUTO: 1.7 X10E3/UL (ref 0.7–3.1)
LYMPHOCYTES NFR BLD AUTO: 44 %
MCH RBC QN AUTO: 28.9 PG (ref 26.6–33)
MCHC RBC AUTO-ENTMCNC: 33.4 G/DL (ref 31.5–35.7)
MCV RBC AUTO: 87 FL (ref 79–97)
MONOCYTES # BLD AUTO: 0.4 X10E3/UL (ref 0.1–0.9)
MONOCYTES NFR BLD AUTO: 11 %
NEUTROPHILS # BLD AUTO: 1.6 X10E3/UL (ref 1.4–7)
NEUTROPHILS NFR BLD AUTO: 43 %
PHOSPHATE SERPL-MCNC: 3.6 MG/DL (ref 2.5–4.5)
PLATELET # BLD AUTO: 217 X10E3/UL (ref 150–379)
POTASSIUM SERPL-SCNC: 4.5 MMOL/L (ref 3.5–5.2)
RBC # BLD AUTO: 4.91 X10E6/UL (ref 3.77–5.28)
SODIUM SERPL-SCNC: 142 MMOL/L (ref 134–144)
WBC # BLD AUTO: 3.8 X10E3/UL (ref 3.4–10.8)

## 2019-06-05 ENCOUNTER — CLINICAL SUPPORT (OUTPATIENT)
Dept: INTERNAL MEDICINE CLINIC | Age: 64
End: 2019-06-05

## 2019-06-05 DIAGNOSIS — Z51.81 ENCOUNTER FOR MONITORING COUMADIN THERAPY: Primary | ICD-10-CM

## 2019-06-05 DIAGNOSIS — Z79.01 ENCOUNTER FOR MONITORING COUMADIN THERAPY: Primary | ICD-10-CM

## 2019-06-05 LAB
INR BLD: 2.2
PT POC: 26.2 SECONDS
VALID INTERNAL CONTROL?: YES

## 2019-06-05 NOTE — PROGRESS NOTES
Chief Complaint   Patient presents with    Coagulation disorder     Patient is here for a PT/INR. Per patient she is taking Warfarin 2.5mg on Sunday she is taking 2 tab and V-Q-H-Th-F-Sat 1tab. Results for orders placed or performed in visit on 06/05/19   AMB POC PT/INR   Result Value Ref Range    VALID INTERNAL CONTROL POC Yes     Prothrombin time (POC) 26.2 seconds    INR POC 2.2      Per Dr. Risa Champagne No Adjustments and return in 1 month for a PT/INR check.

## 2019-06-07 ENCOUNTER — HOSPITAL ENCOUNTER (OUTPATIENT)
Dept: MRI IMAGING | Age: 64
Discharge: HOME | End: 2019-06-07
Attending: INTERNAL MEDICINE
Payer: MEDICARE

## 2019-06-07 VITALS — BODY MASS INDEX: 34.02 KG/M2 | WEIGHT: 186 LBS

## 2019-06-07 DIAGNOSIS — M79.605 PAIN OF LEFT LOWER EXTREMITY: ICD-10-CM

## 2019-06-07 PROCEDURE — A9575 INJ GADOTERATE MEGLUMI 0.1ML: HCPCS | Performed by: INTERNAL MEDICINE

## 2019-06-07 PROCEDURE — 73720 MRI LWR EXTREMITY W/O&W/DYE: CPT

## 2019-06-07 PROCEDURE — 74011250636 HC RX REV CODE- 250/636: Performed by: INTERNAL MEDICINE

## 2019-06-07 RX ORDER — GADOTERATE MEGLUMINE 376.9 MG/ML
20 INJECTION INTRAVENOUS
Status: COMPLETED | OUTPATIENT
Start: 2019-06-07 | End: 2019-06-07

## 2019-06-07 RX ADMIN — GADOTERATE MEGLUMINE 20 ML: 376.9 INJECTION INTRAVENOUS at 13:57

## 2019-06-24 DIAGNOSIS — I10 ESSENTIAL HYPERTENSION: ICD-10-CM

## 2019-06-24 RX ORDER — AMLODIPINE BESYLATE 10 MG/1
TABLET ORAL
Qty: 90 TAB | Refills: 2 | Status: SHIPPED | OUTPATIENT
Start: 2019-06-24 | End: 2020-03-16 | Stop reason: SDUPTHER

## 2019-07-10 ENCOUNTER — CLINICAL SUPPORT (OUTPATIENT)
Dept: INTERNAL MEDICINE CLINIC | Age: 64
End: 2019-07-10

## 2019-07-10 DIAGNOSIS — Z79.01 ENCOUNTER FOR MONITORING COUMADIN THERAPY: Primary | ICD-10-CM

## 2019-07-10 DIAGNOSIS — Z51.81 ENCOUNTER FOR MONITORING COUMADIN THERAPY: Primary | ICD-10-CM

## 2019-07-10 LAB
INR BLD: 2.1
PT POC: 25.6 SECONDS
VALID INTERNAL CONTROL?: YES

## 2019-07-10 NOTE — PROGRESS NOTES
Pt came in today for a PT/INR check. Pt states she is taking   Coumadin 2.5 mg 1 tab PO Monday-Saturday  2 tabs PO Sunday  PT-25.6  INR-2.1  Per Dr. Monroy Kidney pt is to continue taking Coumadin with no changes and return in 1 month for another PT/INR check.      Renetta Nuñez

## 2019-07-12 ENCOUNTER — HOSPITAL ENCOUNTER (OUTPATIENT)
Dept: NUTRITION | Age: 64
Discharge: HOME | End: 2019-07-12
Payer: MEDICARE

## 2019-07-12 PROCEDURE — 97802 MEDICAL NUTRITION INDIV IN: CPT | Performed by: DIETITIAN, REGISTERED

## 2019-07-12 NOTE — PROGRESS NOTES
[facility]  [address], [address] [place], [address] [facility] [address] [place] [address]  Phone: [phone] Fax: [phone]   Nutrition Assessment  Medical Nutrition Therapy   Outpatient Initial Evaluation         Patient Name: Dl Mohr : 1955   Treatment Diagnosis: obesity   Referral Source: [facility] List * Start of Care [facility]): 2019     Gender: female Age: 70 y.o. Ht: 62 in Wt: 186.2 lb  kg   BMI: 34.1  RMR   Male  RMR Female 1319   Anthropometrics Assessment: Per BMI, pt is considered obese class II. Pt notes ht of 60.5\" = BMI of 35.8  No height taken in office today. Past Medical History includes: Sleep apnea with CPAP, GERD, Fibromyalgia, Chronic Pain, HTN, Pre-diabetes, IBS     Pertinent Medications:   Warfarin, Norvasc, Ziac, Trazodone, Lidocaine patches   Biochemical Data:   Lab Results   Component Value Date/Time    Hemoglobin A1c 6.0 (H) 2019 10:58 AM     Lab Results   Component Value Date/Time    Cholesterol, total 187 2018 10:39 AM    HDL Cholesterol 55 2018 10:39 AM    LDL, calculated 111 (H) 2018 10:39 AM    VLDL, calculated 21 2018 10:39 AM    Triglyceride 104 2018 10:39 AM    CHOL/HDL Ratio 3.1 2014 03:56 AM     Lab Results   Component Value Date/Time    ALT (SGPT) 33 (H) 2018 10:39 AM    AST (SGOT) 24 2018 10:39 AM    Alk.  phosphatase 49 2018 10:39 AM    Bilirubin, total 0.2 2018 10:39 AM     Lab Results   Component Value Date/Time    Creatinine (POC) 1.1 10/12/2018 11:40 AM    Creatinine 1.07 (H) 2019 10:58 AM     Lab Results   Component Value Date/Time    BUN 17 2019 10:58 AM     No results found for: MCACR, MCA1, MCA2, MCA3, MCAU, MCAU2, MCALPOCT     Nutrition Diagnosis obesityR/T excessive energy intake from sweets brought by  and physical inactivity AEB  BMI>30     Subjective/ Assessment: Pt is a 70yo female here today for help with weight loss. She states she eats pretty healthy but is not able to be as active as she once was. Swollen ankles visible today. She states her motivation is her mom and seeing how she declined from inactivity. Currently walks some with . Difficulty walking to office today. Uses rolling seat walker. Pt expressed interest in learning about blood type diets. Proposed looking at anti-inflammatory foods instead with regard to portion sizes and sodium content. Current Eating Patterns: Difficulty avoding chips, cookies, cakes brought into the house by her . Feels she has a good grasp on portion control except when foods taste very good and may go back for seconds. She suspects issues with gluten foods and her fibromyalgia. Denies eating past full. Denies eating when not hungry. B- greek yogurt with banana and walnuts OR oatmeal with berries OR eggs and mullins  S- pb crackers OR greek yogurt with berries  L- tuna with ornelas relish, 2 slices diet bread wheat       Estimate Needs   Calories: 1350 Protein: 68 Carbs: 169 Fat: 45   Kcal/day  g/day  g/day  g/day        percent: 25  45  30               Education & Recommendations provided: Educated pt on the basics of weight loss. Set exercise goals today. Due to pt's visible swelling of feet, discussed sodium intake. Due to pt's concner for gluten and fibromyalgia, educated on gluten free options, primarily brown rice, small potato, quinoa, oats. These are foods she already consumes. Educated on portion size.     Handouts Provided: []  Carbohydrates  []  Protein  []  Fiber  [x]  Serving Sizes  []  Meal and Snack Ideas  [x]  Food Journals []  Diabetes  []  Cholesterol  [x]  Sodium  []  Gen Nutr Guidelines  []  SBGM Guidelines  [x]  Others: inflammation and food   Information Reviewed with: pt   Readiness to Change Stage: []  Pre-contemplative    []  Contemplative  [x]  Preparation               [] Action                  []  Maintenance   Potential Barriers to Learning: []  Decline in memory    []  Language barrier   []  Other:  []  Emotional                  []  Limited mobility  []  Lack of motivation     [] Vision, hearing or cognitive impairment   Expected Compliance: Good      Nutritional Goal - To promote lifestyle changes to result in:    [x]  Weight loss  [x]  Improved diabetic control  []  Decreased cholesterol levels  []  Decreased blood pressure  []  Weight maintenance []  Preventing any interactions associated with food allergies  []  Adequate weight gain toward goal weight  []  Other:        Patient Goals:  SMART goals -reduce excess calorie intake from sweets by asking  to remove from house (cakes, cookies, candy bars, potato chips)  -exercise: chair exercises or stretches at home 2 times per week for 10min minimum   + walking with  2 times per week for 10-15min minimum  -reduce sodium intake by asking for sauces on the side and rinsing high sodium items (list provided)   Total Treatment Time: 60min   Dietitian Signature: Areli Pope MS RD Date: 7/12/2019   Follow-up: 4 weeks Time: 2:51 PM

## 2019-07-15 ENCOUNTER — APPOINTMENT (OUTPATIENT)
Dept: NUTRITION | Age: 64
End: 2019-07-15

## 2019-07-23 ENCOUNTER — OFFICE VISIT (OUTPATIENT)
Dept: INTERNAL MEDICINE CLINIC | Age: 64
End: 2019-07-23

## 2019-07-23 VITALS
DIASTOLIC BLOOD PRESSURE: 75 MMHG | HEART RATE: 60 BPM | HEIGHT: 62 IN | WEIGHT: 185.2 LBS | SYSTOLIC BLOOD PRESSURE: 140 MMHG | TEMPERATURE: 98 F | BODY MASS INDEX: 34.08 KG/M2 | OXYGEN SATURATION: 97 %

## 2019-07-23 DIAGNOSIS — G25.81 RESTLESS LEG SYNDROME: ICD-10-CM

## 2019-07-23 DIAGNOSIS — I10 ESSENTIAL HYPERTENSION, BENIGN: Primary | ICD-10-CM

## 2019-07-23 DIAGNOSIS — M19.042 PRIMARY OSTEOARTHRITIS OF LEFT HAND: ICD-10-CM

## 2019-07-23 DIAGNOSIS — I26.99 PULMONARY EMBOLISM ON RIGHT (HCC): ICD-10-CM

## 2019-07-23 DIAGNOSIS — I10 ESSENTIAL HYPERTENSION: ICD-10-CM

## 2019-07-23 DIAGNOSIS — K21.9 GASTROESOPHAGEAL REFLUX DISEASE WITHOUT ESOPHAGITIS: ICD-10-CM

## 2019-07-23 DIAGNOSIS — R73.03 PREDIABETES: ICD-10-CM

## 2019-07-23 DIAGNOSIS — G89.4 CHRONIC PAIN SYNDROME: ICD-10-CM

## 2019-07-23 DIAGNOSIS — K58.9 IRRITABLE BOWEL SYNDROME WITHOUT DIARRHEA: ICD-10-CM

## 2019-07-23 DIAGNOSIS — M79.7 FIBROMYALGIA: ICD-10-CM

## 2019-07-23 NOTE — PROGRESS NOTES
Chief Complaint   Patient presents with    Foot Swelling     Patient is here for foot pain. Per patient she is having bad swelling in her feet and ankels. Patient stated that her toes are hurting. 1. Have you been to the ER, urgent care clinic since your last visit? Hospitalized since your last visit? No    2. Have you seen or consulted any other health care providers outside of the [address] [place] [address] since your last visit? Include any pap smears or colon screening.  No

## 2019-07-23 NOTE — PROGRESS NOTES
SPORTS MEDICINE AND PRIMARY CARE  Natasha Ortiz MD, [address] [address]  Phone:  [phone]  Fax: [phone]       Chief Complaint   Patient presents with    Foot Swelling     Patient is here for foot pain. Per patient she is having bad swelling in her feet and ankels. Patient stated that her toes are hurting. .      SUBJECTIVE:    Nithya Monet is a 70 y.o. female Patient returns today with known history of primary hypertension, chronic pain, pulmonary embolus, white coat hypertension, restless leg syndrome, prediabetes, obesity, irritable bowel syndrome, primary hypertension, GERD, fibromyalgia, degenerative joint disease, and is seen for evaluation. Patient returns today complaining of swelling of legs and redness on the soles of her feet bilaterally for the past two weeks. She has been extremely tired, she is fatigued, \"her  made her get up\". Otherwise she could easily stay in bed all day. Patient denies other specific complaints and is seen for evaluation. Current Outpatient Medications   Medication Sig Dispense Refill    amLODIPine (NORVASC) 10 mg tablet TAKE 1 TABLET DAILY 90 Tab 2    DEXILANT 60 mg CpDB capsule (delayed release)       bisoprolol-hydroCHLOROthiazide (ZIAC) 10-6.25 mg per tablet TAKE 1 TABLET DAILY 90 Tab 3    traZODone (DESYREL) 50 mg tablet Take 1 Tab by mouth nightly as needed for Sleep. 90 Tab 3    warfarin (COUMADIN) 2.5 mg tablet 1 to 2 tabs daily 180 Tab 11    lidocaine (LIDODERM) 5 % Apply patch to the affected area for 12 hours a day and remove for 12 hours a day. DX.M79.7 80 Each 3    warfarin (COUMADIN) 5 mg tablet TAKE ONE TABLET BY MOUTH ONCE DAILY  3    LACTOBACILLUS ACIDOPHILUS (PROBIOTIC PO) Take 1 Cap by mouth as needed.  OTHER [place] sore muscle with cayenne and ginger topical cream as needed. OTC for pain.  CHOLECALCIFEROL, VITAMIN D3, (VITAMIN D3 PO) Take  by mouth. Liquid form.  400 units per 4 drops. Takes 4 drops po once daily.  carica papaya (PAPAYA ENZYME) tab Take  by mouth. Takes 4 tabs po 1-2 times daily after meals.  OT ULTRA/FASTTRACK CONTROL soln       ONETOUCH ULTRA2 monitoring kit       ONE TOUCH DELICA 33 gauge misc       MICROLET LANCET misc USE TO TEST BLOOD SUGAR EVERY  Each 11    CONTOUR NEXT STRIPS strip USE TO TEST BLOOD SUGARS ONCE DAILY 50 Strip 11    cyanocobalamin (VITAMIN B-12) 1,000 mcg tablet Take 1,000 mcg by mouth as needed.        Past Medical History:   Diagnosis Date    Adverse effect of anesthesia     \"hard time waking me up\"    Axillary pain, right     Bereavement 2019    79 yo - heart attack -  in sleep    Chronic pain     d/t fibromyalgia    Coagulation disorder ([facility] [place] [address]     on eliquis -d/c due to gi upset - now on coumadin previously followed by dr. Benitez Shah Depression with anxiety     DJD (degenerative joint disease)     Dyslipidemia     Encounter for Hemoccult screening 2017    neg    Fibromyalgia     Gait instability     GERD (gastroesophageal reflux disease)     Hypertension     IBS (irritable bowel syndrome)     Ill-defined condition     gastroparesis    Ill-defined condition     CT - 3/18/2018 - esophageal diverticulum    Ill-defined condition     pericarditis    Mastodynia, left greater than right 2011    Normal cardiac stress test 3.14    Positive D dimer 9-23-15    Prediabetes     Pulmonary embolism ([facility] [place] [address] 2013    rll    Restless leg syndrome     S/P colonoscopy 2011    kenny hernandez md    Saphenous vein occlusion, right 2017    [facility] acute occlusive superificial vein thrombosis - Melody Dyer md    Saphenous vein thrombophlebitis, right 2018    and acute l posterior tibial vein - this is the 2nd DVT putting her on lifelong anticoagualtion / Mathew Fuelling    Sleep apnea     cpap 9cm    SOB (shortness of breath)     White coat hypertension Past Surgical History:   Procedure Laterality Date    ABDOMEN SURGERY PROC UNLISTED      exploratory years ago    BREAST SURGERY PROCEDURE UNLISTED  2006    left breast siodcb-[id number]. Orin Abraham    COLONOSCOPY N/A 4/10/2018    COLONOSCOPY,EGD performed by [facility] at [facility] ENDOSCOPY    HX BREAST BIOPSY Left     HX COLONOSCOPY      HX ORTHOPAEDIC  2009    foot surgery-left - bunionectomy    HX ORTHOPAEDIC      cyst removed from left hand - ganglion cyst    HX OTHER SURGICAL      right and left leg muscle biopsies - elevated CPK - muscle enzymes were elevated    HX TUBAL LIGATION       Allergies   Allergen Reactions    Aleve [Naproxen Sodium] Hoarseness    Dilaudid [Hydromorphone (Bulk)] Anaphylaxis     Respiratory arrest and throat closes    Diovan [Valsartan] Palpitations    Morphine Other (comments)     Patch-vomiting,weakness, fainting    Sulfa (Sulfonamide Antibiotics) Hives, Rash and Other (comments)     fainting         REVIEW OF SYSTEMS:  General: negative for - chills or fever  ENT: negative for - headaches, nasal congestion or tinnitus  Respiratory: negative for - cough, hemoptysis, shortness of breath or wheezing  Cardiovascular : negative for - chest pain, edema, palpitations or shortness of breath  Gastrointestinal: negative for - abdominal pain, blood in stools, heartburn or nausea/vomiting  Genito-Urinary: no dysuria, trouble voiding, or hematuria  Musculoskeletal: negative for - gait disturbance, joint pain, joint stiffness or joint swelling  Neurological: no TIA or stroke symptoms  Hematologic: no bruises, no bleeding, no swollen glands  Integument: no lumps, mole changes, nail changes or rash  Endocrine: no malaise/lethargy or unexpected weight changes      Social History     Socioeconomic History    Marital status:      Spouse name: Not on file    Number of children: Not on file    Years of education: Not on file    Highest education level: Not on file Tobacco Use    Smoking status: Never Smoker    Smokeless tobacco: Never Used   Substance and Sexual Activity    Alcohol use: No    Drug use: No    Sexual activity: Yes     Partners: Male     Birth control/protection: None   Social History Narrative         Family History: Mother: alive 80 yrs, High Blood Pressure, cva with cognitive deficitsFather:  36 yrs, myocardial infarctionSister(s): aliveBrother(s): unknow n2    sister(s) . 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 Social History: Alcohol Use Patient does not use alcohol. Smoking Status Patient is a never smoker. Caffeine: occasional. Drugs:    prescription narcotics. Diet: Regular. Exercise: None. Marital Status: . Lives w ith: spouse. Children: children. Moravian: Domnick Bills. Patient is  living w ith her  she is on disability related to her fibromyositis. Family History   Problem Relation Age of Onset    Hypertension Mother     Kidney Disease Mother         1 kidney    Heart Disease Father     Heart Attack Father         late 35s or early 45s    Heart Attack Maternal Grandmother     Cancer Maternal Grandfather         ? lung or stomach       OBJECTIVE:    Visit Vitals  /75   Pulse 60   Temp 98 °F (36.7 °C)   Ht 5' 2\" (1.575 m)   Wt 185 lb 3.2 oz (84 kg)   SpO2 97%   BMI 33.87 kg/m²     CONSTITUTIONAL: well , well nourished, appears age appropriate  EYES: perrla, eom intact  ENMT:moist mucous membranes, pharynx clear  NECK: supple. Thyroid normal  RESPIRATORY: Chest: clear bilaterally   CARDIOVASCULAR: Heart: regular rate and rhythm  GASTROINTESTINAL: Abdomen: soft, bowel sounds active  HEMATOLOGIC: no pathological lymph nodes palpated  MUSCULOSKELETAL: Extremities: no edema, pulse 1+   INTEGUMENT: No unusual rashes or suspicious skin lesions noted.  Nails appear normal.  NEUROLOGIC: non-focal exam   MENTAL STATUS: alert and oriented, appropriate affect           ASSESSMENT:  1. Essential hypertension, benign    2. Chronic pain syndrome    3. Pulmonary embolism on right ([address]    4. Restless leg syndrome    5. Prediabetes    6. Irritable bowel syndrome without diarrhea    7. Essential hypertension    8. Gastroesophageal reflux disease without esophagitis    9. Fibromyalgia    10. Primary osteoarthritis of left hand      Patient's BP control is acceptable at 140/75. No adjustments will be made. She has chronic pain in her legs. She is in a very difficult situation. We are not willing to give her narcotics, nor does she want to start narcotics again, and she is unable to take NSAIDs because of Coumadin dosage. She has been relegated to topicals. The Lidoderm patch was not helpful. She has a history of thrombophlebitis and right pulmonary embolus, for which she is on Coumadin now for a lifetime. I think the swelling of the legs then is directly related to a post phlebitis syndrome, superimposed on chronic venous insufficiency. The post phlebitis syndrome would account for the discomfort in her legs, the venous insufficiency as a result of the DVT would account for the edema. We suggest elevation of her feet. I am not willing to put her on a diuretic at this point. I think the edema then is aggravating the discomfort in her feet, causing some erythema on the plantar surface. She thinks she has gout. Will check a uric acid for her. She has a known history of prediabetes with hemoglobin A1c in May of 6.0, confirming the diagnosis. GERD is quiescent. Fibromyalgia remains a concern. She had a CPK that was 967, which is chronic CPK elevation. She has had two or three episodes of muscle biopsies without definitive diagnosis. She also has osteoarthritis of her hands, particularly on the left. She will be back to see us for her P-T check in two or three weeks.       I have discussed the diagnosis with the patient and the intended plan as seen in the  orders above. The patient understands and agees with the plan. The patient has   received an after visit summary and questions were answered concerning  future plans  Patient labs and/or xrays were reviewed  Past records were reviewed. PLAN:  .  Orders Placed This Encounter    CBC WITH AUTOMATED DIFF    URIC ACID       Follow-up and Dispositions    · Return in about 1 month (around 8/20/2019) for bp check, pt/inr. ATTENTION:   This medical record was transcribed using an electronic medical records system. Although proofread, it may and can contain electronic and spelling errors. Other human spelling and other errors may be present. Corrections may be executed at a later time. Please feel free to contact us for any clarifications as needed.

## 2019-07-23 NOTE — ACP (ADVANCE CARE PLANNING)

## 2019-07-24 LAB
BASOPHILS # BLD AUTO: 0 X10E3/UL (ref 0–0.2)
BASOPHILS NFR BLD AUTO: 1 %
EOSINOPHIL # BLD AUTO: 0.1 X10E3/UL (ref 0–0.4)
EOSINOPHIL NFR BLD AUTO: 1 %
ERYTHROCYTE [DISTWIDTH] IN BLOOD BY AUTOMATED COUNT: 15.4 % (ref 12.3–15.4)
HCT VFR BLD AUTO: 41.4 % (ref 34–46.6)
HGB BLD-MCNC: 14.1 G/DL (ref 11.1–15.9)
IMM GRANULOCYTES # BLD AUTO: 0 X10E3/UL (ref 0–0.1)
IMM GRANULOCYTES NFR BLD AUTO: 0 %
LYMPHOCYTES # BLD AUTO: 1.8 X10E3/UL (ref 0.7–3.1)
LYMPHOCYTES NFR BLD AUTO: 40 %
MCH RBC QN AUTO: 29 PG (ref 26.6–33)
MCHC RBC AUTO-ENTMCNC: 34.1 G/DL (ref 31.5–35.7)
MCV RBC AUTO: 85 FL (ref 79–97)
MONOCYTES # BLD AUTO: 0.5 X10E3/UL (ref 0.1–0.9)
MONOCYTES NFR BLD AUTO: 11 %
NEUTROPHILS # BLD AUTO: 2.1 X10E3/UL (ref 1.4–7)
NEUTROPHILS NFR BLD AUTO: 47 %
PLATELET # BLD AUTO: 206 X10E3/UL (ref 150–450)
RBC # BLD AUTO: 4.86 X10E6/UL (ref 3.77–5.28)
URATE SERPL-MCNC: 9.4 MG/DL (ref 2.5–7.1)
WBC # BLD AUTO: 4.6 X10E3/UL (ref 3.4–10.8)

## 2019-08-01 ENCOUNTER — OFFICE VISIT (OUTPATIENT)
Dept: SLEEP MEDICINE | Age: 64
End: 2019-08-01

## 2019-08-01 VITALS
DIASTOLIC BLOOD PRESSURE: 72 MMHG | RESPIRATION RATE: 19 BRPM | WEIGHT: 186 LBS | HEIGHT: 62 IN | BODY MASS INDEX: 34.23 KG/M2 | HEART RATE: 60 BPM | SYSTOLIC BLOOD PRESSURE: 113 MMHG | OXYGEN SATURATION: 98 %

## 2019-08-01 DIAGNOSIS — I10 ESSENTIAL HYPERTENSION: ICD-10-CM

## 2019-08-01 DIAGNOSIS — G47.33 OBSTRUCTIVE SLEEP APNEA (ADULT) (PEDIATRIC): Primary | ICD-10-CM

## 2019-08-01 RX ORDER — AMITRIPTYLINE HYDROCHLORIDE 10 MG/1
TABLET, FILM COATED ORAL
COMMUNITY
End: 2021-06-16 | Stop reason: ALTCHOICE

## 2019-08-01 NOTE — PROGRESS NOTES
[address] [facility] [place], [address]  Tel.  [phone]  Fax. 100 [facility] 60  [place], [address]  Tel.  [phone]  Fax. [phone] [address]   Tel.  [phone]  Fax. [phone]     S>Lisa SHAMIKA Brando Rojas is a 70 y.o. female seen for a positive airway pressure follow-up. She reports no problems using the device. The following problems are identified:    Drowsiness No, improved Problems exhaling no   Snoring no Forget to put on no   Mask Comfortable yes Can't fall asleep Yes, she has signficant pain that bothers her during the day and night   Dry Mouth yes Mask falls off no   Air Leaking Yes, wondering if she needs a smaller size pillow Frequent awakenings yes       Download reviewed. She admits that her sleep has improved. Therapy Apnea Index averaged over PAP use: 0.6 /hr which reflects improved sleep breathing condition. Allergies   Allergen Reactions    Aleve [Naproxen Sodium] Hoarseness    Dilaudid [Hydromorphone (Bulk)] Anaphylaxis     Respiratory arrest and throat closes    Diovan [Valsartan] Palpitations    Morphine Other (comments)     Patch-vomiting,weakness, fainting    Sulfa (Sulfonamide Antibiotics) Hives, Rash and Other (comments)     fainting       She has a current medication list which includes the following prescription(s): amitriptyline, amlodipine, dexilant, bisoprolol-hydrochlorothiazide, trazodone, warfarin, lidocaine, warfarin, lactobacillus acidophilus, OTHER, cholecalciferol (vitamin d3), carica papaya, ot ultra/fasttrack control, onetouch ultra2 meter, one touch delica, microlet lancet, contour next test strips, and cyanocobalamin. .      She  has a past medical history of Adverse effect of anesthesia, Axillary pain, right, Bereavement (03/06/2019), Chronic pain, Coagulation disorder ([address] Depression with anxiety, DJD (degenerative joint disease), Dyslipidemia, Encounter for Hemoccult screening (06/28/2017), Fibromyalgia, Gait instability, GERD (gastroesophageal reflux disease), Hypertension, IBS (irritable bowel syndrome), Ill-defined condition, Ill-defined condition, Ill-defined condition, Mastodynia, left greater than right (8/17/2011), Normal cardiac stress test (3.14), Positive D dimer (9-23-15), Prediabetes, Pulmonary embolism ([facility] [address] (03/2013), Restless leg syndrome, S/P colonoscopy (01/27/2011), Saphenous vein occlusion, 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 Aubrey Sleepiness Score: 6   and Modified F.O.S.Q. Score Total / 2: 11   which reflect improved sleep quality over therapy time. O>    Visit Vitals  /72 (BP 1 Location: Left arm, BP Patient Position: Sitting)   Pulse 60   Resp 19   Ht 5' 2\" (1.575 m)   Wt 186 lb (84.4 kg)   SpO2 98%   BMI 34.02 kg/m²           General:   Alert, oriented, not in distress   Neck:   No JVD    Chest/Lungs:  symetrical lung expansion , no accessory muscle use    Extremities:  no obvious rashes , negative edema    Neuro:  No focal deficits ; No obvious tremor    Psych:  Normal affect ,  Normal countenance ;           A>    ICD-10-CM ICD-9-CM    1. Obstructive sleep apnea (adult) (pediatric) G47.33 327.23    2. Essential hypertension I10 401.9       On CPAP :  9-13 cmH2O. Compliant:      yes    Therapeutic Response:  Positive    P>      *   Follow-up and Dispositions    · Return in about 1 year (around 8/1/2020). 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 She will work with her doctors to help optimize pain control. she will continue on her current pressure settings. biotine mouth spray to prevent/improve oral dryness  * She was asked to contact our office for any problems regarding PAP therapy.     * Counseling was provided regarding the importance of regular PAP use and on proper sleep hygiene and safe driving. * Re-enforced proper and regular cleaning for the device. 2. Hypertension - she continues on her current regimen. I have reviewed the relationship between hypertension as it relates to sleep-disordered breathing.      Electronically signed by    Eleni Kocher, MD  Diplomate in Sleep Medicine  [facility]

## 2019-08-01 NOTE — PATIENT INSTRUCTIONS
217 [facility] [place] [place], [address]  Tel.  [phone]  Fax. 100 [facility] 60  [place], [address] [facility]  Tel.  [phone]  Fax. [phone] [address]  Tel.  [phone]  Fax. [phone]     PROPER SLEEP HYGIENE    What to avoid  · Do not have drinks with caffeine, such as coffee or black tea, for 8 hours before bed. · Do not smoke or use other types of tobacco near bedtime. Nicotine is a stimulant and can keep you awake. · Avoid drinking alcohol late in the evening, because it can cause you to wake in the middle of the night. · Do not eat a big meal close to bedtime. If you are hungry, eat a light snack. · Do not drink a lot of water close to bedtime, because the need to urinate may wake you up during the night. · Do not read or watch TV in bed. Use the bed only for sleeping and sexual activity. What to try  · Go to bed at the same time every night, and wake up at the same time every morning. Do not take naps during the day. · Keep your bedroom quiet, dark, and cool. · Get regular exercise, but not within 3 to 4 hours of your bedtime. .  · Sleep on a comfortable pillow and mattress. · If watching the clock makes you anxious, turn it facing away from you so you cannot see the time. · If you worry when you lie down, start a worry book. Well before bedtime, write down your worries, and then set the book and your concerns aside. · Try meditation or other relaxation techniques before you go to bed. · If you cannot fall asleep, get up and go to another room until you feel sleepy. Do something relaxing. Repeat your bedtime routine before you go to bed again. · Make your house quiet and calm about an hour before bedtime. Turn down the lights, turn off the TV, log off the computer, and turn down the volume on music. This can help you relax after a busy day.     Drowsy Driving  The [address] [place] [address] Road Traffic Safety Administration cites drowsiness as a causing factor in more than 803,325 police reported crashes annually, resulting in 76,000 injuries and 1,500 deaths. Other surveys suggest 55% of people polled have driven while drowsy in the past year, 23% had fallen asleep but not crashed, 3% crashed, and 2% had and accident due to drowsy driving. Who is at risk? Young Drivers: One study of drowsy driving accidents states that 55% of the drivers were under 25 years. Of those, 75% were male. Shift Workers and Travelers: People who work overnight or travel across time zones frequently are at higher risk of experiencing Circadian Rhythm Disorders. They are trying to work and function when their body is programed to sleep. Sleep Deprived: Lack of sleep has a serious impact on your ability to pay attention or focus on a task. Consistently getting less than the average of 8 hours your body needs creates partial or cumulative sleep deprivation. Untreated Sleep Disorders: Sleep Apnea, Narcolepsy, R.L.S., and other sleep disorders (untreated) prevent a person from getting enough restful sleep. This leads to excessive daytime sleepiness and increases the risk for drowsy driving accidents by up to 7 times. Medications / Alcohol: Even over the counter medications can cause drowsiness. Medications that impair a drivers attention should have a warning label. Alcohol naturally makes you sleepy and on its own can cause accidents. Combined with excessive drowsiness its effects are amplified. Signs of Drowsy Driving:   * You don't remember driving the last few miles   * You may drift out of your abrahan   * You are unable to focus and your thoughts wander   * You may yawn more often than normal   * You have difficulty keeping your eyes open / nodding off   * Missing traffic signs, speeding, or tailgating  Prevention-   Good sleep hygiene, lifestyle and behavioral choices have the most impact on drowsy driving.  There is no substitute for sleep and the average person requires 8 hours nightly. If you find yourself driving drowsy, stop and sleep. Consider the sleep hygiene tips provided during your visit as well. Medication Refill Policy: Refills for all medications require 1 week advance notice. Please have your pharmacy fax a refill request. We are unable to fax, or call in \"controled substance\" medications and you will need to pick these prescriptions up from our office. [employer] Activation    Thank you for requesting access to [employer]. Please follow the instructions below to securely access and download your online medical record. [employer] allows you to send messages to your doctor, view your test results, renew your prescriptions, schedule appointments, and more. How Do I Sign Up? 1. In your internet browser, go to [email or web address]. 2. Click on the First Time User? Click Here link in the Sign In box. You will see the New Member Sign Up page. 3. Enter your [employer] Access Code exactly as it appears below. You will not need to use this code after youve completed the sign-up process. If you do not sign up before the expiration date, you must request a new code. [employer] Access Code: [email or web address]  Expires: 2019  4:32 PM (This is the date your [employer] access code will )    4. Enter the last four digits of your Social Security Number (xxxx) and Date of Birth (mm/dd/yyyy) as indicated and click Submit. You will be taken to the next sign-up page. 5. Create a Wikirin ID. This will be your [employer]irin login ID and cannot be changed, so think of one that is secure and easy to remember. 6. Create a [employer] password. You can change your password at any time. 7. Enter your Password Reset Question and Answer. This can be used at a later time if you forget your password. 8. Enter your e-mail address. You will receive e-mail notification when new information is available in [address] 9. Click Sign Up.  You can now view and download portions of your medical record. 10. Click the Download Summary menu link to download a portable copy of your medical information. Additional Information    If you have questions, please call [phone]. Remember, [employer] is NOT to be used for urgent needs. For medical emergencies, dial 911.

## 2019-08-23 ENCOUNTER — HOSPITAL ENCOUNTER (OUTPATIENT)
Dept: NUTRITION | Age: 64
Discharge: HOME | End: 2019-08-23
Payer: MEDICARE

## 2019-08-23 PROCEDURE — 97803 MED NUTRITION INDIV SUBSEQ: CPT | Performed by: DIETITIAN, REGISTERED

## 2019-08-23 NOTE — PROGRESS NOTES
NUTRITION  FOLLOW-UP TREATMENT NOTE  Patient Name: Farhad Palacios         Date: 2019  : 1955    YES Patient  Verified  Diagnosis: obesity   In time: 11:00am           Out time:   11:30am   Total Treatment Time (min):   30     SUBJECTIVE/ASSESSMENT    Changes in medication or medical history? Any new allergies, surgeries or procedures? No   Pt has returned for follow up. She has had a fall due to left leg giving out since last visit.  able to pick her back up. Pt's left leg gave out while walking to office today as well. Using walker, able to stop and regain strength to continue. She has also been experiencing blood when she coughs in the mronings and at night. She describes as usually specks, but had one recently with about 1/8th tsp. She has not discussed this with her PCP yet. Pt has asked  to remove sweets from house and he has done so. No cravings for sweets when they are not present.  brought cake into house one day and pt notes eating less than would have in the past. She did have one milkshake since last visit. Recommended lower calorie option. Pt was not aware how many calories and sugar were in the item she chose. Overall lower intake of sugar. She has tried to increase exercise but is frustrated by being out of breath after walking 2min on the treadmill. Provided support for changes made and discussed reasonable and likely speed of weight loss based on exercise ability and intake. Advised 10-20g protein per meal (especially lunch and dinner). Answered questions about eggs and prunes. Provided additional resources for recommended foods for GERD. Pt states she has gastroparesis, but no dx found in chart. Reviewed goals. She expressed comprehension, high motivation, and compliance is expected. Current Wt: 184.2 Previous Wt: 186. 2 Wt Change: -2     Achievement of Goals: Met.  Continued.-reduce excess calorie intake from sweets by asking  to remove from house (cakes, cookies, candy bars, potato chips)  Not met. Walking for 2min at a time. -exercise: chair exercises or stretches at home 2 times per week for 10min minimum   + walking with  2 times per week for 10-15min min  - met. continued-reduce sodium intake by asking for sauces on the side and rinsing high sodium items (list provided)         Patient Education:  [x]  Review current plan with patient   []  Other:    Handouts/  Information Provided: []  Carbohydrates  []  Protein  []  Fiber  []  Serving Sizes  []  Fluids  []  General guidelines []  Diabetes  []  Cholesterol  []  Sodium  []  SBGM  []  Food Journals  [x]  Others: GERD     New Patient Goals: CONTINUE to reduce excess calorie intake from sweets by asking  to remove from house (cakes, cookies, candy bars, potato chips)  CONTINUE -exercise: chair exercises or stretches at home 2 times per week for 10min minimum   + walking with  2 times per week for 10-15min minimum  -choose mini $1 frosty instead of milkshake  -10-20g protein at meals.    -1/4 cup of nuts per day as snack (no salt added)  -if having eggs, aim for 1 yolk and 1-2 whites for protein goal     PLAN    [x]  Continue on current plan []  Follow-up PRN   []  Discharge due to :    [x]  Next appt: 4-6 weeks     Dietitian: Bren Davis MS RD    Date: 8/23/2019 Time: 12:08 PM

## 2019-08-27 ENCOUNTER — CLINICAL SUPPORT (OUTPATIENT)
Dept: INTERNAL MEDICINE CLINIC | Age: 64
End: 2019-08-27

## 2019-08-27 DIAGNOSIS — I10 ESSENTIAL HYPERTENSION, BENIGN: Primary | ICD-10-CM

## 2019-08-27 LAB
INR BLD: 2.3
PT POC: 27.5 SECONDS
VALID INTERNAL CONTROL?: YES

## 2019-08-27 NOTE — PROGRESS NOTES
Bette De Leon is a 70 y.o. female who presents today for Anticoagulation monitoring. Current dose:  Coumadin 2.5 mg  1 tab PO everyday except on Sunday 2 tabs PO  Medication Changes:  no  Dietary Changes:  no    Latest INR:  Results for orders placed or performed in visit on 08/27/19   AMB POC PT/INR   Result Value Ref Range    VALID INTERNAL CONTROL POC Yes     Prothrombin time (POC) 27.5 seconds    INR POC 2.3          New Coumadin dose:.current treatment plan is effective, no change in therapy. Next check to be scheduled for  4 weeks.   Per Dr. Guru Swartz LPN

## 2019-09-05 ENCOUNTER — HOSPITAL ENCOUNTER (OUTPATIENT)
Dept: CT IMAGING | Age: 64
Discharge: HOME | End: 2019-09-05
Attending: INTERNAL MEDICINE
Payer: MEDICARE

## 2019-09-05 ENCOUNTER — OFFICE VISIT (OUTPATIENT)
Dept: INTERNAL MEDICINE CLINIC | Age: 64
End: 2019-09-05

## 2019-09-05 VITALS
HEIGHT: 62 IN | DIASTOLIC BLOOD PRESSURE: 80 MMHG | TEMPERATURE: 98.1 F | SYSTOLIC BLOOD PRESSURE: 123 MMHG | BODY MASS INDEX: 34.34 KG/M2 | RESPIRATION RATE: 16 BRPM | OXYGEN SATURATION: 96 % | HEART RATE: 61 BPM | WEIGHT: 186.6 LBS

## 2019-09-05 DIAGNOSIS — I27.82 CHRONIC PULMONARY EMBOLISM WITHOUT ACUTE COR PULMONALE, UNSPECIFIED PULMONARY EMBOLISM TYPE (HCC): ICD-10-CM

## 2019-09-05 DIAGNOSIS — R04.2 HEMOPTYSIS: ICD-10-CM

## 2019-09-05 DIAGNOSIS — I73.9 PERIPHERAL VASCULAR DISEASE (HCC): ICD-10-CM

## 2019-09-05 DIAGNOSIS — M19.042 PRIMARY OSTEOARTHRITIS OF LEFT HAND: ICD-10-CM

## 2019-09-05 DIAGNOSIS — K21.9 GASTROESOPHAGEAL REFLUX DISEASE WITHOUT ESOPHAGITIS: ICD-10-CM

## 2019-09-05 DIAGNOSIS — Z79.01 ENCOUNTER FOR MONITORING COUMADIN THERAPY: ICD-10-CM

## 2019-09-05 DIAGNOSIS — K58.9 IRRITABLE BOWEL SYNDROME WITHOUT DIARRHEA: ICD-10-CM

## 2019-09-05 DIAGNOSIS — I10 ESSENTIAL HYPERTENSION, BENIGN: Primary | ICD-10-CM

## 2019-09-05 DIAGNOSIS — G89.4 CHRONIC PAIN SYNDROME: ICD-10-CM

## 2019-09-05 DIAGNOSIS — Z51.81 ENCOUNTER FOR MONITORING COUMADIN THERAPY: ICD-10-CM

## 2019-09-05 DIAGNOSIS — M79.7 FIBROMYALGIA: ICD-10-CM

## 2019-09-05 LAB
CREAT BLD-MCNC: 1 MG/DL (ref 0.6–1.3)
INR BLD: 2.7
PT POC: 32.8 SECONDS
VALID INTERNAL CONTROL?: YES

## 2019-09-05 PROCEDURE — 82565 ASSAY OF CREATININE: CPT

## 2019-09-05 PROCEDURE — 74011636320 HC RX REV CODE- 636/320: Performed by: INTERNAL MEDICINE

## 2019-09-05 PROCEDURE — 71275 CT ANGIOGRAPHY CHEST: CPT

## 2019-09-05 RX ORDER — SODIUM CHLORIDE 0.9 % (FLUSH) 0.9 %
10 SYRINGE (ML) INJECTION
Status: COMPLETED | OUTPATIENT
Start: 2019-09-05 | End: 2019-09-05

## 2019-09-05 RX ADMIN — IOPAMIDOL 100 ML: 755 INJECTION, SOLUTION INTRAVENOUS at 16:16

## 2019-09-05 RX ADMIN — Medication 10 ML: at 16:16

## 2019-09-05 NOTE — PROGRESS NOTES
1. Have you been to the ER, urgent care clinic since your last visit? Hospitalized since your last visit? No    2. Have you seen or consulted any other health care providers outside of the [address] [place] [address] since your last visit? Include any pap smears or colon screening. No     Wants to discuss right leg pain and knot on it.   Spitting up blood  Toe pain

## 2019-09-05 NOTE — PROGRESS NOTES
[facility]  Cassell Dakin, MD, [address] [address]  Phone:  [phone]  Fax: [phone]       Chief Complaint   Patient presents with    Leg Pain     right   . SUBJECTIVE:    Luis F Mohamud is a 70 y.o. female Patient returns today with history of primary hypertension, chronic pain, fibromyalgia, GERD, IBS, pulmonary embolism, DJD, and is seen for evaluation. She is on Coumadin for recurrent DVT and PE in 2013, 2017 and 2018. She was on Eliquis, but was discontinued due to GI upset. She is seen for evaluation. Her last CT of the chest was performed on 07/22/16, which was unremarkable. Patient returns today with several concerns. She has pain in her right pretibial area for the past 2 1/2 weeks. She continues to have discomfort in her toes, feels like a toothache. She has been spitting up blood, actually coughed with sputum with streaks of blood. No cold or cold symptoms. We note elevated uric acid and would prefer not to take medication, but would like to use dietary maneuvers. Patient just feels exhausted, feels tired. When she tries to exercise the fatigue is overwhelming. Patient is seen for evaluation. Current Outpatient Medications   Medication Sig Dispense Refill    amitriptyline (ELAVIL) 10 mg tablet Take  by mouth nightly.  amLODIPine (NORVASC) 10 mg tablet TAKE 1 TABLET DAILY 90 Tab 2    DEXILANT 60 mg CpDB capsule (delayed release)       bisoprolol-hydroCHLOROthiazide (ZIAC) 10-6.25 mg per tablet TAKE 1 TABLET DAILY 90 Tab 3    traZODone (DESYREL) 50 mg tablet Take 1 Tab by mouth nightly as needed for Sleep. 90 Tab 3    warfarin (COUMADIN) 2.5 mg tablet 1 to 2 tabs daily 180 Tab 11    lidocaine (LIDODERM) 5 % Apply patch to the affected area for 12 hours a day and remove for 12 hours a day. DX.M79.7 80 Each 3    warfarin (COUMADIN) 5 mg tablet TAKE ONE TABLET BY MOUTH ONCE DAILY  3    LACTOBACILLUS ACIDOPHILUS (PROBIOTIC PO) Take 1 Cap by mouth as needed.  OTHER Drumright sore muscle with cayenne and ginger topical cream as needed. OTC for pain.  CHOLECALCIFEROL, VITAMIN D3, (VITAMIN D3 PO) Take  by mouth. Liquid form. 400 units per 4 drops. Takes 4 drops po once daily.  carica papaya (PAPAYA ENZYME) tab Take  by mouth. Takes 4 tabs po 1-2 times daily after meals.  OT ULTRA/FASTTRACK CONTROL soln       ONETOUCH ULTRA2 monitoring kit       ONE TOUCH DELICA 33 gauge misc       MICROLET LANCET misc USE TO TEST BLOOD SUGAR EVERY  Each 11    CONTOUR NEXT STRIPS strip USE TO TEST BLOOD SUGARS ONCE DAILY 50 Strip 11    cyanocobalamin (VITAMIN B-12) 1,000 mcg tablet Take 1,000 mcg by mouth as needed.        Past Medical History:   Diagnosis Date    Adverse effect of anesthesia     \"hard time waking me up\"    Axillary pain, right     Bereavement 2019    77 yo - heart attack -  in sleep    Chronic pain     d/t fibromyalgia    Coagulation disorder ([facility] [address]     on eliquis -d/c due to gi upset - now on coumadin previously followed by dr. Ann Santos Depression with anxiety     DJD (degenerative joint disease)     Dyslipidemia     Encounter for Hemoccult screening 2017    neg    Fibromyalgia     Gait instability     GERD (gastroesophageal reflux disease)     Hypertension     IBS (irritable bowel syndrome)     Ill-defined condition     gastroparesis    Ill-defined condition     CT - 3/18/2018 - esophageal diverticulum    Ill-defined condition     pericarditis    Mastodynia, left greater than right 2011    Normal cardiac stress test 3.14    Positive D dimer 9-23-15    Prediabetes     Pulmonary embolism ([facility] [address] 2013    rll    Restless leg syndrome     S/P colonoscopy 2011    kenny hernandez md    Saphenous vein occlusion, right 2017    [facility] acute occlusive superificial vein thrombosis - Melody Dyer md    Saphenous vein thrombophlebitis, right 02/2018    and acute l posterior tibial vein - this is the 2nd DVT putting her on lifelong anticoagualtion / Jean Marie Setters    Sleep apnea     cpap 9cm    SOB (shortness of breath)     White coat hypertension      Past Surgical History:   Procedure Laterality Date    ABDOMEN SURGERY PROC UNLISTED      exploratory years ago    BREAST SURGERY PROCEDURE UNLISTED  2006    left breast [id number]. Ted Zimmer    COLONOSCOPY N/A 4/10/2018    COLONOSCOPY,EGD performed by [facility]as [facility] at [facility] ENDOSCOPY    HX BREAST BIOPSY Left     HX COLONOSCOPY      HX ORTHOPAEDIC  2009    foot surgery-left - bunionectomy    HX ORTHOPAEDIC      cyst removed from left hand - ganglion cyst    HX OTHER SURGICAL      right and left leg muscle biopsies - elevated CPK - muscle enzymes were elevated    HX TUBAL LIGATION       Allergies   Allergen Reactions    Aleve [Naproxen Sodium] Hoarseness    Dilaudid [Hydromorphone (Bulk)] Anaphylaxis     Respiratory arrest and throat closes    Diovan [Valsartan] Palpitations    Morphine Other (comments)     Patch-vomiting,weakness, fainting    Sulfa (Sulfonamide Antibiotics) Hives, Rash and Other (comments)     fainting         REVIEW OF SYSTEMS:  General: negative for - chills or fever  ENT: negative for - headaches, nasal congestion or tinnitus  Respiratory: negative for - cough, hemoptysis, shortness of breath or wheezing  Cardiovascular : negative for - chest pain, edema, palpitations or shortness of breath  Gastrointestinal: negative for - abdominal pain, blood in stools, heartburn or nausea/vomiting  Genito-Urinary: no dysuria, trouble voiding, or hematuria  Musculoskeletal: negative for - gait disturbance, joint pain, joint stiffness or joint swelling  Neurological: no TIA or stroke symptoms  Hematologic: no bruises, no bleeding, no swollen glands  Integument: no lumps, mole changes, nail changes or rash  Endocrine: no malaise/lethargy or unexpected weight changes      Social History     Socioeconomic History    Marital status:      Spouse name: Not on file    Number of children: Not on file    Years of education: Not on file    Highest education level: Not on file   Tobacco Use    Smoking status: Never Smoker    Smokeless tobacco: Never Used   Substance and Sexual Activity    Alcohol use: No    Drug use: No    Sexual activity: Yes     Partners: Male     Birth control/protection: None   Social History Narrative         Family History: Mother: alive 80 yrs, High Blood Pressure, cva with cognitive deficitsFather:  36 yrs, myocardial infarctionSister(s): aliveBrother(s): unknow n2    sister(s) . 40 daughter no choildren -  walked out works for board [employer] for [employer] . Social History: Alcohol Use Patient does not use alcohol. Smoking Status Patient is a never smoker. Caffeine: occasional. Drugs:    prescription narcotics. Diet: Regular. Exercise: None. Marital Status: . Lives w ith: spouse. Children: children. Yazdanism: Leanne [place]ering[place]. Patient is  living w ith her  she is on disability related to her fibromyositis. Family History   Problem Relation Age of Onset    Hypertension Mother     Kidney Disease Mother         1 kidney    Heart Disease Father     Heart Attack Father         late 35s or early 45s    Heart Attack Maternal Grandmother     Cancer Maternal Grandfather         ? lung or stomach       OBJECTIVE:    Visit Vitals  /80   Pulse 61   Temp 98.1 °F (36.7 °C) (Oral)   Resp 16   Ht 5' 2\" (1.575 m)   Wt 186 lb 9.6 oz (84.6 kg)   SpO2 96%   BMI 34.13 kg/m²     CONSTITUTIONAL: well , well nourished, appears age appropriate  EYES: perrla, eom intact  ENMT:moist mucous membranes, pharynx clear  NECK: supple.  Thyroid normal  RESPIRATORY: Chest: clear bilaterally   CARDIOVASCULAR: Heart: regular rate and rhythm  GASTROINTESTINAL: Abdomen: soft, bowel sounds active  HEMATOLOGIC: no pathological lymph nodes palpated  MUSCULOSKELETAL: Extremities: no edema, pulse 1+   INTEGUMENT: No unusual rashes or suspicious skin lesions noted. Nails appear normal.  NEUROLOGIC: non-focal exam   MENTAL STATUS: alert and oriented, appropriate affect           ASSESSMENT:  1. Essential hypertension, benign    2. Encounter for monitoring Coumadin therapy    3. Peripheral vascular disease ([address]    4. Chronic pain syndrome    5. Fibromyalgia    6. Gastroesophageal reflux disease without esophagitis    7. Irritable bowel syndrome without diarrhea    8. Chronic pulmonary embolism without acute cor pulmonale, unspecified pulmonary embolism type ([address]    9. Primary osteoarthritis of left hand    10. Hemoptysis      BP control is at goal.  No adjustments will be made. History of PVD, which currently is asymptomatic. Her chronic pain syndrome is related to the fibromyalgia, the pretibial area on the right is prominent, tibial tuberosity is also tender to touch, but no other pathology is found, no further evaluation will be undertaken. She still has puffiness of both ankles, left greater than right, which we believe is related to chronic venous insufficiency. She has sensation that is difficult to describe and maybe related to known history of GERD. She has had a stress test in the past, stress Dobutamine test, that was negative. I do  not think the symptoms are suggestive of cardiac origin discomfort since it is bilateral.  Of the concerns she has voiced, I am most concern about the hemoptysis. Since there is blood streaking I do not think she has pulmonary embolus, but that obviously cannot be excluded and therefore will ask for CTA of her chest today to rule out the remote possibility. She does not have any cold symptoms, so I cannot completely explain the blood streaking when she coughs, although it could raise the question of some sinus difficulties.     She is on Coumadin, INR is 2.7, which is completely acceptable. She will be back to see us in three weeks for PT check. Will get CT scan done today. I have discussed the diagnosis with the patient and the intended plan as seen in the  orders above. The patient understands and agees with the plan. The patient has   received an after visit summary and questions were answered concerning  future plans  Patient labs and/or xrays were reviewed  Past records were reviewed. PLAN:  .  Orders Placed This Encounter    CTA CHEST W OR W WO CONT    CBC WITH AUTOMATED DIFF    URINALYSIS W/ RFLX MICROSCOPIC    METABOLIC PANEL, COMPREHENSIVE    LIPID PANEL    TSH 3RD GENERATION    HEMOGLOBIN A1C WITH EAG    AMB POC PT/INR       Follow-up and Dispositions    · Return in about 3 weeks (around 9/26/2019) for pt/inr. ATTENTION:   This medical record was transcribed using an electronic medical records system. Although proofread, it may and can contain electronic and spelling errors. Other human spelling and other errors may be present. Corrections may be executed at a later time. Please feel free to contact us for any clarifications as needed.

## 2019-09-06 LAB
ALBUMIN SERPL-MCNC: 4.6 G/DL (ref 3.6–4.8)
ALBUMIN/GLOB SERPL: 1.5 {RATIO} (ref 1.2–2.2)
ALP SERPL-CCNC: 46 IU/L (ref 39–117)
ALT SERPL-CCNC: 34 IU/L (ref 0–32)
APPEARANCE UR: CLEAR
AST SERPL-CCNC: 25 IU/L (ref 0–40)
BASOPHILS # BLD AUTO: 0.1 X10E3/UL (ref 0–0.2)
BASOPHILS NFR BLD AUTO: 1 %
BILIRUB SERPL-MCNC: 0.2 MG/DL (ref 0–1.2)
BILIRUB UR QL STRIP: NEGATIVE
BUN SERPL-MCNC: 19 MG/DL (ref 8–27)
BUN/CREAT SERPL: 19 (ref 12–28)
CALCIUM SERPL-MCNC: 10 MG/DL (ref 8.7–10.3)
CHLORIDE SERPL-SCNC: 103 MMOL/L (ref 96–106)
CHOLEST SERPL-MCNC: 234 MG/DL (ref 100–199)
CO2 SERPL-SCNC: 24 MMOL/L (ref 20–29)
COLOR UR: YELLOW
CREAT SERPL-MCNC: 1 MG/DL (ref 0.57–1)
EOSINOPHIL # BLD AUTO: 0.1 X10E3/UL (ref 0–0.4)
EOSINOPHIL NFR BLD AUTO: 1 %
ERYTHROCYTE [DISTWIDTH] IN BLOOD BY AUTOMATED COUNT: 14.3 % (ref 12.3–15.4)
EST. AVERAGE GLUCOSE BLD GHB EST-MCNC: 123 MG/DL
GLOBULIN SER CALC-MCNC: 3 G/DL (ref 1.5–4.5)
GLUCOSE SERPL-MCNC: 84 MG/DL (ref 65–99)
GLUCOSE UR QL: NEGATIVE
HBA1C MFR BLD: 5.9 % (ref 4.8–5.6)
HCT VFR BLD AUTO: 42.8 % (ref 34–46.6)
HDLC SERPL-MCNC: 59 MG/DL
HGB BLD-MCNC: 14.3 G/DL (ref 11.1–15.9)
HGB UR QL STRIP: NEGATIVE
IMM GRANULOCYTES # BLD AUTO: 0 X10E3/UL (ref 0–0.1)
IMM GRANULOCYTES NFR BLD AUTO: 0 %
KETONES UR QL STRIP: NEGATIVE
LDLC SERPL CALC-MCNC: 148 MG/DL (ref 0–99)
LEUKOCYTE ESTERASE UR QL STRIP: NEGATIVE
LYMPHOCYTES # BLD AUTO: 2.1 X10E3/UL (ref 0.7–3.1)
LYMPHOCYTES NFR BLD AUTO: 40 %
MCH RBC QN AUTO: 28.9 PG (ref 26.6–33)
MCHC RBC AUTO-ENTMCNC: 33.4 G/DL (ref 31.5–35.7)
MCV RBC AUTO: 87 FL (ref 79–97)
MICRO URNS: NORMAL
MONOCYTES # BLD AUTO: 0.4 X10E3/UL (ref 0.1–0.9)
MONOCYTES NFR BLD AUTO: 8 %
NEUTROPHILS # BLD AUTO: 2.6 X10E3/UL (ref 1.4–7)
NEUTROPHILS NFR BLD AUTO: 50 %
NITRITE UR QL STRIP: NEGATIVE
PH UR STRIP: 6 [PH] (ref 5–7.5)
PLATELET # BLD AUTO: 232 X10E3/UL (ref 150–450)
POTASSIUM SERPL-SCNC: 3.9 MMOL/L (ref 3.5–5.2)
PROT SERPL-MCNC: 7.6 G/DL (ref 6–8.5)
PROT UR QL STRIP: NORMAL
RBC # BLD AUTO: 4.94 X10E6/UL (ref 3.77–5.28)
SODIUM SERPL-SCNC: 145 MMOL/L (ref 134–144)
SP GR UR: 1.01 (ref 1–1.03)
TRIGL SERPL-MCNC: 133 MG/DL (ref 0–149)
TSH SERPL DL<=0.005 MIU/L-ACNC: 2.04 UIU/ML (ref 0.45–4.5)
UROBILINOGEN UR STRIP-MCNC: 0.2 MG/DL (ref 0.2–1)
VLDLC SERPL CALC-MCNC: 27 MG/DL (ref 5–40)
WBC # BLD AUTO: 5.3 X10E3/UL (ref 3.4–10.8)

## 2019-10-01 ENCOUNTER — HOSPITAL ENCOUNTER (OUTPATIENT)
Dept: NUTRITION | Age: 64
Discharge: HOME | End: 2019-10-01
Payer: MEDICARE

## 2019-10-01 ENCOUNTER — CLINICAL SUPPORT (OUTPATIENT)
Dept: INTERNAL MEDICINE CLINIC | Age: 64
End: 2019-10-01

## 2019-10-01 DIAGNOSIS — Z79.01 ENCOUNTER FOR MONITORING COUMADIN THERAPY: ICD-10-CM

## 2019-10-01 DIAGNOSIS — Z51.81 ENCOUNTER FOR MONITORING COUMADIN THERAPY: ICD-10-CM

## 2019-10-01 DIAGNOSIS — Z00.00 MEDICARE ANNUAL WELLNESS VISIT, SUBSEQUENT: Primary | ICD-10-CM

## 2019-10-01 PROCEDURE — 97803 MED NUTRITION INDIV SUBSEQ: CPT | Performed by: DIETITIAN, REGISTERED

## 2019-10-01 NOTE — PROGRESS NOTES
NUTRITION  FOLLOW-UP TREATMENT NOTE  Patient Name: Shyam Contreras         Date: 10/1/2019  : 1955    YES Patient  Verified  Diagnosis: obesity   In time: 11:00am           Out time:   11:30am   Total Treatment Time (min):   30     SUBJECTIVE/ASSESSMENT    Changes in medication or medical history? Any new allergies, surgeries or procedures? YES/NO    If yes, update Summary List   Lab Results   Component Value Date/Time    Creatinine (POC) 1.0 2019 04:09 PM    Creatinine 1.00 2019 10:57 AM     Lab Results   Component Value Date/Time    Hemoglobin A1c 5.9 (H) 2019 10:57 AM     Lab Results   Component Value Date/Time    TSH 2.040 2019 10:57 AM     Lab Results   Component Value Date/Time    Cholesterol, total 234 (H) 2019 10:57 AM    HDL Cholesterol 59 2019 10:57 AM    LDL, calculated 148 (H) 2019 10:57 AM    VLDL, calculated 27 2019 10:57 AM    Triglyceride 133 2019 10:57 AM    CHOL/HDL Ratio 3.1 2014 03:56 AM   Pt has returned for follow up. She has had new labs, reviewed above. Educated on nutrition changes for lowering cholesterol. She note little work on making changes from last session though she did try the smaller frosty and felt satisfied. She notes clothes are fitting looser and others have stated she looks as if she is loosing weight. Notes some stress eating. Discussed other ways to deal with stress. Not currently getting good sleep. Feeling tired and taking naps sometimes in the middle of the day. Discussed snack foods and reviewed exercise goals. Revised to take breaks between to increase full duration of active time. She expressed comprehension, high motivation, and compliance is expected. Current Wt: 186.0 Previous Wt: 184. 2 Wt Change: +1.8     Achievement of Goals: Met less consistently CONTINUE to reduce excess calorie intake from sweets by asking  to remove from house (cakes, cookies, candy bars, potato chips)  Inconsistent. CONTINUE -exercise: chair exercises or stretches at home 2 times per week for 10min minimum   + walking with  2 times per week for 10-15min minimum  met-choose mini $1 frosty instead of milkshake  Improved.-10-20g protein at meals. Not met-1/4 cup of nuts per day as snack (no salt added)  met-if having eggs, aim for 1 yolk and 1-2 whites for protein goal         Patient Education:  [x]  Review current plan with patient   []  Other:    Handouts/  Information Provided: []  Carbohydrates  []  Protein  []  Fiber  []  Serving Sizes  []  Fluids  []  General guidelines []  Diabetes  [x]  Cholesterol  []  Sodium  []  SBGM  []  Food Journals  []  Others:      New Patient Goals: -swap cheetos for 1/4 cup nuts  -swap cookies for stress for yogurt with berries IF hungry  -choose pre-portioned ice cream 1/2 cup at home  -exercise: continue goal of 2 times per week minimum. All addes up.  Do in place of afternoon nap     PLAN    [x]  Continue on current plan []  Follow-up PRN   []  Discharge due to :    [x]  Next appt: 2-4 weeks     Dietitian: Nelly Guerra MS RD    Date: 10/1/2019 Time: 3:23 PM

## 2019-10-11 LAB
INR BLD: 3
PT POC: 36.2 SECONDS
VALID INTERNAL CONTROL?: YES

## 2019-10-11 NOTE — PROGRESS NOTES
Chief Complaint   Patient presents with    Coagulation disorder     Patient is here for a PT/INR check. Per patient she is taking Warfarin 2.5mg 1 tab Monday through Saturday and 2 tab Sunday. Results for orders placed or performed in visit on 10/01/19   AMB POC PT/INR   Result Value Ref Range    VALID INTERNAL CONTROL POC Yes     Prothrombin time (POC) 36.2 seconds    INR POC 3.0      Per Dr. Reese Arnold patient need to take 1 tab daily. And return in 1 month for a PT check.

## 2019-10-15 ENCOUNTER — HOSPITAL ENCOUNTER (OUTPATIENT)
Dept: NUTRITION | Age: 64
Discharge: HOME | End: 2019-10-15
Payer: MEDICARE

## 2019-10-15 PROCEDURE — 97803 MED NUTRITION INDIV SUBSEQ: CPT | Performed by: DIETITIAN, REGISTERED

## 2019-10-15 NOTE — PROGRESS NOTES
NUTRITION  FOLLOW-UP TREATMENT NOTE  Patient Name: Lux Gaffney         Date: 10/15/2019  : 1955    YES Patient  Verified  Diagnosis: obesity   In time:   11:00am             Out time: 11:30am   Total Treatment Time (min):   30     SUBJECTIVE/ASSESSMENT    Changes in medication or medical history? Any new allergies, surgeries or procedures? YES/NO    If yes, update Summary List   Lab Results   Component Value Date/Time    Hemoglobin A1c 5.9 (H) 2019 10:57 AM     Pt ha been checking blod sugar. States it is usually <100mg/dl. Highest seen is 120mg/dl. Never checks except in the mornings. Suggested checking blood sugar before and 4 hrs after a meal to see if SMBG comes back down after. Pt has difficulty remembering what she has eaten. She believes she is making good choices overall but is discouraged by lack of weight loss. Exercise continues to be a struggle. She is walking as able. Tired and out of breath after 20ft. Difficult walking back to Nutrition office. Gout pain currently. Educated on foods to limit for gout. Recommended limiting seafood to 2oz. She denies alcohol, organ meats, and shellfish. Asked pt to keep a photo food log to better identify current eating pattners. She agreed. She expressed comprehension, high motivation, and compliance is expected. Current Wt: 186.6 Previous Wt: 186. 0 Wt Change: +0.6     Achievement of Goals: Difficulty with memory. Focus on gout today  -swap cheetos for 1/4 cup nuts  -swap cookies for stress for yogurt with berries IF hungry  -choose pre-portioned ice cream 1/2 cup at home  -exercise: continue goal of 2 times per week minimum. All addes up.  Do in place of afternoon nap         Patient Education:  [x]  Review current plan with patient   []  Other:    Handouts/  Information Provided: []  Carbohydrates  []  Protein  []  Fiber  []  Serving Sizes  []  Fluids  []  General guidelines []  Diabetes  []  Cholesterol  []  Sodium  []  SBGM  []  Food Journals  []  Others:      New Patient Goals: -choose from low purines list  -limit seafood to 2oz until symptoms resolve  -check blood sugar before and 4 hrs after.  If returns to previous, able to handle carbohydrate amount  -keep food log with pictures and bring to next session  -continue exercise as able     PLAN    [x]  Continue on current plan []  Follow-up PRN   []  Discharge due to :    [x]  Next appt: 2-6 weeks     Dietitian: Dom De MS RD    Date: 10/15/2019 Time: 4:56 PM

## 2019-11-12 ENCOUNTER — APPOINTMENT (OUTPATIENT)
Dept: NUTRITION | Age: 64
End: 2019-11-12
Payer: MEDICARE

## 2019-11-13 ENCOUNTER — OFFICE VISIT (OUTPATIENT)
Dept: INTERNAL MEDICINE CLINIC | Age: 64
End: 2019-11-13

## 2019-11-13 VITALS
RESPIRATION RATE: 18 BRPM | DIASTOLIC BLOOD PRESSURE: 73 MMHG | SYSTOLIC BLOOD PRESSURE: 115 MMHG | WEIGHT: 188 LBS | HEIGHT: 62 IN | OXYGEN SATURATION: 96 % | HEART RATE: 62 BPM | TEMPERATURE: 98.3 F | BODY MASS INDEX: 34.6 KG/M2

## 2019-11-13 DIAGNOSIS — E66.9 OBESITY, UNSPECIFIED CLASSIFICATION, UNSPECIFIED OBESITY TYPE, UNSPECIFIED WHETHER SERIOUS COMORBIDITY PRESENT: ICD-10-CM

## 2019-11-13 DIAGNOSIS — R73.03 PREDIABETES: ICD-10-CM

## 2019-11-13 DIAGNOSIS — M1A.0790 IDIOPATHIC CHRONIC GOUT OF FOOT WITHOUT TOPHUS, UNSPECIFIED LATERALITY: ICD-10-CM

## 2019-11-13 DIAGNOSIS — M79.7 FIBROMYOSITIS: ICD-10-CM

## 2019-11-13 DIAGNOSIS — M19.042 PRIMARY OSTEOARTHRITIS OF LEFT HAND: ICD-10-CM

## 2019-11-13 DIAGNOSIS — E78.5 DYSLIPIDEMIA: ICD-10-CM

## 2019-11-13 DIAGNOSIS — K21.9 GASTROESOPHAGEAL REFLUX DISEASE WITHOUT ESOPHAGITIS: ICD-10-CM

## 2019-11-13 DIAGNOSIS — K58.9 IRRITABLE BOWEL SYNDROME WITHOUT DIARRHEA: ICD-10-CM

## 2019-11-13 DIAGNOSIS — I10 ESSENTIAL HYPERTENSION, BENIGN: Primary | ICD-10-CM

## 2019-11-13 DIAGNOSIS — R26.81 GAIT INSTABILITY: ICD-10-CM

## 2019-11-13 DIAGNOSIS — I27.82 CHRONIC PULMONARY EMBOLISM WITHOUT ACUTE COR PULMONALE, UNSPECIFIED PULMONARY EMBOLISM TYPE (HCC): ICD-10-CM

## 2019-11-13 DIAGNOSIS — M79.7 FIBROMYALGIA: ICD-10-CM

## 2019-11-13 PROBLEM — M10.9 GOUT: Status: ACTIVE | Noted: 2019-11-13

## 2019-11-13 NOTE — PATIENT INSTRUCTIONS
Body Mass Index: Care Instructions Your Care Instructions Body mass index (BMI) can help you see if your weight is raising your risk for health problems. It uses a formula to compare how much you weigh with how tall you are. · A BMI lower than 18.5 is considered underweight. · A BMI between 18.5 and 24.9 is considered healthy. · A BMI between 25 and 29.9 is considered overweight. A BMI of 30 or higher is considered obese. If your BMI is in the normal range, it means that you have a lower risk for weight-related health problems. If your BMI is in the overweight or obese range, you may be at increased risk for weight-related health problems, such as high blood pressure, heart disease, stroke, arthritis or joint pain, and diabetes. If your BMI is in the underweight range, you may be at increased risk for health problems such as fatigue, lower protection (immunity) against illness, muscle loss, bone loss, hair loss, and hormone problems. BMI is just one measure of your risk for weight-related health problems. You may be at higher risk for health problems if you are not active, you eat an unhealthy diet, or you drink too much alcohol or use tobacco products. Follow-up care is a key part of your treatment and safety. Be sure to make and go to all appointments, and call your doctor if you are having problems. It's also a good idea to know your test results and keep a list of the medicines you take. How can you care for yourself at home? · Practice healthy eating habits. This includes eating plenty of fruits, vegetables, whole grains, lean protein, and low-fat dairy. · If your doctor recommends it, get more exercise. Walking is a good choice. Bit by bit, increase the amount you walk every day. Try for at least 30 minutes on most days of the week. · Do not smoke. Smoking can increase your risk for health problems.  If you need help quitting, talk to your doctor about stop-smoking programs and medicines. These can increase your chances of quitting for good. · Limit alcohol to 2 drinks a day for men and 1 drink a day for women. Too much alcohol can cause health problems. If you have a BMI higher than 25 · Your doctor may do other tests to check your risk for weight-related health problems. This may include measuring the distance around your waist. A waist measurement of more than 40 inches in men or 35 inches in women can increase the risk of weight-related health problems. · Talk with your doctor about steps you can take to stay healthy or improve your health. You may need to make lifestyle changes to lose weight and stay healthy, such as changing your diet and getting regular exercise. If you have a BMI lower than 18.5 · Your doctor may do other tests to check your risk for health problems. · Talk with your doctor about steps you can take to stay healthy or improve your health. You may need to make lifestyle changes to gain or maintain weight and stay healthy, such as getting more healthy foods in your diet and doing exercises to build muscle. Where can you learn more? Go to http://henri-moon.info/. Enter S176 in the search box to learn more about \"Body Mass Index: Care Instructions. \" Current as of: October 13, 2016 Content Version: 11.4 © 8937-1317 [employer]. Care instructions adapted under license by [employer] (which disclaims liability or warranty for this information). If you have questions about a medical condition or this instruction, always ask your healthcare professional. [address] any warranty or liability for your use of this information.

## 2019-11-13 NOTE — PROGRESS NOTES
SPORTS MEDICINE AND PRIMARY CARE  Rosa Bella MD, [address] [address]  Phone:  [phone]  Fax: [phone]       Chief Complaint   Patient presents with    Hypertension     follow up    . SUBJECTIVE:    Rohini Armendariz is a 70 y.o. female Patient returns today with known history of primary hypertension, fibromyositis, prediabetes, obesity, IBS, GERD, gait instability, dyslipidemia, DJD, and is seen for evaluation. Since we last saw her she thinks she had a gout attack. The rheumatologist checked her uric acid and it was 7.7, down from 9.8. She is trying to control it with diet. She continues to have the myalgias. Current Outpatient Medications   Medication Sig Dispense Refill    amitriptyline (ELAVIL) 10 mg tablet Take  by mouth nightly.  amLODIPine (NORVASC) 10 mg tablet TAKE 1 TABLET DAILY 90 Tab 2    DEXILANT 60 mg CpDB capsule (delayed release)       bisoprolol-hydroCHLOROthiazide (ZIAC) 10-6.25 mg per tablet TAKE 1 TABLET DAILY 90 Tab 3    traZODone (DESYREL) 50 mg tablet Take 1 Tab by mouth nightly as needed for Sleep. 90 Tab 3    warfarin (COUMADIN) 2.5 mg tablet 1 to 2 tabs daily 180 Tab 11    lidocaine (LIDODERM) 5 % Apply patch to the affected area for 12 hours a day and remove for 12 hours a day. DX.M79.7 90 Each 3    LACTOBACILLUS ACIDOPHILUS (PROBIOTIC PO) Take 1 Cap by mouth as needed.  OTHER Patricia sore muscle with cayenne and ginger topical cream as needed. OTC for pain.  CHOLECALCIFEROL, VITAMIN D3, (VITAMIN D3 PO) Take  by mouth. Liquid form. 400 units per 4 drops. Takes 4 drops po once daily.  carica papaya (PAPAYA ENZYME) tab Take  by mouth. Takes 4 tabs po 1-2 times daily after meals.       OT ULTRA/FASTTRACK CONTROL soln       ONETOUCH ULTRA2 monitoring kit       ONE TOUCH DELICA 33 gauge misc       MICROLET LANCET misc USE TO TEST BLOOD SUGAR EVERY  Each 11    CONTOUR NEXT STRIPS strip USE TO TEST BLOOD SUGARS ONCE DAILY 50 Strip 11    cyanocobalamin (VITAMIN B-12) 1,000 mcg tablet Take 1,000 mcg by mouth as needed.  warfarin (COUMADIN) 5 mg tablet TAKE ONE TABLET BY MOUTH ONCE DAILY  3     Past Medical History:   Diagnosis Date    Adverse effect of anesthesia     \"hard time waking me up\"    Axillary pain, right     Bereavement 2019    79 yo - heart attack -  in sleep    Chronic pain     d/t fibromyalgia    Coagulation disorder ([facility] [address]     on eliquis -d/c due to gi upset - now on coumadin previously followed by dr. Claudia Izquierdo Depression with anxiety     DJD (degenerative joint disease)     Dyslipidemia     Encounter for Hemoccult screening 2017    neg    Fibromyalgia     Gait instability     GERD (gastroesophageal reflux disease)     Hypertension     IBS (irritable bowel syndrome)     Ill-defined condition     gastroparesis    Ill-defined condition     CT - 3/18/2018 - esophageal diverticulum    Ill-defined condition     pericarditis    Mastodynia, left greater than right 2011    Normal cardiac stress test 3.14    Positive D dimer 9-23-15    Prediabetes     Pulmonary embolism ([facility] [address] 2013    rll    Restless leg syndrome     S/P colonoscopy 2011    kenny hernandez md    Saphenous vein occlusion, right 2017    [facility] acute occlusive superificial vein thrombosis - Melody Dyer md    Saphenous vein thrombophlebitis, right 2018    and acute l posterior tibial vein - this is the 2nd DVT putting her on lifelong anticoagualtion / Renetta Plaster    Sleep apnea     cpap 9cm    SOB (shortness of breath)     White coat hypertension      Past Surgical History:   Procedure Laterality Date    ABDOMEN SURGERY PROC UNLISTED      exploratory years ago   [address]      left breast [id number]. Yesika De La Cruz    COLONOSCOPY N/A 4/10/2018    COLONOSCOPY,EGD [facility]ibhav Briggs [facility] ENDOSCOPY    HX BREAST BIOPSY Left     HX COLONOSCOPY      HX ORTHOPAEDIC  2009    foot surgery-left - bunionectomy    HX ORTHOPAEDIC      cyst removed from left hand - ganglion cyst    HX OTHER SURGICAL      right and left leg muscle biopsies - elevated CPK - muscle enzymes were elevated    HX TUBAL LIGATION       Allergies   Allergen Reactions    Aleve [Naproxen Sodium] Hoarseness    Dilaudid [Hydromorphone (Bulk)] Anaphylaxis     Respiratory arrest and throat closes    Diovan [Valsartan] Palpitations    Morphine Other (comments)     Patch-vomiting,weakness, fainting    Sulfa (Sulfonamide Antibiotics) Hives, Rash and Other (comments)     fainting         REVIEW OF SYSTEMS:  General: negative for - chills or fever  ENT: negative for - headaches, nasal congestion or tinnitus  Respiratory: negative for - cough, hemoptysis, shortness of breath or wheezing  Cardiovascular : negative for - chest pain, edema, palpitations or shortness of breath  Gastrointestinal: negative for - abdominal pain, blood in stools, heartburn or nausea/vomiting  Genito-Urinary: no dysuria, trouble voiding, or hematuria  Musculoskeletal: negative for - gait disturbance, joint pain, joint stiffness or joint swelling  Neurological: no TIA or stroke symptoms  Hematologic: no bruises, no bleeding, no swollen glands  Integument: no lumps, mole changes, nail changes or rash  Endocrine: no malaise/lethargy or unexpected weight changes      Social History     Socioeconomic History    Marital status:      Spouse name: Not on file    Number of children: Not on file    Years of education: Not on file    Highest education level: Not on file   Tobacco Use    Smoking status: Never Smoker    Smokeless tobacco: Never Used   Substance and Sexual Activity    Alcohol use: No    Drug use: No    Sexual activity: Yes     Partners: Male     Birth control/protection: None   Social History Narrative         Family History:  Mother: alive 80 yrs, High Blood Pressure, cva with cognitive deficitsFather:  36 yrs, myocardial infarctionSister(s): aliveBrother(s): unknow n2    sister(s) . 40 daughter no choildren -  walked out works for board of directors for [employer] . Social History: Alcohol Use Patient does not use alcohol. Smoking Status Patient is a never smoker. Caffeine: occasional. Drugs:    prescription narcotics. Diet: Regular. Exercise: None. Marital Status: . Lives w ith: spouse. Children: children. Methodist: Jeanann Sarita. Patient is  living w ith her  she is on disability related to her fibromyositis. Family History   Problem Relation Age of Onset    Hypertension Mother     Kidney Disease Mother         1 kidney    Heart Disease Father     Heart Attack Father         late 35s or early 45s    Heart Attack Maternal Grandmother     Cancer Maternal Grandfather         ? lung or stomach           OBJECTIVE:    Visit Vitals  /73   Pulse 62   Temp 98.3 °F (36.8 °C) (Oral)   Resp 18   Ht 5' 2\" (1.575 m)   Wt 188 lb (85.3 kg)   SpO2 96%   BMI 34.39 kg/m²     CONSTITUTIONAL: well , well nourished, appears age appropriate  EYES: perrla, eom intact  ENMT:moist mucous membranes, pharynx clear  NECK: supple. Thyroid normal  RESPIRATORY: Chest: clear bilaterally   CARDIOVASCULAR: Heart: regular rate and rhythm  GASTROINTESTINAL: Abdomen: soft, bowel sounds active  HEMATOLOGIC: no pathological lymph nodes palpated  MUSCULOSKELETAL: Extremities: no edema, pulse 1+   INTEGUMENT: No unusual rashes or suspicious skin lesions noted. Nails appear normal.  NEUROLOGIC: non-focal exam   MENTAL STATUS: alert and oriented, appropriate affect           ASSESSMENT:  1. Essential hypertension, benign    2. Fibromyositis    3. Prediabetes    4. Obesity, unspecified classification, unspecified obesity type, unspecified whether serious comorbidity present    5. Irritable bowel syndrome without diarrhea    6. Gastroesophageal reflux disease without esophagitis    7. Gait instability    8. Fibromyalgia    9. Dyslipidemia    10. Primary osteoarthritis of left hand    11. Idiopathic chronic gout of foot without tophus, unspecified laterality    12. Chronic pulmonary embolism without acute cor pulmonale, unspecified pulmonary embolism type (HCC)      Blood pressure control is at goal.  No adjustment will be made in her antihypertensive medication. Fibromyalgia, fibromyositis remains symptomatic. She is currently using Tylenol. She has prediabetes and will check her blood sugars. We advised her she does not really have to check her blood sugars as long as they are staying in the normal range with prediabetes. Obesity is discussed below. IBS today is quiescent, as well as the GERD. She uses a rollabout walker for age debility. Dyslipidemia is controlled with diet. She just saw the dietitian the other day. She has gouty attacks and we offer Allopurinol. She would prefer not to take it because of the drug interaction as the combination may increase Warfarin levels and this would ____________ adverse effects _____________. We will just titrate her Coumadin as soon as she can tolerate the Allopurinol. We also advise her of the ill effects of the Allopurinol. She decides she will continue to stick with her diet. INR is subtherapeutic. She has a history pulmonary embolus repeated. We will increase Coumadin to 2.5 mg daily, 5 mg on Sunday. She will be back in two weeks for P-T check. Discussed the patient's BMI with her. The BMI follow up plan is as follows:     dietary management education, guidance, and counseling  encourage exercise  monitor weight  prescribed dietary intake    I have discussed the diagnosis with the patient and the intended plan as seen in the  orders above. The patient understands and agees with the plan.   The patient has   received an after visit summary and questions were answered concerning  future plans  Patient labs and/or xrays were reviewed  Past records were reviewed. PLAN:  . No orders of the defined types were placed in this encounter. Follow-up and Dispositions    · Return in about 2 years (around 11/13/2021) for pt/inr. ATTENTION:   This medical record was transcribed using an electronic medical records system. Although proofread, it may and can contain electronic and spelling errors. Other human spelling and other errors may be present. Corrections may be executed at a later time. Please feel free to contact us for any clarifications as needed.

## 2019-11-13 NOTE — PROGRESS NOTES
Chief Complaint   Patient presents with    Hypertension     follow up      1. Have you been to the ER, urgent care clinic since your last visit? Hospitalized since your last visit? No    2. Have you seen or consulted any other health care providers outside of the [address] [place] [address] since your last visit? Include any pap smears or colon screening.  No

## 2019-11-27 ENCOUNTER — CLINICAL SUPPORT (OUTPATIENT)
Dept: INTERNAL MEDICINE CLINIC | Age: 64
End: 2019-11-27

## 2019-11-27 DIAGNOSIS — Z51.81 ENCOUNTER FOR MONITORING COUMADIN THERAPY: Primary | ICD-10-CM

## 2019-11-27 DIAGNOSIS — Z79.01 ENCOUNTER FOR MONITORING COUMADIN THERAPY: Primary | ICD-10-CM

## 2019-11-27 LAB
INR BLD: 2
PT POC: 24.3 SECONDS
VALID INTERNAL CONTROL?: YES

## 2019-11-27 NOTE — PROGRESS NOTES
Pt came in today for a PT/INR check. Pt states she is taking   Coumadin 2.5 mg 1 tab PO Monday-Saturday  2 tabs PO Sunday  PT-24.3  INR-2.0  Per Dr. Platt Sender pt is to continue taking Coumadin with no changes and return in 1 month for another PT/INR check.      Rosa Kwan

## 2019-12-10 ENCOUNTER — HOSPITAL ENCOUNTER (OUTPATIENT)
Dept: NUTRITION | Age: 64
Discharge: HOME | End: 2019-12-10
Payer: MEDICARE

## 2019-12-10 PROCEDURE — 97803 MED NUTRITION INDIV SUBSEQ: CPT | Performed by: DIETITIAN, REGISTERED

## 2019-12-10 NOTE — PROGRESS NOTES
NUTRITION  FOLLOW-UP TREATMENT NOTE  Patient Name: Arslan Huang         Date: 12/10/2019  : 1955    YES Patient  Verified  Diagnosis: obesity, pre-diabetes   In time:   1:30pm             Out time:   2:00pm   Total Treatment Time (min):   30     SUBJECTIVE/ASSESSMENT    Changes in medication or medical history? Any new allergies, surgeries or procedures?    /NO    If yes, update Summary List   Pt has returned for follow up. She feels she is eating as healthy as she can. She continues to use low purines list. Does not want to use gout medication at this time. She feels she is doing better with avoiding sweets, but  is still bringing them into the house. Discussed use of 1 meal replacement shake as desired as does not have an appetite often to eat 3 full meals per day. Set goals for streting daily. Not started back with  yet. She felt she did well with thanksgiving to enjoy but not go overboard. Discussed options for including sweet treats and other special items into her diet instead of fully restricting. She expressed comprehension, high motivation, and compliance is expected. Current Wt: 185.6 Previous Wt: 186. 6 Wt Change: -1     Achievement of Goals: Improved. continued-choose from low purines list  Met. contiued-limit seafood to 2oz until symptoms resolve  Not discussed today.-check blood sugar before and 4 hrs after. If returns to previous, able to handle carbohydrate amount  Not met.  Difficult to remember-keep food log with pictures and bring to next session  Inconsistent based on pain and energy-continue exercise as able         Patient Education:  [x]  Review current plan with patient   []  Other:    Handouts/  Information Provided: []  Carbohydrates  []  Protein  []  Fiber  []  Serving Sizes  []  Fluids  []  General guidelines []  Diabetes  []  Cholesterol  []  Sodium  []  SBGM  []  Food Journals  []  Others:      New Patient Goals: -use meal replacement shake for 1 meal per day as desired.  Other 2 meals should still have 1-2 oz of protein at them  -stretching exercises each day  -treat yourself either 1 time per month with smaller portion OR make a healthier dessert type item built in to your day      PLAN    [x]  Continue on current plan []  Follow-up PRN   []  Discharge due to :    [x]  Next appt: 4-6 weeks     Dietitian: Scarlet Wilde MS RD    Date: 12/10/2019 Time: 3:40 PM

## 2019-12-20 ENCOUNTER — CLINICAL SUPPORT (OUTPATIENT)
Dept: INTERNAL MEDICINE CLINIC | Age: 64
End: 2019-12-20

## 2019-12-20 DIAGNOSIS — I73.9 PERIPHERAL VASCULAR DISEASE (HCC): Primary | ICD-10-CM

## 2019-12-20 LAB
INR BLD: 2.1
PT POC: 25.4 SECONDS
VALID INTERNAL CONTROL?: YES

## 2019-12-20 NOTE — PROGRESS NOTES
Chief Complaint   Patient presents with    Coagulation disorder     Patient in office for pt/inr check. Patient states that she is Coumadin 2.5 mg tabs; 1 tab daily, except Sunday she takes 2 tabs. Results for orders placed or performed in visit on 12/20/19   AMB POC PT/INR   Result Value Ref Range    VALID INTERNAL CONTROL POC Yes     Prothrombin time (POC) 25.4 seconds    INR POC 2.1      Per Dr. Elena Herrera, no changes and return to office in one month for pt/inr check.

## 2020-01-19 RX ORDER — BISOPROLOL FUMARATE AND HYDROCHLOROTHIAZIDE 10; 6.25 MG/1; MG/1
TABLET ORAL
Qty: 90 TAB | Refills: 4 | Status: SHIPPED | OUTPATIENT
Start: 2020-01-19 | End: 2021-02-01

## 2020-01-20 ENCOUNTER — CLINICAL SUPPORT (OUTPATIENT)
Dept: INTERNAL MEDICINE CLINIC | Age: 65
End: 2020-01-20

## 2020-01-20 ENCOUNTER — HOSPITAL ENCOUNTER (OUTPATIENT)
Dept: NUTRITION | Age: 65
Discharge: HOME | End: 2020-01-20
Payer: MEDICARE

## 2020-01-20 DIAGNOSIS — Z79.01 ENCOUNTER FOR MONITORING COUMADIN THERAPY: Primary | ICD-10-CM

## 2020-01-20 DIAGNOSIS — Z51.81 ENCOUNTER FOR MONITORING COUMADIN THERAPY: Primary | ICD-10-CM

## 2020-01-20 LAB
INR BLD: 2.4
PT POC: 28.4 SECONDS
VALID INTERNAL CONTROL?: YES

## 2020-01-20 PROCEDURE — 97803 MED NUTRITION INDIV SUBSEQ: CPT | Performed by: DIETITIAN, REGISTERED

## 2020-01-20 NOTE — PROGRESS NOTES
Pt came in today for a PT/INR check. Pt states she is currently taking   Coumadin 2.5 mg 2 tabs PO Sundays  1 tab PO Monday-Saturday  PT-28.4  INR-2.4  Per Dr. Dugan Form pt is to continue taking Coumadin as directed above with no change and return in 1 month for another PT/INR check.      Deana Ferreira

## 2020-01-21 NOTE — PROGRESS NOTES
NUTRITION  FOLLOW-UP TREATMENT NOTE  Patient Name: Dwight Dominguez         Date: 2020  : 1955    YES Patient  Verified  Diagnosis: obesity, pre-diabetes   In time:  11:00am           Out time:   11:30am   Total Treatment Time (min):   30     SUBJECTIVE/ASSESSMENT    Changes in medication or medical history? Any new allergies, surgeries or procedures?    /NO    If yes, update Summary List   Pt has returned for follow up. She has been mindful of snacking, removing chips from house when able.  brings some home still. She has made room for small portions of treats about 1 time per month to decrease feeling of restriction. Doing exercise at home more frequently with family help. Now walking stairs and some around house. Difficult with cold weather. Reviewed anti-inflammatory foods, gout foods, and options for using protein shakes. She is proud of choices made over adrian holiday. Provided support. She expressed comprehension, high motivation, and compliance is expected. Current Wt: 183.8 Previous Wt: 185. 6 Wt Change: -1.8     Achievement of Goals: Met. Continued. -use meal replacement shake for 1 meal per day as desired. Other 2 meals should still have 1-2 oz of protein at them  Inconsistent. -stretching exercises each day  Met. Continued. -treat yourself either 1 time per month with smaller portion OR make a healthier dessert type item built in to your day          Patient Education:  [x]  Review current plan with patient   []  Other:    Handouts/  Information Provided: []  Carbohydrates  []  Protein  []  Fiber  []  Serving Sizes  []  Fluids  []  General guidelines []  Diabetes  []  Cholesterol  []  Sodium  []  SBGM  []  Food Journals  [x]  Others: anti-inflammatory foods      New Patient Goals: -exercise: increase by walking to get mail, walking stairs, or stretching minimum of 2 times per week  -add anti-inflammatory foods to diet (list provided).  Replace coffee with green tea every other day  -continue portion control      PLAN    [x]  Continue on current plan []  Follow-up PRN   []  Discharge due to :    [x]  Next appt: 4-6 weeks     Dietitian: Kathy Portillo MS RD    Date: 1/20/2020 Time: 7:34 PM

## 2020-01-26 RX ORDER — TRAZODONE HYDROCHLORIDE 50 MG/1
TABLET ORAL
Qty: 90 TAB | Refills: 4 | Status: SHIPPED | OUTPATIENT
Start: 2020-01-26

## 2020-02-11 ENCOUNTER — OFFICE VISIT (OUTPATIENT)
Dept: INTERNAL MEDICINE CLINIC | Age: 65
End: 2020-02-11

## 2020-02-11 VITALS — WEIGHT: 185.5 LBS | BODY MASS INDEX: 34.14 KG/M2 | HEIGHT: 62 IN

## 2020-02-11 DIAGNOSIS — E66.9 OBESITY, UNSPECIFIED CLASSIFICATION, UNSPECIFIED OBESITY TYPE, UNSPECIFIED WHETHER SERIOUS COMORBIDITY PRESENT: ICD-10-CM

## 2020-02-11 DIAGNOSIS — R73.03 PREDIABETES: ICD-10-CM

## 2020-02-11 DIAGNOSIS — Z00.00 PREVENTATIVE HEALTH CARE: ICD-10-CM

## 2020-02-11 DIAGNOSIS — I10 ESSENTIAL HYPERTENSION: ICD-10-CM

## 2020-02-11 DIAGNOSIS — M1A.0790 IDIOPATHIC CHRONIC GOUT OF FOOT WITHOUT TOPHUS, UNSPECIFIED LATERALITY: ICD-10-CM

## 2020-02-11 DIAGNOSIS — I73.9 PERIPHERAL VASCULAR DISEASE (HCC): ICD-10-CM

## 2020-02-11 DIAGNOSIS — K58.9 IRRITABLE BOWEL SYNDROME WITHOUT DIARRHEA: ICD-10-CM

## 2020-02-11 DIAGNOSIS — Z51.81 ENCOUNTER FOR MONITORING COUMADIN THERAPY: Primary | ICD-10-CM

## 2020-02-11 DIAGNOSIS — K21.9 GASTROESOPHAGEAL REFLUX DISEASE WITHOUT ESOPHAGITIS: ICD-10-CM

## 2020-02-11 DIAGNOSIS — M19.042 PRIMARY OSTEOARTHRITIS OF LEFT HAND: ICD-10-CM

## 2020-02-11 DIAGNOSIS — E78.5 DYSLIPIDEMIA: ICD-10-CM

## 2020-02-11 DIAGNOSIS — M79.7 FIBROMYALGIA: ICD-10-CM

## 2020-02-11 DIAGNOSIS — Z79.01 ENCOUNTER FOR MONITORING COUMADIN THERAPY: Primary | ICD-10-CM

## 2020-02-11 LAB
INR BLD: 3.1
PT POC: 36.9 SECONDS
VALID INTERNAL CONTROL?: YES

## 2020-02-11 NOTE — PROGRESS NOTES
1. Have you been to the ER, urgent care clinic since your last visit? Hospitalized since your last visit? No    2. Have you seen or consulted any other health care providers outside of the [address] [place] [address] since your last visit? Include any pap smears or colon screening.  No     Wants to discuss frequent urination  Requesting a hemoglobin a1 c

## 2020-02-11 NOTE — PROGRESS NOTES
SPORTS MEDICINE AND PRIMARY CARE  Tiara Glynn MD, [address] [address]  Phone:  [phone]  Fax: [phone]       Chief Complaint   Patient presents with    Hypertension   . SUBJECTIVE:    Aamir Barrios is a 75 y.o. female Patient returns today requesting an annual wellness visit. This is through her private insurance, not through her Medicare, she tells me. We recall that she has a known history of pulmonary embolus, prediabetes, peripheral vascular disease, obesity, irritable bowel syndrome, primary hypertension, gout, GERD, fibromyalgia, dyslipidemia, DJD, and is seen for evaluation. New complaints are denied. Current Outpatient Medications   Medication Sig Dispense Refill    traZODone (DESYREL) 50 mg tablet TAKE 1 TABLET NIGHTLY AS NEEDED FOR SLEEP 90 Tab 4    bisoprolol-hydroCHLOROthiazide (ZIAC) 10-6.25 mg per tablet TAKE 1 TABLET DAILY 90 Tab 4    amitriptyline (ELAVIL) 10 mg tablet Take  by mouth nightly.  amLODIPine (NORVASC) 10 mg tablet TAKE 1 TABLET DAILY 90 Tab 2    warfarin (COUMADIN) 2.5 mg tablet 1 to 2 tabs daily 180 Tab 11    lidocaine (LIDODERM) 5 % Apply patch to the affected area for 12 hours a day and remove for 12 hours a day. DX.M79.7 80 Each 3    warfarin (COUMADIN) 5 mg tablet TAKE ONE TABLET BY MOUTH ONCE DAILY  3    LACTOBACILLUS ACIDOPHILUS (PROBIOTIC PO) Take 1 Cap by mouth as needed.  OTHER [place] sore muscle with cayenne and ginger topical cream as needed. OTC for pain.  CHOLECALCIFEROL, VITAMIN D3, (VITAMIN D3 PO) Take  by mouth. Liquid form. 400 units per 4 drops. Takes 4 drops po once daily.  carica papaya (PAPAYA ENZYME) tab Take  by mouth. Takes 4 tabs po 1-2 times daily after meals.       OT ULTRA/FASTTRACK CONTROL soln       ONETOUCH ULTRA2 monitoring kit       ONE TOUCH DELICA 33 gauge misc       MICROLET LANCET misc USE TO TEST BLOOD SUGAR EVERY  Each 11    CONTOUR NEXT STRIPS strip USE TO TEST BLOOD SUGARS ONCE DAILY 50 Strip 11    cyanocobalamin (VITAMIN B-12) 1,000 mcg tablet Take 1,000 mcg by mouth as needed.  DEXILANT 60 mg CpDB capsule (delayed release)        Past Medical History:   Diagnosis Date    Adverse effect of anesthesia     \"hard time waking me up\"    Axillary pain, right     Bereavement 2019    77 yo - heart attack -  in sleep    Chronic pain     d/t fibromyalgia    Coagulation disorder [address] [address]     on eliquis -d/c due to gi upset - now on coumadin previously followed by dr. Aguilar Right Depression with anxiety     DJD (degenerative joint disease)     Dyslipidemia     Encounter for Hemoccult screening 2017    neg    Fibromyalgia     Gait instability     GERD (gastroesophageal reflux disease)     Hypertension     IBS (irritable bowel syndrome)     Ill-defined condition     gastroparesis    Ill-defined condition     CT - 3/18/2018 - esophageal diverticulum    Ill-defined condition     pericarditis    Mastodynia, left greater than right 2011    Normal cardiac stress test 3.14    Positive D dimer 9-23-15    Prediabetes     Pulmonary embolism ([facility] [address] 2013    rll    Restless leg syndrome     S/P colonoscopy 2011    kenny hernandez md    Saphenous vein occlusion, right 2017    [facility] acute occlusive superificial vein thrombosis - Melody Dyer md    Saphenous vein thrombophlebitis, right 2018    and acute l posterior tibial vein - this is the 2nd DVT putting her on lifelong anticoagualtion / Devorah Barboza    Sleep apnea     cpap 9cm    SOB (shortness of breath)     White coat hypertension      Past Surgical History:   Procedure Laterality Date    ABDOMEN SURGERY PROC UNLISTED      exploratory years ago   [address]      left breast [id number]. Edy Desir    COLONOSCOPY N/A 4/10/2018    COLONOSCOPY,EGD performed by Anna Peters MD at [address] HX BREAST BIOPSY Left     HX COLONOSCOPY      HX ORTHOPAEDIC  2009    foot surgery-left - bunionectomy    HX ORTHOPAEDIC      cyst removed from left hand - ganglion cyst    HX OTHER SURGICAL      right and left leg muscle biopsies - elevated CPK - muscle enzymes were elevated    HX TUBAL LIGATION       Allergies   Allergen Reactions    Aleve [Naproxen Sodium] Hoarseness    Dilaudid [Hydromorphone (Bulk)] Anaphylaxis     Respiratory arrest and throat closes    Diovan [Valsartan] Palpitations    Morphine Other (comments)     Patch-vomiting,weakness, fainting    Sulfa (Sulfonamide Antibiotics) Hives, Rash and Other (comments)     fainting         REVIEW OF SYSTEMS:  General: negative for - chills or fever  ENT: negative for - headaches, nasal congestion or tinnitus  Respiratory: negative for - cough, hemoptysis, shortness of breath or wheezing  Cardiovascular : negative for - chest pain, edema, palpitations or shortness of breath  Gastrointestinal: negative for - abdominal pain, blood in stools, heartburn or nausea/vomiting  Genito-Urinary: no dysuria, trouble voiding, or hematuria  Musculoskeletal: negative for - gait disturbance, joint pain, joint stiffness or joint swelling  Neurological: no TIA or stroke symptoms  Hematologic: no bruises, no bleeding, no swollen glands  Integument: no lumps, mole changes, nail changes or rash  Endocrine: no malaise/lethargy or unexpected weight changes      Social History     Socioeconomic History    Marital status:      Spouse name: Not on file    Number of children: Not on file    Years of education: Not on file    Highest education level: Not on file   Tobacco Use    Smoking status: Never Smoker    Smokeless tobacco: Never Used   Substance and Sexual Activity    Alcohol use: No    Drug use: No    Sexual activity: Yes     Partners: Male     Birth control/protection: None   Social History Narrative         Family History:  Mother: alive 80 yrs, High Blood Pressure, cva with cognitive deficitsFather:  36 yrs, myocardial infarctionSister(s): aliveBrother(s): unknow n2    sister(s) . 40 daughter no choildren -  walked out works for board of directors for [employer] . Social History: Alcohol Use Patient does not use alcohol. Smoking Status Patient is a never smoker. Caffeine: occasional. Drugs:    prescription narcotics. Diet: Regular. Exercise: None. Marital Status: . Lives w ith: spouse. Children: children. Scientology: Geoffery Jacquie. Patient is  living w ith her  she is on disability related to her fibromyositis. Family History   Problem Relation Age of Onset    Hypertension Mother     Kidney Disease Mother         1 kidney    Heart Disease Father     Heart Attack Father         late 35s or early 45s    Heart Attack Maternal Grandmother     Cancer Maternal Grandfather         ? lung or stomach       OBJECTIVE:    Visit Vitals  Ht 5' 2\" (1.575 m)   Wt 185 lb 8 oz (84.1 kg)   BMI 33.93 kg/m²     CONSTITUTIONAL: well , well nourished, appears age appropriate  EYES: perrla, eom intact  ENMT:moist mucous membranes, pharynx clear  NECK: supple. Thyroid normal  RESPIRATORY: Chest: clear bilaterally   CARDIOVASCULAR: Heart: regular rate and rhythm  GASTROINTESTINAL: Abdomen: soft, bowel sounds active  HEMATOLOGIC: no pathological lymph nodes palpated  MUSCULOSKELETAL: Extremities: no edema, pulse 1+   INTEGUMENT: No unusual rashes or suspicious skin lesions noted. Nails appear normal.  NEUROLOGIC: non-focal exam   MENTAL STATUS: alert and oriented, appropriate affect           ASSESSMENT:  1. Encounter for monitoring Coumadin therapy    2. Essential hypertension    3. Gastroesophageal reflux disease without esophagitis    4. Idiopathic chronic gout of foot without tophus, unspecified laterality    5. Fibromyalgia    6. Dyslipidemia    7. Primary osteoarthritis of left hand    8. Peripheral vascular disease ([address]    9. Prediabetes    10. Obesity, unspecified classification, unspecified obesity type, unspecified whether serious comorbidity present    11. Irritable bowel syndrome without diarrhea    12. Preventative health care      So this visit will constitute a preventive healthcare wellness visit. We encourage physical activity, as well as a heart healthy, weight reducing diet. Blood pressure control is noted and at home it is at goal.    She complains of urinary symptoms, for which we check a UA and urine C&S. Fibromyalgia discomfort has changed little. INR is slightly above therapeutic. We will decrease Coumadin to .5 mg daily. She will be back in one month for a P-T check, three or four months to see me. I have discussed the diagnosis with the patient and the intended plan as seen in the  orders above. The patient understands and agees with the plan. The patient has   received an after visit summary and questions were answered concerning  future plans  Patient labs and/or xrays were reviewed  Past records were reviewed. PLAN:  .  Orders Placed This Encounter    UA WITH REFLEX MICRO AND CULTURE    AMB POC PT/INR       Follow-up and Dispositions    · Return in about 4 weeks (around 3/10/2020) for pt/inr, bp check. ATTENTION:   This medical record was transcribed using an electronic medical records system. Although proofread, it may and can contain electronic and spelling errors. Other human spelling and other errors may be present. Corrections may be executed at a later time. Please feel free to contact us for any clarifications as needed.

## 2020-03-11 ENCOUNTER — CLINICAL SUPPORT (OUTPATIENT)
Dept: INTERNAL MEDICINE CLINIC | Age: 65
End: 2020-03-11

## 2020-03-11 DIAGNOSIS — Z51.81 ENCOUNTER FOR MONITORING COUMADIN THERAPY: Primary | ICD-10-CM

## 2020-03-11 DIAGNOSIS — I10 ESSENTIAL HYPERTENSION, BENIGN: ICD-10-CM

## 2020-03-11 DIAGNOSIS — Z79.01 ENCOUNTER FOR MONITORING COUMADIN THERAPY: Primary | ICD-10-CM

## 2020-03-11 LAB
INR BLD: 2.1
PT POC: 25.6 SECONDS
VALID INTERNAL CONTROL?: YES

## 2020-03-11 NOTE — PROGRESS NOTES
Pt came in today for a PT/INR check. Pt states she is currently taking   Coumadin 2.5 mg 1 tab PO daily  PT-25.6  INR-2.1  Per Dr. Luba Khan pt is continue taking Coumadin as directed above with no change and return in 1 month for another PT/INR check.      Shell Ray

## 2020-03-12 LAB
APPEARANCE UR: CLEAR
BACTERIA #/AREA URNS HPF: NORMAL /[HPF]
BILIRUB UR QL STRIP: NEGATIVE
CASTS URNS QL MICRO: NORMAL /LPF
COLOR UR: YELLOW
EPI CELLS #/AREA URNS HPF: NORMAL /HPF (ref 0–10)
GLUCOSE UR QL: NEGATIVE
HGB UR QL STRIP: NEGATIVE
KETONES UR QL STRIP: NEGATIVE
LEUKOCYTE ESTERASE UR QL STRIP: NEGATIVE
MICRO URNS: ABNORMAL
MUCOUS THREADS URNS QL MICRO: PRESENT
NITRITE UR QL STRIP: NEGATIVE
PH UR STRIP: 5.5 [PH] (ref 5–7.5)
PROT UR QL STRIP: ABNORMAL
RBC #/AREA URNS HPF: NORMAL /HPF (ref 0–2)
SP GR UR: 1.02 (ref 1–1.03)
URINALYSIS REFLEX, 377202: ABNORMAL
UROBILINOGEN UR STRIP-MCNC: 0.2 MG/DL (ref 0.2–1)
WBC #/AREA URNS HPF: NORMAL /HPF (ref 0–5)

## 2020-03-16 DIAGNOSIS — I10 ESSENTIAL HYPERTENSION: ICD-10-CM

## 2020-03-16 RX ORDER — AMLODIPINE BESYLATE 10 MG/1
TABLET ORAL
Qty: 90 TAB | Refills: 2 | Status: SHIPPED | OUTPATIENT
Start: 2020-03-16 | End: 2021-02-01

## 2020-03-18 RX ORDER — WARFARIN 2.5 MG/1
TABLET ORAL
Qty: 180 TAB | Refills: 3 | Status: SHIPPED | OUTPATIENT
Start: 2020-03-18 | End: 2021-05-03

## 2020-04-08 ENCOUNTER — OFFICE VISIT (OUTPATIENT)
Dept: INTERNAL MEDICINE CLINIC | Age: 65
End: 2020-04-08

## 2020-04-08 VITALS
OXYGEN SATURATION: 96 % | BODY MASS INDEX: 33.8 KG/M2 | TEMPERATURE: 98.1 F | HEIGHT: 62 IN | DIASTOLIC BLOOD PRESSURE: 78 MMHG | HEART RATE: 61 BPM | RESPIRATION RATE: 16 BRPM | SYSTOLIC BLOOD PRESSURE: 151 MMHG | WEIGHT: 183.7 LBS

## 2020-04-08 DIAGNOSIS — I27.82 CHRONIC PULMONARY EMBOLISM WITHOUT ACUTE COR PULMONALE, UNSPECIFIED PULMONARY EMBOLISM TYPE (HCC): Primary | ICD-10-CM

## 2020-04-08 DIAGNOSIS — K58.9 IRRITABLE BOWEL SYNDROME WITHOUT DIARRHEA: ICD-10-CM

## 2020-04-08 DIAGNOSIS — R73.03 PREDIABETES: ICD-10-CM

## 2020-04-08 DIAGNOSIS — I73.9 PERIPHERAL VASCULAR DISEASE (HCC): ICD-10-CM

## 2020-04-08 DIAGNOSIS — E66.9 OBESITY, UNSPECIFIED CLASSIFICATION, UNSPECIFIED OBESITY TYPE, UNSPECIFIED WHETHER SERIOUS COMORBIDITY PRESENT: ICD-10-CM

## 2020-04-08 DIAGNOSIS — M19.042 PRIMARY OSTEOARTHRITIS OF LEFT HAND: ICD-10-CM

## 2020-04-08 DIAGNOSIS — R26.81 GAIT INSTABILITY: ICD-10-CM

## 2020-04-08 DIAGNOSIS — K21.9 GASTROESOPHAGEAL REFLUX DISEASE WITHOUT ESOPHAGITIS: ICD-10-CM

## 2020-04-08 DIAGNOSIS — E78.5 DYSLIPIDEMIA: ICD-10-CM

## 2020-04-08 DIAGNOSIS — I10 ESSENTIAL HYPERTENSION: ICD-10-CM

## 2020-04-08 DIAGNOSIS — M1A.0790 IDIOPATHIC CHRONIC GOUT OF FOOT WITHOUT TOPHUS, UNSPECIFIED LATERALITY: ICD-10-CM

## 2020-04-08 DIAGNOSIS — M79.7 FIBROMYALGIA: ICD-10-CM

## 2020-04-08 LAB
INR BLD: 1.8
PT POC: 22 SECONDS
VALID INTERNAL CONTROL?: YES

## 2020-04-08 NOTE — ASSESSMENT & PLAN NOTE
This condition is managed by Specialist.  Key Peripheral Vascular Disease Meds             warfarin (COUMADIN) 2.5 mg tablet (Taking) 1 to 2 tabs daily    warfarin (COUMADIN) 5 mg tablet (Taking) TAKE ONE TABLET BY MOUTH ONCE DAILY        Lab Results   Component Value Date/Time    WBC 5.3 09/05/2019 10:57 AM    HGB 14.3 09/05/2019 10:57 AM    HCT 42.8 09/05/2019 10:57 AM    PLATELET 577 22/03/2050 10:57 AM    Creatinine 1.00 09/05/2019 10:57 AM    Creatinine (POC) 1.0 09/05/2019 04:09 PM    BUN 19 09/05/2019 10:57 AM    INR 1.6 11/07/2018 01:20 PM    INR POC 1.8 04/08/2020 10:28 AM    Prothrombin time 15.7 11/07/2018 01:20 PM    Cholesterol, total 234 09/05/2019 10:57 AM    HDL Cholesterol 59 09/05/2019 10:57 AM    LDL, calculated 148 09/05/2019 10:57 AM    Triglyceride 133 09/05/2019 10:57 AM

## 2020-04-08 NOTE — PROGRESS NOTES
SPORTS MEDICINE AND PRIMARY CARE  Carolee Sears MD, [address] [address]  Phone:  [phone]  Fax: [phone]       Chief Complaint   Patient presents with    Anticoagulation     Patient states that she is taking Coumadin 2.5 mg daily. .      SUBJECTIVE:    Samir Solo is a 75 y.o. female Patient is seen today with a known history of saphenous vein thrombophlebitis, normal cardiac stress test, pulmonary embolism, peripheral vascular disease, DJD, obesity, gout, dyslipidemia, gait instability, irritable bowel syndrome, prediabetes, primary hypertension, GERD, fibromyalgia, and is seen for evaluation. She states her pains are about the same. She is taking Coumadin as directed. She is following the recommendations for the coronavirus outbreak. Other new complaints denied, other than her father who is 78years old has colon cancer and metastasis to the liver, and she wonders if she can see him. Current Outpatient Medications   Medication Sig Dispense Refill    warfarin (COUMADIN) 2.5 mg tablet 1 to 2 tabs daily 180 Tab 3    amLODIPine (NORVASC) 10 mg tablet Take 1 tablet daily. 90 Tab 2    traZODone (DESYREL) 50 mg tablet TAKE 1 TABLET NIGHTLY AS NEEDED FOR SLEEP 90 Tab 4    bisoprolol-hydroCHLOROthiazide (ZIAC) 10-6.25 mg per tablet TAKE 1 TABLET DAILY 90 Tab 4    amitriptyline (ELAVIL) 10 mg tablet Take  by mouth nightly.  DEXILANT 60 mg CpDB capsule (delayed release)       lidocaine (LIDODERM) 5 % Apply patch to the affected area for 12 hours a day and remove for 12 hours a day. DX.M79.7 80 Each 3    warfarin (COUMADIN) 5 mg tablet TAKE ONE TABLET BY MOUTH ONCE DAILY  3    LACTOBACILLUS ACIDOPHILUS (PROBIOTIC PO) Take 1 Cap by mouth as needed.  OTHER [place] sore muscle with cayenne and ginger topical cream as needed. OTC for pain.  CHOLECALCIFEROL, VITAMIN D3, (VITAMIN D3 PO) Take  by mouth. Liquid form. 400 units per 4 drops.  Takes 4 drops po once daily.  carica papaya (PAPAYA ENZYME) tab Take  by mouth. Takes 4 tabs po 1-2 times daily after meals.  OT ULTRA/FASTTRACK CONTROL soln       ONETOUCH ULTRA2 monitoring kit       ONE TOUCH DELICA 33 gauge misc       MICROLET LANCET misc USE TO TEST BLOOD SUGAR EVERY  Each 11    CONTOUR NEXT STRIPS strip USE TO TEST BLOOD SUGARS ONCE DAILY 50 Strip 11    cyanocobalamin (VITAMIN B-12) 1,000 mcg tablet Take 1,000 mcg by mouth as needed.        Past Medical History:   Diagnosis Date    Adverse effect of anesthesia     \"hard time waking me up\"    Axillary pain, right     Bereavement 2019    77 yo - heart attack -  in sleep    Chronic pain     d/t fibromyalgia    Coagulation disorder ([facility] [address]     on eliquis -d/c due to gi upset - now on coumadin previously followed by dr. Radha Kaba Depression with anxiety     DJD (degenerative joint disease)     Dyslipidemia     Encounter for Hemoccult screening 2017    neg    Fibromyalgia     Gait instability     GERD (gastroesophageal reflux disease)     Hypertension     IBS (irritable bowel syndrome)     Ill-defined condition     gastroparesis    Ill-defined condition     CT - 3/18/2018 - esophageal diverticulum    Ill-defined condition     pericarditis    Mastodynia, left greater than right 2011    Normal cardiac stress test 3.14    Positive D dimer 15    Prediabetes     Pulmonary embolism ([facility] [address] 2013    rll    Restless leg syndrome     S/P colonoscopy 2011    kenny hernandez md    Saphenous vein occlusion, right 2017    [facility] acute occlusive superificial vein thrombosis - Melody Dyer md    Saphenous vein thrombophlebitis, right 2018    and acute l posterior tibial vein - this is the 2nd DVT putting her on lifelong anticoagualtion / Bernardo Rich    Sleep apnea     cpap 9cm    SOB (shortness of breath)     White coat hypertension      Past Surgical History:   Procedure Laterality Date    ABDOMEN SURGERY PROC UNLISTED      exploratory years ago    BREAST SURGERY PROCEDURE UNLISTED  2006    left breast [id number]. Aylin Caal    COLONOSCOPY N/A 4/10/2018    COLONOSCOPY,EGD performed by [facility] at [facility]OSCOPY    HX BREAST BIOPSY Left     HX COLONOSCOPY      HX ORTHOPAEDIC  2009    foot surgery-left - bunionectomy    HX ORTHOPAEDIC      cyst removed from left hand - ganglion cyst    HX OTHER SURGICAL      right and left leg muscle biopsies - elevated CPK - muscle enzymes were elevated    HX TUBAL LIGATION       Allergies   Allergen Reactions    Aleve [Naproxen Sodium] Hoarseness    Dilaudid [Hydromorphone (Bulk)] Anaphylaxis     Respiratory arrest and throat closes    Diovan [Valsartan] Palpitations    Morphine Other (comments)     Patch-vomiting,weakness, fainting    Sulfa (Sulfonamide Antibiotics) Hives, Rash and Other (comments)     fainting         REVIEW OF SYSTEMS:  General: negative for - chills or fever  ENT: negative for - headaches, nasal congestion or tinnitus  Respiratory: negative for - cough, hemoptysis, shortness of breath or wheezing  Cardiovascular : negative for - chest pain, edema, palpitations or shortness of breath  Gastrointestinal: negative for - abdominal pain, blood in stools, heartburn or nausea/vomiting  Genito-Urinary: no dysuria, trouble voiding, or hematuria  Musculoskeletal: negative for - gait disturbance, joint pain, joint stiffness or joint swelling  Neurological: no TIA or stroke symptoms  Hematologic: no bruises, no bleeding, no swollen glands  Integument: no lumps, mole changes, nail changes or rash  Endocrine: no malaise/lethargy or unexpected weight changes      Social History     Socioeconomic History    Marital status:      Spouse name: Not on file    Number of children: Not on file    Years of education: Not on file    Highest education level: Not on file   Tobacco Use    Smoking status: Never Smoker    Smokeless tobacco: Never Used   Substance and Sexual Activity    Alcohol use: No    Drug use: No    Sexual activity: Yes     Partners: Male     Birth control/protection: None   Social History Narrative         Family History: Mother: alive 80 yrs, High Blood Pressure, cva with cognitive deficitsFather:  36 yrs, myocardial infarctionSister(s): aliveBrother(s): unknow n2    sister(s) . 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 Social History: Alcohol Use Patient does not use alcohol. Smoking Status Patient is a never smoker. Caffeine: occasional. Drugs:    prescription narcotics. Diet: Regular. Exercise: None. Marital Status: . Lives w ith: spouse. Children: children. Alevism: Donimaggie Schumacherfeaissatou. Patient is  living w ith her  she is on disability related to her fibromyositis. Family History   Problem Relation Age of Onset    Hypertension Mother     Kidney Disease Mother         1 kidney    Heart Disease Father     Heart Attack Father         late 35s or early 45s    Heart Attack Maternal Grandmother     Cancer Maternal Grandfather         ? lung or stomach       OBJECTIVE:    Visit Vitals  /78   Pulse 61   Temp 98.1 °F (36.7 °C) (Oral)   Resp 16   Ht 5' 2\" (1.575 m)   Wt 183 lb 11.2 oz (83.3 kg)   SpO2 96%   BMI 33.60 kg/m²     CONSTITUTIONAL: well , well nourished, appears age appropriate  EYES: perrla, eom intact  ENMT:moist mucous membranes, pharynx clear  NECK: supple. Thyroid normal  RESPIRATORY: Chest: clear bilaterally   CARDIOVASCULAR: Heart: regular rate and rhythm  GASTROINTESTINAL: Abdomen: soft, bowel sounds active  HEMATOLOGIC: no pathological lymph nodes palpated  MUSCULOSKELETAL: Extremities: no edema, pulse 1+   INTEGUMENT: No unusual rashes or suspicious skin lesions noted.  Nails appear normal.  NEUROLOGIC: non-focal exam   MENTAL STATUS: alert and oriented, appropriate affect           ASSESSMENT:  1. Chronic pulmonary embolism without acute cor pulmonale, unspecified pulmonary embolism type ([address]    2. Peripheral vascular disease ([address]    3. Idiopathic chronic gout of foot without tophus, unspecified laterality    4. Obesity, unspecified classification, unspecified obesity type, unspecified whether serious comorbidity present    5. Primary osteoarthritis of left hand    6. Dyslipidemia    7. Irritable bowel syndrome without diarrhea    8. Gait instability    9. Prediabetes    10. Essential hypertension    11. Gastroesophageal reflux disease without esophagitis    12. Fibromyalgia      COPD is stable. No increasing shortness of breath. She has peripheral vascular occlusive disease with no claudication and she remains asymptomatic. No recent gout attacks. Obesity remains a concern and we encourage a heart healthy, weight reducing diet. Since we last saw her she has lost 5 pounds and we congratulate her. The osteoarthritis of her hips is quiescent. She is not having any symptoms of IBS today. She continues to use a walker for gait. Repeat blood pressure is as noted above and is acceptable. Fibromyalgia is also quiescent. We give her appropriate precautions regarding coronavirus and things she needs to do personally. She receives and agrees with the plan. She will be back to see us in one month for P-T check. I have discussed the diagnosis with the patient and the intended plan as seen in the  orders above. The patient understands and agees with the plan. The patient has   received an after visit summary and questions were answered concerning  future plans  Patient labs and/or xrays were reviewed  Past records were reviewed. PLAN:  .  Orders Placed This Encounter    AMB POC PT/INR       Follow-up and Dispositions    · Return in about 4 weeks (around 5/6/2020) for pt/inr, bp check.                 ATTENTION:   This medical record was transcribed using an electronic medical records system. Although proofread, it may and can contain electronic and spelling errors. Other human spelling and other errors may be present. Corrections may be executed at a later time. Please feel free to contact us for any clarifications as needed.

## 2020-04-08 NOTE — PROGRESS NOTES
Chief Complaint   Patient presents with    Anticoagulation     Patient states that she is taking Coumadin 2.5 mg daily. 1. Have you been to the ER, urgent care clinic since your last visit? Hospitalized since your last visit? No    2. Have you seen or consulted any other health care providers outside of the [address] [place] [address] since your last visit? Include any pap smears or colon screening.  No    Results for orders placed or performed in visit on 04/08/20   AMB POC PT/INR   Result Value Ref Range    VALID INTERNAL CONTROL POC Yes     Prothrombin time (POC) 22.0 seconds    INR POC 1.8

## 2020-04-08 NOTE — ASSESSMENT & PLAN NOTE
Stable, based on history, physical exam and review of pertinent labs, studies and medications; meds reconciled; continue current treatment plan.   Key Peripheral Vascular Disease Meds             warfarin (COUMADIN) 2.5 mg tablet (Taking) 1 to 2 tabs daily    warfarin (COUMADIN) 5 mg tablet (Taking) TAKE ONE TABLET BY MOUTH ONCE DAILY        Lab Results   Component Value Date/Time    WBC 5.3 09/05/2019 10:57 AM    HGB 14.3 09/05/2019 10:57 AM    HCT 42.8 09/05/2019 10:57 AM    PLATELET 451 59/91/3461 10:57 AM    Creatinine 1.00 09/05/2019 10:57 AM    Creatinine (POC) 1.0 09/05/2019 04:09 PM    BUN 19 09/05/2019 10:57 AM    INR 1.6 11/07/2018 01:20 PM    INR POC 1.8 04/08/2020 10:28 AM    Prothrombin time 15.7 11/07/2018 01:20 PM    Cholesterol, total 234 09/05/2019 10:57 AM    HDL Cholesterol 59 09/05/2019 10:57 AM    LDL, calculated 148 09/05/2019 10:57 AM    Triglyceride 133 09/05/2019 10:57 AM

## 2020-05-13 ENCOUNTER — CLINICAL SUPPORT (OUTPATIENT)
Dept: INTERNAL MEDICINE CLINIC | Age: 65
End: 2020-05-13

## 2020-05-13 DIAGNOSIS — I27.82 CHRONIC PULMONARY EMBOLISM WITHOUT ACUTE COR PULMONALE, UNSPECIFIED PULMONARY EMBOLISM TYPE (HCC): Primary | ICD-10-CM

## 2020-05-13 LAB
INR BLD: 1.5
PT POC: 17.5 SECONDS
VALID INTERNAL CONTROL?: YES

## 2020-05-13 NOTE — PROGRESS NOTES
Osei Jasso is a 75 y.o. female who presents today for Anticoagulation monitoring. Current dose:  Coumadin 2.5 mg 1 tab PO daily. Medication Changes:  yes - coumadin 2.5 mg 1 tab PO everyday except on Sunday and Thursday 2 tabs PO  Dietary Changes:  no    Latest INR:  Results for orders placed or performed in visit on 05/13/20   AMB POC PT/INR   Result Value Ref Range    VALID INTERNAL CONTROL POC Yes     Prothrombin time (POC) 17.5 seconds    INR POC 1.5          New Coumadin dose:.orders as documented in EMR. Next check to be scheduled for  2 weeks.   Per Dr. Ever Kraus, DIANN

## 2020-05-27 ENCOUNTER — CLINICAL SUPPORT (OUTPATIENT)
Dept: INTERNAL MEDICINE CLINIC | Age: 65
End: 2020-05-27

## 2020-05-27 DIAGNOSIS — I27.82 CHRONIC PULMONARY EMBOLISM WITHOUT ACUTE COR PULMONALE, UNSPECIFIED PULMONARY EMBOLISM TYPE (HCC): Primary | ICD-10-CM

## 2020-05-27 LAB
INR BLD: 2.6
PT POC: 31.7 SECONDS
VALID INTERNAL CONTROL?: YES

## 2020-05-27 NOTE — PROGRESS NOTES
Linda Bedolla is a 75 y.o. female who presents today for Anticoagulation monitoring. Current dose:  Coumadin 2.5 mg 1 tab PO everyday except on Sunday and Thursday 2 tabs PO  Medication Changes:  no  Dietary Changes:  no    Latest INR:  Results for orders placed or performed in visit on 05/27/20   AMB POC PT/INR   Result Value Ref Range    VALID INTERNAL CONTROL POC Yes     Prothrombin time (POC) 31.7 seconds    INR POC 2.6          New Coumadin dose:.current treatment plan is effective, no change in therapy. Next check to be scheduled for  4 weeks.   Per Dr. Doc Tapia LPN

## 2020-06-24 ENCOUNTER — CLINICAL SUPPORT (OUTPATIENT)
Dept: INTERNAL MEDICINE CLINIC | Age: 65
End: 2020-06-24

## 2020-06-24 DIAGNOSIS — Z79.01 ANTICOAGULATED ON COUMADIN: Primary | ICD-10-CM

## 2020-06-24 LAB
INR BLD: 2.8
PT POC: 33.4 SECONDS
VALID INTERNAL CONTROL?: YES

## 2020-06-24 NOTE — PROGRESS NOTES
Lux Gaffney is a 75 y.o. female who presents today for Anticoagulation monitoring. Current dose:  Coumadin 2.5 mg 1 tab po everyday except on sun & Thur 2 tabs PO  Medication Changes:  no  Dietary Changes:  no    Latest INR:  Results for orders placed or performed in visit on 06/24/20   AMB POC PT/INR   Result Value Ref Range    VALID INTERNAL CONTROL POC Yes     Prothrombin time (POC) 33.4 seconds    INR POC 2.8          New Coumadin dose:.current treatment plan is effective, no change in therapy. Next check to be scheduled for  4 weeks.   Per Rashawn Freed LPN

## 2020-07-22 ENCOUNTER — CLINICAL SUPPORT (OUTPATIENT)
Dept: INTERNAL MEDICINE CLINIC | Age: 65
End: 2020-07-22

## 2020-07-22 DIAGNOSIS — I27.82 CHRONIC PULMONARY EMBOLISM WITHOUT ACUTE COR PULMONALE, UNSPECIFIED PULMONARY EMBOLISM TYPE (HCC): Primary | ICD-10-CM

## 2020-07-22 LAB
INR BLD: 3
PT POC: 36.3 SECONDS
VALID INTERNAL CONTROL?: YES

## 2020-07-22 NOTE — PROGRESS NOTES
Follow up for anticoagulation. Indication: Pt advised to follow up in 1 month per Dr. Charity Thayer. Current medication: Warfarin: 5 mg Sun & Thurs and 2.5 mg Mon, Tues, Wed, Fri and Saturday.    Exam: Pt presents for Pt/Inr check  INR: 3.0  PT:36.3  Dose adjustment: None

## 2020-08-06 ENCOUNTER — VIRTUAL VISIT (OUTPATIENT)
Dept: SLEEP MEDICINE | Age: 65
End: 2020-08-06
Payer: MEDICARE

## 2020-08-06 ENCOUNTER — DOCUMENTATION ONLY (OUTPATIENT)
Dept: SLEEP MEDICINE | Age: 65
End: 2020-08-06

## 2020-08-06 DIAGNOSIS — G47.33 OBSTRUCTIVE SLEEP APNEA (ADULT) (PEDIATRIC): Primary | ICD-10-CM

## 2020-08-06 PROCEDURE — 1090F PRES/ABSN URINE INCON ASSESS: CPT | Performed by: INTERNAL MEDICINE

## 2020-08-06 PROCEDURE — 99213 OFFICE O/P EST LOW 20 MIN: CPT | Performed by: INTERNAL MEDICINE

## 2020-08-06 PROCEDURE — 3017F COLORECTAL CA SCREEN DOC REV: CPT | Performed by: INTERNAL MEDICINE

## 2020-08-06 PROCEDURE — G9899 SCRN MAM PERF RSLTS DOC: HCPCS | Performed by: INTERNAL MEDICINE

## 2020-08-06 PROCEDURE — G8756 NO BP MEASURE DOC: HCPCS | Performed by: INTERNAL MEDICINE

## 2020-08-06 PROCEDURE — G8432 DEP SCR NOT DOC, RNG: HCPCS | Performed by: INTERNAL MEDICINE

## 2020-08-06 PROCEDURE — 1101F PT FALLS ASSESS-DOCD LE1/YR: CPT | Performed by: INTERNAL MEDICINE

## 2020-08-06 PROCEDURE — G8399 PT W/DXA RESULTS DOCUMENT: HCPCS | Performed by: INTERNAL MEDICINE

## 2020-08-06 PROCEDURE — G8427 DOCREV CUR MEDS BY ELIG CLIN: HCPCS | Performed by: INTERNAL MEDICINE

## 2020-08-06 NOTE — PATIENT INSTRUCTIONS
217 [facility] [place] 1668 [facility], [address] Tel.  [phone] Fax. 100 [facility] 60 [facility], [address] Tel.  [phone] Fax. [phone] [address] [place] Tel.  [phone] Fax. [phone] PROPER SLEEP HYGIENE What to avoid · Do not have drinks with caffeine, such as coffee or black tea, for 8 hours before bed. · Do not smoke or use other types of tobacco near bedtime. Nicotine is a stimulant and can keep you awake. · Avoid drinking alcohol late in the evening, because it can cause you to wake in the middle of the night. · Do not eat a big meal close to bedtime. If you are hungry, eat a light snack. · Do not drink a lot of water close to bedtime, because the need to urinate may wake you up during the night. · Do not read or watch TV in bed. Use the bed only for sleeping and sexual activity. What to try · Go to bed at the same time every night, and wake up at the same time every morning. Do not take naps during the day. · Keep your bedroom quiet, dark, and cool. · Get regular exercise, but not within 3 to 4 hours of your bedtime. Rahat Cast · Sleep on a comfortable pillow and mattress. · If watching the clock makes you anxious, turn it facing away from you so you cannot see the time. · If you worry when you lie down, start a worry book. Well before bedtime, write down your worries, and then set the book and your concerns aside. · Try meditation or other relaxation techniques before you go to bed. · If you cannot fall asleep, get up and go to another room until you feel sleepy. Do something relaxing. Repeat your bedtime routine before you go to bed again. · Make your house quiet and calm about an hour before bedtime. Turn down the lights, turn off the TV, log off the computer, and turn down the volume on music. This can help you relax after a busy day. Drowsy Driving [employer] cites drowsiness as a causing factor in more than 285,684 police reported crashes annually, resulting in 76,000 injuries and 1,500 deaths. Other surveys suggest 55% of people polled have driven while drowsy in the past year, 23% had fallen asleep but not crashed, 3% crashed, and 2% had and accident due to drowsy driving. Who is at risk? Young Drivers: One study of drowsy driving accidents states that 55% of the drivers were under 25 years. Of those, 75% were male. Shift Workers and Travelers: People who work overnight or travel across time zones frequently are at higher risk of experiencing Circadian Rhythm Disorders. They are trying to work and function when their body is programed to sleep. Sleep Deprived: Lack of sleep has a serious impact on your ability to pay attention or focus on a task. Consistently getting less than the average of 8 hours your body needs creates partial or cumulative sleep deprivation. Untreated Sleep Disorders: Sleep Apnea, Narcolepsy, R.L.S., and other sleep disorders (untreated) prevent a person from getting enough restful sleep. This leads to excessive daytime sleepiness and increases the risk for drowsy driving accidents by up to 7 times. Medications / Alcohol: Even over the counter medications can cause drowsiness. Medications that impair a drivers attention should have a warning label. Alcohol naturally makes you sleepy and on its own can cause accidents. Combined with excessive drowsiness its effects are amplified. Signs of Drowsy Driving: * You don't remember driving the last few miles * You may drift out of your abrahan * You are unable to focus and your thoughts wander * You may yawn more often than normal 
 * You have difficulty keeping your eyes open / nodding off * Missing traffic signs, speeding, or tailgating Prevention-  
Good sleep hygiene, lifestyle and behavioral choices have the most impact on drowsy driving. There is no substitute for sleep and the average person requires 8 hours nightly. If you find yourself driving drowsy, stop and sleep. Consider the sleep hygiene tips provided during your visit as well. Medication Refill Policy: Refills for all medications require 1 week advance notice. Please have your pharmacy fax a refill request. We are unable to fax, or call in \"controled substance\" medications and you will need to pick these prescriptions up from our office. MyChart Activation Thank you for requesting access to [facility]. Please follow the instructions below to securely access and download your online medical record. [facility] allows you to send messages to your doctor, view your test results, renew your prescriptions, schedule appointments, and more. How Do I Sign Up? 1. In your internet browser, go to [email or web address]. 2. Click on the First Time User? Click Here link in the Sign In box. You will see the New Member Sign Up page. 3. Enter your [facility] Access Code exactly as it appears below. You will not need to use this code after youve completed the sign-up process. If you do not sign up before the expiration date, you must request a new code. [facility] Access Code: [email or web address] Expires: 2020  8:34 AM (This is the date your [facility] access code will ) 4. Enter the last four digits of your Social Security Number (xxxx) and Date of Birth (mm/dd/yyyy) as indicated and click Submit. You will be taken to the next sign-up page. 5. Create a [facility] ID. This will be your [facility] login ID and cannot be changed, so think of one that is secure and easy to remember. 6. Create a [facility] password. You can change your password at any time. 7. Enter your Password Reset Question and Answer. This can be used at a later time if you forget your password. 8. Enter your e-mail address.  You will receive e-mail notification when new information is available in [facility]. 9. Click Sign Up. You can now view and download portions of your medical record. 10. Click the Download Summary menu link to download a portable copy of your medical information. Additional Information If you have questions, please call [phone]. Remember, [facility] is NOT to be used for urgent needs. For medical emergencies, dial 911.

## 2020-08-06 NOTE — PROGRESS NOTES
217 [facility] [place], [address]  Tel.  [phone]  Fax. [phone] [facility], [address] [facility]  Tel.  [phone]  Fax. [phone] 9250 [facility] Ruth Cristina   Tel.  [phone]  Fax. [phone]       Telemedicine visit performed with verbal consent of the patient. Patient called and identity confirmed with 2 patient identifers    Patient was seen at home  Shyam Contreras is a 75 y.o. female who was seen by synchronous (real-time) audio-video technology on 8/6/2020. Consent:  She and/or her healthcare decision maker is aware that this patient-initiated Telehealth encounter is the equivalent to a face to face encounter in the sleep disorder center and has provided verbal consent to proceed: Yes    I was at home while conducting this encounter. S>Lisa Fair Lipguerline is a 75 y.o. female seen at this telemedicine visit for a positive airway pressure follow-up. She reports minor problems using at times device. She is 99% compliant over the past 30 days. The following problems are identified:    Drowsiness no Problems exhaling no   Snoring no Forget to put on no   Mask Comfortable Yes, slips sometimes but not often  Can't fall asleep no   Dry Mouth Yes at times Mask falls off no   Air Leaking A little when mask slips Frequent awakenings no       Download reviewed. She says she sometimes has hallucinations on waking where she feels like someone is in the room so she wakes startled and it takes her a minute to realize whats going on and no one is really there.  Denies new excessive daytime sleepiness, denies cataplexy   She admits that her sleep has improved on PAP therapy using pillow mask    Allergies   Allergen Reactions    Aleve [Naproxen Sodium] Hoarseness    Dilaudid [Hydromorphone (Bulk)] Anaphylaxis     Respiratory arrest and throat closes    Diovan [Valsartan] Palpitations    Morphine Other (comments) Patch-vomiting,weakness, fainting    Sulfa (Sulfonamide Antibiotics) Hives, Rash and Other (comments)     fainting       She has a current medication list which includes the following prescription(s): warfarin, amlodipine, trazodone, bisoprolol-hydrochlorothiazide, amitriptyline, dexilant, lidocaine, warfarin, lactobacillus acidophilus, OTHER, cholecalciferol (vitamin d3), carica papaya, ot ultra/fasttrack control, onetouch ultra2 meter, one touch delica, microlet lancet, contour next test strips, and cyanocobalamin. .      She  has a past medical history of Adverse effect of anesthesia, Axillary pain, right, Bereavement (03/06/2019), Chronic pain, Coagulation disorder ([facility] [address] Depression with anxiety, DJD (degenerative joint disease), Dyslipidemia, Encounter for Hemoccult screening (06/28/2017), Fibromyalgia, Gait instability, GERD (gastroesophageal reflux disease), Hypertension, IBS (irritable bowel syndrome), Ill-defined condition, Ill-defined condition, Ill-defined condition, Mastodynia, left greater than right (8/17/2011), Normal cardiac stress test (3.14), Positive D dimer (9-23-15), Prediabetes, Pulmonary embolism ([facility] [address] (03/2013), Restless leg syndrome, S/P colonoscopy (01/27/2011), Saphenous vein occlusion, 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 Ladson Sleepiness Score: 5   and     which reflect improved sleep quality over therapy time. O>      There were no vitals taken for this visit.       Vital Signs: (As obtained by patient/caregiver at home)        Constitutional: [x] Appears well-developed and well-nourished [x] No apparent distress      [] Abnormal -     Mental status: [x] Alert and awake  [x] Oriented to person/place/time [x] Able to follow commands    [] Abnormal -     Eyes:   EOM    [x]  Normal    [] Abnormal -   Sclera  [x]  Normal    [] Abnormal -          Discharge [x]  None visible   [] Abnormal -     HENT: [x] Normocephalic, atraumatic  [] Abnormal -     External Ears [x] Normal  [] Abnormal -    Neck: [x] No visualized mass [] Abnormal -     Pulmonary/Chest: [x] Respiratory effort normal   [x] No visualized signs of difficulty breathing or respiratory distress        [] Abnormal -       Neurological:        [x] No Facial Asymmetry (Cranial nerve 7 motor function) (limited exam due to video visit)          [x] No gaze palsy        [] Abnormal -          Skin:        [x] No significant exanthematous lesions or discoloration noted on facial skin         [] Abnormal -            Psychiatric:       [x] Normal Affect [] Abnormal -        Other pertinent observable physical exam findings:-            A>    ICD-10-CM ICD-9-CM    1. Obstructive sleep apnea (adult) (pediatric)  G47.33 327.23 AMB SUPPLY ORDER      On CPAP, Resmed :  9-13 cmH2O. Compliant:      yes    Therapeutic Response:  Positive    P>    she is compliant with PAP therapy and PAP continues to benefit patient and remains necessary for control of her sleep apnea. CPAP setting - continue 9-13 cmH20     * We have recommended a dedicated weight loss through appropriate diet and an exercise regimen as significant weight reduction has been shown to reduce severity of obstructive sleep apnea. *   Follow-up and Dispositions    · Return in about 1 year (around 8/6/2021). I have ordered replacement supplies      * She was asked to contact our office for any problems regarding PAP therapy. * Counseling was provided regarding the importance of regular PAP use and on proper sleep hygiene and safe driving. * Re-enforced proper and regular cleaning for the device. With regards to her sleep related hallucinations, I recommended further evaluation with an attended sleep study. She will think about it but does not wish to pursue it at this time as it occurs only occasionally. All of her questions were addressed.        Pursuant to the emergency declaration under the [facility][note 3][facility], [facility] waiver authority and the [employer] Act, this Virtual  Visit was conducted, with patient's consent, to reduce the patient's risk of exposure to COVID-19 and provide continuity of care for an established patient. Services were provided through a video synchronous discussion virtually to substitute for in-person clinic visit.     Thao Clark MD    Electronically signed by    Luiza Otero MD  Diplomate in Sleep Medicine  [facility]

## 2020-08-12 ENCOUNTER — OFFICE VISIT (OUTPATIENT)
Dept: INTERNAL MEDICINE CLINIC | Age: 65
End: 2020-08-12
Payer: MEDICARE

## 2020-08-12 VITALS
OXYGEN SATURATION: 95 % | BODY MASS INDEX: 34.69 KG/M2 | HEART RATE: 63 BPM | RESPIRATION RATE: 16 BRPM | SYSTOLIC BLOOD PRESSURE: 124 MMHG | WEIGHT: 188.5 LBS | TEMPERATURE: 98.1 F | HEIGHT: 62 IN | DIASTOLIC BLOOD PRESSURE: 83 MMHG

## 2020-08-12 DIAGNOSIS — K21.9 GASTROESOPHAGEAL REFLUX DISEASE WITHOUT ESOPHAGITIS: ICD-10-CM

## 2020-08-12 DIAGNOSIS — I73.9 PERIPHERAL VASCULAR DISEASE (HCC): ICD-10-CM

## 2020-08-12 DIAGNOSIS — Z00.00 MEDICARE ANNUAL WELLNESS VISIT, SUBSEQUENT: Primary | ICD-10-CM

## 2020-08-12 DIAGNOSIS — M79.7 FIBROMYOSITIS: ICD-10-CM

## 2020-08-12 DIAGNOSIS — R04.2 COUGH WITH HEMOPTYSIS: ICD-10-CM

## 2020-08-12 DIAGNOSIS — Z13.31 SCREENING FOR DEPRESSION: ICD-10-CM

## 2020-08-12 DIAGNOSIS — I27.82 CHRONIC PULMONARY EMBOLISM WITHOUT ACUTE COR PULMONALE, UNSPECIFIED PULMONARY EMBOLISM TYPE (HCC): ICD-10-CM

## 2020-08-12 DIAGNOSIS — I10 ESSENTIAL HYPERTENSION, BENIGN: ICD-10-CM

## 2020-08-12 DIAGNOSIS — Z13.39 SCREENING FOR ALCOHOLISM: ICD-10-CM

## 2020-08-12 DIAGNOSIS — I10 ESSENTIAL HYPERTENSION: ICD-10-CM

## 2020-08-12 DIAGNOSIS — Z79.01 ANTICOAGULATED ON COUMADIN: ICD-10-CM

## 2020-08-12 DIAGNOSIS — G25.81 RESTLESS LEG SYNDROME: ICD-10-CM

## 2020-08-12 LAB
INR BLD: 2.6
PT POC: 31.1 SECONDS
VALID INTERNAL CONTROL?: YES

## 2020-08-12 PROCEDURE — G0439 PPPS, SUBSEQ VISIT: HCPCS | Performed by: INTERNAL MEDICINE

## 2020-08-12 PROCEDURE — 3017F COLORECTAL CA SCREEN DOC REV: CPT | Performed by: INTERNAL MEDICINE

## 2020-08-12 PROCEDURE — G0444 DEPRESSION SCREEN ANNUAL: HCPCS | Performed by: INTERNAL MEDICINE

## 2020-08-12 PROCEDURE — G8752 SYS BP LESS 140: HCPCS | Performed by: INTERNAL MEDICINE

## 2020-08-12 PROCEDURE — G8427 DOCREV CUR MEDS BY ELIG CLIN: HCPCS | Performed by: INTERNAL MEDICINE

## 2020-08-12 PROCEDURE — G8417 CALC BMI ABV UP PARAM F/U: HCPCS | Performed by: INTERNAL MEDICINE

## 2020-08-12 PROCEDURE — 71046 X-RAY EXAM CHEST 2 VIEWS: CPT | Performed by: INTERNAL MEDICINE

## 2020-08-12 PROCEDURE — G8536 NO DOC ELDER MAL SCRN: HCPCS | Performed by: INTERNAL MEDICINE

## 2020-08-12 PROCEDURE — 1101F PT FALLS ASSESS-DOCD LE1/YR: CPT | Performed by: INTERNAL MEDICINE

## 2020-08-12 PROCEDURE — 36415 COLL VENOUS BLD VENIPUNCTURE: CPT | Performed by: INTERNAL MEDICINE

## 2020-08-12 PROCEDURE — 99213 OFFICE O/P EST LOW 20 MIN: CPT | Performed by: INTERNAL MEDICINE

## 2020-08-12 PROCEDURE — 1090F PRES/ABSN URINE INCON ASSESS: CPT | Performed by: INTERNAL MEDICINE

## 2020-08-12 PROCEDURE — G8432 DEP SCR NOT DOC, RNG: HCPCS | Performed by: INTERNAL MEDICINE

## 2020-08-12 PROCEDURE — G9899 SCRN MAM PERF RSLTS DOC: HCPCS | Performed by: INTERNAL MEDICINE

## 2020-08-12 PROCEDURE — 85610 PROTHROMBIN TIME: CPT | Performed by: INTERNAL MEDICINE

## 2020-08-12 PROCEDURE — G8399 PT W/DXA RESULTS DOCUMENT: HCPCS | Performed by: INTERNAL MEDICINE

## 2020-08-12 PROCEDURE — G8754 DIAS BP LESS 90: HCPCS | Performed by: INTERNAL MEDICINE

## 2020-08-12 NOTE — PROGRESS NOTES
1. Have you been to the ER, urgent care clinic since your last visit? Hospitalized since your last visit? No    2. Have you seen or consulted any other health care providers outside of the [facility] since your last visit? Include any pap smears or colon screening. No     Wants to discuss coughing up blood and swollen calf's  This is the Subsequent Medicare Annual Wellness Exam, performed 12 months or more after the Initial AWV or the last Subsequent AWV    I have reviewed the patient's medical history in detail and updated the computerized patient record.      History     Patient Active Problem List   Diagnosis Code    Sleep apnea G47.30    Hypertension I10    GERD (gastroesophageal reflux disease) K21.9    Restless leg syndrome G25.81    Fibromyalgia M79.7    Mastodynia, left greater than right N64.4    Altered mental status R41.82    Chronic pain G89.29    Fibromyositis M79.7    Essential hypertension, benign I10    Prediabetes R73.03    IBS (irritable bowel syndrome) K58.9    Gait instability R26.81    Normal cardiac stress test [id number]    White coat hypertension [id number]    Positive D dimer R79.89    SOB (shortness of breath) R06.02    S/P colonoscopy Z98.890    Saphenous vein occlusion, right I82.811    Dyslipidemia E78.5    Preventative health care Z00.00    DJD (degenerative joint disease) M19.90    Axillary pain, right M79.621    Obesity E66.9    Peripheral vascular disease (HCC) I73.9    Gout M10.9     Past Medical History:   Diagnosis Date    Adverse effect of anesthesia     \"hard time waking me up\"    Axillary pain, right     Bereavement 2019    79 yo - heart attack -  in sleep    Chronic pain     d/t fibromyalgia    Coagulation disorder (HCC)     on eliquis -d/c due to gi upset - now on coumadin previously followed by dr. Socorro Burden    Depression with anxiety     DJD (degenerative joint disease)     Dyslipidemia     Encounter for Hemoccult screening 06/28/2017    neg    Fibromyalgia     Gait instability     GERD (gastroesophageal reflux disease)     Hypertension     IBS (irritable bowel syndrome)     Ill-defined condition     gastroparesis    Ill-defined condition     CT - 3/18/2018 - esophageal diverticulum    Ill-defined condition     pericarditis    Mastodynia, left greater than right 8/17/2011    Normal cardiac stress test 3.14    Positive D dimer 9-23-15    Prediabetes     Pulmonary embolism ([facility] [address] 03/2013    rll    Restless leg syndrome     S/P colonoscopy 01/27/2011    kenny hernandez md    Saphenous vein occlusion, 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    [facility] occlusive superificial vein thrombosis - Melody Dyer md    Saphenous vein thrombophlebitis, right 02/2018    and acute l posterior tibial vein - this is the 2nd DVT putting her on lifelong anticoagualtion / Ileana Madera    Sleep apnea     cpap 9cm    SOB (shortness of breath)     White coat hypertension       Past Surgical History:   Procedure Laterality Date    ABDOMEN SURGERY PROC UNLISTED      exploratory years ago   [address]  2006    left breast [id number]-ND. Rozann Rolling    COLONOSCOPY N/A 4/10/2018    COLONOSCOPY,EGD performed by Melissa Reyes [facility] at [facility] ENDOSCOPY    HX BREAST BIOPSY Left     HX COLONOSCOPY      HX ORTHOPAEDIC  2009    foot surgery-left - bunionectomy    HX ORTHOPAEDIC      cyst removed from left hand - ganglion cyst    HX OTHER SURGICAL      right and left leg muscle biopsies - elevated CPK - muscle enzymes were elevated    HX TUBAL LIGATION       Current Outpatient Medications   Medication Sig Dispense Refill    warfarin (COUMADIN) 2.5 mg tablet 1 to 2 tabs daily 180 Tab 3    amLODIPine (NORVASC) 10 mg tablet Take 1 tablet daily.  90 Tab 2    traZODone (DESYREL) 50 mg tablet TAKE 1 TABLET NIGHTLY AS NEEDED FOR SLEEP 90 Tab 4    bisoprolol-hydroCHLOROthiazide (ZIAC) 10-6.25 mg per tablet TAKE 1 TABLET DAILY 90 Tab 4    lidocaine (LIDODERM) 5 % Apply patch to the affected area for 12 hours a day and remove for 12 hours a day. DX.M79.7 80 Each 3    warfarin (COUMADIN) 5 mg tablet TAKE ONE TABLET BY MOUTH ONCE DAILY  3    LACTOBACILLUS ACIDOPHILUS (PROBIOTIC PO) Take 1 Cap by mouth as needed.  OTHER [place] sore muscle with cayenne and ginger topical cream as needed. OTC for pain.  CHOLECALCIFEROL, VITAMIN D3, (VITAMIN D3 PO) Take  by mouth. Liquid form. 400 units per 4 drops. Takes 4 drops po once daily.  carica papaya (PAPAYA ENZYME) tab Take  by mouth. Takes 4 tabs po 1-2 times daily after meals.  OT ULTRA/FASTTRACK CONTROL soln       ONETOUCH ULTRA2 monitoring kit       ONE TOUCH DELICA 33 gauge misc       MICROLET LANCET misc USE TO TEST BLOOD SUGAR EVERY  Each 11    CONTOUR NEXT STRIPS strip USE TO TEST BLOOD SUGARS ONCE DAILY 50 Strip 11    cyanocobalamin (VITAMIN B-12) 1,000 mcg tablet Take 1,000 mcg by mouth as needed.  amitriptyline (ELAVIL) 10 mg tablet Take  by mouth nightly.       DEXILANT 60 mg CpDB capsule (delayed release)        Allergies   Allergen Reactions    Aleve [Naproxen Sodium] Hoarseness    Dilaudid [Hydromorphone (Bulk)] Anaphylaxis     Respiratory arrest and throat closes    Diovan [Valsartan] Palpitations    Morphine Other (comments)     Patch-vomiting,weakness, fainting    Sulfa (Sulfonamide Antibiotics) Hives, Rash and Other (comments)     fainting       Family History   Problem Relation Age of Onset    Hypertension Mother     Kidney Disease Mother         1 kidney    Heart Disease Father     Heart Attack Father         late 35s or early 45s    Heart Attack Maternal Grandmother     Cancer Maternal Grandfather         ? lung or stomach     Social History     Tobacco Use    Smoking status: Never Smoker    Smokeless tobacco: Never Used   Substance Use Topics    Alcohol use: No       Depression Risk Factor Screening:     3 most recent PHQ Screens 8/12/2020   PHQ Not Done -   Little interest or pleasure in doing things Not at all   Feeling down, depressed, irritable, or hopeless Not at all   Total Score PHQ 2 0       Alcohol Risk Factor Screening:   Do you average 1 drink per night or more than 7 drinks a week:  No    On any one occasion in the past three months have you have had more than 3 drinks containing alcohol:  No      Functional Ability and Level of Safety:   Hearing: Hearing is good. Activities of Daily Living: The home contains: handrails and grab bars  Patient needs help with:  transportation, shopping, preparing meals, laundry, housework, managing medications, managing money, dressing, bathing, hygiene and bathroom needs     Ambulation: with difficulty, uses a walker     Fall Risk:  Fall Risk Assessment, last 12 mths 8/12/2020   Able to walk? Yes   Fall in past 12 months? No   Fall with injury? -   Number of falls in past 12 months -   Fall Risk Score -     Abuse Screen:  Patient is not abused       Cognitive Screening   Has your family/caregiver stated any concerns about your memory: no     Cognitive Screening: not necessary    Patient Care Team   Patient Care Team:  Qi Bustamante MD as PCP - General (Internal Medicine)    Assessment/Plan   Education and counseling provided:  Are appropriate based on today's review and evaluation    Diagnoses and all orders for this visit:    1.  Anticoagulated on Coumadin  -     AMB POC PT/INR        Health Maintenance Due   Topic Date Due    Shingrix Vaccine Age 49> (1 of 2) 12/05/2003    GLAUCOMA SCREENING Q2Y  12/05/2018    Pneumococcal 65+ years (1 of 1 - PPSV23) 12/05/2018    Medicare Yearly Exam  05/07/2020    Influenza Age 9 to Adult  08/01/2020

## 2020-08-12 NOTE — PROGRESS NOTES
SPORTS MEDICINE AND PRIMARY CARE  Emily Chua MD, [address] [address]  Phone:  [phone]  Fax: [phone]      Chief Complaint   Patient presents with    Annual Wellness Visit         SUBECTIVE:    Mansi Murguia is a 75 y.o. female Patient returns today for her annual Medicare wellness exam.  She is concerned about hair loss and attributes it to Coumadin. She did not tolerate the NOACs due to GI issues, she states. She has a known history of peripheral vascular occlusive disease, pulmonary embolus, fibromyositis, primary hypertension, GERD and restless leg syndrome, and is seen for evaluation. Current Outpatient Medications   Medication Sig Dispense Refill    warfarin (COUMADIN) 2.5 mg tablet 1 to 2 tabs daily 180 Tab 3    amLODIPine (NORVASC) 10 mg tablet Take 1 tablet daily. 90 Tab 2    traZODone (DESYREL) 50 mg tablet TAKE 1 TABLET NIGHTLY AS NEEDED FOR SLEEP 90 Tab 4    bisoprolol-hydroCHLOROthiazide (ZIAC) 10-6.25 mg per tablet TAKE 1 TABLET DAILY 90 Tab 4    lidocaine (LIDODERM) 5 % Apply patch to the affected area for 12 hours a day and remove for 12 hours a day. DX.[id number] 80 Each 3    warfarin (COUMADIN) 5 mg tablet TAKE ONE TABLET BY MOUTH ONCE DAILY  3    LACTOBACILLUS ACIDOPHILUS (PROBIOTIC PO) Take 1 Cap by mouth as needed.  OTHER [place] sore muscle with cayenne and ginger topical cream as needed. OTC for pain.  CHOLECALCIFEROL, VITAMIN D3, (VITAMIN D3 PO) Take  by mouth. Liquid form. 400 units per 4 drops. Takes 4 drops po once daily.  carica papaya (PAPAYA ENZYME) tab Take  by mouth. Takes 4 tabs po 1-2 times daily after meals.       OT ULTRA/FASTTRACK CONTROL soln       ONETOUCH ULTRA2 monitoring kit       ONE TOUCH DELICA 33 gauge misc       MICROLET LANCET misc USE TO TEST BLOOD SUGAR EVERY  Each 11    CONTOUR NEXT STRIPS strip USE TO TEST BLOOD SUGARS ONCE DAILY 50 Strip 11    cyanocobalamin (VITAMIN B-12) 1,000 mcg tablet Take 1,000 mcg by mouth as needed.  amitriptyline (ELAVIL) 10 mg tablet Take  by mouth nightly.  DEXILANT 60 mg CpDB capsule (delayed release)        Past Medical History:   Diagnosis Date    Adverse effect of anesthesia     \"hard time waking me up\"    Axillary pain, right     Bereavement 2019    79 yo - heart attack -  in sleep    Chronic pain     d/t fibromyalgia    Coagulation disorder ([facility] [address]     on eliquis -d/c due to gi upset - now on coumadin previously followed by dr. Ld Courtney Depression with anxiety     DJD (degenerative joint disease)     Dyslipidemia     Encounter for Hemoccult screening 2017    neg    Fibromyalgia     Gait instability     GERD (gastroesophageal reflux disease)     Hypertension     IBS (irritable bowel syndrome)     Ill-defined condition     gastroparesis    Ill-defined condition     CT - 3/18/2018 - esophageal diverticulum    Ill-defined condition     pericarditis    Mastodynia, left greater than right 2011    Normal cardiac stress test 3.14    Positive D dimer 9-23-15    Prediabetes     Pulmonary embolism ([facility] [address] 2013    rll    Restless leg syndrome     S/P colonoscopy 2011    kenny hernandez md    Saphenous vein occlusion, right 2017    [facility] acute occlusive superificial vein thrombosis - Melody Dyer md    Saphenous vein thrombophlebitis, right 2018    and acute l posterior tibial vein - this is the 2nd DVT putting her on lifelong anticoagualtion / Verlean [place]    Sleep apnea     cpap 9cm    SOB (shortness of breath)     White coat hypertension      Past Surgical History:   Procedure Laterality Date    ABDOMEN SURGERY PROC UNLISTED      exploratory years ago   Ty Richards  2006    left breast [id number]. Ashely Horn    COLONOSCOPY N/A 4/10/2018    COLONOSCOPY,EGD performed by Raissa Montejo MD at [address] HX BREAST BIOPSY Left  HX COLONOSCOPY      HX ORTHOPAEDIC  2009    foot surgery-left - bunionectomy    HX ORTHOPAEDIC      cyst removed from left hand - ganglion cyst    HX OTHER SURGICAL      right and left leg muscle biopsies - elevated CPK - muscle enzymes were elevated    HX TUBAL LIGATION       Allergies   Allergen Reactions    Aleve [Naproxen Sodium] Hoarseness    Dilaudid [Hydromorphone (Bulk)] Anaphylaxis     Respiratory arrest and throat closes    Diovan [Valsartan] Palpitations    Morphine Other (comments)     Patch-vomiting,weakness, fainting    Sulfa (Sulfonamide Antibiotics) Hives, Rash and Other (comments)     fainting       REVIEW OF SYSTEMS:   Patient states that her and her  are coping with the COVID. No chest pain or shortness of breath. Social History     Socioeconomic History    Marital status:      Spouse name: Not on file    Number of children: Not on file    Years of education: Not on file    Highest education level: Not on file   Tobacco Use    Smoking status: Never Smoker    Smokeless tobacco: Never Used   Substance and Sexual Activity    Alcohol use: No    Drug use: No    Sexual activity: Yes     Partners: Male     Birth control/protection: None   Social History Narrative         Family History: Mother: alive 80 yrs, High Blood Pressure, cva with cognitive deficitsFather:  36 yrs, myocardial infarctionSister(s): aliveBrother(s): unknow n2    sister(s) . 40 daughter no choildren -  walked out works for board of directors for [employer] . Social History: Alcohol Use Patient does not use alcohol. Smoking Status Patient is a never smoker. Caffeine: occasional. Drugs:    prescription narcotics. Diet: Regular. Exercise: None. Marital Status: . Lives w ith: spouse. Children: children. Jehovah's witness: Prakash Conquest. Patient is  living w ith her  she is on disability related to her fibromyositis.    r  Family History   Problem Relation Age of Onset    Hypertension Mother     Kidney Disease Mother         1 kidney    Heart Disease Father     Heart Attack Father         late 35s or early 45s    Heart Attack Maternal Grandmother     Cancer Maternal Grandfather         ? lung or stomach       OBJECTIVE:  Visit Vitals  /83   Pulse 63   Temp 98.1 °F (36.7 °C) (Oral)   Resp 16   Ht 5' 2\" (1.575 m)   Wt 188 lb 8 oz (85.5 kg)   SpO2 95%   BMI 34.48 kg/m²     ENT: perrla,  eom intact  NECK: supple. Thyroid normal  CHEST: clear to ascultation and percussion   HEART: regular rate and rhythm  ABD: soft, bowel sounds active  EXTREMITIES: no edema, pulse 1+     Office Visit on 08/12/2020   Component Date Value Ref Range Status    VALID INTERNAL CONTROL POC 08/12/2020 Yes   Final    Prothrombin time (POC) 08/12/2020 31.1  seconds Final    INR POC 08/12/2020 2.6   Final   Clinical Support on 07/22/2020   Component Date Value Ref Range Status    VALID INTERNAL CONTROL POC 07/22/2020 Yes   Final    Prothrombin time (POC) 07/22/2020 36.3  seconds Final    INR POC 07/22/2020 3.0   Final   Clinical Support on 06/24/2020   Component Date Value Ref Range Status    VALID INTERNAL CONTROL POC 06/24/2020 Yes   Final    Prothrombin time (POC) 06/24/2020 33.4  seconds Final    INR POC 06/24/2020 2.8   Final   Clinical Support on 05/27/2020   Component Date Value Ref Range Status    VALID INTERNAL CONTROL POC 05/27/2020 Yes   Final    Prothrombin time (POC) 05/27/2020 31.7  seconds Final    INR POC 05/27/2020 2.6   Final   Clinical Support on 05/13/2020   Component Date Value Ref Range Status    VALID INTERNAL CONTROL POC 05/13/2020 Yes   Final    Prothrombin time (POC) 05/13/2020 17.5  seconds Final    INR POC 05/13/2020 1.5   Final          ASSESSMENT:  1. Medicare annual wellness visit, subsequent    2. Anticoagulated on Coumadin    3. Screening for alcoholism    4. Screening for depression    5. Fibromyositis    6.  Essential hypertension, benign 7. Peripheral vascular disease ([facility] [address]    8. Chronic pulmonary embolism without acute cor pulmonale, unspecified pulmonary embolism type ([facility] [address]    9. Gastroesophageal reflux disease without esophagitis    10. Essential hypertension    11. Restless leg syndrome      Patient states there is a little blood in her mucus when she coughs. She says, \"I am just saying this, I do not want it investigated\". Blood pressure control is at goal.    INR is therapeutic. We suggest at the minimum we should do a chest x-ray, which she agrees to. Certainly, if she changes her mind we will send her to a pulmonary specialist for their opinion. But at this time, she prefers not to go. I suspect it is a local cause as the etiology. INR is therapeutic. She will be back to see us in one month. The dermatologist mentioned that she could use Rogaine, but she prefers not. I have discussed the diagnosis with the patient and the intended plan as seen in the  orders above. The patient understands and agees with the plan. The patient has   received an after visit summary and questions were answered concerning  future plans  Patient labs and/or xrays were reviewed  Past records were reviewed. PLAN:  .  Orders Placed This Encounter    Depression Screen Annual    GLO MAMMO BI SCREENING INCL CAD    URINALYSIS W/ RFLX MICROSCOPIC    CBC WITH AUTOMATED DIFF    METABOLIC PANEL, COMPREHENSIVE    LIPID PANEL    TSH 3RD GENERATION    HEMOGLOBIN A1C WITH EAG    AMB POC PT/INR                     ATTENTION:   This medical record was transcribed using an electronic medical records system. Although proofread, it may and can contain electronic and spelling errors. Other human spelling and other errors may be present. Corrections may be executed at a later time. Please feel free to contact us for any clarifications as needed.

## 2020-08-12 NOTE — PATIENT INSTRUCTIONS
Medicare Wellness Visit, Female The best way to live healthy is to have a lifestyle where you eat a well-balanced diet, exercise regularly, limit alcohol use, and quit all forms of tobacco/nicotine, if applicable. Regular preventive services are another way to keep healthy. Preventive services (vaccines, screening tests, monitoring & exams) can help personalize your care plan, which helps you manage your own care. Screening tests can find health problems at the earliest stages, when they are easiest to treat. [address] follows the current, evidence-based guidelines published by the [facility] Jasvir Mi ([facility]) when recommending preventive services for our patients. Because we follow these guidelines, sometimes recommendations change over time as research supports it. (For example, mammograms used to be recommended annually. Even though Medicare will still pay for an annual mammogram, the newer guidelines recommend a mammogram every two years for women of average risk.) Of course, you and your doctor may decide to screen more often for some diseases, based on your risk and your health status. Preventive services for you include: - Medicare offers their members a free annual wellness visit, which is time for you and your primary care provider to discuss and plan for your preventive service needs. Take advantage of this benefit every year! 
-All adults over the age of 72 should receive the recommended pneumonia vaccines. Current USPSTF guidelines recommend a series of two vaccines for the best pneumonia protection.  
-All adults should have a flu vaccine yearly and a tetanus vaccine every 10 years. All adults age 48 and older should receive a shingles vaccine once in their lifetime.   
-A bone mass density test is recommended when a woman turns 65 to screen for osteoporosis. This test is only recommended one time, as a screening. Some providers will use this same test as a disease monitoring tool if you already have osteoporosis. -All adults age 38-68 who are overweight should have a diabetes screening test once every three years.  
-Other screening tests and preventive services for persons with diabetes include: an eye exam to screen for diabetic retinopathy, a kidney function test, a foot exam, and stricter control over your cholesterol.  
-Cardiovascular screening for adults with routine risk involves an electrocardiogram (ECG) at intervals determined by your doctor.  
-Colorectal cancer screenings should be done for adults age 54-65 with no increased risk factors for colorectal cancer. There are a number of acceptable methods of screening for this type of cancer. Each test has its own benefits and drawbacks. Discuss with your doctor what is most appropriate for you during your annual wellness visit. The different tests include: colonoscopy (considered the best screening method), a fecal occult blood test, a fecal DNA test, and sigmoidoscopy. -Breast cancer screenings are recommended every other year for women of normal risk, age 54-69. 
-Cervical cancer screenings for women over age 72 are only recommended with certain risk factors.  
-All adults born between [facility] should be screened once for Hepatitis C. Here is a list of your current Health Maintenance items (your personalized list of preventive services) with a due date: 
Health Maintenance Due Topic Date Due  Shingles Vaccine (1 of 2) 12/05/2003  Glaucoma Screening   12/05/2018  Pneumococcal Vaccine (1 of 1 - PPSV23) 12/05/2018 [place] Annual Well Visit  05/07/2020  Flu Vaccine  08/01/2020 Medicare Wellness Visit, Female The best way to live healthy is to have a lifestyle where you eat a well-balanced diet, exercise regularly, limit alcohol use, and quit all forms of tobacco/nicotine, if applicable. Regular preventive services are another way to keep healthy. Preventive services (vaccines, screening tests, monitoring & exams) can help personalize your care plan, which helps you manage your own care. Screening tests can find health problems at the earliest stages, when they are easiest to treat. [facility] follows the current, evidence-based guidelines published by the [facility]asvir Stark ([facility]) when recommending preventive services for our patients. Because we follow these guidelines, sometimes recommendations change over time as research supports it. (For example, mammograms used to be recommended annually. Even though Medicare will still pay for an annual mammogram, the newer guidelines recommend a mammogram every two years for women of average risk). Of course, you and your doctor may decide to screen more often for some diseases, based on your risk and your co-morbidities (chronic disease you are already diagnosed with). Preventive services for you include: - Medicare offers their members a free annual wellness visit, which is time for you and your primary care provider to discuss and plan for your preventive service needs. Take advantage of this benefit every year! 
-All adults over the age of 72 should receive the recommended pneumonia vaccines. Current USPSTF guidelines recommend a series of two vaccines for the best pneumonia protection.  
-All adults should have a flu vaccine yearly and a tetanus vaccine every 10 years.  
-All adults age 48 and older should receive the shingles vaccines (series of two vaccines).      
-All adults age 38-68 who are overweight should have a diabetes screening test once every three years.  
-All adults born between [birth year] and 1967 should be screened once for Hepatitis C. 
-Other screening tests and preventive services for persons with diabetes include: an eye exam to screen for diabetic retinopathy, a kidney function test, a foot exam, and stricter control over your cholesterol.  
-Cardiovascular screening for adults with routine risk involves an electrocardiogram (ECG) at intervals determined by your doctor.  
-Colorectal cancer screenings should be done for adults age 54-65 with no increased risk factors for colorectal cancer. There are a number of acceptable methods of screening for this type of cancer. Each test has its own benefits and drawbacks. Discuss with your doctor what is most appropriate for you during your annual wellness visit. The different tests include: colonoscopy (considered the best screening method), a fecal occult blood test, a fecal DNA test, and sigmoidoscopy. 
 
-A bone mass density test is recommended when a woman turns 65 to screen for osteoporosis. This test is only recommended one time, as a screening. Some providers will use this same test as a disease monitoring tool if you already have osteoporosis. -Breast cancer screenings are recommended every other year for women of normal risk, age 54-69. 
-Cervical cancer screenings for women over age 72 are only recommended with certain risk factors. Here is a list of your current Health Maintenance items (your personalized list of preventive services) with a due date: 
Health Maintenance Due Topic Date Due  Shingles Vaccine (1 of 2) 12/05/2003  Glaucoma Screening   12/05/2018  Pneumococcal Vaccine (1 of 1 - PPSV23) 12/05/2018 [place] [facility] Annual Well Visit  05/07/2020  Flu Vaccine  08/01/2020

## 2020-08-13 LAB
ALBUMIN SERPL-MCNC: 4.4 G/DL (ref 3.8–4.8)
ALBUMIN/GLOB SERPL: 1.6 {RATIO} (ref 1.2–2.2)
ALP SERPL-CCNC: 55 IU/L (ref 39–117)
ALT SERPL-CCNC: 36 IU/L (ref 0–32)
APPEARANCE UR: CLEAR
AST SERPL-CCNC: 36 IU/L (ref 0–40)
BACTERIA #/AREA URNS HPF: NORMAL /[HPF]
BASOPHILS # BLD AUTO: 0 X10E3/UL (ref 0–0.2)
BASOPHILS NFR BLD AUTO: 1 %
BILIRUB SERPL-MCNC: <0.2 MG/DL (ref 0–1.2)
BILIRUB UR QL STRIP: NEGATIVE
BUN SERPL-MCNC: 24 MG/DL (ref 8–27)
BUN/CREAT SERPL: 19 (ref 12–28)
CALCIUM SERPL-MCNC: 9.9 MG/DL (ref 8.7–10.3)
CASTS URNS QL MICRO: NORMAL /LPF
CHLORIDE SERPL-SCNC: 104 MMOL/L (ref 96–106)
CHOLEST SERPL-MCNC: 207 MG/DL (ref 100–199)
CO2 SERPL-SCNC: 26 MMOL/L (ref 20–29)
COLOR UR: YELLOW
CREAT SERPL-MCNC: 1.28 MG/DL (ref 0.57–1)
EOSINOPHIL # BLD AUTO: 0.1 X10E3/UL (ref 0–0.4)
EOSINOPHIL NFR BLD AUTO: 2 %
EPI CELLS #/AREA URNS HPF: NORMAL /HPF (ref 0–10)
ERYTHROCYTE [DISTWIDTH] IN BLOOD BY AUTOMATED COUNT: 14.1 % (ref 11.7–15.4)
EST. AVERAGE GLUCOSE BLD GHB EST-MCNC: 126 MG/DL
GLOBULIN SER CALC-MCNC: 2.7 G/DL (ref 1.5–4.5)
GLUCOSE SERPL-MCNC: 74 MG/DL (ref 65–99)
GLUCOSE UR QL: NEGATIVE
HBA1C MFR BLD: 6 % (ref 4.8–5.6)
HCT VFR BLD AUTO: 41.9 % (ref 34–46.6)
HDLC SERPL-MCNC: 48 MG/DL
HGB BLD-MCNC: 13.9 G/DL (ref 11.1–15.9)
HGB UR QL STRIP: NEGATIVE
IMM GRANULOCYTES # BLD AUTO: 0 X10E3/UL (ref 0–0.1)
IMM GRANULOCYTES NFR BLD AUTO: 0 %
KETONES UR QL STRIP: NEGATIVE
LDLC SERPL CALC-MCNC: 125 MG/DL (ref 0–99)
LEUKOCYTE ESTERASE UR QL STRIP: NEGATIVE
LYMPHOCYTES # BLD AUTO: 1.7 X10E3/UL (ref 0.7–3.1)
LYMPHOCYTES NFR BLD AUTO: 42 %
MCH RBC QN AUTO: 29.1 PG (ref 26.6–33)
MCHC RBC AUTO-ENTMCNC: 33.2 G/DL (ref 31.5–35.7)
MCV RBC AUTO: 88 FL (ref 79–97)
MICRO URNS: ABNORMAL
MONOCYTES # BLD AUTO: 0.5 X10E3/UL (ref 0.1–0.9)
MONOCYTES NFR BLD AUTO: 12 %
MUCOUS THREADS URNS QL MICRO: PRESENT
NEUTROPHILS # BLD AUTO: 1.7 X10E3/UL (ref 1.4–7)
NEUTROPHILS NFR BLD AUTO: 43 %
NITRITE UR QL STRIP: NEGATIVE
PH UR STRIP: 6.5 [PH] (ref 5–7.5)
PLATELET # BLD AUTO: 204 X10E3/UL (ref 150–450)
POTASSIUM SERPL-SCNC: 4 MMOL/L (ref 3.5–5.2)
PROT SERPL-MCNC: 7.1 G/DL (ref 6–8.5)
PROT UR QL STRIP: ABNORMAL
RBC # BLD AUTO: 4.78 X10E6/UL (ref 3.77–5.28)
RBC #/AREA URNS HPF: NORMAL /HPF (ref 0–2)
SODIUM SERPL-SCNC: 145 MMOL/L (ref 134–144)
SP GR UR: 1.02 (ref 1–1.03)
TRIGL SERPL-MCNC: 170 MG/DL (ref 0–149)
TSH SERPL DL<=0.005 MIU/L-ACNC: 1.94 UIU/ML (ref 0.45–4.5)
UROBILINOGEN UR STRIP-MCNC: 0.2 MG/DL (ref 0.2–1)
VLDLC SERPL CALC-MCNC: 34 MG/DL (ref 5–40)
WBC # BLD AUTO: 4 X10E3/UL (ref 3.4–10.8)
WBC #/AREA URNS HPF: NORMAL /HPF (ref 0–5)

## 2020-08-13 RX ORDER — ROSUVASTATIN CALCIUM 10 MG/1
10 TABLET, COATED ORAL
Qty: 90 TAB | Refills: 3 | Status: SHIPPED | OUTPATIENT
Start: 2020-08-13 | End: 2021-03-04

## 2020-09-01 ENCOUNTER — TELEPHONE (OUTPATIENT)
Dept: INTERNAL MEDICINE CLINIC | Age: 65
End: 2020-09-01

## 2020-09-01 NOTE — TELEPHONE ENCOUNTER
Patient have concerns about her unexplained bruises. She have not fallen or had any incidents. She is currently taking Coumadin. Should she be worried? Patient asked to be called for medical advice. Please advise!

## 2020-09-02 ENCOUNTER — CLINICAL SUPPORT (OUTPATIENT)
Dept: INTERNAL MEDICINE CLINIC | Age: 65
End: 2020-09-02
Payer: MEDICARE

## 2020-09-02 DIAGNOSIS — Z79.01 ANTICOAGULATED ON COUMADIN: Primary | ICD-10-CM

## 2020-09-02 LAB
INR BLD: 2.9
PT POC: 35.1 SECONDS
VALID INTERNAL CONTROL?: YES

## 2020-09-02 PROCEDURE — 85610 PROTHROMBIN TIME: CPT | Performed by: INTERNAL MEDICINE

## 2020-09-02 NOTE — PROGRESS NOTES
Ai Minor is a 75 y.o. female who presents today for Anticoagulation monitoring. Current dose:  Coumadin 5 mg 1/2 tab PO everyday except on Sun & Thur 1 tab PO  Medication Changes:  yes - coumadin 5 mg 1/2 tab PO everyday except on Sunday 1 tab PO. CBC done  Dietary Changes:  no    Latest INR:  Results for orders placed or performed in visit on 09/02/20   AMB POC PT/INR   Result Value Ref Range    VALID INTERNAL CONTROL POC Yes     Prothrombin time (POC) 35.1 seconds    INR POC 2.9          New Coumadin dose:.orders as documented in EMR. Next check to be scheduled for  4 weeks.   Per Dr. Danyel Mcclain LPN

## 2020-09-03 LAB
BASOPHILS # BLD AUTO: 0 X10E3/UL (ref 0–0.2)
BASOPHILS NFR BLD AUTO: 1 %
EOSINOPHIL # BLD AUTO: 0.1 X10E3/UL (ref 0–0.4)
EOSINOPHIL NFR BLD AUTO: 1 %
ERYTHROCYTE [DISTWIDTH] IN BLOOD BY AUTOMATED COUNT: 14 % (ref 11.7–15.4)
HCT VFR BLD AUTO: 43 % (ref 34–46.6)
HGB BLD-MCNC: 14.5 G/DL (ref 11.1–15.9)
IMM GRANULOCYTES # BLD AUTO: 0 X10E3/UL (ref 0–0.1)
IMM GRANULOCYTES NFR BLD AUTO: 0 %
LYMPHOCYTES # BLD AUTO: 1.4 X10E3/UL (ref 0.7–3.1)
LYMPHOCYTES NFR BLD AUTO: 42 %
MCH RBC QN AUTO: 29.5 PG (ref 26.6–33)
MCHC RBC AUTO-ENTMCNC: 33.7 G/DL (ref 31.5–35.7)
MCV RBC AUTO: 87 FL (ref 79–97)
MONOCYTES # BLD AUTO: 0.4 X10E3/UL (ref 0.1–0.9)
MONOCYTES NFR BLD AUTO: 10 %
NEUTROPHILS # BLD AUTO: 1.6 X10E3/UL (ref 1.4–7)
NEUTROPHILS NFR BLD AUTO: 46 %
PLATELET # BLD AUTO: 196 X10E3/UL (ref 150–450)
RBC # BLD AUTO: 4.92 X10E6/UL (ref 3.77–5.28)
WBC # BLD AUTO: 3.5 X10E3/UL (ref 3.4–10.8)

## 2020-11-06 ENCOUNTER — HOSPITAL ENCOUNTER (OUTPATIENT)
Dept: MAMMOGRAPHY | Age: 65
Discharge: HOME | End: 2020-11-06
Attending: INTERNAL MEDICINE
Payer: MEDICARE

## 2020-11-06 DIAGNOSIS — Z00.00 MEDICARE ANNUAL WELLNESS VISIT, SUBSEQUENT: ICD-10-CM

## 2020-11-06 PROCEDURE — 77067 SCR MAMMO BI INCL CAD: CPT

## 2020-11-20 ENCOUNTER — OFFICE VISIT (OUTPATIENT)
Dept: INTERNAL MEDICINE CLINIC | Age: 65
End: 2020-11-20
Payer: MEDICARE

## 2020-11-20 VITALS
HEART RATE: 60 BPM | WEIGHT: 186 LBS | HEIGHT: 62 IN | OXYGEN SATURATION: 96 % | DIASTOLIC BLOOD PRESSURE: 93 MMHG | BODY MASS INDEX: 34.23 KG/M2 | SYSTOLIC BLOOD PRESSURE: 142 MMHG | RESPIRATION RATE: 16 BRPM | TEMPERATURE: 98.1 F

## 2020-11-20 DIAGNOSIS — I10 ESSENTIAL HYPERTENSION: Primary | ICD-10-CM

## 2020-11-20 DIAGNOSIS — R26.81 GAIT INSTABILITY: ICD-10-CM

## 2020-11-20 DIAGNOSIS — E78.5 DYSLIPIDEMIA: ICD-10-CM

## 2020-11-20 DIAGNOSIS — Z79.01 ENCOUNTER FOR MONITORING COUMADIN THERAPY: ICD-10-CM

## 2020-11-20 DIAGNOSIS — M19.042 PRIMARY OSTEOARTHRITIS OF LEFT HAND: ICD-10-CM

## 2020-11-20 DIAGNOSIS — K58.9 IRRITABLE BOWEL SYNDROME WITHOUT DIARRHEA: ICD-10-CM

## 2020-11-20 DIAGNOSIS — R06.02 SOB (SHORTNESS OF BREATH): ICD-10-CM

## 2020-11-20 DIAGNOSIS — R73.03 PREDIABETES: ICD-10-CM

## 2020-11-20 DIAGNOSIS — I27.82 CHRONIC PULMONARY EMBOLISM WITHOUT ACUTE COR PULMONALE, UNSPECIFIED PULMONARY EMBOLISM TYPE (HCC): ICD-10-CM

## 2020-11-20 DIAGNOSIS — Z51.81 ENCOUNTER FOR MONITORING COUMADIN THERAPY: ICD-10-CM

## 2020-11-20 DIAGNOSIS — M79.7 FIBROMYOSITIS: ICD-10-CM

## 2020-11-20 LAB
INR BLD: 1.8
PT POC: 21 SECONDS
VALID INTERNAL CONTROL?: YES

## 2020-11-20 PROCEDURE — 36415 COLL VENOUS BLD VENIPUNCTURE: CPT | Performed by: INTERNAL MEDICINE

## 2020-11-20 PROCEDURE — G8417 CALC BMI ABV UP PARAM F/U: HCPCS | Performed by: INTERNAL MEDICINE

## 2020-11-20 PROCEDURE — 3017F COLORECTAL CA SCREEN DOC REV: CPT | Performed by: INTERNAL MEDICINE

## 2020-11-20 PROCEDURE — G8510 SCR DEP NEG, NO PLAN REQD: HCPCS | Performed by: INTERNAL MEDICINE

## 2020-11-20 PROCEDURE — G8399 PT W/DXA RESULTS DOCUMENT: HCPCS | Performed by: INTERNAL MEDICINE

## 2020-11-20 PROCEDURE — G8753 SYS BP > OR = 140: HCPCS | Performed by: INTERNAL MEDICINE

## 2020-11-20 PROCEDURE — G8755 DIAS BP > OR = 90: HCPCS | Performed by: INTERNAL MEDICINE

## 2020-11-20 PROCEDURE — 1101F PT FALLS ASSESS-DOCD LE1/YR: CPT | Performed by: INTERNAL MEDICINE

## 2020-11-20 PROCEDURE — 85610 PROTHROMBIN TIME: CPT | Performed by: INTERNAL MEDICINE

## 2020-11-20 PROCEDURE — G9899 SCRN MAM PERF RSLTS DOC: HCPCS | Performed by: INTERNAL MEDICINE

## 2020-11-20 PROCEDURE — G8536 NO DOC ELDER MAL SCRN: HCPCS | Performed by: INTERNAL MEDICINE

## 2020-11-20 PROCEDURE — 1090F PRES/ABSN URINE INCON ASSESS: CPT | Performed by: INTERNAL MEDICINE

## 2020-11-20 PROCEDURE — 99214 OFFICE O/P EST MOD 30 MIN: CPT | Performed by: INTERNAL MEDICINE

## 2020-11-20 PROCEDURE — G8427 DOCREV CUR MEDS BY ELIG CLIN: HCPCS | Performed by: INTERNAL MEDICINE

## 2020-11-20 NOTE — PROGRESS NOTES
SPORTS MEDICINE AND PRIMARY CARE  Garfield Payan MD, [address] [address]  Phone:  [phone]  Fax: [phone]       Chief Complaint   Patient presents with    Hypertension   . SUBJECTIVE:    Zee Cruz is a 75 y.o. female Patient is seen today with a known history of fibromyositis, primary hypertension, prediabetes, IBS, gait instability, DJD, dyslipidemia, history of PE, recurrent, on Coumadin, and is seen for evaluation. Patient complains of pain in the lateral aspect of her leg on the left just below her knee. It comes and goes, becomes red and then goes away. She also has problems with balance. When she got up and walked into the kitchen, she seemed to lose her balance.  fortunately was there. She has not had any falls, but she is concerned about the balance issue. Other new complaints denied and patient is seen for evaluation. Current Outpatient Medications   Medication Sig Dispense Refill    rosuvastatin (CRESTOR) 10 mg tablet Take 1 Tab by mouth nightly. 90 Tab 3    warfarin (COUMADIN) 2.5 mg tablet 1 to 2 tabs daily 180 Tab 3    amLODIPine (NORVASC) 10 mg tablet Take 1 tablet daily. 90 Tab 2    traZODone (DESYREL) 50 mg tablet TAKE 1 TABLET NIGHTLY AS NEEDED FOR SLEEP 90 Tab 4    bisoprolol-hydroCHLOROthiazide (ZIAC) 10-6.25 mg per tablet TAKE 1 TABLET DAILY 90 Tab 4    amitriptyline (ELAVIL) 10 mg tablet Take  by mouth nightly.  DEXILANT 60 mg CpDB capsule (delayed release)       lidocaine (LIDODERM) 5 % Apply patch to the affected area for 12 hours a day and remove for 12 hours a day. DX.M79.7 80 Each 3    warfarin (COUMADIN) 5 mg tablet TAKE ONE TABLET BY MOUTH ONCE DAILY  3    LACTOBACILLUS ACIDOPHILUS (PROBIOTIC PO) Take 1 Cap by mouth as needed.  OTHER Patricia sore muscle with cayenne and ginger topical cream as needed. OTC for pain.  CHOLECALCIFEROL, VITAMIN D3, (VITAMIN D3 PO) Take  by mouth. Liquid form.  0 units per 4 drops. Takes 4 drops po once daily.  carica papaya (PAPAYA ENZYME) tab Take  by mouth. Takes 4 tabs po 1-2 times daily after meals.  OT ULTRA/FASTTRACK CONTROL soln       ONETOUCH ULTRA2 monitoring kit       ONE TOUCH DELICA 33 gauge misc       MICROLET LANCET misc USE TO TEST BLOOD SUGAR EVERY  Each 11    CONTOUR NEXT STRIPS strip USE TO TEST BLOOD SUGARS ONCE DAILY 50 Strip 11    cyanocobalamin (VITAMIN B-12) 1,000 mcg tablet Take 1,000 mcg by mouth as needed.        Past Medical History:   Diagnosis Date    Adverse effect of anesthesia     \"hard time waking me up\"    Axillary pain, right     Bereavement 2019    77 yo - heart attack -  in sleep    Chronic pain     d/t fibromyalgia    Coagulation disorder ([facility] [address]     on eliquis -d/c due to gi upset - now on coumadin previously followed by dr. Nhung Preston Depression with anxiety     DJD (degenerative joint disease)     Dyslipidemia     Encounter for Hemoccult screening 2017    neg    Fibromyalgia     Gait instability     GERD (gastroesophageal reflux disease)     Hypertension     IBS (irritable bowel syndrome)     Ill-defined condition     gastroparesis    Ill-defined condition     CT - 3/18/2018 - esophageal diverticulum    Ill-defined condition     pericarditis    Mastodynia, left greater than right 2011    Menopause     Normal cardiac stress test 3.14    Positive D dimer 9-23-15    Prediabetes     Pulmonary embolism ([facility] [address] 2013    rll    Restless leg syndrome     S/P colonoscopy 2011    kenny hernandez md    Saphenous vein occlusion, right 2017    [facility] acute occlusive superificial vein thrombosis - Melody Dyer md    Saphenous vein thrombophlebitis, right 2018    and acute l posterior tibial vein - this is the 2nd DVT putting her on lifelong anticoagualtion / Mag Maguire    Sleep apnea     cpap 9cm    SOB (shortness of breath)     White coat hypertension      Past Surgical History:   Procedure Laterality Date    ABDOMEN SURGERY PROC UNLISTED      exploratory years ago    BREAST SURGERY PROCEDURE UNLISTED  2006    left breast sppskd-Holske-GXDR. Delvin Castro    COLONOSCOPY N/A 4/10/2018    COLONOSCOPY,EGD performed by Charmayne Favia, [facility] at [facility] ENDOSCOPY    HX BREAST BIOPSY Left     HX COLONOSCOPY      HX ORTHOPAEDIC  2009    foot surgery-left - bunionectomy    HX ORTHOPAEDIC      cyst removed from left hand - ganglion cyst    HX OTHER SURGICAL      right and left leg muscle biopsies - elevated CPK - muscle enzymes were elevated    HX TUBAL LIGATION       Allergies   Allergen Reactions    Aleve [Naproxen Sodium] Hoarseness    Dilaudid [Hydromorphone (Bulk)] Anaphylaxis     Respiratory arrest and throat closes    Diovan [Valsartan] Palpitations    Morphine Other (comments)     Patch-vomiting,weakness, fainting    Sulfa (Sulfonamide Antibiotics) Hives, Rash and Other (comments)     fainting         REVIEW OF SYSTEMS:  General: negative for - chills or fever  ENT: negative for - headaches, nasal congestion or tinnitus  Respiratory: negative for - cough, hemoptysis, shortness of breath or wheezing  Cardiovascular : negative for - chest pain, edema, palpitations or shortness of breath  Gastrointestinal: negative for - abdominal pain, blood in stools, heartburn or nausea/vomiting  Genito-Urinary: no dysuria, trouble voiding, or hematuria  Musculoskeletal: negative for - gait disturbance, joint pain, joint stiffness or joint swelling  Neurological: no TIA or stroke symptoms  Hematologic: no bruises, no bleeding, no swollen glands  Integument: no lumps, mole changes, nail changes or rash  Endocrine: no malaise/lethargy or unexpected weight changes      Social History     Socioeconomic History    Marital status:      Spouse name: Not on file    Number of children: Not on file    Years of education: Not on file    Highest education level: Not on file   Tobacco Use    Smoking status: Never Smoker    Smokeless tobacco: Never Used   Substance and Sexual Activity    Alcohol use: No    Drug use: No    Sexual activity: Yes     Partners: Male     Birth control/protection: None   Social History Narrative         Family History: Mother: alive 80 yrs, High Blood Pressure, cva with cognitive deficitsFather:  36 yrs, myocardial infarctionSister(s): aliveBrother(s): unknow n2    sister(s) . 40 daughter no choildren -  walked out works for board of directors for [employer] . Social History: Alcohol Use Patient does not use alcohol. Smoking Status Patient is a never smoker. Caffeine: occasional. Drugs:    prescription narcotics. Diet: Regular. Exercise: None. Marital Status: . Lives w ith: spouse. Children: children. [facility]. Patient is  living w ith her  she is on disability related to her fibromyositis. Family History   Problem Relation Age of Onset    Hypertension Mother     Kidney Disease Mother         1 kidney    Heart Disease Father     Heart Attack Father         late 35s or early 45s    Heart Attack Maternal Grandmother     Cancer Maternal Grandfather         ? lung or stomach       OBJECTIVE:    Visit Vitals  BP (!) 142/93   Pulse 60   Temp 98.1 °F (36.7 °C) (Oral)   Resp 16   Ht 5' 2\" (1.575 m)   Wt 186 lb (84.4 kg)   SpO2 96%   BMI 34.02 kg/m²     CONSTITUTIONAL: well , well nourished, appears age appropriate  EYES: perrla, eom intact  ENMT:moist mucous membranes, pharynx clear  NECK: supple. Thyroid normal  RESPIRATORY: Chest: clear bilaterally   CARDIOVASCULAR: Heart: regular rate and rhythm  GASTROINTESTINAL: Abdomen: soft, bowel sounds active  HEMATOLOGIC: no pathological lymph nodes palpated  MUSCULOSKELETAL: Extremities: no edema, pulse 1+   INTEGUMENT: No unusual rashes or suspicious skin lesions noted.  Nails appear normal.  NEUROLOGIC: non-focal exam   MENTAL STATUS: alert and oriented, appropriate affect           ASSESSMENT:  1. Essential hypertension    2. Encounter for monitoring Coumadin therapy    3. Fibromyositis    4. Prediabetes    5. Irritable bowel syndrome without diarrhea    6. Gait instability    7. Primary osteoarthritis of left hand    8. Dyslipidemia    9. Chronic pulmonary embolism without acute cor pulmonale, unspecified pulmonary embolism type ([facility] [address]    10. SOB (shortness of breath)      Patient's INR is therapeutic at 1.8, no adjustments are needed. She has fibromyalgia, which is continuing to plague her periodically. I do not think the fibromyalgia, however, is causing discomfort in her leg. I think that is more related to varicosities, and so suggest a heating pad when it occurs since she cannot take aspirin. Blood pressure is up today, which would not be unusual.  At home her blood pressure is in the 291R systolic. No symptoms of IBS. The gait instability is not new. We offer referral to neurologist, she prefers not at this time. In addition, she complains of shortness of breath with exertion. We will ask for a BNP to exclude that etiology. For her dyslipidemia, she is on Rosuvastatin 10 mg and we will check the lipid panel today and make an adjustment in Rosuvastatin because we would like her LDL less than 70. She will be back to see us in one month for P-T check, three to four months to see me. I have discussed the diagnosis with the patient and the intended plan as seen in the  Orders. The patient understands and agees with the plan. The patient has   received an after visit summary and questions were answered concerning  future plans  Patient labs and/or xrays were reviewed  Past records were reviewed.     PLAN:  .  Orders Placed This Encounter    RENAL FUNCTION PANEL    HEMOGLOBIN A1C WITH EAG    CBC WITH AUTOMATED DIFF    NT-PRO BNP    LIPID PANEL    AMB POC PT/INR       Follow-up and Dispositions · Return in about 4 weeks (around 12/18/2020) for bp check, pt/inr. ATTENTION:   This medical record was transcribed using an electronic medical records system. Although proofread, it may and can contain electronic and spelling errors. Other human spelling and other errors may be present. Corrections may be executed at a later time. Please feel free to contact us for any clarifications as needed.

## 2020-11-20 NOTE — PROGRESS NOTES
1. Have you been to the ER, urgent care clinic since your last visit? Hospitalized since your last visit? No    2. Have you seen or consulted any other health care providers outside of the [address] [place] [address] since your last visit? Include any pap smears or colon screening.  No     Coumadin 2.5 1 tab PO everyday except on Sunday 2 tabs PO

## 2020-11-21 ENCOUNTER — HOSPITAL ENCOUNTER (OUTPATIENT)
Dept: LAB | Age: 65
Discharge: HOME | End: 2020-11-21

## 2020-11-23 LAB
ALBUMIN SERPL-MCNC: 4.1 G/DL (ref 3.5–5)
ANION GAP SERPL CALC-SCNC: 5 MMOL/L (ref 5–15)
BASOPHILS # BLD: 0 K/UL (ref 0–0.1)
BASOPHILS NFR BLD: 1 % (ref 0–1)
BNP SERPL-MCNC: 50 PG/ML
BUN SERPL-MCNC: 17 MG/DL (ref 6–20)
BUN/CREAT SERPL: 15 (ref 12–20)
CALCIUM SERPL-MCNC: 9.6 MG/DL (ref 8.5–10.1)
CHLORIDE SERPL-SCNC: 108 MMOL/L (ref 97–108)
CHOLEST SERPL-MCNC: 221 MG/DL
CO2 SERPL-SCNC: 29 MMOL/L (ref 21–32)
CREAT SERPL-MCNC: 1.15 MG/DL (ref 0.55–1.02)
DIFFERENTIAL METHOD BLD: ABNORMAL
EOSINOPHIL # BLD: 0 K/UL (ref 0–0.4)
EOSINOPHIL NFR BLD: 1 % (ref 0–7)
ERYTHROCYTE [DISTWIDTH] IN BLOOD BY AUTOMATED COUNT: 15.9 % (ref 11.5–14.5)
EST. AVERAGE GLUCOSE BLD GHB EST-MCNC: 123 MG/DL
GLUCOSE SERPL-MCNC: 83 MG/DL (ref 65–100)
HBA1C MFR BLD: 5.9 % (ref 4–5.6)
HCT VFR BLD AUTO: 46.3 % (ref 35–47)
HDLC SERPL-MCNC: 58 MG/DL
HDLC SERPL: 3.8 {RATIO} (ref 0–5)
HGB BLD-MCNC: 14.2 G/DL (ref 11.5–16)
IMM GRANULOCYTES # BLD AUTO: 0 K/UL (ref 0–0.04)
IMM GRANULOCYTES NFR BLD AUTO: 0 % (ref 0–0.5)
LDLC SERPL CALC-MCNC: 144.8 MG/DL (ref 0–100)
LIPID PROFILE,FLP: ABNORMAL
LYMPHOCYTES # BLD: 1.6 K/UL (ref 0.8–3.5)
LYMPHOCYTES NFR BLD: 42 % (ref 12–49)
MCH RBC QN AUTO: 28.7 PG (ref 26–34)
MCHC RBC AUTO-ENTMCNC: 30.7 G/DL (ref 30–36.5)
MCV RBC AUTO: 93.5 FL (ref 80–99)
MONOCYTES # BLD: 0.4 K/UL (ref 0–1)
MONOCYTES NFR BLD: 10 % (ref 5–13)
NEUTS SEG # BLD: 1.7 K/UL (ref 1.8–8)
NEUTS SEG NFR BLD: 46 % (ref 32–75)
NRBC # BLD: 0 K/UL (ref 0–0.01)
NRBC BLD-RTO: 0 PER 100 WBC
PHOSPHATE SERPL-MCNC: 3.6 MG/DL (ref 2.6–4.7)
PLATELET # BLD AUTO: 211 K/UL (ref 150–400)
PMV BLD AUTO: 12.2 FL (ref 8.9–12.9)
POTASSIUM SERPL-SCNC: 4.3 MMOL/L (ref 3.5–5.1)
RBC # BLD AUTO: 4.95 M/UL (ref 3.8–5.2)
SODIUM SERPL-SCNC: 142 MMOL/L (ref 136–145)
TRIGL SERPL-MCNC: 91 MG/DL (ref ?–150)
VLDLC SERPL CALC-MCNC: 18.2 MG/DL
WBC # BLD AUTO: 3.8 K/UL (ref 3.6–11)

## 2020-12-02 ENCOUNTER — HOSPITAL ENCOUNTER (OUTPATIENT)
Dept: MAMMOGRAPHY | Age: 65
Discharge: HOME | End: 2020-12-02
Attending: INTERNAL MEDICINE
Payer: MEDICARE

## 2020-12-02 DIAGNOSIS — R92.8 ABNORMAL MAMMOGRAM OF LEFT BREAST: ICD-10-CM

## 2020-12-02 PROCEDURE — 77065 DX MAMMO INCL CAD UNI: CPT

## 2020-12-16 ENCOUNTER — CLINICAL SUPPORT (OUTPATIENT)
Dept: INTERNAL MEDICINE CLINIC | Age: 65
End: 2020-12-16
Payer: MEDICARE

## 2020-12-16 DIAGNOSIS — Z51.81 ENCOUNTER FOR MONITORING COUMADIN THERAPY: Primary | ICD-10-CM

## 2020-12-16 DIAGNOSIS — Z79.01 ENCOUNTER FOR MONITORING COUMADIN THERAPY: Primary | ICD-10-CM

## 2020-12-16 LAB
INR BLD: 1.1
PT POC: 13.5 SECONDS
VALID INTERNAL CONTROL?: YES

## 2020-12-16 PROCEDURE — 85610 PROTHROMBIN TIME: CPT | Performed by: INTERNAL MEDICINE

## 2020-12-16 NOTE — PROGRESS NOTES
Kalina Rebollar is a 77 y.o. female who presents today for Anticoagulation monitoring. Current dose: no coumadin at this time  Medication Changes:  yes - 3 days after procedure resume coumadin the way she was taking it before it was put on hold  Dietary Changes:  no    Latest INR:  Results for orders placed or performed in visit on 12/16/20   AMB POC PT/INR   Result Value Ref Range    VALID INTERNAL CONTROL POC Yes     Prothrombin time (POC) 13.5 seconds    INR POC 1.1          New Coumadin dose:.orders as documented in EMR. Next check to be scheduled for  2 weeks.   Per Dr. Phyllis Genao LPN

## 2020-12-18 ENCOUNTER — HOSPITAL ENCOUNTER (OUTPATIENT)
Dept: MAMMOGRAPHY | Age: 65
Discharge: HOME | End: 2020-12-18
Attending: INTERNAL MEDICINE
Payer: MEDICARE

## 2020-12-18 DIAGNOSIS — R92.8 ABNORMAL MAMMOGRAM OF LEFT BREAST: ICD-10-CM

## 2020-12-18 DIAGNOSIS — R92.8 ABNORMAL MAMMOGRAM: ICD-10-CM

## 2020-12-18 PROCEDURE — 74011000250 HC RX REV CODE- 250: Performed by: RADIOLOGY

## 2020-12-18 PROCEDURE — 77030013689 HC GD NDL EVIVA HOLO -A

## 2020-12-18 PROCEDURE — 19081 BX BREAST 1ST LESION STRTCTC: CPT

## 2020-12-18 PROCEDURE — 88305 TISSUE EXAM BY PATHOLOGIST: CPT

## 2020-12-18 PROCEDURE — 77065 DX MAMMO INCL CAD UNI: CPT

## 2020-12-18 PROCEDURE — 2709999900 HC NON-CHARGEABLE SUPPLY

## 2020-12-18 PROCEDURE — A4648 IMPLANTABLE TISSUE MARKER: HCPCS

## 2020-12-18 PROCEDURE — 77030030538 HC HNDPC BIOP EVIVA HOLO -C

## 2020-12-18 RX ORDER — LIDOCAINE HYDROCHLORIDE AND EPINEPHRINE 10; 10 MG/ML; UG/ML
1.5 INJECTION, SOLUTION INFILTRATION; PERINEURAL ONCE
Status: COMPLETED | OUTPATIENT
Start: 2020-12-18 | End: 2020-12-18

## 2020-12-18 RX ORDER — SODIUM BICARBONATE 84 MG/ML
50 INJECTION, SOLUTION INTRAVENOUS
Status: COMPLETED | OUTPATIENT
Start: 2020-12-18 | End: 2020-12-18

## 2020-12-18 RX ORDER — LIDOCAINE HYDROCHLORIDE 10 MG/ML
4 INJECTION, SOLUTION EPIDURAL; INFILTRATION; INTRACAUDAL; PERINEURAL
Status: COMPLETED | OUTPATIENT
Start: 2020-12-18 | End: 2020-12-18

## 2020-12-18 RX ADMIN — LIDOCAINE HYDROCHLORIDE 4 ML: 10 INJECTION, SOLUTION EPIDURAL; INFILTRATION; INTRACAUDAL; PERINEURAL at 10:00

## 2020-12-18 RX ADMIN — LIDOCAINE HYDROCHLORIDE AND EPINEPHRINE 15 MG: 10; 10 INJECTION, SOLUTION INFILTRATION; PERINEURAL at 10:00

## 2020-12-18 RX ADMIN — SODIUM BICARBONATE 50 MEQ: 84 INJECTION, SOLUTION INTRAVENOUS at 10:00

## 2020-12-18 NOTE — ROUTINE PROCESS
Procedure reviewed with patient by Dr. Kevin Witt. Opportunity to verbalize questions and concerns. Consent obtained.

## 2020-12-18 NOTE — DISCHARGE INSTRUCTIONS
[address], [address] [facility]  [phone]      Breast Biopsy Discharge Instructions          1. After the biopsy, we will place a clean covered ice pack over the biopsy site, within the bra - you should leave the ice pack on 30 minutes and then remove the ice pack for 1-2 hours until bedtime. If needed you can continue applying ice the following day. It is a good idea to wear your bra for support, both day and night unless this causes you discomfort. 2. You may take Tylenol (two tablets) every 4 to 6 hours as needed for pain. Do not take aspirin or aspirin products (e.g. ibuprofen, Advil, Motrin) as these may cause more bleeding. 3. You may expect some bruising and skin discoloration in the biopsy area. This is normal and generally should resolve in 5 to 7 days. 4. Most women do not find it necessary to restrict their activities after the procedure. You should rest as needed on the day of your biopsy. The next day, if you are feeling okay, you may resume your regular work/activity schedule. Avoid strenuous activity and heavy lifting, jogging, aerobics, or vacuuming for 48 hours after the procedure. 5. 48 hours after your biopsy, remove the large outer dressing and leave the steri-strips (tiny pieces of tape) in place. The steri-strips will usually fall off in a few days. You may shower 48 hours after your biopsy and you may get the steri-strips wet. If still present after 4 days, you may gently peel the strips off. Keep the area clean and dry and shower daily. 6. If you have bleeding from the incision area, hold firm pressure on the area for 20 minutes. This should control any slight oozing that might occur.   If you develop persistent bleeding or pain which does not respond to the above measures or if you develop a fever, excessive swelling, redness, heat or drainage, please call the Breast Health Navigator at [phone] during normal business hours (7 a.m. - 5 p.m.). After business hours, call [phone] and ask for the on-call Radiology Nurse to be paged, or your referring physician. 7. You will receive your biopsy results (pathology report) in 3-5 business days - the radiologist will review your results and you will receive a call from the radiology department and/or from your doctor.           Physician :____________________     Nurse: ______________________        [facility]: ______________

## 2021-01-06 ENCOUNTER — CLINICAL SUPPORT (OUTPATIENT)
Dept: INTERNAL MEDICINE CLINIC | Age: 66
End: 2021-01-06
Payer: MEDICARE

## 2021-01-06 DIAGNOSIS — Z51.81 ENCOUNTER FOR MONITORING COUMADIN THERAPY: Primary | ICD-10-CM

## 2021-01-06 DIAGNOSIS — Z79.01 ENCOUNTER FOR MONITORING COUMADIN THERAPY: Primary | ICD-10-CM

## 2021-01-06 LAB
INR BLD: 2
PT POC: 23.8 SECONDS
VALID INTERNAL CONTROL?: YES

## 2021-01-06 PROCEDURE — 85610 PROTHROMBIN TIME: CPT | Performed by: INTERNAL MEDICINE

## 2021-01-06 NOTE — PROGRESS NOTES
Dago Cardenas is a 77 y.o. female who presents today for Anticoagulation monitoring. Current dose:  Coumadin 2.5 mg 1 tab PO everyday except on Sunday 2 tabs PO   Medication Changes:  no  Dietary Changes:  no    Latest INR:  Results for orders placed or performed in visit on 01/06/21   AMB POC PT/INR   Result Value Ref Range    VALID INTERNAL CONTROL POC Yes     Prothrombin time (POC) 23.8 seconds    INR POC 2.0          New Coumadin dose:.current treatment plan is effective, no change in therapy. Next check to be scheduled for  4 weeks.   Per Amy Au LPN

## 2021-01-31 DIAGNOSIS — I10 ESSENTIAL HYPERTENSION: ICD-10-CM

## 2021-02-01 RX ORDER — BISOPROLOL FUMARATE AND HYDROCHLOROTHIAZIDE 10; 6.25 MG/1; MG/1
TABLET ORAL
Qty: 90 TAB | Refills: 3 | Status: SHIPPED | OUTPATIENT
Start: 2021-02-01 | End: 2022-01-31 | Stop reason: SDUPTHER

## 2021-02-01 RX ORDER — AMLODIPINE BESYLATE 10 MG/1
TABLET ORAL
Qty: 90 TAB | Refills: 3 | Status: SHIPPED | OUTPATIENT
Start: 2021-02-01 | End: 2022-04-22

## 2021-02-10 ENCOUNTER — CLINICAL SUPPORT (OUTPATIENT)
Dept: INTERNAL MEDICINE CLINIC | Age: 66
End: 2021-02-10
Payer: MEDICARE

## 2021-02-10 DIAGNOSIS — Z51.81 ENCOUNTER FOR MONITORING COUMADIN THERAPY: Primary | ICD-10-CM

## 2021-02-10 DIAGNOSIS — Z79.01 ENCOUNTER FOR MONITORING COUMADIN THERAPY: Primary | ICD-10-CM

## 2021-02-10 LAB
INR BLD: 2.7
PT POC: 32.4 SECONDS
VALID INTERNAL CONTROL?: YES

## 2021-02-10 PROCEDURE — 93793 ANTICOAG MGMT PT WARFARIN: CPT | Performed by: INTERNAL MEDICINE

## 2021-02-10 PROCEDURE — 85610 PROTHROMBIN TIME: CPT | Performed by: INTERNAL MEDICINE

## 2021-03-03 ENCOUNTER — OFFICE VISIT (OUTPATIENT)
Dept: INTERNAL MEDICINE CLINIC | Age: 66
End: 2021-03-03
Payer: MEDICARE

## 2021-03-03 VITALS
OXYGEN SATURATION: 96 % | HEART RATE: 65 BPM | TEMPERATURE: 97.9 F | SYSTOLIC BLOOD PRESSURE: 131 MMHG | HEIGHT: 62 IN | RESPIRATION RATE: 16 BRPM | WEIGHT: 189.1 LBS | DIASTOLIC BLOOD PRESSURE: 76 MMHG | BODY MASS INDEX: 34.8 KG/M2

## 2021-03-03 DIAGNOSIS — I27.82 CHRONIC PULMONARY EMBOLISM WITHOUT ACUTE COR PULMONALE, UNSPECIFIED PULMONARY EMBOLISM TYPE (HCC): ICD-10-CM

## 2021-03-03 DIAGNOSIS — K58.9 IRRITABLE BOWEL SYNDROME WITHOUT DIARRHEA: ICD-10-CM

## 2021-03-03 DIAGNOSIS — R73.02 IGT (IMPAIRED GLUCOSE TOLERANCE): ICD-10-CM

## 2021-03-03 DIAGNOSIS — I73.9 PERIPHERAL VASCULAR DISEASE (HCC): ICD-10-CM

## 2021-03-03 DIAGNOSIS — R26.81 GAIT INSTABILITY: ICD-10-CM

## 2021-03-03 DIAGNOSIS — K21.9 GASTROESOPHAGEAL REFLUX DISEASE WITHOUT ESOPHAGITIS: ICD-10-CM

## 2021-03-03 DIAGNOSIS — M77.11 EPICONDYLITIS, LATERAL, RIGHT: ICD-10-CM

## 2021-03-03 DIAGNOSIS — Z51.81 ENCOUNTER FOR MONITORING COUMADIN THERAPY: ICD-10-CM

## 2021-03-03 DIAGNOSIS — E78.5 DYSLIPIDEMIA: ICD-10-CM

## 2021-03-03 DIAGNOSIS — Z79.01 ENCOUNTER FOR MONITORING COUMADIN THERAPY: ICD-10-CM

## 2021-03-03 DIAGNOSIS — M79.7 FIBROMYALGIA: ICD-10-CM

## 2021-03-03 DIAGNOSIS — I10 ESSENTIAL HYPERTENSION: ICD-10-CM

## 2021-03-03 DIAGNOSIS — G89.4 CHRONIC PAIN SYNDROME: ICD-10-CM

## 2021-03-03 DIAGNOSIS — Z00.00 PREVENTATIVE HEALTH CARE: Primary | ICD-10-CM

## 2021-03-03 DIAGNOSIS — M19.042 PRIMARY OSTEOARTHRITIS OF LEFT HAND: ICD-10-CM

## 2021-03-03 LAB
ALBUMIN SERPL-MCNC: 3.9 G/DL (ref 3.5–5)
ANION GAP SERPL CALC-SCNC: 7 MMOL/L (ref 5–15)
BUN SERPL-MCNC: 20 MG/DL (ref 6–20)
BUN/CREAT SERPL: 17 (ref 12–20)
CALCIUM SERPL-MCNC: 9.8 MG/DL (ref 8.5–10.1)
CHLORIDE SERPL-SCNC: 108 MMOL/L (ref 97–108)
CHOLEST SERPL-MCNC: 205 MG/DL
CO2 SERPL-SCNC: 27 MMOL/L (ref 21–32)
CREAT SERPL-MCNC: 1.18 MG/DL (ref 0.55–1.02)
EST. AVERAGE GLUCOSE BLD GHB EST-MCNC: 117 MG/DL
GLUCOSE SERPL-MCNC: 94 MG/DL (ref 65–100)
HBA1C MFR BLD: 5.7 % (ref 4–5.6)
HDLC SERPL-MCNC: 54 MG/DL
HDLC SERPL: 3.8 {RATIO} (ref 0–5)
INR BLD: 2.3
LDLC SERPL CALC-MCNC: 113.2 MG/DL (ref 0–100)
LIPID PROFILE,FLP: ABNORMAL
PHOSPHATE SERPL-MCNC: 3 MG/DL (ref 2.6–4.7)
POTASSIUM SERPL-SCNC: 3.9 MMOL/L (ref 3.5–5.1)
PT POC: 27.8 SECONDS
SODIUM SERPL-SCNC: 142 MMOL/L (ref 136–145)
TRIGL SERPL-MCNC: 189 MG/DL (ref ?–150)
VALID INTERNAL CONTROL?: YES
VLDLC SERPL CALC-MCNC: 37.8 MG/DL

## 2021-03-03 PROCEDURE — G8754 DIAS BP LESS 90: HCPCS | Performed by: INTERNAL MEDICINE

## 2021-03-03 PROCEDURE — G8432 DEP SCR NOT DOC, RNG: HCPCS | Performed by: INTERNAL MEDICINE

## 2021-03-03 PROCEDURE — 3017F COLORECTAL CA SCREEN DOC REV: CPT | Performed by: INTERNAL MEDICINE

## 2021-03-03 PROCEDURE — G8752 SYS BP LESS 140: HCPCS | Performed by: INTERNAL MEDICINE

## 2021-03-03 PROCEDURE — G8417 CALC BMI ABV UP PARAM F/U: HCPCS | Performed by: INTERNAL MEDICINE

## 2021-03-03 PROCEDURE — 1090F PRES/ABSN URINE INCON ASSESS: CPT | Performed by: INTERNAL MEDICINE

## 2021-03-03 PROCEDURE — 1101F PT FALLS ASSESS-DOCD LE1/YR: CPT | Performed by: INTERNAL MEDICINE

## 2021-03-03 PROCEDURE — G9899 SCRN MAM PERF RSLTS DOC: HCPCS | Performed by: INTERNAL MEDICINE

## 2021-03-03 PROCEDURE — 99397 PER PM REEVAL EST PAT 65+ YR: CPT | Performed by: INTERNAL MEDICINE

## 2021-03-03 PROCEDURE — 85610 PROTHROMBIN TIME: CPT | Performed by: INTERNAL MEDICINE

## 2021-03-03 NOTE — PROGRESS NOTES
SPORTS MEDICINE AND PRIMARY CARE  Mamie Moran MD, [address] [address]  Phone:  [phone]  Fax: [phone]       Chief Complaint   Patient presents with    Hypertension   . SUBJECTIVE:    Meenu Smith is a 77 y.o. female Patient returns today with a history of fibromyalgia, primary hypertension, GERD, impaired glucose tolerance, chronic pain, gait instability, irritable bowel syndrome, dyslipidemia, degenerative joint disease, peripheral vascular disease, pulmonary embolism, recurrent, and is seen for evaluation. Patient returns today saying she wants an annual physical at the request of her insurance company. Patient complains of swelling of her elbow area, which is improved, but is sore. She also complains of discomfort in the lateral aspect of her right knee that is also sore and swollen. For the fibromyalgia, Lidocaine patches are not helpful. She uses topical creams. Patient is seen for evaluation. Current Outpatient Medications   Medication Sig Dispense Refill    bisoprolol-hydroCHLOROthiazide (ZIAC) 10-6.25 mg per tablet TAKE 1 TABLET DAILY 90 Tab 3    amLODIPine (NORVASC) 10 mg tablet TAKE 1 TABLET DAILY 90 Tab 3    rosuvastatin (CRESTOR) 10 mg tablet Take 1 Tab by mouth nightly. 90 Tab 3    warfarin (COUMADIN) 2.5 mg tablet 1 to 2 tabs daily 180 Tab 3    traZODone (DESYREL) 50 mg tablet TAKE 1 TABLET NIGHTLY AS NEEDED FOR SLEEP 90 Tab 4    amitriptyline (ELAVIL) 10 mg tablet Take  by mouth nightly.  DEXILANT 60 mg CpDB capsule (delayed release)       lidocaine (LIDODERM) 5 % Apply patch to the affected area for 12 hours a day and remove for 12 hours a day. DX.M79.7 80 Each 3    warfarin (COUMADIN) 5 mg tablet TAKE ONE TABLET BY MOUTH ONCE DAILY  3    LACTOBACILLUS ACIDOPHILUS (PROBIOTIC PO) Take 1 Cap by mouth as needed.  OTHER [place] sore muscle with cayenne and ginger topical cream as needed. OTC for pain.       CHOLECALCIFEROL, VITAMIN D3, (VITAMIN D3 PO) Take  by mouth. Liquid form. 400 units per 4 drops. Takes 4 drops po once daily.  carica papaya (PAPAYA ENZYME) tab Take  by mouth. Takes 4 tabs po 1-2 times daily after meals.  OT ULTRA/FASTTRACK CONTROL soln       ONETOUCH ULTRA2 monitoring kit       ONE TOUCH DELICA 33 gauge misc       MICROLET LANCET misc USE TO TEST BLOOD SUGAR EVERY  Each 11    CONTOUR NEXT STRIPS strip USE TO TEST BLOOD SUGARS ONCE DAILY 50 Strip 11    cyanocobalamin (VITAMIN B-12) 1,000 mcg tablet Take 1,000 mcg by mouth as needed.        Past Medical History:   Diagnosis Date    Adverse effect of anesthesia     \"hard time waking me up\"    Axillary pain, right     Bereavement 2019    79 yo - heart attack -  in sleep    Chronic pain     d/t fibromyalgia    Coagulation disorder ([facility] [address]     on eliquis -d/c due to gi upset - now on coumadin previously followed by dr. Mike Rubin Depression with anxiety     DJD (degenerative joint disease)     Dyslipidemia     Encounter for Hemoccult screening 2017    neg    Epicondylitis, lateral, right 2021    Fibromyalgia     Gait instability     GERD (gastroesophageal reflux disease)     Hypertension     IBS (irritable bowel syndrome)     Ill-defined condition     gastroparesis    Ill-defined condition     CT - 3/18/2018 - esophageal diverticulum    Ill-defined condition     pericarditis    Mastodynia, left greater than right 2011    Menopause     Normal cardiac stress test 3.14    Positive D dimer 9-23-15    Prediabetes     Preventative health care 2021    Pulmonary embolism ([facility] [address] 2013    rll    Restless leg syndrome     S/P colonoscopy 2011    kenny hernandez md    Saphenous vein occlusion, right 2017    [facility] acute occlusive superificial vein thrombosis - Melody Dyer md    Saphenous vein thrombophlebitis, right 2018    and acute l posterior tibial vein - this is the 2nd DVT putting her on lifelong anticoagualtion / Yazmin Ali    Sleep apnea     cpap 9cm    SOB (shortness of breath)     White coat hypertension      Past Surgical History:   Procedure Laterality Date    COLONOSCOPY N/A 4/10/2018    COLONOSCOPY,EGD [facility] ENDOSCOPY    HX BREAST BIOPSY Left     HX COLONOSCOPY      HX ORTHOPAEDIC  2009    foot surgery-left - bunionectomy    HX ORTHOPAEDIC      cyst removed from left hand - ganglion cyst    HX OTHER SURGICAL      right and left leg muscle biopsies - elevated CPK - muscle enzymes were elevated    HX TUBAL LIGATION      [facility] ABDOMEN SUR[facility] PROC UNLISTED      exploratory years ago    [facility] BREAST SURGERY PROCEDURE UNLISTED  2006    left breast [id number]. Karna Raider     Allergies   Allergen Reactions    Aleve [Naproxen Sodium] Hoarseness    Dilaudid [Hydromorphone (Bulk)] Anaphylaxis     Respiratory arrest and throat closes    Diovan [Valsartan] Palpitations    Morphine Other (comments)     Patch-vomiting,weakness, fainting    Sulfa (Sulfonamide Antibiotics) Hives, Rash and Other (comments)     fainting         REVIEW OF SYSTEMS:  General: negative for - chills or fever  ENT: negative for - headaches, nasal congestion or tinnitus  Respiratory: negative for - cough, hemoptysis, shortness of breath or wheezing  Cardiovascular : negative for - chest pain, edema, palpitations or shortness of breath  Gastrointestinal: negative for - abdominal pain, blood in stools, heartburn or nausea/vomiting  Genito-Urinary: no dysuria, trouble voiding, or hematuria  Musculoskeletal: negative for - gait disturbance, joint pain, joint stiffness or joint swelling  Neurological: no TIA or stroke symptoms  Hematologic: no bruises, no bleeding, no swollen glands  Integument: no lumps, mole changes, nail changes or rash  Endocrine: no malaise/lethargy or unexpected weight changes      Social History Socioeconomic History    Marital status:      Spouse name: Not on file    Number of children: Not on file    Years of education: Not on file    Highest education level: Not on file   Tobacco Use    Smoking status: Never Smoker    Smokeless tobacco: Never Used   Substance and Sexual Activity    Alcohol use: No    Drug use: No    Sexual activity: Yes     Partners: Male     Birth control/protection: None   Social History Narrative         Family History: Mother: alive 80 yrs, High Blood Pressure, cva with cognitive deficitsFather:  36 yrs, myocardial infarctionSister(s): aliveBrother(s): unknow n2    sister(s) . 40 daughter no choildren -  walked out works for board of directors for [employer] . Social History: Alcohol Use Patient does not use alcohol. Smoking Status Patient is a never smoker. Caffeine: occasional. Drugs:    prescription narcotics. Diet: Regular. Exercise: None. Marital Status: . Lives w ith: spouse. Children: children. [employer]: [employer]. Patient is  living w ith her  she is on disability related to her fibromyositis. Family History   Problem Relation Age of Onset    Hypertension Mother     Kidney Disease Mother         1 kidney    Heart Disease Father     Heart Attack Father         late 35s or early 45s    Heart Attack Maternal Grandmother     Cancer Maternal Grandfather         ? lung or stomach       OBJECTIVE:    Visit Vitals  /76   Pulse 65   Temp 97.9 °F (36.6 °C) (Oral)   Resp 16   Ht 5' 2\" (1.575 m)   Wt 189 lb 1.6 oz (85.8 kg)   SpO2 96%   BMI 34.59 kg/m²     CONSTITUTIONAL: well , well nourished, appears age appropriate  EYES: perrla, eom intact  ENMT:moist mucous membranes, pharynx clear  NECK: supple.  Thyroid normal  RESPIRATORY: Chest: clear bilaterally   CARDIOVASCULAR: Heart: regular rate and rhythm  GASTROINTESTINAL: Abdomen: soft, bowel sounds active  HEMATOLOGIC: no pathological lymph nodes palpated  MUSCULOSKELETAL: Extremities: no edema, pulse 1+   INTEGUMENT: No unusual rashes or suspicious skin lesions noted. Nails appear normal.  NEUROLOGIC: non-focal exam   MENTAL STATUS: alert and oriented, appropriate affect           ASSESSMENT:  1. Preventative health care    2. Encounter for monitoring Coumadin therapy    3. Fibromyalgia    4. Essential hypertension    5. Gastroesophageal reflux disease without esophagitis    6. IGT (impaired glucose tolerance)    7. Chronic pain syndrome    8. Gait instability    9. Irritable bowel syndrome without diarrhea    10. Dyslipidemia    11. Primary osteoarthritis of left hand    12. Peripheral vascular disease ([address]    13. Chronic pulmonary embolism without acute cor pulmonale, unspecified pulmonary embolism type (HCC)    14. Epicondylitis, lateral, right      This completes her preventive healthcare visit. INR is therapeutic, no adjustments are needed. She has a continuing flare of her fibromyalgia. Lidocaine patch is no longer working. She is avoiding narcotics, as are we. Blood pressure control is at goal on Ziac and Amlodipine. Known history of GERD and for that she is on Dexilant as needed. She has impaired glucose tolerance. Blood sugars have been less than 120 generally and we will confirm that with a hemoglobin A1c today. She is on diet alone. She has a long history of chronic pain syndrome, which in part is related to fibromyalgia. For the gait instability she is using a rollabout walker. Known history of IBS and diarrhea. Currently she is not having a bout. She never got the Crestor. We will check the lipids. She wanted to try diet alone. We will check a lipid panel and restart the Crestor as I suspect it is still elevated. We will wait for lipid panel to come back and then we will send a note that we sent a prescription over for Crestor. She agrees with the plan.     Osteoarthritis of the left hand is not problematic today. Neither is the peripheral vascular occlusive disease. She is on Coumadin for the recurrent PE and Coumadin is therapeutic. She has lateral epicondylitis, for which we give her elbow splint and some information. She has synovitis of the right knee, which except for the swelling is asymptomatic. She will be back to see us in one month for a P-T check. I have discussed the diagnosis with the patient and the intended plan as seen in the  Orders. The patient understands and agees with the plan. The patient has   received an after visit summary and questions were answered concerning  future plans  Patient labs and/or xrays were reviewed  Past records were reviewed. PLAN:  .  Orders Placed This Encounter    HEMOGLOBIN A1C WITH EAG    LIPID PANEL    RENAL FUNCTION PANEL    AMB POC PT/INR                  ATTENTION:   This medical record was transcribed using an electronic medical records system. Although proofread, it may and can contain electronic and spelling errors. Other human spelling and other errors may be present. Corrections may be executed at a later time. Please feel free to contact us for any clarifications as needed.

## 2021-03-03 NOTE — PATIENT INSTRUCTIONS
Tennis Elbow: Care Instructions Your Care Instructions Tennis elbow is soreness or pain on the outer part of the elbow. The pain occurs when the tendon is stretched and becomes irritated by repeated twisting of the hand, wrist, and forearm. A tendon is a tough tissue that connects muscle to bone. This injury is common in tennis players. But you also can get it from many activities that work the same muscles. Examples include gardening, painting, and using tools. Tennis elbow usually heals with rest and treatment at home. Follow-up care is a key part of your treatment and safety. Be sure to make and go to all appointments, and call your doctor if you are having problems. It's also a good idea to know your test results and keep a list of the medicines you take. How can you care for yourself at home? 
  · Rest your fingers, wrist, and forearm. Try to stop or reduce any activity that causes elbow pain. You may have to rest your arm for weeks to months. Follow your doctor's directions for how long to rest.  
  · Put ice or a cold pack on your elbow for 10 to 20 minutes at a time. Try to do this every 1 to 2 hours for the next 3 days (when you are awake) or until the swelling goes down. Put a thin cloth between the ice and your skin. You can try heat, or alternating heat and ice, after the first 3 days.  
  · If your doctor gave you a brace or splint, use it as directed. A \"counterforce\" brace is a strap around your forearm, just below your elbow. It may ease the pressure on the tendon and spread force throughout your arm.  
  · Prop up your elbow on pillows to help reduce swelling.  
  · Follow your doctor's or physical therapist's directions for exercise.  
  · Return to your usual activities slowly.   · Try to prevent the problem. Learn the best techniques for your sport. For example, make sure the  on your tennis racquet is not too big for your hand. Try not to hit a tennis ball late in your swing.  
  · Think about asking your employer about new ways of doing your job if your elbow pain is caused by something you do at work. Medicines 
  · Be safe with medicines. Read and follow all instructions on the label. ? If the doctor gave you a prescription medicine for pain, take it as prescribed. ? If you are not taking a prescription pain medicine, ask your doctor if you can take an over-the-counter medicine. When should you call for help? Call your doctor now or seek immediate medical care if: 
  · Your pain is worse.  
  · You cannot bend your elbow normally.  
  · Your arm or hand is cool or pale or changes color.  
  · You have tingling, weakness, or numbness in your hand and fingers. Watch closely for changes in your health, and be sure to contact your doctor if: 
  · You have work problems caused by your elbow pain.  
  · Your pain is not better after 2 weeks. Where can you learn more? Go to http://www.gray.com/ Enter [phone] in the search box to learn more about \"Tennis Elbow: Care Instructions. \" Current as of: March 2, 2020               Content Version: 12.6 © 0579-4108 "[employer]. Care instructions adapted under license by [employer] (which disclaims liability or warranty for this information). If you have questions about a medical condition or this instruction, always ask your healthcare professional. [place] [address] any warranty or liability for your use of this information. Tennis Elbow: Exercises Introduction Here are some examples of exercises for you to try. The exercises may be suggested for a condition or for rehabilitation. Start each exercise slowly. Ease off the exercises if you start to have pain. You will be told when to start these exercises and which ones will work best for you. How to do the exercises Wrist flexor stretch 1. Extend your arm in front of you with your palm up. 2. Bend your wrist, pointing your hand toward the floor. 3. With your other hand, gently bend your wrist farther until you feel a mild to moderate stretch in your forearm. 4. Hold for at least 15 to 30 seconds. Repeat 2 to 4 times. Wrist extensor stretch 1. Repeat steps 1 to 4 of the stretch above but begin with your extended hand palm down. Ball or sock squeeze 1. Hold a tennis ball (or a rolled-up sock) in your hand. 2. Make a fist around the ball (or sock) and squeeze. 3. Hold for about 6 seconds, and then relax for up to 10 seconds. 4. Repeat 8 to 12 times. 5. Switch the ball (or sock) to your other hand and do 8 to 12 times. Wrist deviation 1. Sit so that your arm is supported but your hand hangs off the edge of a flat surface, such as a table. 2. Hold your hand out like you are shaking hands with someone. 3. Move your hand up and down. 4. Repeat this motion 8 to 12 times. 5. Switch arms. 6. Try to do this exercise twice with each hand. Wrist curls 1. Place your forearm on a table with your hand hanging over the edge of the table, palm up. 2. Place a 1- to 2-pound weight in your hand. This may be a dumbbell, a can of food, or a filled water bottle. 3. Slowly raise and lower the weight while keeping your forearm on the table and your palm facing up. 4. Repeat this motion 8 to 12 times. 5. Switch arms, and do steps 1 through 4. 
6. Repeat with your hand facing down toward the floor. Switch arms. Biceps curls 1. Sit leaning forward with your legs slightly spread and your left hand on your left thigh. 2. Place your right elbow on your right thigh, and hold the weight with your forearm horizontal. 
3. Slowly curl the weight up and toward your chest. 
4. Repeat this motion 8 to 12 times. 5. Switch arms, and do steps 1 through 4. Follow-up care is a key part of your treatment and safety. Be sure to make and go to all appointments, and call your doctor if you are having problems. It's also a good idea to know your test results and keep a list of the medicines you take. Where can you learn more? Go to http://www.gray.com/ Enter G820 in the search box to learn more about \"Tennis Elbow: Exercises. \" Current as of: March 2, 2020               Content Version: 12.6 © 0458-6403 [employer]. Care instructions adapted under license by [employer] (which disclaims liability or warranty for this information). If you have questions about a medical condition or this instruction, always ask your healthcare professional. [address] any warranty or liability for your use of this information.

## 2021-03-03 NOTE — PROGRESS NOTES
1. Have you been to the ER, urgent care clinic since your last visit? Hospitalized since your last visit? No    2. Have you seen or consulted any other health care providers outside of the [address] [place] [address] since your last visit? Include any pap smears or colon screening.  No    Wants to discuss right knee and leg pain  Arm pain

## 2021-03-04 RX ORDER — ROSUVASTATIN CALCIUM 20 MG/1
20 TABLET, COATED ORAL
Qty: 90 TAB | Refills: 3 | Status: SHIPPED | OUTPATIENT
Start: 2021-03-04 | End: 2021-06-16 | Stop reason: DRUGHIGH

## 2021-05-03 RX ORDER — WARFARIN 2.5 MG/1
TABLET ORAL
Qty: 180 TAB | Refills: 3 | Status: SHIPPED | OUTPATIENT
Start: 2021-05-03 | End: 2021-06-16

## 2021-06-16 ENCOUNTER — OFFICE VISIT (OUTPATIENT)
Dept: INTERNAL MEDICINE CLINIC | Age: 66
End: 2021-06-16
Payer: MEDICARE

## 2021-06-16 VITALS
TEMPERATURE: 98.2 F | BODY MASS INDEX: 33.75 KG/M2 | OXYGEN SATURATION: 95 % | HEART RATE: 56 BPM | SYSTOLIC BLOOD PRESSURE: 135 MMHG | DIASTOLIC BLOOD PRESSURE: 78 MMHG | WEIGHT: 183.4 LBS | RESPIRATION RATE: 16 BRPM | HEIGHT: 62 IN

## 2021-06-16 DIAGNOSIS — I27.82 CHRONIC PULMONARY EMBOLISM WITHOUT ACUTE COR PULMONALE, UNSPECIFIED PULMONARY EMBOLISM TYPE (HCC): ICD-10-CM

## 2021-06-16 DIAGNOSIS — R00.2 PALPITATIONS: ICD-10-CM

## 2021-06-16 DIAGNOSIS — I82.811: ICD-10-CM

## 2021-06-16 DIAGNOSIS — E66.9 CLASS 1 OBESITY WITH BODY MASS INDEX (BMI) OF 33.0 TO 33.9 IN ADULT, UNSPECIFIED OBESITY TYPE, UNSPECIFIED WHETHER SERIOUS COMORBIDITY PRESENT: ICD-10-CM

## 2021-06-16 DIAGNOSIS — E78.5 DYSLIPIDEMIA: ICD-10-CM

## 2021-06-16 DIAGNOSIS — Z51.81 ENCOUNTER FOR MONITORING COUMADIN THERAPY: Primary | ICD-10-CM

## 2021-06-16 DIAGNOSIS — K21.9 GASTROESOPHAGEAL REFLUX DISEASE WITHOUT ESOPHAGITIS: ICD-10-CM

## 2021-06-16 DIAGNOSIS — R73.02 IGT (IMPAIRED GLUCOSE TOLERANCE): ICD-10-CM

## 2021-06-16 DIAGNOSIS — M19.042 PRIMARY OSTEOARTHRITIS OF LEFT HAND: ICD-10-CM

## 2021-06-16 DIAGNOSIS — N39.0 URINARY TRACT INFECTION WITHOUT HEMATURIA, SITE UNSPECIFIED: ICD-10-CM

## 2021-06-16 DIAGNOSIS — K58.9 IRRITABLE BOWEL SYNDROME WITHOUT DIARRHEA: ICD-10-CM

## 2021-06-16 DIAGNOSIS — Z79.01 ENCOUNTER FOR MONITORING COUMADIN THERAPY: Primary | ICD-10-CM

## 2021-06-16 DIAGNOSIS — G89.4 CHRONIC PAIN SYNDROME: ICD-10-CM

## 2021-06-16 DIAGNOSIS — M79.7 FIBROMYOSITIS: ICD-10-CM

## 2021-06-16 DIAGNOSIS — I10 ESSENTIAL HYPERTENSION, BENIGN: ICD-10-CM

## 2021-06-16 DIAGNOSIS — I73.9 PERIPHERAL VASCULAR DISEASE (HCC): ICD-10-CM

## 2021-06-16 LAB
INR BLD: 2.1
PT POC: 25.1 SECONDS
VALID INTERNAL CONTROL?: YES

## 2021-06-16 PROCEDURE — G8536 NO DOC ELDER MAL SCRN: HCPCS | Performed by: INTERNAL MEDICINE

## 2021-06-16 PROCEDURE — 3017F COLORECTAL CA SCREEN DOC REV: CPT | Performed by: INTERNAL MEDICINE

## 2021-06-16 PROCEDURE — 1090F PRES/ABSN URINE INCON ASSESS: CPT | Performed by: INTERNAL MEDICINE

## 2021-06-16 PROCEDURE — G8432 DEP SCR NOT DOC, RNG: HCPCS | Performed by: INTERNAL MEDICINE

## 2021-06-16 PROCEDURE — G8427 DOCREV CUR MEDS BY ELIG CLIN: HCPCS | Performed by: INTERNAL MEDICINE

## 2021-06-16 PROCEDURE — G8754 DIAS BP LESS 90: HCPCS | Performed by: INTERNAL MEDICINE

## 2021-06-16 PROCEDURE — G9899 SCRN MAM PERF RSLTS DOC: HCPCS | Performed by: INTERNAL MEDICINE

## 2021-06-16 PROCEDURE — G8417 CALC BMI ABV UP PARAM F/U: HCPCS | Performed by: INTERNAL MEDICINE

## 2021-06-16 PROCEDURE — 85610 PROTHROMBIN TIME: CPT | Performed by: INTERNAL MEDICINE

## 2021-06-16 PROCEDURE — G8399 PT W/DXA RESULTS DOCUMENT: HCPCS | Performed by: INTERNAL MEDICINE

## 2021-06-16 PROCEDURE — 99215 OFFICE O/P EST HI 40 MIN: CPT | Performed by: INTERNAL MEDICINE

## 2021-06-16 PROCEDURE — G8752 SYS BP LESS 140: HCPCS | Performed by: INTERNAL MEDICINE

## 2021-06-16 PROCEDURE — 1101F PT FALLS ASSESS-DOCD LE1/YR: CPT | Performed by: INTERNAL MEDICINE

## 2021-06-16 RX ORDER — WARFARIN 2.5 MG/1
2.5 TABLET ORAL DAILY
Qty: 180 TABLET | Refills: 3
Start: 2021-06-16 | End: 2021-09-08

## 2021-06-16 RX ORDER — ROSUVASTATIN CALCIUM 10 MG/1
10 TABLET, COATED ORAL
Qty: 90 TABLET | Refills: 3 | Status: SHIPPED | OUTPATIENT
Start: 2021-06-16 | End: 2021-06-17 | Stop reason: DRUGHIGH

## 2021-06-16 NOTE — PROGRESS NOTES
1. Have you been to the ER, urgent care clinic since your last visit? Hospitalized since your last visit? No    2. Have you seen or consulted any other health care providers outside of the [address] [place] [address] since your last visit? Include any pap smears or colon screening.  No     Wants to discuss cholesterol  Heart palpitations  SOB and leg pain

## 2021-06-16 NOTE — PROGRESS NOTES
SPORTS MEDICINE AND PRIMARY CARE  Faby Silva MD, [address] [address]  Phone:  [phone]  Fax: [phone]       Chief Complaint   Patient presents with    Hypertension   . SUBJECTIVE:    Alejandro Montelongo is a 77 y.o. female Patient returns today voicing several concerns. 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 She is concerned about having to take another cholesterol pill. She did not start the cholesterol pill that was recommended and wonders if she really needs to take it. She is also complaining of urinary frequency and wonders about that. She complains of pains in her muscles, calves and notes she is excessively fatigued and falls off asleep during the day. For the last two weeks she also complains of palpitations that are also associated with shortness of breath. Patient is watching what she eats, has lost six pounds, and she has backed off on a lot of foods. Patient denies other specific complaints and is seen for evaluation. Patient also notes she has been despondent, which is affecting her relationship with her . Current Outpatient Medications   Medication Sig Dispense Refill    rosuvastatin (CRESTOR) 10 mg tablet Take 1 Tablet by mouth nightly. 90 Tablet 3    warfarin (COUMADIN) 2.5 mg tablet Take 1 Tablet by mouth daily. And 5 mg on sunday 180 Tablet 3    bisoprolol-hydroCHLOROthiazide (ZIAC) 10-6.25 mg per tablet TAKE 1 TABLET DAILY 90 Tab 3    amLODIPine (NORVASC) 10 mg tablet TAKE 1 TABLET DAILY 90 Tab 3    traZODone (DESYREL) 50 mg tablet TAKE 1 TABLET NIGHTLY AS NEEDED FOR SLEEP 90 Tab 4    lidocaine (LIDODERM) 5 % Apply patch to the affected area for 12 hours a day and remove for 12 hours a day. DX.M79.7 80 Each 3    LACTOBACILLUS ACIDOPHILUS (PROBIOTIC PO) Take 1 Cap by mouth as needed.  CHOLECALCIFEROL, VITAMIN D3, (VITAMIN D3 PO) Take  by mouth. Liquid form. 400 units per 4 drops. Takes 4 drops po once daily.  carica papaya (PAPAYA ENZYME) tab Take  by mouth. Takes 4 tabs po 1-2 times daily after meals.  ONETOUCH ULTRA2 monitoring kit       MICROLET LANCET misc USE TO TEST BLOOD SUGAR EVERY  Each 11    cyanocobalamin (VITAMIN B-12) 1,000 mcg tablet Take 1,000 mcg by mouth as needed.        Past Medical History:   Diagnosis Date    Adverse effect of anesthesia     \"hard time waking me up\"    Axillary pain, right     Bereavement 2019    77 yo - heart attack -  in sleep    Chronic pain     d/t fibromyalgia    Coagulation disorder ([facility] [address]     on eliquis -d/c due to gi upset - now on coumadin previously followed by dr. Renzo Massey Depression with anxiety     DJD (degenerative joint disease)     Dyslipidemia     Encounter for Hemoccult screening 2017    neg    Epicondylitis, lateral, right 2021    Fibromyalgia     Gait instability     GERD (gastroesophageal reflux disease)     Hypertension     IBS (irritable bowel syndrome)     Ill-defined condition     gastroparesis    Ill-defined condition     CT - 3/18/2018 - esophageal diverticulum    Ill-defined condition     pericarditis    Mastodynia, left greater than right 2011    Menopause     Normal cardiac stress test 3.14    Positive D dimer -15    Prediabetes     Preventative health care 2021    Pulmonary embolism ([facility] [address] 2013    rll    Restless leg syndrome     S/P colonoscopy 2011    kenny hernandez md    S/P colonoscopy 04/10/2018    Armando Carter MD repeat5 yrs    Saphenous vein occlusion, right 2017    [facility] acute occlusive superificial vein thrombosis - Melody Dyer md    Saphenous vein thrombophlebitis, right 2018    and acute l posterior tibial vein - this is the 2nd DVT putting her on lifelong anticoagualtion / Allison Lizama    Sleep apnea     cpap 9cm    SOB (shortness of breath)     White coat hypertension      Past Surgical History:   Procedure Laterality Date    COLONOSCOPY N/A 4/10/2018    COLONOSCOPY,EGD performed by Elizabeth Gu MD at [facility] ENDOSCOPY    HX BREAST BIOPSY Left     HX COLONOSCOPY      HX ORTHOPAEDIC  2009    foot surgery-left - bunionectomy    HX ORTHOPAEDIC      cyst removed from left hand - ganglion cyst    HX OTHER SURGICAL      right and left leg muscle biopsies - elevated CPK - muscle enzymes were elevated    HX TUBAL LIGATION      [place] ABDOMEN SURGERY PROC UNLISTED      exploratory years ago    [place] BREAST SURGERY PROCEDURE UNLISTED  2006    left breast [id number]. Kaylee Nieto     Allergies   Allergen Reactions    Aleve [Naproxen Sodium] Hoarseness    Dilaudid [Hydromorphone (Bulk)] Anaphylaxis     Respiratory arrest and throat closes    Diovan [Valsartan] Palpitations    Morphine Other (comments)     Patch-vomiting,weakness, fainting    Sulfa (Sulfonamide Antibiotics) Hives, Rash and Other (comments)     fainting         REVIEW OF SYSTEMS:  General: negative for - chills or fever  ENT: negative for - headaches, nasal congestion or tinnitus  Respiratory: negative for - cough, hemoptysis, shortness of breath or wheezing  Cardiovascular : negative for - chest pain, edema, palpitations or shortness of breath  Gastrointestinal: negative for - abdominal pain, blood in stools, heartburn or nausea/vomiting  Genito-Urinary: no dysuria, trouble voiding, or hematuria  Musculoskeletal: negative for - gait disturbance, joint pain, joint stiffness or joint swelling  Neurological: no TIA or stroke symptoms  Hematologic: no bruises, no bleeding, no swollen glands  Integument: no lumps, mole changes, nail changes or rash  Endocrine: no malaise/lethargy or unexpected weight changes      Social History     Socioeconomic History    Marital status:      Spouse name: Not on file    Number of children: Not on file    Years of education: Not on file    Highest education level: Not on file   Tobacco Use    Smoking status: Never Smoker    Smokeless tobacco: Never Used   Vaping Use    Vaping Use: Never used   Substance and Sexual Activity    Alcohol use: No    Drug use: No    Sexual activity: Yes     Partners: Male     Birth control/protection: None   Social History Narrative         Family History: Mother: alive 80 yrs, High Blood Pressure, cva with cognitive deficitsFather:  36 yrs, myocardial infarctionSister(s): aliveBrother(s): unknow n2    sister(s) . 40 daughter no choildren -  walked out works for board of directors for [employer] . Social History: Alcohol Use Patient does not use alcohol. Smoking Status Patient is a never smoker. Caffeine: occasional. Drugs:    prescription narcotics. Diet: Regular. Exercise: None. Marital Status: . Lives w ith: spouse. Children: children. Worship: [employer]. Patient is  living w ith her  she is on disability related to her fibromyositis. Social Determinants of Health     Financial Resource Strain:     Difficulty of Paying Living Expenses:    Food Insecurity:     Worried About Running Out of Food in the Last Year:     [address] in the Last Year:    Transportation Needs:     Lack of Transportation (Medical):      Lack of Transportation (Non-Medical):    Physical Activity:     Days of Exercise per Week:     Minutes of Exercise per Session:    Stress:     Feeling of Stress :    Social Connections:     Frequency of Communication with Friends and Family:     Frequency of Social Gatherings with Friends and Family:     Attends Restoration Services:     Active Member of Clubs or Organizations:     Attends Club or Organization Meetings:     Marital Status:      Family History   Problem Relation Age of Onset    Hypertension Mother     Kidney Disease Mother         1 kidney    Heart Disease Father     Heart Attack Father         late 35s or early 45s    Heart Attack Maternal Grandmother     Cancer Maternal Grandfather         ? lung or stomach       OBJECTIVE:    Visit Vitals  /78   Pulse (!) 56   Temp 98.2 °F (36.8 °C) (Oral)   Resp 16   Ht 5' 2\" (1.575 m)   Wt 183 lb 6.4 oz (83.2 kg)   SpO2 95%   BMI 33.54 kg/m²     CONSTITUTIONAL: well , well nourished, appears age appropriate  EYES: perrla, eom intact  ENMT:moist mucous membranes, pharynx clear  NECK: supple. Thyroid normal  RESPIRATORY: Chest: clear bilaterally   CARDIOVASCULAR: Heart: regular rate and rhythm  GASTROINTESTINAL: Abdomen: soft, bowel sounds active  HEMATOLOGIC: no pathological lymph nodes palpated  MUSCULOSKELETAL: Extremities: no edema, pulse 1+   INTEGUMENT: No unusual rashes or suspicious skin lesions noted. Nails appear normal.  NEUROLOGIC: non-focal exam   MENTAL STATUS: alert and oriented, appropriate affect           ASSESSMENT:  1. Encounter for monitoring Coumadin therapy    2. Essential hypertension, benign    3. IGT (impaired glucose tolerance)    4. Fibromyositis    5. Chronic pain syndrome    6. Peripheral vascular disease (HCC)    7. Class 1 obesity with body mass index (BMI) of 33.0 to 33.9 in adult, unspecified obesity type, unspecified whether serious comorbidity present    8. Primary osteoarthritis of left hand    9. Dyslipidemia    10. Gastroesophageal reflux disease without esophagitis    11. Irritable bowel syndrome without diarrhea    12. Saphenous vein occlusion, right    13. Chronic pulmonary embolism without acute cor pulmonale, unspecified pulmonary embolism type ([address]    14. Urinary tract infection without hematuria, site unspecified    15. Palpitations      Patient's P-T/INR is therapeutic and no titration is needed today. She is taking 2.5 mg tablets daily and 5 mg, two tablets on Sundays.     BP control is at goal.  She is on Ziac for BP control without ill effects and Amlodipine 10 mg daily. Known history of impaired glucose tolerance and she periodically checks her blood sugars. Long history of fibromyositis, which may be contributing to the pain in her legs. She has chronic pain syndrome and has fortunately gotten off narcotics. She also has peripheral vascular disease, for which we continue to monitor. She is asymptomatic. She has obesity, for which she has lost 6 pounds since we last saw her and we congratulate her. Osteoarthritis of the left hand is not symptomatic currently. She has a history of dyslipidemia. We suggested Rosuvastatin for cholesterol, she decided not to take it. She has been on a diet and wants to know if diet is going to be adequate in controlling cholesterol. Recurrent 10 year ASCVD risk is 11.6%. With the addition of Rosuvastatin, we can expect to get optimal ASCVD risk of 5.8%. We sent a prescription over for the 10 mg tablets and she will make a decision as to whether she wants to take it or not after she gets the result of the lab studies that we draw today. She was on Dexilant for her GERD, which has been discontinued by her because she is changing her eating habits. To that end, her IBS seems to be responding, although she is also taking a probiotic. She has recurrent DVT and PE, which is the cause for the continued Coumadin. The cost of the NOACs was prohibitive. She complains of urinary frequency, for which we will check a urine and C&S, although this may be related to neurogenic bladder. She complains of palpitations and when she gets the palpitations has shortness of breath. We will ask for a 24 hour Holter monitor. Her cardiac evaluation in the past included an echo seven years ago that was normal, as well as a stress test around the same time that was unremarkable. She has had a cardiac monitor and we will repeat that today.   At that time she had SVT with a ventricular response of 150, but not associated with any symptoms. She will be back in a month for P-T check and three months to see me. I have discussed the diagnosis with the patient and the intended plan as seen in the  Orders. The patient understands and agees with the plan. The patient has   received an after visit summary and questions were answered concerning  future plans  Patient labs and/or xrays were reviewed  Past records were reviewed. PLAN:  .  Orders Placed This Encounter    CULTURE, URINE    URINALYSIS W/ RFLX MICROSCOPIC    LIPID PANEL    RENAL FUNCTION PANEL    AMB POC PT/INR    rosuvastatin (CRESTOR) 10 mg tablet    warfarin (COUMADIN) 2.5 mg tablet       Follow-up and Dispositions    · Return in about 4 weeks (around 7/14/2021) for pt/inr. ATTENTION:   This medical record was transcribed using an electronic medical records system. Although proofread, it may and can contain electronic and spelling errors. Other human spelling and other errors may be present. Corrections may be executed at a later time. Please feel free to contact us for any clarifications as needed.

## 2021-06-17 LAB
ALBUMIN SERPL-MCNC: 4.3 G/DL (ref 3.5–5)
ANION GAP SERPL CALC-SCNC: 7 MMOL/L (ref 5–15)
APPEARANCE UR: CLEAR
BILIRUB UR QL: NEGATIVE
BUN SERPL-MCNC: 20 MG/DL (ref 6–20)
BUN/CREAT SERPL: 16 (ref 12–20)
CALCIUM SERPL-MCNC: 9.4 MG/DL (ref 8.5–10.1)
CHLORIDE SERPL-SCNC: 108 MMOL/L (ref 97–108)
CHOLEST SERPL-MCNC: 242 MG/DL
CO2 SERPL-SCNC: 25 MMOL/L (ref 21–32)
COLOR UR: NORMAL
COMMENT, HOLDF: NORMAL
CREAT SERPL-MCNC: 1.23 MG/DL (ref 0.55–1.02)
GLUCOSE SERPL-MCNC: 75 MG/DL (ref 65–100)
GLUCOSE UR STRIP.AUTO-MCNC: NEGATIVE MG/DL
HDLC SERPL-MCNC: 69 MG/DL
HDLC SERPL: 3.5 {RATIO} (ref 0–5)
HGB UR QL STRIP: NEGATIVE
KETONES UR QL STRIP.AUTO: NEGATIVE MG/DL
LDLC SERPL CALC-MCNC: 154.4 MG/DL (ref 0–100)
LEUKOCYTE ESTERASE UR QL STRIP.AUTO: NEGATIVE
NITRITE UR QL STRIP.AUTO: NEGATIVE
PH UR STRIP: 6 [PH] (ref 5–8)
PHOSPHATE SERPL-MCNC: 3.2 MG/DL (ref 2.6–4.7)
POTASSIUM SERPL-SCNC: 4 MMOL/L (ref 3.5–5.1)
PROT UR STRIP-MCNC: NEGATIVE MG/DL
SAMPLES BEING HELD,HOLD: NORMAL
SODIUM SERPL-SCNC: 140 MMOL/L (ref 136–145)
SP GR UR REFRACTOMETRY: <1.005 (ref 1–1.03)
TRIGL SERPL-MCNC: 93 MG/DL (ref ?–150)
UROBILINOGEN UR QL STRIP.AUTO: 0.2 EU/DL (ref 0.2–1)
VLDLC SERPL CALC-MCNC: 18.6 MG/DL

## 2021-06-17 RX ORDER — ROSUVASTATIN CALCIUM 20 MG/1
20 TABLET, COATED ORAL
Qty: 90 TABLET | Refills: 3 | Status: SHIPPED | OUTPATIENT
Start: 2021-06-17 | End: 2021-06-24 | Stop reason: SDUPTHER

## 2021-06-19 LAB
BACTERIA SPEC CULT: NORMAL
CC UR VC: NORMAL
SERVICE CMNT-IMP: NORMAL

## 2021-06-22 ENCOUNTER — HOSPITAL ENCOUNTER (OUTPATIENT)
Dept: NON INVASIVE DIAGNOSTICS | Age: 66
Discharge: HOME | End: 2021-06-22
Attending: INTERNAL MEDICINE
Payer: MEDICARE

## 2021-06-22 DIAGNOSIS — R00.2 PALPITATIONS: ICD-10-CM

## 2021-06-22 PROCEDURE — 93225 XTRNL ECG REC<48 HRS REC: CPT

## 2021-06-25 RX ORDER — ROSUVASTATIN CALCIUM 10 MG/1
TABLET, COATED ORAL
Qty: 90 TABLET | Refills: 3 | Status: SHIPPED | OUTPATIENT
Start: 2021-06-25 | End: 2021-08-10

## 2021-07-08 PROCEDURE — 93227 XTRNL ECG REC<48 HR R&I: CPT | Performed by: INTERNAL MEDICINE

## 2021-07-13 ENCOUNTER — TELEPHONE (OUTPATIENT)
Dept: INTERNAL MEDICINE CLINIC | Age: 66
End: 2021-07-13

## 2021-07-13 ENCOUNTER — CLINICAL SUPPORT (OUTPATIENT)
Dept: INTERNAL MEDICINE CLINIC | Age: 66
End: 2021-07-13

## 2021-07-13 DIAGNOSIS — N39.0 URINARY TRACT INFECTION WITHOUT HEMATURIA, SITE UNSPECIFIED: Primary | ICD-10-CM

## 2021-07-14 LAB
APPEARANCE UR: CLEAR
BACTERIA URNS QL MICRO: NEGATIVE /HPF
BILIRUB UR QL: NEGATIVE
COLOR UR: ABNORMAL
EPITH CASTS URNS QL MICRO: ABNORMAL /LPF
GLUCOSE UR STRIP.AUTO-MCNC: NEGATIVE MG/DL
HGB UR QL STRIP: NEGATIVE
HYALINE CASTS URNS QL MICRO: ABNORMAL /LPF (ref 0–5)
KETONES UR QL STRIP.AUTO: NEGATIVE MG/DL
LEUKOCYTE ESTERASE UR QL STRIP.AUTO: NEGATIVE
NITRITE UR QL STRIP.AUTO: NEGATIVE
PH UR STRIP: 5.5 [PH] (ref 5–8)
PROT UR STRIP-MCNC: 30 MG/DL
RBC #/AREA URNS HPF: ABNORMAL /HPF (ref 0–5)
SP GR UR REFRACTOMETRY: 1.02 (ref 1–1.03)
UROBILINOGEN UR QL STRIP.AUTO: 0.2 EU/DL (ref 0.2–1)
WBC URNS QL MICRO: ABNORMAL /HPF (ref 0–4)

## 2021-07-15 LAB
BACTERIA SPEC CULT: NORMAL
CC UR VC: NORMAL
SERVICE CMNT-IMP: NORMAL

## 2021-08-05 ENCOUNTER — DOCUMENTATION ONLY (OUTPATIENT)
Dept: SLEEP MEDICINE | Age: 66
End: 2021-08-05

## 2021-08-05 ENCOUNTER — OFFICE VISIT (OUTPATIENT)
Dept: SLEEP MEDICINE | Age: 66
End: 2021-08-05
Payer: MEDICARE

## 2021-08-05 VITALS
WEIGHT: 182 LBS | OXYGEN SATURATION: 97 % | BODY MASS INDEX: 33.49 KG/M2 | RESPIRATION RATE: 16 BRPM | HEART RATE: 62 BPM | SYSTOLIC BLOOD PRESSURE: 136 MMHG | HEIGHT: 62 IN | DIASTOLIC BLOOD PRESSURE: 82 MMHG

## 2021-08-05 DIAGNOSIS — I10 ESSENTIAL HYPERTENSION, BENIGN: ICD-10-CM

## 2021-08-05 DIAGNOSIS — G47.33 OBSTRUCTIVE SLEEP APNEA (ADULT) (PEDIATRIC): Primary | ICD-10-CM

## 2021-08-05 PROCEDURE — 3017F COLORECTAL CA SCREEN DOC REV: CPT | Performed by: INTERNAL MEDICINE

## 2021-08-05 PROCEDURE — G8752 SYS BP LESS 140: HCPCS | Performed by: INTERNAL MEDICINE

## 2021-08-05 PROCEDURE — G8536 NO DOC ELDER MAL SCRN: HCPCS | Performed by: INTERNAL MEDICINE

## 2021-08-05 PROCEDURE — G8399 PT W/DXA RESULTS DOCUMENT: HCPCS | Performed by: INTERNAL MEDICINE

## 2021-08-05 PROCEDURE — G8417 CALC BMI ABV UP PARAM F/U: HCPCS | Performed by: INTERNAL MEDICINE

## 2021-08-05 PROCEDURE — 1101F PT FALLS ASSESS-DOCD LE1/YR: CPT | Performed by: INTERNAL MEDICINE

## 2021-08-05 PROCEDURE — G8427 DOCREV CUR MEDS BY ELIG CLIN: HCPCS | Performed by: INTERNAL MEDICINE

## 2021-08-05 PROCEDURE — G9899 SCRN MAM PERF RSLTS DOC: HCPCS | Performed by: INTERNAL MEDICINE

## 2021-08-05 PROCEDURE — G8754 DIAS BP LESS 90: HCPCS | Performed by: INTERNAL MEDICINE

## 2021-08-05 PROCEDURE — 99213 OFFICE O/P EST LOW 20 MIN: CPT | Performed by: INTERNAL MEDICINE

## 2021-08-05 PROCEDURE — G8432 DEP SCR NOT DOC, RNG: HCPCS | Performed by: INTERNAL MEDICINE

## 2021-08-05 PROCEDURE — 1090F PRES/ABSN URINE INCON ASSESS: CPT | Performed by: INTERNAL MEDICINE

## 2021-08-05 NOTE — PROGRESS NOTES
[address] [place] [place], [address]  Tel.  [phone]  Fax. [address] [facility] [address]  [place], [address]  Tel.  [phone]  Fax. [phone] [address] [facility] [address]   Tel.  [phone]  Fax. [phone]     S>Lisa SHAMIKA Gabrielle Sun is a 77 y.o. female seen for a positive airway pressure follow-up. She reports no problems using the device. The following problems are identified:    Drowsiness no Problems exhaling no   Snoring no Forget to put on no   Mask Comfortable yes Can't fall asleep no   Dry Mouth yes Mask falls off no   Air Leaking no Frequent awakenings no     Download reviewed. She admits that her sleep has improved. Therapy Apnea Index averaged over PAP use: 1 /hr which reflects improved sleep breathing condition. Allergies   Allergen Reactions    Aleve [Naproxen Sodium] Hoarseness    Dilaudid [Hydromorphone (Bulk)] Anaphylaxis     Respiratory arrest and throat closes    Diovan [Valsartan] Palpitations    Morphine Other (comments)     Patch-vomiting,weakness, fainting    Sulfa (Sulfonamide Antibiotics) Hives, Rash and Other (comments)     fainting       She has a current medication list which includes the following prescription(s): rosuvastatin, warfarin, bisoprolol-hydrochlorothiazide, amlodipine, trazodone, lidocaine, lactobacillus acidophilus, cholecalciferol (vitamin d3), carica papaya, onetouch ultra2 meter, microlet lancet, and cyanocobalamin. .      She  has a past medical history of Adverse effect of anesthesia, Axillary pain, right, Bereavement (03/06/2019), Chronic pain, Coagulation disorder ([facility] [address] Depression with anxiety, DJD (degenerative joint disease), Dyslipidemia, Encounter for Hemoccult screening (06/28/2017), Epicondylitis, lateral, right (03/03/2021), Fibromyalgia, Gait instability, GERD (gastroesophageal reflux disease), Hypertension, IBS (irritable bowel syndrome), Ill-defined condition, Ill-defined condition, Ill-defined condition, Mastodynia, left greater than right (8/17/2011), Menopause, Normal cardiac stress test (3.14), Positive D dimer (9-23-15), Prediabetes, Preventative health care (03/03/2021), Pulmonary embolism ([facility] [address] (03/2013), Restless leg syndrome, S/P colonoscopy (01/27/2011), S/P colonoscopy (04/10/2018), Saphenous vein occlusion, 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 [place] Sleepiness Score: 11      O>    Visit Vitals  /82   Pulse 62   Resp 16   Ht 5' 2\" (1.575 m)   Wt 182 lb (82.6 kg)   SpO2 97%   BMI 33.29 kg/m²           General:   Alert, oriented, not in distress   Neck:   No JVD    Chest/Lungs:  symetrical lung expansion , no accessory muscle use    Extremities:  no obvious rashes , negative edema    Neuro:  No focal deficits ; No obvious tremor    Psych:  Normal affect ,  Normal countenance ;         A>    ICD-10-CM ICD-9-CM    1. Obstructive sleep apnea (adult) (pediatric)  G47.33 327.23 AMB SUPPLY ORDER   2. Essential hypertension, benign  I10 401.1      . On CPAP, Resmed :  9-13 cmH2O. Compliant:      yes    Therapeutic Response:  Positive    P>    she will continue on her current pressure settings. I have ordered replacement supplies    * tech to instruct patient on how to increase humidity setting on her machine    * She was asked to contact our office for any problems regarding PAP therapy. * Counseling was provided regarding the importance of regular PAP use and on proper sleep hygiene and safe driving. * Re-enforced proper and regular cleaning for the device. 2.Hypertension - she continues on her current regimen. I have reviewed the relationship between hypertension as it relates to sleep-disordered breathing.      Electronically signed by    Mark Wise MD  Diplomate in Sleep Medicine  [facility]    8/5/2021

## 2021-08-05 NOTE — PROGRESS NOTES
Joao Raines is a 77 y.o. female  Chief Complaint   Patient presents with    Follow-up     yearly - cpap download     Health Maintenance Due   Topic Date Due    Medicare Yearly Exam  08/13/2021     Visit Vitals  /82   Pulse 62   Resp 16   Ht 5' 2\" (1.575 m)   Wt 182 lb (82.6 kg)   SpO2 97%   BMI 33.29 kg/m²

## 2021-08-05 NOTE — PATIENT INSTRUCTIONS
[address] [place] [place], [address]  Tel.  [phone]  Fax. 100 [facility] 60  [place], [address]  Tel.  [phone]  Fax. [phone] [address] [facility] [address]  Tel.  [phone]  Fax. [phone]     PROPER SLEEP HYGIENE    What to avoid  · Do not have drinks with caffeine, such as coffee or black tea, for 8 hours before bed. · Do not smoke or use other types of tobacco near bedtime. Nicotine is a stimulant and can keep you awake. · Avoid drinking alcohol late in the evening, because it can cause you to wake in the middle of the night. · Do not eat a big meal close to bedtime. If you are hungry, eat a light snack. · Do not drink a lot of water close to bedtime, because the need to urinate may wake you up during the night. · Do not read or watch TV in bed. Use the bed only for sleeping and sexual activity. What to try  · Go to bed at the same time every night, and wake up at the same time every morning. Do not take naps during the day. · Keep your bedroom quiet, dark, and cool. · Get regular exercise, but not within 3 to 4 hours of your bedtime. .  · Sleep on a comfortable pillow and mattress. · If watching the clock makes you anxious, turn it facing away from you so you cannot see the time. · If you worry when you lie down, start a worry book. Well before bedtime, write down your worries, and then set the book and your concerns aside. · Try meditation or other relaxation techniques before you go to bed. · If you cannot fall asleep, get up and go to another room until you feel sleepy. Do something relaxing. Repeat your bedtime routine before you go to bed again. · Make your house quiet and calm about an hour before bedtime. Turn down the lights, turn off the TV, log off the computer, and turn down the volume on music. This can help you relax after a busy day.     Drowsy Driving  The [address] [place] [address] Road Traffic Safety Administration cites drowsiness as a causing factor in more than 058,180 police reported crashes annually, resulting in 76,000 injuries and 1,500 deaths. Other surveys suggest 55% of people polled have driven while drowsy in the past year, 23% had fallen asleep but not crashed, 3% crashed, and 2% had and accident due to drowsy driving. Who is at risk? Young Drivers: One study of drowsy driving accidents states that 55% of the drivers were under 25 years. Of those, 75% were male. Shift Workers and Travelers: People who work overnight or travel across time zones frequently are at higher risk of experiencing Circadian Rhythm Disorders. They are trying to work and function when their body is programed to sleep. Sleep Deprived: Lack of sleep has a serious impact on your ability to pay attention or focus on a task. Consistently getting less than the average of 8 hours your body needs creates partial or cumulative sleep deprivation. Untreated Sleep Disorders: Sleep Apnea, Narcolepsy, R.L.S., and other sleep disorders (untreated) prevent a person from getting enough restful sleep. This leads to excessive daytime sleepiness and increases the risk for drowsy driving accidents by up to 7 times. Medications / Alcohol: Even over the counter medications can cause drowsiness. Medications that impair a drivers attention should have a warning label. Alcohol naturally makes you sleepy and on its own can cause accidents. Combined with excessive drowsiness its effects are amplified. Signs of Drowsy Driving:   * You don't remember driving the last few miles   * You may drift out of your abrahan   * You are unable to focus and your thoughts wander   * You may yawn more often than normal   * You have difficulty keeping your eyes open / nodding off   * Missing traffic signs, speeding, or tailgating  Prevention-   Good sleep hygiene, lifestyle and behavioral choices have the most impact on drowsy driving.  There is no substitute for sleep and the average person requires 8 hours nightly. If you find yourself driving drowsy, stop and sleep. Consider the sleep hygiene tips provided during your visit as well. Medication Refill Policy: Refills for all medications require 1 week advance notice. Please have your pharmacy fax a refill request. We are unable to fax, or call in \"controled substance\" medications and you will need to pick these prescriptions up from our office. [employer] Activation    Thank you for requesting access to [employer]. Please follow the instructions below to securely access and download your online medical record. [employer] allows you to send messages to your doctor, view your test results, renew your prescriptions, schedule appointments, and more. How Do I Sign Up? 1. In your internet browser, go to [email or web address]. 2. Click on the First Time User? Click Here link in the Sign In box. You will see the New Member Sign Up page. 3. Enter your [employer] Access Code exactly as it appears below. You will not need to use this code after youve completed the sign-up process. If you do not sign up before the expiration date, you must request a new code. [employer] Access Code: [email or web address]  Expires: 2021  2:28 PM (This is the date your [employer] access code will )    4. Enter the last four digits of your Social Security Number (xxxx) and Date of Birth (mm/dd/yyyy) as indicated and click Submit. You will be taken to the next sign-up page. 5. Create a [employer] ID. This will be your [employer] login ID and cannot be changed, so think of one that is secure and easy to remember. 6. Create a [employer] password. You can change your password at any time. 7. Enter your Password Reset Question and Answer. This can be used at a later time if you forget your password. 8. Enter your e-mail address. You will receive e-mail notification when new information is available in [address] 9. Click Sign Up.  You can now view and download portions of your medical record. 10. Click the Download Summary menu link to download a portable copy of your medical information. Additional Information    If you have questions, please call [phone]. Remember, [employer] is NOT to be used for urgent needs. For medical emergencies, dial 911.

## 2021-08-05 NOTE — PROGRESS NOTES
Patient was instructed on how to properly adjust humidification settings on her ResMed S10 PAP device. Mrs. Grisel Finn was also educated on how to properly clean her res Med P10 large nasal pillow mask. As well as the appropriate replacement schedule.

## 2021-08-24 ENCOUNTER — TELEPHONE (OUTPATIENT)
Dept: INTERNAL MEDICINE CLINIC | Age: 66
End: 2021-08-24

## 2021-08-24 NOTE — TELEPHONE ENCOUNTER
Patient called asking how long to hold her coumadin for dental work and per Dr. Ji Plaza she can hold it for 5 days and restart 1day after procedure

## 2021-09-08 ENCOUNTER — OFFICE VISIT (OUTPATIENT)
Dept: INTERNAL MEDICINE CLINIC | Age: 66
End: 2021-09-08
Payer: MEDICARE

## 2021-09-08 VITALS
TEMPERATURE: 98.2 F | BODY MASS INDEX: 33.27 KG/M2 | OXYGEN SATURATION: 98 % | HEART RATE: 58 BPM | RESPIRATION RATE: 16 BRPM | DIASTOLIC BLOOD PRESSURE: 84 MMHG | HEIGHT: 62 IN | SYSTOLIC BLOOD PRESSURE: 145 MMHG | WEIGHT: 180.8 LBS

## 2021-09-08 DIAGNOSIS — I82.811: ICD-10-CM

## 2021-09-08 DIAGNOSIS — R26.81 GAIT INSTABILITY: ICD-10-CM

## 2021-09-08 DIAGNOSIS — I73.9 PERIPHERAL VASCULAR DISEASE (HCC): ICD-10-CM

## 2021-09-08 DIAGNOSIS — M79.7 FIBROMYOSITIS: Primary | ICD-10-CM

## 2021-09-08 DIAGNOSIS — M19.042 PRIMARY OSTEOARTHRITIS OF LEFT HAND: ICD-10-CM

## 2021-09-08 DIAGNOSIS — Z79.01 ENCOUNTER FOR MONITORING COUMADIN THERAPY: ICD-10-CM

## 2021-09-08 DIAGNOSIS — Z23 ENCOUNTER FOR IMMUNIZATION: ICD-10-CM

## 2021-09-08 DIAGNOSIS — G89.4 CHRONIC PAIN SYNDROME: ICD-10-CM

## 2021-09-08 DIAGNOSIS — E55.9 VITAMIN D DEFICIENCY, UNSPECIFIED: ICD-10-CM

## 2021-09-08 DIAGNOSIS — K58.9 IRRITABLE BOWEL SYNDROME WITHOUT DIARRHEA: ICD-10-CM

## 2021-09-08 DIAGNOSIS — Z51.81 ENCOUNTER FOR MONITORING COUMADIN THERAPY: ICD-10-CM

## 2021-09-08 DIAGNOSIS — I10 ESSENTIAL HYPERTENSION, BENIGN: ICD-10-CM

## 2021-09-08 DIAGNOSIS — Z23 NEEDS FLU SHOT: ICD-10-CM

## 2021-09-08 DIAGNOSIS — R68.89 OTHER GENERAL SYMPTOMS AND SIGNS: ICD-10-CM

## 2021-09-08 DIAGNOSIS — R73.02 IGT (IMPAIRED GLUCOSE TOLERANCE): ICD-10-CM

## 2021-09-08 DIAGNOSIS — M76.61 ACHILLES TENDINITIS OF RIGHT LOWER EXTREMITY: ICD-10-CM

## 2021-09-08 LAB
INR BLD: 1.4
PT POC: 16.4 SECONDS
VALID INTERNAL CONTROL?: YES

## 2021-09-08 PROCEDURE — G8427 DOCREV CUR MEDS BY ELIG CLIN: HCPCS | Performed by: INTERNAL MEDICINE

## 2021-09-08 PROCEDURE — G8753 SYS BP > OR = 140: HCPCS | Performed by: INTERNAL MEDICINE

## 2021-09-08 PROCEDURE — G0008 ADMIN INFLUENZA VIRUS VAC: HCPCS | Performed by: INTERNAL MEDICINE

## 2021-09-08 PROCEDURE — G8417 CALC BMI ABV UP PARAM F/U: HCPCS | Performed by: INTERNAL MEDICINE

## 2021-09-08 PROCEDURE — G8754 DIAS BP LESS 90: HCPCS | Performed by: INTERNAL MEDICINE

## 2021-09-08 PROCEDURE — G9899 SCRN MAM PERF RSLTS DOC: HCPCS | Performed by: INTERNAL MEDICINE

## 2021-09-08 PROCEDURE — 1101F PT FALLS ASSESS-DOCD LE1/YR: CPT | Performed by: INTERNAL MEDICINE

## 2021-09-08 PROCEDURE — 3017F COLORECTAL CA SCREEN DOC REV: CPT | Performed by: INTERNAL MEDICINE

## 2021-09-08 PROCEDURE — 90694 VACC AIIV4 NO PRSRV 0.5ML IM: CPT | Performed by: INTERNAL MEDICINE

## 2021-09-08 PROCEDURE — 1090F PRES/ABSN URINE INCON ASSESS: CPT | Performed by: INTERNAL MEDICINE

## 2021-09-08 PROCEDURE — G8536 NO DOC ELDER MAL SCRN: HCPCS | Performed by: INTERNAL MEDICINE

## 2021-09-08 PROCEDURE — 85610 PROTHROMBIN TIME: CPT | Performed by: INTERNAL MEDICINE

## 2021-09-08 PROCEDURE — G8510 SCR DEP NEG, NO PLAN REQD: HCPCS | Performed by: INTERNAL MEDICINE

## 2021-09-08 PROCEDURE — G8399 PT W/DXA RESULTS DOCUMENT: HCPCS | Performed by: INTERNAL MEDICINE

## 2021-09-08 PROCEDURE — 99214 OFFICE O/P EST MOD 30 MIN: CPT | Performed by: INTERNAL MEDICINE

## 2021-09-08 RX ORDER — WARFARIN 2.5 MG/1
2.5 TABLET ORAL DAILY
Qty: 180 TABLET | Refills: 3
Start: 2021-09-08 | End: 2022-05-05

## 2021-09-08 NOTE — PROGRESS NOTES
1. Have you been to the ER, urgent care clinic since your last visit? Hospitalized since your last visit? No    2. Have you seen or consulted any other health care providers outside of the [address] [place] [address] since your last visit? Include any pap smears or colon screening.  No    Wants to discuss lab results, swollen ankles/ pain in right heel  Patient stop taking Crestor  Coumadin was on hold x 5 days

## 2021-09-08 NOTE — PATIENT INSTRUCTIONS
Vaccine Information Statement    Influenza (Flu) Vaccine (Inactivated or Recombinant): What You Need to Know    Many vaccine information statements are available in [place]ian and other languages. See www.immunize.org/vis. Hojas de información sobre vacunas están disponibles en español y en muchos otros idiomas. Visite www.immunize.org/vis. 1. Why get vaccinated? Influenza vaccine can prevent influenza (flu). Flu is a contagious disease that spreads around the [facility] every year, usually between October and May. Anyone can get the flu, but it is more dangerous for some people. Infants and young children, people 72 years and older, pregnant people, and people with certain health conditions or a weakened immune system are at greatest risk of flu complications. Pneumonia, bronchitis, sinus infections, and ear infections are examples of flu-related complications. If you have a medical condition, such as heart disease, cancer, or diabetes, flu can make it worse. Flu can cause fever and chills, sore throat, muscle aches, fatigue, cough, headache, and runny or stuffy nose. Some people may have vomiting and diarrhea, though this is more common in children than adults. In an average year, thousands of people in the [facility] die from flu, and many more are hospitalized. Flu vaccine prevents millions of illnesses and flu-related visits to the doctor each year. 2. Influenza vaccines     CDC recommends everyone 6 months and older get vaccinated every flu season. Children 6 months through 6years of age may need 2 doses during a single flu season. Everyone else needs only 1 dose each flu season. It takes about 2 weeks for protection to develop after vaccination. There are many flu viruses, and they are always changing. Each year a new flu vaccine is made to protect against the influenza viruses believed to be likely to cause disease in the upcoming flu season.  Even when the vaccine doesnt exactly match these viruses, it may still provide some protection. Influenza vaccine does not cause flu. Influenza vaccine may be given at the same time as other vaccines. 3. Talk with your health care provider    Tell your vaccination provider if the person getting the vaccine:   Has had an allergic reaction after a previous dose of influenza vaccine, or has any severe, life-threatening allergies    Has ever had Guillain-Barré Syndrome (also called GBS)    In some cases, your health care provider may decide to postpone influenza vaccination until a future visit. Influenza vaccine can be administered at any time during pregnancy. People who are or will be pregnant during influenza season should receive inactivated influenza vaccine. People with minor illnesses, such as a cold, may be vaccinated. People who are moderately or severely ill should usually wait until they recover before getting influenza vaccine. Your health care provider can give you more information. 4. Risks of a vaccine reaction     Soreness, redness, and swelling where the shot is given, fever, muscle aches, and headache can happen after influenza vaccination.  There may be a very small increased risk of Guillain-Barré Syndrome (GBS) after inactivated influenza vaccine (the flu shot). [facility] children who get the flu shot along with pneumococcal vaccine (PCV13) and/or DTaP vaccine at the same time might be slightly more likely to have a seizure caused by fever. Tell your health care provider if a child who is getting flu vaccine has ever had a seizure. People sometimes faint after medical procedures, including vaccination. Tell your provider if you feel dizzy or have vision changes or ringing in the ears. As with any medicine, there is a very remote chance of a vaccine causing a severe allergic reaction, other serious injury, or death. 5. What if there is a serious problem?     An allergic reaction could occur after the vaccinated person leaves the clinic. If you see signs of a severe allergic reaction (hives, swelling of the face and throat, difficulty breathing, a fast heartbeat, dizziness, or weakness), call 9-1-1 and get the person to the nearest hospital.    For other signs that concern you, call your health care provider. Adverse reactions should be reported to the Vaccine Adverse Event Reporting System (VAERS). Your health care provider will usually file this report, or you can do it yourself. Visit the VAERS website at www.vaers. [facility].gov or call 2-282.992.2659. VAERS is only for reporting reactions, and VAERS staff members do not give medical advice. 6. The National Vaccine Injury Compensation Program    The [facility] Vaccine Injury Compensation Program (VICP) is a federal program that was created to compensate people who may have been injured by certain vaccines. Claims regarding alleged injury or death due to vaccination have a time limit for filing, which may be as short as two years. Visit the VICP website at www.[facility].gov/vaccinecompensation or call 8-992.266.9204 to learn about the program and about filing a claim. 7. How can I learn more?  Ask your health care provider.  Call your local or state health department.  Visit the website of the Food and Drug Administration (FDA) for vaccine package inserts and additional information at www.fda.gov/vaccines-blood-biologics/vaccines.  Contact the Centers for Disease Control and Prevention (CDC):  - Call 7-989.349.6754 (1-800-CDC-INFO) or  - Visit CDCs influenza website at www.cdc.gov/flu. Vaccine Information Statement   Inactivated Influenza Vaccine   8/6/2021  42 TAVIA Villaseñor [id number]   Department of Health and Human Services  Centers for Disease Control and Prevention    Office Use Only         Achilles Tendon: Exercises  Introduction  Here are some examples of exercises for you to try.  The exercises may be suggested for a condition or for rehabilitation. Start each exercise slowly. Ease off the exercises if you start to have pain. You will be told when to start these exercises and which ones will work best for you. How to do the exercises  Toe stretch   1. Sit in a chair, and extend your affected leg so that your heel is on the floor. 2. With your hand, reach down and pull your big toe up and back. Pull toward your ankle and away from the floor. 3. Hold the position for at least 15 to 30 seconds. 4. Repeat 2 to 4 times a session, several times a day. Calf-plantar fascia stretch   1. Sit with your legs extended and knees straight. 2. Place a towel around your foot just under the toes. 3. Hold each end of the towel in each hand, with your hands above your knees. 4. Pull back with the towel so that your foot stretches toward you. 5. Hold the position for at least 15 to 30 seconds. 6. Repeat 2 to 4 times a session, up to 5 sessions a day. Floor stretch   1. Stand about 2 feet from a wall, and place your hands on the wall at about shoulder height. Or you can stand behind a chair, placing your hands on the back of it for balance. 2. Step back with the leg you want to stretch. Keep the leg straight, and press your heel into the floor with your toe turned slightly in.  3. Lean forward, and bend your other leg slightly. Feel the stretch in the Achilles tendon of your back leg. Hold for at least 15 to 30 seconds. 4. Repeat 2 to 4 times a session, up to 5 sessions a day. Stair stretch   1. Stand with the balls of both feet on the edge of a step or curb (or a medium-sized phone book). With at least one hand, hold onto something solid for balance, such as a banister or handrail. 2. Keeping your affected leg straight, slowly let that heel hang down off of the step or curb until you feel a stretch in the back of your calf and/or Achilles area. Some of your weight should still be on the other leg.   3. Hold this position for at least 15 to 30 seconds. 4. Repeat 2 to 4 times a session, up to 5 times a day or whenever your Achilles tendon starts to feel tight. This stretch can also be done with your knee slightly bent. Strength exercise   1. This exercise will get you started on building strength after an Achilles tendon injury. Your doctor or physical therapist can help you move on to more challenging exercises as you heal and get stronger. 2. Stand on a step with your heel off the edge of the step. Hold on to a handrail or wall for balance. 3. Push up on your toes, then slowly count to 10 as you lower yourself back down until your heel is below the step. If it hurts to push up on your toes, try putting most of your weight on your other foot as you push up, or try using your arms to help you. If you can't do this exercise without causing pain, stop the exercise and talk to your doctor. 4. Repeat the exercise 8 to 12 times, half with the knee straight and half with the knee bent. Follow-up care is a key part of your treatment and safety. Be sure to make and go to all appointments, and call your doctor if you are having problems. It's also a good idea to know your test results and keep a list of the medicines you take. Where can you learn more? Go to http://www.gray.com/  Enter F973 in the search box to learn more about \"Achilles Tendon: Exercises. \"  Current as of: November 16, 2020               Content Version: 12.8  © 6503-8414 Healthwise, Incorporated. Care instructions adapted under license by [employer] (which disclaims liability or warranty for this information). If you have questions about a medical condition or this instruction, always ask your healthcare professional. [place] [address] any warranty or liability for your use of this information.

## 2021-09-08 NOTE — PROGRESS NOTES
SPORTS MEDICINE AND PRIMARY CARE  Sulaiman Swartz MD, [address] [address]  Phone:  [phone]  Fax: [phone]       Chief Complaint   Patient presents with    Hypertension   . SUBJECTIVE:    Kaylene Vergara is a 77 y.o. female Patient returns today with a known history of primary hypertension, fibromyalgia, chronic pain, IBS, gait instability, peripheral vascular disease, impaired glucose tolerance, DJD, saphenous vein occlusion, and is seen for evaluation. Patient returns today with several concerns. She has a painful right heel, to the point that she has trouble walking on it. She also notes easy bruisability, but attributes that to her Coumadin. She had a tooth extraction last Thursday, held her Coumadin and has been taking the Coumadin now for the past six days. Patient states she feels miserable, she is in pain all over, particularly her lower extremities, and she cannot walk. She complains of a fluttering sensation, thinks something is wrong with her heart. She did have a 24-hour Holter monitor that revealed occasional PAC and PVC. She had an echo stress test about seven years ago. Patient is seen for evaluation. Complains of anorexia. He is concerned about weight loss. Current Outpatient Medications   Medication Sig Dispense Refill    warfarin (COUMADIN) 2.5 mg tablet Take 1 Tablet by mouth daily. And 5 mg on sunday 180 Tablet 3    bisoprolol-hydroCHLOROthiazide (ZIAC) 10-6.25 mg per tablet TAKE 1 TABLET DAILY 90 Tab 3    amLODIPine (NORVASC) 10 mg tablet TAKE 1 TABLET DAILY 90 Tab 3    traZODone (DESYREL) 50 mg tablet TAKE 1 TABLET NIGHTLY AS NEEDED FOR SLEEP 90 Tab 4    lidocaine (LIDODERM) 5 % Apply patch to the affected area for 12 hours a day and remove for 12 hours a day. DX.M79.7 90 Each 3    LACTOBACILLUS ACIDOPHILUS (PROBIOTIC PO) Take 1 Cap by mouth as needed.       CHOLECALCIFEROL, VITAMIN D3, (VITAMIN D3 PO) Take  by mouth. Liquid form. 400 units per 4 drops. Takes 4 drops po once daily.  carica papaya (PAPAYA ENZYME) tab Take  by mouth. Takes 4 tabs po 1-2 times daily after meals.  ONETOUCH ULTRA2 monitoring kit       MICROLET LANCET misc USE TO TEST BLOOD SUGAR EVERY  Each 11    cyanocobalamin (VITAMIN B-12) 1,000 mcg tablet Take 1,000 mcg by mouth as needed.       rosuvastatin (CRESTOR) 10 mg tablet TAKE ONE TABLET BY MOUTH EVERY DAY - TO LOWER CHOLESTEROL (Patient not taking: Reported on 2021) 30 Tablet 11     Past Medical History:   Diagnosis Date    Adverse effect of anesthesia     \"hard time waking me up\"    Axillary pain, right     Bereavement 2019    77 yo - heart attack -  in sleep    Chronic pain     d/t fibromyalgia    Coagulation disorder ([facility]     on eliquis -d/c due to gi upset - now on coumadin previously followed by dr. Vahid Mejia Depression with anxiety     DJD (degenerative joint disease)     Dyslipidemia     Encounter for Hemoccult screening 2017    neg    Epicondylitis, lateral, right 2021    Fibromyalgia     Gait instability     GERD (gastroesophageal reflux disease)     Hypertension     IBS (irritable bowel syndrome)     Ill-defined condition     gastroparesis    Ill-defined condition     CT - 3/18/2018 - esophageal diverticulum    Ill-defined condition     pericarditis    Mastodynia, left greater than right 2011    Menopause     Normal cardiac stress test 3.14    Positive D dimer -15    Prediabetes     Preventative health care 2021    Pulmonary embolism ([facility] 2013    rll    Restless leg syndrome     S/P colonoscopy 2011    kenny hernandez md    S/P colonoscopy 04/10/2018    Milan Brandt MD repeat5 yrs    Saphenous vein occlusion, right 2017    [facility] acute occlusive superificial vein thrombosis - Melody Dyer md    Saphenous vein thrombophlebitis, right 2018    and acute l posterior tibial vein - this is the 2nd DVT putting her on lifelong anticoagualtion / Milderd November    Sleep apnea     cpap 9cm    SOB (shortness of breath)     White coat hypertension      Past Surgical History:   Procedure Laterality Date    COLONOSCOPY N/A 4/10/2018    COLONOSCOPY,EGD [facility]is [facility] ENDOSCOPY    HX BREAST BIOPSY Left     HX COLONOSCOPY      HX ORTHOPAEDIC  2009    foot surgery-left - bunionectomy    HX ORTHOPAEDIC      cyst removed from left hand - ganglion cyst    HX OTHER SURGICAL      right and left leg muscle biopsies - elevated CPK - muscle enzymes were elevated    HX TUBAL LIGATION      OR ABDOMEN SURGERY PROC UNLISTED      exploratory years ago    OR BREAST SURGERY PROCEDURE UNLISTED  2006    left breast [id number]. Corey Benton     Allergies   Allergen Reactions    Aleve [Naproxen Sodium] Hoarseness    Dilaudid [Hydromorphone (Bulk)] Anaphylaxis     Respiratory arrest and throat closes    Diovan [Valsartan] Palpitations    Morphine Other (comments)     Patch-vomiting,weakness, fainting    Sulfa (Sulfonamide Antibiotics) Hives, Rash and Other (comments)     fainting         REVIEW OF SYSTEMS:  General: negative for - chills or fever  ENT: negative for - headaches, nasal congestion or tinnitus  Respiratory: negative for - cough, hemoptysis, shortness of breath or wheezing  Cardiovascular : negative for - chest pain, edema, palpitations or shortness of breath  Gastrointestinal: negative for - abdominal pain, blood in stools, heartburn or nausea/vomiting  Genito-Urinary: no dysuria, trouble voiding, or hematuria  Musculoskeletal: negative for - gait disturbance, joint pain, joint stiffness or joint swelling  Neurological: no TIA or stroke symptoms  Hematologic: no bruises, no bleeding, no swollen glands  Integument: no lumps, mole changes, nail changes or rash  Endocrine: no malaise/lethargy or unexpected weight changes      Social History     Socioeconomic History    Marital status:      Spouse name: Not on file    Number of children: Not on file    Years of education: Not on file    Highest education level: Not on file   Tobacco Use    Smoking status: Never Smoker    Smokeless tobacco: Never Used   Vaping Use    Vaping Use: Never used   Substance and Sexual Activity    Alcohol use: No    Drug use: No    Sexual activity: Yes     Partners: Male     Birth control/protection: None   Social History Narrative         Family History: Mother: alive 80 yrs, High Blood Pressure, cva with cognitive deficitsFather:  36 yrs, myocardial infarctionSister(s): aliveBrother(s): unknow n2    sister(s) . 40 daughter no choildren -  walked out works for board of directors for [employer] . Social History: Alcohol Use Patient does not use alcohol. Smoking Status Patient is a never smoker. Caffeine: occasional. Drugs:    prescription narcotics. Diet: Regular. Exercise: None. Marital Status: . Lives w ith: spouse. Children: children. [employer]: [employer] Sinning. Patient is  living w ith her  she is on disability related to her fibromyositis. Social Determinants of Health     Financial Resource Strain:     Difficulty of Paying Living Expenses:    Food Insecurity:     Worried About Running Out of Food in the Last Year:     [address] in the Last Year:    Transportation Needs:     Lack of Transportation (Medical):      Lack of Transportation (Non-Medical):    Physical Activity:     Days of Exercise per Week:     Minutes of Exercise per Session:    Stress:     Feeling of Stress :    Social Connections:     Frequency of Communication with Friends and Family:     Frequency of Social Gatherings with Friends and Family:     Attends Y[employer]ism Services:     Active Member of Clubs or Organizations:     Attends Club or Organization Meetings:     Marital Status:      Family History   Problem Relation Age of Onset    Hypertension Mother     Kidney Disease Mother         1 kidney    Heart Disease Father     Heart Attack Father         late 35s or early 45s    Heart Attack Maternal Grandmother     Cancer Maternal Grandfather         ? lung or stomach       OBJECTIVE:    Visit Vitals  BP (!) 145/84   Pulse (!) 58   Temp 98.2 °F (36.8 °C) (Oral)   Resp 16   Ht 5' 2\" (1.575 m)   Wt 180 lb 12.8 oz (82 kg)   SpO2 98%   BMI 33.07 kg/m²     CONSTITUTIONAL: well , well nourished, appears age appropriate  EYES: perrla, eom intact  ENMT:moist mucous membranes, pharynx clear  NECK: supple. Thyroid normal  RESPIRATORY: Chest: clear bilaterally   CARDIOVASCULAR: Heart: regular rate and rhythm  GASTROINTESTINAL: Abdomen: soft, bowel sounds active  HEMATOLOGIC: no pathological lymph nodes palpated  MUSCULOSKELETAL: Extremities: no edema, pulse 1+   INTEGUMENT: No unusual rashes or suspicious skin lesions noted. Nails appear normal.  NEUROLOGIC: non-focal exam   MENTAL STATUS: alert and oriented, appropriate affect           ASSESSMENT:  1. Fibromyositis    2. Needs flu shot    3. Encounter for monitoring Coumadin therapy    4. Essential hypertension, benign    5. Chronic pain syndrome    6. Irritable bowel syndrome without diarrhea    7. Gait instability    8. Peripheral vascular disease ([address]    9. IGT (impaired glucose tolerance)    10. Primary osteoarthritis of left hand    11. Saphenous vein occlusion, right    12. Achilles tendinitis of right lower extremity    13. Vitamin D deficiency, unspecified     14. Other general symptoms and signs       Her pain all over I think is related to a flare of her fibromyositis. She had been on narcotics in the past and various pain medications. We offer referral to pain management and she prefers not. She is using Tylenol for the discomfort. Blood pressure elevation is noted and I suspect this is related to uncontrolled pain.     She has chronic pain syndrome in addition to the fibromyalgia. No symptoms related to IBS. Her gait instability is related to Achilles tendinitis on the right. She has impaired glucose tolerance, which we follow yearly. Osteoarthritis of the left hand is not problematic. She has a history of PE, as well as DVT and is on Coumadin, which is subtherapeutic. We will increase the Coumadin to 5mg on Sundays and Thursdays and 2.5 mg on Monday, Tuesday, Friday, Saturday. She wanted her vitamin levels checked and we check B12 and vitamin D and we will report to her the results. She will be back for a P-T check in one week. I have discussed the diagnosis with the patient and the intended plan as seen in the  Orders. The patient understands and agees with the plan. The patient has   received an after visit summary and questions were answered concerning  future plans  Patient labs and/or xrays were reviewed  Past records were reviewed. PLAN:  .  Orders Placed This Encounter    Influenza Vaccine, High Dose, QUAD, IM (Fluzone 0.7ml 17191)    VITAMIN D, 25 HYDROXY    VITAMIN B12 & FOLATE    VITAMIN B1, WHOLE BLOOD    AMB POC PT/INR       Follow-up and Dispositions    · Return in about 1 week (around 9/15/2021) for bp check, pt/inr. ATTENTION:   This medical record was transcribed using an electronic medical records system. Although proofread, it may and can contain electronic and spelling errors. Other human spelling and other errors may be present. Corrections may be executed at a later time. Please feel free to contact us for any clarifications as needed.

## 2021-09-09 LAB
25(OH)D3 SERPL-MCNC: 17.3 NG/ML (ref 30–100)
FOLATE SERPL-MCNC: 10 NG/ML (ref 5–21)
VIT B12 SERPL-MCNC: 629 PG/ML (ref 193–986)

## 2021-09-09 RX ORDER — ERGOCALCIFEROL 1.25 MG/1
50000 CAPSULE ORAL
Qty: 12 CAPSULE | Refills: 1 | Status: SHIPPED | OUTPATIENT
Start: 2021-09-09 | End: 2022-02-08

## 2021-09-14 LAB — VIT B1 BLD-SCNC: 103.6 NMOL/L (ref 66.5–200)

## 2021-09-15 ENCOUNTER — CLINICAL SUPPORT (OUTPATIENT)
Dept: INTERNAL MEDICINE CLINIC | Age: 66
End: 2021-09-15
Payer: MEDICARE

## 2021-09-15 DIAGNOSIS — Z79.01 ENCOUNTER FOR MONITORING COUMADIN THERAPY: Primary | ICD-10-CM

## 2021-09-15 DIAGNOSIS — Z51.81 ENCOUNTER FOR MONITORING COUMADIN THERAPY: Primary | ICD-10-CM

## 2021-09-15 DIAGNOSIS — N18.31 STAGE 3A CHRONIC KIDNEY DISEASE (HCC): ICD-10-CM

## 2021-09-15 DIAGNOSIS — M1A.0790 IDIOPATHIC CHRONIC GOUT OF FOOT WITHOUT TOPHUS, UNSPECIFIED LATERALITY: ICD-10-CM

## 2021-09-15 DIAGNOSIS — R73.02 IGT (IMPAIRED GLUCOSE TOLERANCE): ICD-10-CM

## 2021-09-15 LAB
INR BLD: 2.3
PT POC: 27.6 SECONDS
VALID INTERNAL CONTROL?: YES

## 2021-09-15 PROCEDURE — 85610 PROTHROMBIN TIME: CPT | Performed by: INTERNAL MEDICINE

## 2021-09-15 RX ORDER — PREDNISONE 20 MG/1
40 TABLET ORAL
Qty: 10 TABLET | Refills: 5 | Status: SHIPPED | OUTPATIENT
Start: 2021-09-15 | End: 2022-10-02 | Stop reason: SDUPTHER

## 2021-09-15 NOTE — PROGRESS NOTES
Abdirahman Jose is a 77 y.o. female who presents today for Anticoagulation monitoring. Current dose:  Coumadin 2.5 mg 1 tab PO everyday except on Sunday and Thursday 2 tabs PO. Medication Changes:  no  Dietary Changes:  no    Latest INR:  Results for orders placed or performed in visit on 09/15/21   AMB POC PT/INR   Result Value Ref Range    VALID INTERNAL CONTROL POC Yes     Prothrombin time (POC) 27.6 seconds    INR POC 2.3          New Coumadin dose:.current treatment plan is effective, no change in therapy. Next check to be scheduled for  4 weeks.   Per Dr. Kecia Rain LPN

## 2021-09-16 LAB
ALBUMIN SERPL-MCNC: 3.8 G/DL (ref 3.5–5)
ANION GAP SERPL CALC-SCNC: 4 MMOL/L (ref 5–15)
BUN SERPL-MCNC: 18 MG/DL (ref 6–20)
BUN/CREAT SERPL: 16 (ref 12–20)
CALCIUM SERPL-MCNC: 10.4 MG/DL (ref 8.5–10.1)
CHLORIDE SERPL-SCNC: 109 MMOL/L (ref 97–108)
CO2 SERPL-SCNC: 29 MMOL/L (ref 21–32)
CREAT SERPL-MCNC: 1.14 MG/DL (ref 0.55–1.02)
EST. AVERAGE GLUCOSE BLD GHB EST-MCNC: 123 MG/DL
GLUCOSE SERPL-MCNC: 77 MG/DL (ref 65–100)
HBA1C MFR BLD: 5.9 % (ref 4–5.6)
PHOSPHATE SERPL-MCNC: 2.9 MG/DL (ref 2.6–4.7)
POTASSIUM SERPL-SCNC: 4.1 MMOL/L (ref 3.5–5.1)
SODIUM SERPL-SCNC: 142 MMOL/L (ref 136–145)

## 2021-12-07 ENCOUNTER — TRANSCRIBE ORDER (OUTPATIENT)
Dept: SCHEDULING | Age: 66
End: 2021-12-07

## 2021-12-07 DIAGNOSIS — Z12.31 SCREENING MAMMOGRAM FOR HIGH-RISK PATIENT: Primary | ICD-10-CM

## 2021-12-08 ENCOUNTER — OFFICE VISIT (OUTPATIENT)
Dept: INTERNAL MEDICINE CLINIC | Age: 66
End: 2021-12-08
Payer: MEDICARE

## 2021-12-08 VITALS
SYSTOLIC BLOOD PRESSURE: 139 MMHG | WEIGHT: 183.3 LBS | RESPIRATION RATE: 16 BRPM | HEIGHT: 62 IN | TEMPERATURE: 97.9 F | DIASTOLIC BLOOD PRESSURE: 86 MMHG | BODY MASS INDEX: 33.73 KG/M2 | HEART RATE: 57 BPM | OXYGEN SATURATION: 98 %

## 2021-12-08 DIAGNOSIS — Z51.81 ENCOUNTER FOR MONITORING COUMADIN THERAPY: ICD-10-CM

## 2021-12-08 DIAGNOSIS — I82.811: ICD-10-CM

## 2021-12-08 DIAGNOSIS — M79.7 FIBROMYALGIA: ICD-10-CM

## 2021-12-08 DIAGNOSIS — E78.5 DYSLIPIDEMIA: ICD-10-CM

## 2021-12-08 DIAGNOSIS — I73.9 PERIPHERAL VASCULAR DISEASE (HCC): ICD-10-CM

## 2021-12-08 DIAGNOSIS — E55.9 VITAMIN D DEFICIENCY, UNSPECIFIED: ICD-10-CM

## 2021-12-08 DIAGNOSIS — Z79.01 ENCOUNTER FOR MONITORING COUMADIN THERAPY: ICD-10-CM

## 2021-12-08 DIAGNOSIS — E66.9 CLASS 1 OBESITY WITH BODY MASS INDEX (BMI) OF 33.0 TO 33.9 IN ADULT, UNSPECIFIED OBESITY TYPE, UNSPECIFIED WHETHER SERIOUS COMORBIDITY PRESENT: ICD-10-CM

## 2021-12-08 DIAGNOSIS — Z13.31 SCREENING FOR DEPRESSION: ICD-10-CM

## 2021-12-08 DIAGNOSIS — Z00.00 MEDICARE ANNUAL WELLNESS VISIT, SUBSEQUENT: Primary | ICD-10-CM

## 2021-12-08 DIAGNOSIS — N18.31 STAGE 3A CHRONIC KIDNEY DISEASE (HCC): ICD-10-CM

## 2021-12-08 DIAGNOSIS — K21.9 GASTROESOPHAGEAL REFLUX DISEASE WITHOUT ESOPHAGITIS: ICD-10-CM

## 2021-12-08 DIAGNOSIS — I10 ESSENTIAL HYPERTENSION, BENIGN: ICD-10-CM

## 2021-12-08 DIAGNOSIS — R73.02 IGT (IMPAIRED GLUCOSE TOLERANCE): ICD-10-CM

## 2021-12-08 PROBLEM — N18.30 CKD (CHRONIC KIDNEY DISEASE), STAGE III (HCC): Status: ACTIVE | Noted: 2021-08-20

## 2021-12-08 LAB
25(OH)D3 SERPL-MCNC: 58 NG/ML (ref 30–100)
ALBUMIN SERPL-MCNC: 3.7 G/DL (ref 3.5–5)
ALBUMIN/GLOB SERPL: 1 {RATIO} (ref 1.1–2.2)
ALP SERPL-CCNC: 42 U/L (ref 45–117)
ALT SERPL-CCNC: 57 U/L (ref 12–78)
ANION GAP SERPL CALC-SCNC: 7 MMOL/L (ref 5–15)
AST SERPL-CCNC: 29 U/L (ref 15–37)
BASOPHILS # BLD: 0 K/UL (ref 0–0.1)
BASOPHILS NFR BLD: 1 % (ref 0–1)
BILIRUB SERPL-MCNC: 0.3 MG/DL (ref 0.2–1)
BUN SERPL-MCNC: 20 MG/DL (ref 6–20)
BUN/CREAT SERPL: 16 (ref 12–20)
CALCIUM SERPL-MCNC: 9.4 MG/DL (ref 8.5–10.1)
CHLORIDE SERPL-SCNC: 109 MMOL/L (ref 97–108)
CHOLEST SERPL-MCNC: 197 MG/DL
CO2 SERPL-SCNC: 27 MMOL/L (ref 21–32)
CREAT SERPL-MCNC: 1.25 MG/DL (ref 0.55–1.02)
DIFFERENTIAL METHOD BLD: ABNORMAL
EOSINOPHIL # BLD: 0.1 K/UL (ref 0–0.4)
EOSINOPHIL NFR BLD: 2 % (ref 0–7)
ERYTHROCYTE [DISTWIDTH] IN BLOOD BY AUTOMATED COUNT: 15 % (ref 11.5–14.5)
GLOBULIN SER CALC-MCNC: 3.6 G/DL (ref 2–4)
GLUCOSE SERPL-MCNC: 84 MG/DL (ref 65–100)
HCT VFR BLD AUTO: 43.2 % (ref 35–47)
HDLC SERPL-MCNC: 49 MG/DL
HDLC SERPL: 4 {RATIO} (ref 0–5)
HGB BLD-MCNC: 13.9 G/DL (ref 11.5–16)
IMM GRANULOCYTES # BLD AUTO: 0 K/UL (ref 0–0.04)
IMM GRANULOCYTES NFR BLD AUTO: 0 % (ref 0–0.5)
INR BLD: 1.7
LDLC SERPL CALC-MCNC: 126.8 MG/DL (ref 0–100)
LYMPHOCYTES # BLD: 1.4 K/UL (ref 0.8–3.5)
LYMPHOCYTES NFR BLD: 49 % (ref 12–49)
MCH RBC QN AUTO: 29 PG (ref 26–34)
MCHC RBC AUTO-ENTMCNC: 32.2 G/DL (ref 30–36.5)
MCV RBC AUTO: 90.2 FL (ref 80–99)
MONOCYTES # BLD: 0.7 K/UL (ref 0–1)
MONOCYTES NFR BLD: 24 % (ref 5–13)
NEUTS SEG # BLD: 0.7 K/UL (ref 1.8–8)
NEUTS SEG NFR BLD: 24 % (ref 32–75)
NRBC # BLD: 0 K/UL (ref 0–0.01)
NRBC BLD-RTO: 0 PER 100 WBC
PLATELET # BLD AUTO: 172 K/UL (ref 150–400)
PMV BLD AUTO: 12 FL (ref 8.9–12.9)
POTASSIUM SERPL-SCNC: 4 MMOL/L (ref 3.5–5.1)
PROT SERPL-MCNC: 7.3 G/DL (ref 6.4–8.2)
PT POC: 20.5 SECONDS
RBC # BLD AUTO: 4.79 M/UL (ref 3.8–5.2)
RBC MORPH BLD: ABNORMAL
SODIUM SERPL-SCNC: 143 MMOL/L (ref 136–145)
TRIGL SERPL-MCNC: 106 MG/DL (ref ?–150)
VALID INTERNAL CONTROL?: YES
VLDLC SERPL CALC-MCNC: 21.2 MG/DL
WBC # BLD AUTO: 2.9 K/UL (ref 3.6–11)

## 2021-12-08 PROCEDURE — 99213 OFFICE O/P EST LOW 20 MIN: CPT | Performed by: INTERNAL MEDICINE

## 2021-12-08 PROCEDURE — G8399 PT W/DXA RESULTS DOCUMENT: HCPCS | Performed by: INTERNAL MEDICINE

## 2021-12-08 PROCEDURE — 36415 COLL VENOUS BLD VENIPUNCTURE: CPT | Performed by: INTERNAL MEDICINE

## 2021-12-08 PROCEDURE — 85610 PROTHROMBIN TIME: CPT | Performed by: INTERNAL MEDICINE

## 2021-12-08 PROCEDURE — G8536 NO DOC ELDER MAL SCRN: HCPCS | Performed by: INTERNAL MEDICINE

## 2021-12-08 PROCEDURE — G8427 DOCREV CUR MEDS BY ELIG CLIN: HCPCS | Performed by: INTERNAL MEDICINE

## 2021-12-08 PROCEDURE — G8432 DEP SCR NOT DOC, RNG: HCPCS | Performed by: INTERNAL MEDICINE

## 2021-12-08 PROCEDURE — G8752 SYS BP LESS 140: HCPCS | Performed by: INTERNAL MEDICINE

## 2021-12-08 PROCEDURE — 1090F PRES/ABSN URINE INCON ASSESS: CPT | Performed by: INTERNAL MEDICINE

## 2021-12-08 PROCEDURE — G8417 CALC BMI ABV UP PARAM F/U: HCPCS | Performed by: INTERNAL MEDICINE

## 2021-12-08 PROCEDURE — G8754 DIAS BP LESS 90: HCPCS | Performed by: INTERNAL MEDICINE

## 2021-12-08 PROCEDURE — 1101F PT FALLS ASSESS-DOCD LE1/YR: CPT | Performed by: INTERNAL MEDICINE

## 2021-12-08 PROCEDURE — 3017F COLORECTAL CA SCREEN DOC REV: CPT | Performed by: INTERNAL MEDICINE

## 2021-12-08 PROCEDURE — G9899 SCRN MAM PERF RSLTS DOC: HCPCS | Performed by: INTERNAL MEDICINE

## 2021-12-08 PROCEDURE — G0439 PPPS, SUBSEQ VISIT: HCPCS | Performed by: INTERNAL MEDICINE

## 2021-12-08 NOTE — PROGRESS NOTES
1. Have you been to the ER, urgent care clinic since your last visit? Hospitalized since your last visit? No    2. Have you seen or consulted any other health care providers outside of the [address] [place] [address] since your last visit? Include any pap smears or colon screening. No    Wants to discuss something that was on her after visit summary  This is the Subsequent Medicare Annual Wellness Exam, performed 12 months or more after the Initial AWV or the last Subsequent AWV    I have reviewed the patient's medical history in detail and updated the computerized patient record. Assessment/Plan   Education and counseling provided:  Are appropriate based on today's review and evaluation    1. Encounter for monitoring Coumadin therapy  -     AMB POC PT/INR       Depression Risk Factor Screening     3 most recent PHQ Screens 12/8/2021   PHQ Not Done -   Little interest or pleasure in doing things Not at all   Feeling down, depressed, irritable, or hopeless Not at all   Total Score PHQ 2 0       Alcohol Risk Screen    Do you average more than 1 drink per night or more than 7 drinks a week:  No    On any one occasion in the past three months have you have had more than 3 drinks containing alcohol:  No        Functional Ability and Level of Safety    Hearing: Hearing is good. Activities of Daily Living: The home contains: handrails and grab bars  Patient does total self care      Ambulation: with difficulty, uses a walker     Fall Risk:  Fall Risk Assessment, last 12 mths 12/8/2021   Able to walk? Yes   Fall in past 12 months? 0   Do you feel unsteady? 0   Are you worried about falling 0   Number of falls in past 12 months -   Fall with injury?  -      Abuse Screen:  Patient is not abused       Cognitive Screening    Has your family/caregiver stated any concerns about your memory: no     Cognitive Screening: not necessary    Health Maintenance Due     Health Maintenance Due   Topic Date Due    Shingrix Vaccine Age 50> (1 of 2) Never done    Pneumococcal 65+ years (1 of 1 - PPSV23) Never done    Medicare Yearly Exam  2021       Patient Care Team   Patient Care Team:  Claude Aguilera MD as PCP - General (Internal Medicine)  Claude [facility]CP [facility]    History     Patient Active Problem List   Diagnosis Code    Sleep apnea G47.30    GERD (gastroesophageal reflux disease) K21.9    Restless leg syndrome G25.81    Fibromyalgia M79.7    Mastodynia, left greater than right N64.4    Altered mental status R41.82    Chronic pain G89.29    Fibromyositis M79.7    Essential hypertension, benign I10    IGT (impaired glucose tolerance) R73.02    IBS (irritable bowel syndrome) K58.9    Gait instability R26.81    Normal cardiac stress test [id number]    White coat hypertension [id number]    Positive D dimer R79.89    SOB (shortness of breath) R06.02    S/P colonoscopy Z98.890    Saphenous vein occlusion, 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 [address] I73.9    Gout M10.9    Epicondylitis, lateral, right M77.11    Achilles tendinitis of right lower extremity M76.61     Past Medical History:   Diagnosis Date    Adverse effect of anesthesia     \"hard time waking me up\"    Axillary pain, right     Bereavement 2019    77 yo - heart attack -  in sleep    Chronic pain     d/t fibromyalgia    Coagulation disorder ([facility] [address]     on eliquis -d/c due to gi upset - now on coumadin previously followed by dr. Carroll Aguilar Depression with anxiety     DJD (degenerative joint disease)     Dyslipidemia     Encounter for Hemoccult screening 2017    neg    Epicondylitis, lateral, right 2021    Fibromyalgia     Gait instability     GERD (gastroesophageal reflux disease)     Hypertension     IBS (irritable bowel syndrome)     Ill-defined condition     gastroparesis    Ill-defined condition     CT - 3/18/2018 - esophageal diverticulum    Ill-defined condition     pericarditis    Mastodynia, left greater than right 8/17/2011    Menopause     Normal cardiac stress test 3.14    Positive D dimer 9-23-15    Prediabetes     Preventative health care 03/03/2021    Pulmonary embolism ([address] 03/2013    rll    Restless leg syndrome     S/P colonoscopy 01/27/2011    kenny hernandez md    S/P colonoscopy 04/10/2018    Meg Murillo MD repeat5 yrs    Saphenous vein occlusion, 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    [facility] acute occlusive superificial vein thrombosis - Melody Dyer md    Saphenous vein thrombophlebitis, right 02/2018    and acute l posterior tibial vein - this is the 2nd DVT putting her on lifelong anticoagualtion / Ivin Filler    Sleep apnea     cpap 9cm    SOB (shortness of breath)     White coat hypertension       Past Surgical History:   Procedure Laterality Date    COLONOSCOPY N/A 4/10/2018    COLONOSCOPY,EGD [facility]COPY    HX BREAST BIOPSY Left     HX COLONOSCOPY      HX ORTHOPAEDIC  2009    foot surgery-left - bunionectomy    HX ORTHOPAEDIC      cyst removed from left hand - ganglion cyst    HX OTHER SURGICAL      right and left leg muscle biopsies - elevated CPK - muscle enzymes were elevated    HX TUBAL LIGATION      TN ABDOMEN SURGERY PROC UNLISTED      exploratory years ago    TN BREAST SURGERY PROCEDURE UNLISTED  2006    left breast [id number]. Ema Other     Current Outpatient Medications   Medication Sig Dispense Refill    predniSONE (DELTASONE) 20 mg tablet Take 40 mg by mouth daily (with breakfast). For 5 days as need for gout atack 10 Tablet 5    ergocalciferol (ERGOCALCIFEROL) 1,250 mcg (50,000 unit) capsule Take 1 Capsule by mouth every seven (7) days.  12 Capsule 1    warfarin (COUMADIN) 2.5 mg tablet Take 1 Tablet by mouth daily. And 5 mg on Sunday and thursday 180 Tablet 3    rosuvastatin (CRESTOR) 10 mg tablet TAKE ONE TABLET BY MOUTH EVERY DAY - TO LOWER CHOLESTEROL 30 Tablet 11    bisoprolol-hydroCHLOROthiazide (ZIAC) 10-6.25 mg per tablet TAKE 1 TABLET DAILY 90 Tab 3    amLODIPine (NORVASC) 10 mg tablet TAKE 1 TABLET DAILY 90 Tab 3    traZODone (DESYREL) 50 mg tablet TAKE 1 TABLET NIGHTLY AS NEEDED FOR SLEEP 90 Tab 4    lidocaine (LIDODERM) 5 % Apply patch to the affected area for 12 hours a day and remove for 12 hours a day. DX.M79.7 90 Each 3    LACTOBACILLUS ACIDOPHILUS (PROBIOTIC PO) Take 1 Cap by mouth as needed.  carica papaya (PAPAYA ENZYME) tab Take  by mouth. Takes 4 tabs po 1-2 times daily after meals.  ONETOUCH ULTRA2 monitoring kit       MICROLET LANCET misc USE TO TEST BLOOD SUGAR EVERY  Each 11    cyanocobalamin (VITAMIN B-12) 1,000 mcg tablet Take 1,000 mcg by mouth as needed.        Allergies   Allergen Reactions    Aleve [Naproxen Sodium] Hoarseness    Dilaudid [Hydromorphone (Bulk)] Anaphylaxis     Respiratory arrest and throat closes    Crestor [Rosuvastatin] Nausea Only    Diovan [Valsartan] Palpitations    Morphine Other (comments)     Patch-vomiting,weakness, fainting    Sulfa (Sulfonamide Antibiotics) Hives, Rash and Other (comments)     fainting       Family History   Problem Relation Age of Onset    Hypertension Mother     Kidney Disease Mother         1 kidney    Heart Disease Father     Heart Attack Father         late 35s or early 45s    Heart Attack Maternal Grandmother     Cancer Maternal Grandfather         ? lung or stomach     Social History     Tobacco Use    Smoking status: Never Smoker    Smokeless tobacco: Never Used   Substance Use Topics    Alcohol use: No         Keon Bellamy LPN

## 2021-12-08 NOTE — PATIENT INSTRUCTIONS

## 2021-12-08 NOTE — PROGRESS NOTES
[facility]  Kash Judd MD, [address] [address]  Phone:  [phone]  Fax: [phone]      Chief Complaint   Patient presents with    Annual Wellness Visit         SUBECTIVE:    Dago Cardenas is a 74 y.o. female Patient returns today with a known history of fibromyalgia, dyslipidemia, obesity, impaired glucose tolerance, peripheral vascular disease, recurrent DVT, on Coumadin, primary hypertension, GERD and is seen for evaluation. Saw a podiatrist, Dr. Malinda Islas, who told her that she had bilateral plantar fasciitis and encouraged her to do exercises. She has pain, her body is just aching terribly, she states. She is moving around, but the body pain is there all the time. Patient is seen for evaluation. Current Outpatient Medications   Medication Sig Dispense Refill    predniSONE (DELTASONE) 20 mg tablet Take 40 mg by mouth daily (with breakfast). For 5 days as need for gout atack 10 Tablet 5    ergocalciferol (ERGOCALCIFEROL) 1,250 mcg (50,000 unit) capsule Take 1 Capsule by mouth every seven (7) days. 12 Capsule 1    warfarin (COUMADIN) 2.5 mg tablet Take 1 Tablet by mouth daily. And 5 mg on Sunday and thursday 180 Tablet 3    rosuvastatin (CRESTOR) 10 mg tablet TAKE ONE TABLET BY MOUTH EVERY DAY - TO LOWER CHOLESTEROL 30 Tablet 11    bisoprolol-hydroCHLOROthiazide (ZIAC) 10-6.25 mg per tablet TAKE 1 TABLET DAILY 90 Tab 3    amLODIPine (NORVASC) 10 mg tablet TAKE 1 TABLET DAILY 90 Tab 3    traZODone (DESYREL) 50 mg tablet TAKE 1 TABLET NIGHTLY AS NEEDED FOR SLEEP 90 Tab 4    lidocaine (LIDODERM) 5 % Apply patch to the affected area for 12 hours a day and remove for 12 hours a day. DX.M79.7 90 Each 3    LACTOBACILLUS ACIDOPHILUS (PROBIOTIC PO) Take 1 Cap by mouth as needed.  carica papaya (PAPAYA ENZYME) tab Take  by mouth. Takes 4 tabs po 1-2 times daily after meals.       [facility]       MICROLET LANCET misc USE TO TEST BLOOD SUGAR EVERY  Each 11    cyanocobalamin (VITAMIN B-12) 1,000 mcg tablet Take 1,000 mcg by mouth as needed.        Past Medical History:   Diagnosis Date    Adverse effect of anesthesia     \"hard time waking me up\"    Axillary pain, right     Bereavement 2019    79 yo - heart attack -  in sleep    Chronic pain     d/t fibromyalgia    CKD (chronic kidney disease), stage III ([facility] [address] 2020    Coagulation disorder ([facility] [address]     on eliquis -d/c due to gi upset - now on coumadin previously followed by dr. Jay Hughes Depression with anxiety     DJD (degenerative joint disease)     Dyslipidemia     Encounter for Hemoccult screening 2017    neg    Epicondylitis, lateral, right 2021    Fibromyalgia     Gait instability     GERD (gastroesophageal reflux disease)     Hypertension     IBS (irritable bowel syndrome)     Ill-defined condition     gastroparesis    Ill-defined condition     CT - 3/18/2018 - esophageal diverticulum    Ill-defined condition     pericarditis    Mastodynia, left greater than right 2011    Menopause     Normal cardiac stress test 3.14    Positive D dimer 9-23-15    Prediabetes     Preventative health care 2021    Pulmonary embolism ([facility] [address] 2013    rll    Restless leg syndrome     S/P colonoscopy 2011    kenny hernandez md    S/P colonoscopy 04/10/2018    Power Mojica MD repeat5 yrs    Saphenous vein occlusion, right 2017    [facility] acute occlusive superificial vein thrombosis - Melody Dyer md    Saphenous vein thrombophlebitis, right 2018    and acute l posterior tibial vein - this is the 2nd DVT putting her on lifelong anticoagualtion / Yazmin Ali    Sleep apnea     cpap 9cm    SOB (shortness of breath)     White coat hypertension      Past Surgical History:   Procedure Laterality Date    COLONOSCOPY N/A 4/10/2018    COLONOSCOPY,EGD performed by Juancho Mcneil MD [place] [facility] ENDOSCOPY    HX BREAST BIOPSY Left     HX COLONOSCOPY      HX ORTHOPAEDIC  2009    foot surgery-left - bunionectomy    HX ORTHOPAEDIC      cyst removed from left hand - ganglion cyst    HX OTHER SURGICAL      right and left leg muscle biopsies - elevated CPK - muscle enzymes were elevated    HX TUBAL LIGATION      ID ABDOMEN SURGERY PROC UNLISTED      exploratory years ago    ID BREAST SURGERY PROCEDURE UNLISTED  2006    left breast [id number]. Hyla Sport     Allergies   Allergen Reactions    Aleve [Naproxen Sodium] Hoarseness    Dilaudid [Hydromorphone (Bulk)] Anaphylaxis     Respiratory arrest and throat closes    Crestor [Rosuvastatin] Nausea Only    Diovan [Valsartan] Palpitations    Morphine Other (comments)     Patch-vomiting,weakness, fainting    Sulfa (Sulfonamide Antibiotics) Hives, Rash and Other (comments)     fainting       REVIEW OF SYSTEMS:   She is checking her blood sugars and most of the time this is in the normal range, occasionally up to 112, consistent with impaired glucose tolerance. Social History     Socioeconomic History    Marital status:    Tobacco Use    Smoking status: Never Smoker    Smokeless tobacco: Never Used   Vaping Use    Vaping Use: Never used   Substance and Sexual Activity    Alcohol use: No    Drug use: No    Sexual activity: Yes     Partners: Male     Birth control/protection: None   Social History Narrative         Family History: Mother: alive 80 yrs, High Blood Pressure, cva with cognitive deficitsFather:  36 yrs, myocardial infarctionSister(s): aliveBrother(s): unknow n2    sister(s) . 40 daughter no choildren -  walked out works for board of directors for [employer] . Social History: Alcohol Use Patient does not use alcohol. Smoking Status Patient is a never smoker. Caffeine: occasional. Drugs:    prescription narcotics. Diet: Regular. Exercise: None. Marital Status: .  Lives w ith: spouse. Children: children. [employer]ist: Loren Vick. Patient is  living w ith her  she is on disability related to her fibromyositis. r  Family History   Problem Relation Age of Onset    Hypertension Mother     Kidney Disease Mother         1 kidney    Heart Disease Father     Heart Attack Father         late 35s or early 45s    Heart Attack Maternal Grandmother     Cancer Maternal Grandfather         ? lung or stomach       OBJECTIVE:  Visit Vitals  /86   Pulse (!) 57   Temp 97.9 °F (36.6 °C) (Oral)   Resp 16   Ht 5' 2\" (1.575 m)   Wt 183 lb 4.8 oz (83.1 kg)   SpO2 98%   BMI 33.53 kg/m²     ENT: perrla,  eom intact  NECK: supple.  Thyroid normal  CHEST: clear to ascultation and percussion   HEART: regular rate and rhythm  ABD: soft, bowel sounds active  EXTREMITIES: no edema, pulse 1+     Office Visit on 12/08/2021   Component Date Value Ref Range Status    VALID INTERNAL CONTROL POC 12/08/2021 Yes   Final    Prothrombin time (POC) 12/08/2021 20.5  seconds Final    INR POC 12/08/2021 1.7   Final   Clinical Support on 10/28/2021   Component Date Value Ref Range Status    VALID INTERNAL CONTROL POC 10/28/2021 Yes   Final    Prothrombin time (POC) 10/28/2021 23.0  seconds Final    INR POC 10/28/2021 1.9   Final   Clinical Support on 10/15/2021   Component Date Value Ref Range Status    Uric acid 10/15/2021 8.5* 3.0 - 7.2 mg/dL Final               Therapeutic target for gout patients: <6.0    VALID INTERNAL CONTROL POC 10/15/2021 Yes   Final    Prothrombin time (POC) 10/15/2021 54.9  seconds Final    INR POC 10/15/2021 4.6   Final    INR, External 10/15/2021 4.6   Final    Hold for 2 days and take 2.5mg daily after that, per Dr Мария Chavez, come back in 2 wks   Orders Only on 09/15/2021   Component Date Value Ref Range Status    Hemoglobin A1c 09/15/2021 5.9* 4.0 - 5.6 % Final    Comment: NEW METHOD PLEASE NOTE NEW REFERENCE RANGE  (NOTE)  HbA1C Interpretive Ranges  <5.7 Normal  5.7 - 6.4         Consider Prediabetes  >6.5              Consider Diabetes      Est. average glucose 09/15/2021 123  mg/dL Final   Orders Only on 09/15/2021   Component Date Value Ref Range Status    Sodium 09/15/2021 142  136 - 145 mmol/L Final    Potassium 09/15/2021 4.1  3.5 - 5.1 mmol/L Final    Chloride 09/15/2021 109* 97 - 108 mmol/L Final    CO2 09/15/2021 29  21 - 32 mmol/L Final    Anion gap 09/15/2021 4* 5 - 15 mmol/L Final    Glucose 09/15/2021 77  65 - 100 mg/dL Final    BUN 09/15/2021 18  6 - 20 MG/DL Final    Creatinine 09/15/2021 1.14* 0.55 - 1.02 MG/DL Final    BUN/Creatinine ratio 09/15/2021 16  12 - 20   Final    GFR est AA 09/15/2021 58* >60 ml/min/1.73m2 Final    GFR est non-AA 09/15/2021 48* >60 ml/min/1.73m2 Final    Comment: Estimated GFR is calculated using the IDMS-traceable Modification of Diet in  Renal Disease (MDRD) Study equation, reported for both  Americans  (GFRAA) and non- Americans (GFRNA), and normalized to 1.73m2 body  surface area. The physician must decide which value applies to the patient.  Calcium 09/15/2021 10.4* 8.5 - 10.1 MG/DL Final    Phosphorus 09/15/2021 2.9  2.6 - 4.7 MG/DL Final    Albumin 09/15/2021 3.8  3.5 - 5.0 g/dL Final   Clinical Support on 09/15/2021   Component Date Value Ref Range Status    VALID INTERNAL CONTROL POC 09/15/2021 Yes   Final    Prothrombin time (POC) 09/15/2021 27.6  seconds Final    INR POC 09/15/2021 2.3   Final          ASSESSMENT:  1. Medicare annual wellness visit, subsequent    2. Encounter for monitoring Coumadin therapy    3. Screening for depression    4. Dyslipidemia    5. Class 1 obesity with body mass index (BMI) of 33.0 to 33.9 in adult, unspecified obesity type, unspecified whether serious comorbidity present    6. IGT (impaired glucose tolerance)    7. Peripheral vascular disease ([address]    8. Saphenous vein occlusion, right    9. Essential hypertension, benign    10. Fibromyalgia    11. Gastroesophageal reflux disease without esophagitis    12. Vitamin D deficiency, unspecified     13. Stage 3a chronic kidney disease ([facility] [address]      Patient with a history of dyslipidemia, for which we now have her on Rosuvastatin. We will check her lipid panel today. Obesity remains an issue and we encourage a heart healthy, weight reducing diet. Peripheral vascular occlusive disease is asymptomatic. She is on Coumadin chronically for recurrent DVT. BP control is adequate. Fibromyalgia is flaring. No symptoms related to GERD. She is advised of CKD, which she has actually had since last year. She claims we never told her. She will return to the office in one month. We increased Coumadin to 2.5 mg daily except Sunday and Thursday she will take 5 mg. I have discussed the diagnosis with the patient and the intended plan as seen in the  orders above. The patient understands and agees with the plan. The patient has   received an after visit summary and questions were answered concerning  future plans  Patient labs and/or xrays were reviewed  Past records were reviewed. PLAN:  .  Orders Placed This Encounter   [facility] 8-15 MIN    CBC WITH AUTOMATED DIFF    METABOLIC PANEL, COMPREHENSIVE    LIPID PANEL    VITAMIN D, 25 HYDROXY    AMB POC PT/INR       Follow-up and Dispositions    · Return in about 4 weeks (around 1/5/2022) for pt/inr. ATTENTION:   This medical record was transcribed using an electronic medical records system. Although proofread, it may and can contain electronic and spelling errors. Other human spelling and other errors may be present. Corrections may be executed at a later time. Please feel free to contact us for any clarifications as needed.

## 2022-01-12 ENCOUNTER — CLINICAL SUPPORT (OUTPATIENT)
Dept: INTERNAL MEDICINE CLINIC | Age: 67
End: 2022-01-12
Payer: MEDICARE

## 2022-01-12 DIAGNOSIS — Z51.81 ENCOUNTER FOR MONITORING COUMADIN THERAPY: Primary | ICD-10-CM

## 2022-01-12 DIAGNOSIS — Z79.01 ENCOUNTER FOR MONITORING COUMADIN THERAPY: Primary | ICD-10-CM

## 2022-01-12 LAB
INR BLD: 3.6
PT POC: 42.9 SECONDS
VALID INTERNAL CONTROL?: YES

## 2022-01-12 PROCEDURE — 85610 PROTHROMBIN TIME: CPT | Performed by: INTERNAL MEDICINE

## 2022-01-12 NOTE — PROGRESS NOTES
Domingo Benavides is a 74 y.o. female who presents today for Anticoagulation monitoring. Current dose:  Coumadin 2.5 mg 1 tab PO everyday except on Sunday and Thursday    Medication Changes:  yes - coumadin 2.5 mg 1 tab PO daily  Dietary Changes:  no    Latest INR:  Results for orders placed or performed in visit on 01/12/22   AMB POC PT/INR   Result Value Ref Range    VALID INTERNAL CONTROL POC Yes     Prothrombin time (POC) 42.9 seconds    INR POC 3.6          New Coumadin dose:.orders as documented in EMR. Next check to be scheduled for  4 weeks.   Per Yessenia Monae LPN

## 2022-01-13 LAB
BASOPHILS # BLD: 0 K/UL (ref 0–0.1)
BASOPHILS NFR BLD: 1 % (ref 0–1)
DIFFERENTIAL METHOD BLD: ABNORMAL
EOSINOPHIL # BLD: 0.1 K/UL (ref 0–0.4)
EOSINOPHIL NFR BLD: 2 % (ref 0–7)
ERYTHROCYTE [DISTWIDTH] IN BLOOD BY AUTOMATED COUNT: 15.3 % (ref 11.5–14.5)
HCT VFR BLD AUTO: 42 % (ref 35–47)
HGB BLD-MCNC: 13.4 G/DL (ref 11.5–16)
IMM GRANULOCYTES # BLD AUTO: 0 K/UL (ref 0–0.04)
IMM GRANULOCYTES NFR BLD AUTO: 0 % (ref 0–0.5)
LYMPHOCYTES # BLD: 1.7 K/UL (ref 0.8–3.5)
LYMPHOCYTES NFR BLD: 42 % (ref 12–49)
MCH RBC QN AUTO: 29.2 PG (ref 26–34)
MCHC RBC AUTO-ENTMCNC: 31.9 G/DL (ref 30–36.5)
MCV RBC AUTO: 91.5 FL (ref 80–99)
MONOCYTES # BLD: 0.5 K/UL (ref 0–1)
MONOCYTES NFR BLD: 12 % (ref 5–13)
NEUTS SEG # BLD: 1.7 K/UL (ref 1.8–8)
NEUTS SEG NFR BLD: 43 % (ref 32–75)
NRBC # BLD: 0 K/UL (ref 0–0.01)
NRBC BLD-RTO: 0 PER 100 WBC
PLATELET # BLD AUTO: 209 K/UL (ref 150–400)
PMV BLD AUTO: 11.9 FL (ref 8.9–12.9)
RBC # BLD AUTO: 4.59 M/UL (ref 3.8–5.2)
WBC # BLD AUTO: 4 K/UL (ref 3.6–11)

## 2022-01-14 NOTE — PROGRESS NOTES
The easy bruising that she noted was most likely related to the fact that she her blood was too thin, and that was the reason for the adjustment.   Your CBC is normal

## 2022-01-20 ENCOUNTER — HOSPITAL ENCOUNTER (OUTPATIENT)
Dept: MAMMOGRAPHY | Age: 67
Discharge: HOME | End: 2022-01-20
Attending: INTERNAL MEDICINE
Payer: MEDICARE

## 2022-01-20 DIAGNOSIS — Z12.31 SCREENING MAMMOGRAM FOR HIGH-RISK PATIENT: ICD-10-CM

## 2022-01-20 PROCEDURE — 77063 BREAST TOMOSYNTHESIS BI: CPT

## 2022-01-31 RX ORDER — BISOPROLOL FUMARATE AND HYDROCHLOROTHIAZIDE 10; 6.25 MG/1; MG/1
1 TABLET ORAL DAILY
Qty: 90 TABLET | Refills: 3 | Status: SHIPPED | OUTPATIENT
Start: 2022-01-31

## 2022-02-08 RX ORDER — ERGOCALCIFEROL 1.25 MG/1
CAPSULE ORAL
Qty: 12 CAPSULE | Refills: 3 | Status: SHIPPED | OUTPATIENT
Start: 2022-02-08 | End: 2022-07-27

## 2022-02-16 ENCOUNTER — OFFICE VISIT (OUTPATIENT)
Dept: INTERNAL MEDICINE CLINIC | Age: 67
End: 2022-02-16
Payer: MEDICARE

## 2022-02-16 ENCOUNTER — HOSPITAL ENCOUNTER (EMERGENCY)
Age: 67
Discharge: HOME | End: 2022-02-16
Attending: EMERGENCY MEDICINE
Payer: MEDICARE

## 2022-02-16 ENCOUNTER — APPOINTMENT (OUTPATIENT)
Dept: CT IMAGING | Age: 67
End: 2022-02-16
Attending: EMERGENCY MEDICINE
Payer: MEDICARE

## 2022-02-16 VITALS
DIASTOLIC BLOOD PRESSURE: 82 MMHG | OXYGEN SATURATION: 100 % | HEART RATE: 60 BPM | TEMPERATURE: 98.4 F | BODY MASS INDEX: 33.75 KG/M2 | RESPIRATION RATE: 20 BRPM | HEIGHT: 62 IN | SYSTOLIC BLOOD PRESSURE: 145 MMHG | WEIGHT: 183.4 LBS

## 2022-02-16 VITALS
DIASTOLIC BLOOD PRESSURE: 105 MMHG | HEART RATE: 68 BPM | SYSTOLIC BLOOD PRESSURE: 158 MMHG | TEMPERATURE: 98.5 F | OXYGEN SATURATION: 98 % | RESPIRATION RATE: 18 BRPM

## 2022-02-16 DIAGNOSIS — Z79.01 ENCOUNTER FOR MONITORING COUMADIN THERAPY: ICD-10-CM

## 2022-02-16 DIAGNOSIS — K21.9 GASTROESOPHAGEAL REFLUX DISEASE WITHOUT ESOPHAGITIS: ICD-10-CM

## 2022-02-16 DIAGNOSIS — K58.9 IRRITABLE BOWEL SYNDROME WITHOUT DIARRHEA: ICD-10-CM

## 2022-02-16 DIAGNOSIS — I27.82 CHRONIC PULMONARY EMBOLISM WITHOUT ACUTE COR PULMONALE, UNSPECIFIED PULMONARY EMBOLISM TYPE (HCC): ICD-10-CM

## 2022-02-16 DIAGNOSIS — Z51.81 ENCOUNTER FOR MONITORING COUMADIN THERAPY: ICD-10-CM

## 2022-02-16 DIAGNOSIS — I73.9 PERIPHERAL VASCULAR DISEASE (HCC): ICD-10-CM

## 2022-02-16 DIAGNOSIS — M79.7 FIBROMYALGIA: ICD-10-CM

## 2022-02-16 DIAGNOSIS — R10.32 LEFT LOWER QUADRANT ABDOMINAL PAIN: ICD-10-CM

## 2022-02-16 DIAGNOSIS — K57.90 DIVERTICULOSIS: Primary | ICD-10-CM

## 2022-02-16 DIAGNOSIS — E66.9 CLASS 1 OBESITY WITH BODY MASS INDEX (BMI) OF 33.0 TO 33.9 IN ADULT, UNSPECIFIED OBESITY TYPE, UNSPECIFIED WHETHER SERIOUS COMORBIDITY PRESENT: ICD-10-CM

## 2022-02-16 DIAGNOSIS — R10.12 ABDOMINAL PAIN, LUQ (LEFT UPPER QUADRANT): Primary | ICD-10-CM

## 2022-02-16 DIAGNOSIS — R26.81 GAIT INSTABILITY: ICD-10-CM

## 2022-02-16 DIAGNOSIS — I10 ESSENTIAL HYPERTENSION, BENIGN: ICD-10-CM

## 2022-02-16 DIAGNOSIS — E78.5 DYSLIPIDEMIA: ICD-10-CM

## 2022-02-16 DIAGNOSIS — N18.31 STAGE 3A CHRONIC KIDNEY DISEASE (HCC): ICD-10-CM

## 2022-02-16 LAB
ALBUMIN SERPL-MCNC: 3.9 G/DL (ref 3.5–5)
ALBUMIN/GLOB SERPL: 1 {RATIO} (ref 1.1–2.2)
ALP SERPL-CCNC: 47 U/L (ref 45–117)
ALT SERPL-CCNC: 44 U/L (ref 12–78)
ANION GAP SERPL CALC-SCNC: 6 MMOL/L (ref 5–15)
APTT PPP: 32.8 SEC (ref 22.1–31)
AST SERPL-CCNC: 33 U/L (ref 15–37)
BASOPHILS # BLD: 0.1 K/UL (ref 0–0.1)
BASOPHILS NFR BLD: 1 % (ref 0–1)
BILIRUB SERPL-MCNC: 0.2 MG/DL (ref 0.2–1)
BUN SERPL-MCNC: 22 MG/DL (ref 6–20)
BUN/CREAT SERPL: 17 (ref 12–20)
CALCIUM SERPL-MCNC: 9.8 MG/DL (ref 8.5–10.1)
CHLORIDE SERPL-SCNC: 105 MMOL/L (ref 97–108)
CO2 SERPL-SCNC: 27 MMOL/L (ref 21–32)
COMMENT, HOLDF: NORMAL
CREAT SERPL-MCNC: 1.29 MG/DL (ref 0.55–1.02)
DIFFERENTIAL METHOD BLD: ABNORMAL
EOSINOPHIL # BLD: 0.1 K/UL (ref 0–0.4)
EOSINOPHIL NFR BLD: 1 % (ref 0–7)
ERYTHROCYTE [DISTWIDTH] IN BLOOD BY AUTOMATED COUNT: 14.5 % (ref 11.5–14.5)
GLOBULIN SER CALC-MCNC: 4.1 G/DL (ref 2–4)
GLUCOSE SERPL-MCNC: 86 MG/DL (ref 65–100)
HCT VFR BLD AUTO: 44.1 % (ref 35–47)
HGB BLD-MCNC: 14.5 G/DL (ref 11.5–16)
IMM GRANULOCYTES # BLD AUTO: 0 K/UL (ref 0–0.04)
IMM GRANULOCYTES NFR BLD AUTO: 0 % (ref 0–0.5)
INR BLD: 2
INR PPP: 1.9 (ref 0.9–1.1)
LACTATE SERPL-SCNC: 0.9 MMOL/L (ref 0.4–2)
LIPASE SERPL-CCNC: 228 U/L (ref 73–393)
LYMPHOCYTES # BLD: 1.5 K/UL (ref 0.8–3.5)
LYMPHOCYTES NFR BLD: 33 % (ref 12–49)
MCH RBC QN AUTO: 29.4 PG (ref 26–34)
MCHC RBC AUTO-ENTMCNC: 32.9 G/DL (ref 30–36.5)
MCV RBC AUTO: 89.5 FL (ref 80–99)
MONOCYTES # BLD: 0.5 K/UL (ref 0–1)
MONOCYTES NFR BLD: 10 % (ref 5–13)
NEUTS SEG # BLD: 2.4 K/UL (ref 1.8–8)
NEUTS SEG NFR BLD: 55 % (ref 32–75)
NRBC # BLD: 0 K/UL (ref 0–0.01)
NRBC BLD-RTO: 0 PER 100 WBC
PLATELET # BLD AUTO: 118 K/UL (ref 150–400)
PMV BLD AUTO: 11.8 FL (ref 8.9–12.9)
POTASSIUM SERPL-SCNC: 3.8 MMOL/L (ref 3.5–5.1)
PROT SERPL-MCNC: 8 G/DL (ref 6.4–8.2)
PROTHROMBIN TIME: 18.9 SEC (ref 9–11.1)
PT POC: 24 SECONDS
RBC # BLD AUTO: 4.93 M/UL (ref 3.8–5.2)
SAMPLES BEING HELD,HOLD: NORMAL
SODIUM SERPL-SCNC: 138 MMOL/L (ref 136–145)
THERAPEUTIC RANGE,PTTT: ABNORMAL SECS (ref 58–77)
VALID INTERNAL CONTROL?: YES
WBC # BLD AUTO: 4.5 K/UL (ref 3.6–11)

## 2022-02-16 PROCEDURE — 36415 COLL VENOUS BLD VENIPUNCTURE: CPT

## 2022-02-16 PROCEDURE — 87040 BLOOD CULTURE FOR BACTERIA: CPT

## 2022-02-16 PROCEDURE — G8427 DOCREV CUR MEDS BY ELIG CLIN: HCPCS | Performed by: INTERNAL MEDICINE

## 2022-02-16 PROCEDURE — 74177 CT ABD & PELVIS W/CONTRAST: CPT

## 2022-02-16 PROCEDURE — G8417 CALC BMI ABV UP PARAM F/U: HCPCS | Performed by: INTERNAL MEDICINE

## 2022-02-16 PROCEDURE — G8510 SCR DEP NEG, NO PLAN REQD: HCPCS | Performed by: INTERNAL MEDICINE

## 2022-02-16 PROCEDURE — 83690 ASSAY OF LIPASE: CPT

## 2022-02-16 PROCEDURE — 80053 COMPREHEN METABOLIC PANEL: CPT

## 2022-02-16 PROCEDURE — G9899 SCRN MAM PERF RSLTS DOC: HCPCS | Performed by: INTERNAL MEDICINE

## 2022-02-16 PROCEDURE — 74011000636 HC RX REV CODE- 636: Performed by: EMERGENCY MEDICINE

## 2022-02-16 PROCEDURE — 1090F PRES/ABSN URINE INCON ASSESS: CPT | Performed by: INTERNAL MEDICINE

## 2022-02-16 PROCEDURE — 85610 PROTHROMBIN TIME: CPT | Performed by: INTERNAL MEDICINE

## 2022-02-16 PROCEDURE — 85730 THROMBOPLASTIN TIME PARTIAL: CPT

## 2022-02-16 PROCEDURE — 99282 EMERGENCY DEPT VISIT SF MDM: CPT

## 2022-02-16 PROCEDURE — 1101F PT FALLS ASSESS-DOCD LE1/YR: CPT | Performed by: INTERNAL MEDICINE

## 2022-02-16 PROCEDURE — 99215 OFFICE O/P EST HI 40 MIN: CPT | Performed by: INTERNAL MEDICINE

## 2022-02-16 PROCEDURE — G8753 SYS BP > OR = 140: HCPCS | Performed by: INTERNAL MEDICINE

## 2022-02-16 PROCEDURE — 85025 COMPLETE CBC W/AUTO DIFF WBC: CPT

## 2022-02-16 PROCEDURE — 85610 PROTHROMBIN TIME: CPT

## 2022-02-16 PROCEDURE — G8754 DIAS BP LESS 90: HCPCS | Performed by: INTERNAL MEDICINE

## 2022-02-16 PROCEDURE — G8399 PT W/DXA RESULTS DOCUMENT: HCPCS | Performed by: INTERNAL MEDICINE

## 2022-02-16 PROCEDURE — 83605 ASSAY OF LACTIC ACID: CPT

## 2022-02-16 PROCEDURE — G8536 NO DOC ELDER MAL SCRN: HCPCS | Performed by: INTERNAL MEDICINE

## 2022-02-16 PROCEDURE — 3017F COLORECTAL CA SCREEN DOC REV: CPT | Performed by: INTERNAL MEDICINE

## 2022-02-16 RX ORDER — ONDANSETRON 4 MG/1
4 TABLET, FILM COATED ORAL
Qty: 20 TABLET | Refills: 0 | Status: SHIPPED | OUTPATIENT
Start: 2022-02-16

## 2022-02-16 RX ORDER — ACETAMINOPHEN 500 MG
1000 TABLET ORAL ONCE
Status: DISCONTINUED | OUTPATIENT
Start: 2022-02-16 | End: 2022-02-16 | Stop reason: HOSPADM

## 2022-02-16 RX ORDER — ONDANSETRON 2 MG/ML
4 INJECTION INTRAMUSCULAR; INTRAVENOUS
Status: DISCONTINUED | OUTPATIENT
Start: 2022-02-16 | End: 2022-02-16 | Stop reason: HOSPADM

## 2022-02-16 RX ORDER — CEFUROXIME AXETIL 500 MG/1
500 TABLET ORAL 2 TIMES DAILY
Qty: 20 TABLET | Refills: 0 | Status: SHIPPED | OUTPATIENT
Start: 2022-02-16 | End: 2022-02-26

## 2022-02-16 RX ADMIN — IOPAMIDOL 100 ML: 755 INJECTION, SOLUTION INTRAVENOUS at 16:45

## 2022-02-16 NOTE — PROGRESS NOTES
SPORTS MEDICINE AND PRIMARY CARE  Ward Yap MD, [address] [address]  Phone:  [phone]  Fax: [phone]     Chief Complaint   Patient presents with    Coagulation disorder    Abdominal Pain   . Bay Lazar returns today with a known history of pulmonary embolism, on Coumadin, which is therapeutic today, with an INR of 2.0, GERD, CKD stage 3A, peripheral vascular disease, fibromyalgia, hypertension, IBS, gait disorder, dyslipidemia, and is seen for evaluation. Patient complains of left lower chest lateral and left upper abdominal discomfort that is keeping her from eating and sleeping and it has just been hurting her all the time for the past four to five days. She notes a change in her bowel pattern in that bowels are smaller in size. We recall she had a colonoscopy on 04/10/18 that was unremarkable. Prune juice seems to help her move her bowels. She states that something is not right with her bowels and she feels a knot in her left flank. She continues to have pains in her left leg and left foot, particularly in the bottom, where it is also numb. On the right foot the toes ache. She continues to have chronic shortness of breath. She does not ever recall having a pain on the left side and is seen for evaluation. Phoebe Cloud is a 74 y.o. female        Current Outpatient Medications   Medication Sig Dispense Refill    cefUROXime (CEFTIN) 500 mg tablet Take 1 Tablet by mouth two (2) times a day for 10 days. 20 Tablet 0    ergocalciferol (ERGOCALCIFEROL) 1,250 mcg (50,000 unit) capsule TAKE 1 CAPSULE EVERY 7 DAYS 12 Capsule 3    bisoprolol-hydroCHLOROthiazide (ZIAC) 10-6.25 mg per tablet Take 1 Tablet by mouth daily. 90 Tablet 3    predniSONE (DELTASONE) 20 mg tablet Take 40 mg by mouth daily (with breakfast). For 5 days as need for gout atack 10 Tablet 5    warfarin (COUMADIN) 2.5 mg tablet Take 1 Tablet by mouth daily.  And 5 mg on Sunday and thursday 180 Tablet 3    amLODIPine (NORVASC) 10 mg tablet TAKE 1 TABLET DAILY 90 Tab 3    traZODone (DESYREL) 50 mg tablet TAKE 1 TABLET NIGHTLY AS NEEDED FOR SLEEP 90 Tab 4    lidocaine (LIDODERM) 5 % Apply patch to the affected area for 12 hours a day and remove for 12 hours a day. DX.M79.7 90 Each 3    LACTOBACILLUS ACIDOPHILUS (PROBIOTIC PO) Take 1 Cap by mouth as needed.  MICROLET LANCET misc USE TO TEST BLOOD SUGAR EVERY  Each 11    cyanocobalamin (VITAMIN B-12) 1,000 mcg tablet Take 1,000 mcg by mouth as needed.  rosuvastatin (CRESTOR) 10 mg tablet TAKE ONE TABLET BY MOUTH EVERY DAY - TO LOWER CHOLESTEROL 30 Tablet 11    carica papaya (PAPAYA ENZYME) tab Take  by mouth. Takes 4 tabs po 1-2 times daily after meals.       ONETOUCH ULTRA2 monitoring kit        Past Medical History:   Diagnosis Date    Adverse effect of anesthesia     \"hard time waking me up\"    Axillary pain, right     Bereavement 2019    77 yo - heart attack -  in sleep    Chronic pain     d/t fibromyalgia    CKD (chronic kidney disease), stage III ([address] 2020    Coagulation disorder ([address]     on eliquis -d/c due to gi upset - now on coumadin previously followed by dr. Gabrielle Dhaliwal Depression with anxiety     Diverticulosis     DJD (degenerative joint disease)     Dyslipidemia     Encounter for Hemoccult screening 2017    neg    Epicondylitis, lateral, right 2021    Fibromyalgia     Gait instability     GERD (gastroesophageal reflux disease)     Hypertension     IBS (irritable bowel syndrome)     Ill-defined condition     gastroparesis    Ill-defined condition     CT - 3/18/2018 - esophageal diverticulum    Ill-defined condition     pericarditis    Mastodynia, left greater than right 2011    Menopause     Normal cardiac stress test 3.14    Positive D dimer 9-23-15    Prediabetes     Preventative health care 2021    Pulmonary embolism ([address] 03/2013    rll    Restless leg syndrome     S/P colonoscopy 01/27/2011    kenny hernandez md    S/P colonoscopy 04/10/2018    Leighton Teran MD repeat5 yrs    Saphenous vein occlusion, 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    [facility] acute occlusive superificial vein thrombosis - Melody Dyer md    Saphenous vein thrombophlebitis, right 02/2018    and acute l posterior tibial vein - this is the 2nd DVT putting her on lifelong anticoagualtion / Vee Pilon    Sleep apnea     cpap 9cm    SOB (shortness of breath)     White coat hypertension      Past Surgical History:   Procedure Laterality Date    COLONOSCOPY N/A 4/10/2018    COLONOSCOPY,EGD performed by [facility]z [facility] at [facility] ENDOSCOPY    HX BREAST BIOPSY Left     HX COLONOSCOPY      HX ORTHOPAEDIC  2009    foot surgery-left - bunionectomy    HX ORTHOPAEDIC      cyst removed from left hand - ganglion cyst    HX OTHER SURGICAL      right and left leg muscle biopsies - elevated CPK - muscle enzymes were elevated    HX TUBAL LIGATION      [place] ABDOMEN SURGERY PROC UNLISTED      exploratory years ago   [address]  2006    left breast [id number]. Roby Sours     Allergies   Allergen Reactions    Aleve [Naproxen Sodium] Hoarseness    Dilaudid [Hydromorphone (Bulk)] Anaphylaxis     Respiratory arrest and throat closes    Crestor [Rosuvastatin] Nausea Only    Diovan [Valsartan] Palpitations    Morphine Other (comments)     Patch-vomiting,weakness, fainting    Sulfa (Sulfonamide Antibiotics) Hives, Rash and Other (comments)     fainting         REVIEW OF SYSTEMS:  General: negative for - chills or fever  ENT: negative for - headaches, nasal congestion or tinnitus  Respiratory: negative for - cough, hemoptysis, shortness of breath or wheezing  Cardiovascular : negative for - chest pain, edema, palpitations or shortness of breath  Gastrointestinal: negative for - abdominal pain, blood in stools, heartburn or nausea/vomiting  Genito-Urinary: no dysuria, trouble voiding, or hematuria  Musculoskeletal: negative for - gait disturbance, joint pain, joint stiffness or joint swelling  Neurological: no TIA or stroke symptoms  Hematologic: no bruises, no bleeding, no swollen glands  Integument: no lumps, mole changes, nail changes or rash  Endocrine: no malaise/lethargy or unexpected weight changes      Social History     Socioeconomic History    Marital status:    Tobacco Use    Smoking status: Never Smoker    Smokeless tobacco: Never Used   Vaping Use    Vaping Use: Never used   Substance and Sexual Activity    Alcohol use: No    Drug use: No    Sexual activity: Yes     Partners: Male     Birth control/protection: None   Social History Narrative         Family History: Mother: alive 80 yrs, High Blood Pressure, cva with cognitive deficitsFather:  36 yrs, myocardial infarctionSister(s): aliveBrother(s): unknow n2    sister(s) . 40 daughter no choildren -  walked out works for board of directors for [employer] . Social History: Alcohol Use Patient does not use alcohol. Smoking Status Patient is a never smoker. Caffeine: occasional. Drugs:    prescription narcotics. Diet: Regular. Exercise: None. Marital Status: . Lives w ith: spouse. Children: children. Taoism: Niurka [place]. Patient is  living w ith her  she is on disability related to her fibromyositis.      Family History   Problem Relation Age of Onset    Hypertension Mother     Kidney Disease Mother         1 kidney    Heart Disease Father     Heart Attack Father         late 35s or early 45s    Heart Attack Maternal Grandmother     Cancer Maternal Grandfather         ? lung or stomach       OBJECTIVE:    Visit Vitals  BP (!) 145/82   Pulse 60   Temp 98.4 °F (36.9 °C) (Oral)   Resp 20   Ht 5' 2\" (1.575 m)   Wt 183 lb 6.4 oz (83.2 kg)   SpO2 100%   BMI 33.54 kg/m²     CONSTITUTIONAL: well , well nourished, appears age appropriate  EYES: perrla, eom intact  ENMT:moist mucous membranes, pharynx clear  NECK: supple. Thyroid normal  RESPIRATORY: Chest: clear bilaterally   CARDIOVASCULAR: Heart: regular rate and rhythm  GASTROINTESTINAL: Abdomen: soft, bowel sounds active  HEMATOLOGIC: no pathological lymph nodes palpated  MUSCULOSKELETAL: Extremities: no edema, pulse 1+   INTEGUMENT: No unusual rashes or suspicious skin lesions noted. Nails appear normal.  NEUROLOGIC: non-focal exam   MENTAL STATUS: alert and oriented, appropriate affect    ASSESSMENT:  1. Diverticulosis    2. Stage 3a chronic kidney disease ([address]    3. Peripheral vascular disease ([address]    4. Chronic pulmonary embolism without acute cor pulmonale, unspecified pulmonary embolism type ([address]    5. Gastroesophageal reflux disease without esophagitis    6. Fibromyalgia    7. Essential hypertension, benign    8. Irritable bowel syndrome without diarrhea    9. Gait instability    10. Dyslipidemia    11. Class 1 obesity with body mass index (BMI) of 33.0 to 33.9 in adult, unspecified obesity type, unspecified whether serious comorbidity present    12. Left lower quadrant abdominal pain      Patient with a history of GERD, for which she takes a PPI intermittently. She has CKD stage 3A, for which we will check her renal function today. No symptoms related to peripheral vascular occlusive disease. Her Coumadin is therapeutic for her chronic pulmonary embolism, for which she has had recurrent bouts, as well as recurrent DVT, and therefore she is on chronic anticoagulation. NOACs are not covered by her insurance company. I do not think the pain she is complaining of is related to fibromyalgia. Her blood pressure is usually in the 130s. It is up to 145 today. I think she is having considerable pain. We suggest she continue the Tylenol. We have been reluctant to use narcotics.     There is a history of fibromyalgia, but the symptoms are not compatible with that diagnosis. Nor do I think it is related to IBS. She uses a Roll-A-Bout walker for her gait instability. We have her on a statin for her cholesterol. I think the pain is related to diverticulitis. She has a history of diverticulosis by a colonoscopy that was performed by Dr. Lux De Souza on 04/10/18 that revealed mild descending colon diverticulosis and moderate sigmoid diverticulosis. I think she is having a flare of that disease and therefore we will place her on Ceftin. I did not add Flagyl because of the drug interaction with Coumadin. She has an appointment to see Dr. Lux De Souza at 11:00. We call his office and let him know that she is going to be a few minutes late because she is still here in our office. She will be back to see me in two weeks, sooner if needed. We will ask for a CT of the abdomen and pelvis. I have discussed the diagnosis with the patient and the intended plan as seen in the  Orders. The patient understands and agees with the plan. The patient has   received an after visit summary and questions were answered concerning  future plans  Patient labs and/or xrays were reviewed  Past records were reviewed. PLAN:  .  Orders Placed This Encounter    CT ABD PELV W CONT    CBC WITH AUTOMATED DIFF    RENAL FUNCTION PANEL    cefUROXime (CEFTIN) 500 mg tablet       Follow-up and Dispositions    · Return in about 2 weeks (around 3/2/2022). ATTENTION:   This medical record was transcribed using an electronic medical records system. Although proofread, it may and can contain electronic and spelling errors. Other human spelling and other errors may be present. Corrections may be executed at a later time. Please feel free to contact us for any clarifications as needed.

## 2022-02-16 NOTE — ED PROVIDER NOTES
The history is provided by the patient. Abdominal Pain   This is a new problem. The current episode started more than 1 week ago. The problem occurs constantly. The problem has been gradually worsening. The pain is located in the LUQ. The quality of the pain is aching. The pain is severe. Associated symptoms include nausea. Pertinent negatives include no anorexia, no fever, no belching, no diarrhea, no flatus, no hematochezia, no melena, no vomiting, no constipation, no dysuria, no frequency, no hematuria, no headaches, no arthralgias, no myalgias, no trauma, no chest pain and no back pain. The pain is worsened by activity, eating and certain positions. The pain is relieved by nothing. Past workup includes colonoscopy. Her past medical history is significant for GERD and irritable bowel syndrome. The patient's surgical history non-contributory.        Past Medical History:   Diagnosis Date    Adverse effect of anesthesia     \"hard time waking me up\"    Axillary pain, right     Bereavement 2019    77 yo - heart attack -  in sleep    Chronic pain     d/t fibromyalgia    CKD (chronic kidney disease), stage III ([address] 2020    Co[facility] disorder [facility]     on eliquis -d/c due to gi upset - now on coumadin previously followed by dr. Adalgisa Schilling Depression with anxiety     Diverticulosis     DJD (degenerative joint disease)     Dyslipidemia     Encounter for Hemoccult screening 2017    neg    Epicondylitis, lateral, right 2021    Fibromyalgia     Gait instability     GERD (gastroesophageal reflux disease)     Hypertension     IBS (irritable bowel syndrome)     Ill-defined condition     gastroparesis    Ill-defined condition     CT - 3/18/2018 - esophageal diverticulum    Ill-defined condition     pericarditis    Mastodynia, left greater than right 2011    Menopause     Normal cardiac stress test 3.14    Positive D dimer 9-23-15    Prediabetes     Preventative health care 03/03/2021    Pulmonary embolism [facility] 03/2013    rll    Restless leg syndrome     S/P colonoscopy 01/27/2011    kenny hernandez md    S/P colonoscopy 04/10/2018    Gay Cervantes MD repeat5 yrs    Saphenous vein occlusion, 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 ENDOSCOPY    HX BREAST BIOPSY Left     HX COLONOSCOPY      HX ORTHOPAEDIC  2009    foot surgery-left - bunionectomy    HX ORTHOPAEDIC      cyst removed from left hand - ganglion cyst    HX OTHER SURGICAL      right and left leg muscle biopsies - elevated CPK - muscle enzymes were elevated    HX TUBAL LIGATION      [place] ABDOMEN SURGERY PROC UNLISTED      exploratory years ago   [address]  2006    left breast [id number]. Loraine Aden         Family History:   Problem Relation Age of Onset    Hypertension Mother     Kidney Disease Mother         1 kidney    Heart Disease Father     Heart Attack Father         late 35s or early 45s    Heart Attack Maternal Grandmother     Cancer Maternal Grandfather         ? lung or stomach       Social History     Socioeconomic History    Marital status:      Spouse name: Not on file    Number of children: Not on file    Years of education: Not on file    Highest education level: Not on file   Occupational History    Not on file   Tobacco Use    Smoking status: Never Smoker    Smokeless tobacco: Never Used   Vaping Use    Vaping Use: Never used   Substance and Sexual Activity    Alcohol use: No    Drug use: No    Sexual activity: Yes     Partners: Male     Birth control/protection: None   Other Topics Concern    Not on file   Social History Narrative         Family History: Mother: alive 80 yrs, High Blood Pressure, cva with cognitive deficitsFather:  36 yrs, myocardial infarctionSister(s): aliveBrother(s): unknow n2    sister(s) . 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 Social History: Alcohol Use Patient does not use alcohol. Smoking Status Patient is a never smoker. Caffeine: occasional. Drugs:    prescription narcotics. Diet: Regular. Exercise: None. Marital Status: . Lives w ith: spouse. Children: children. Roman Catholic: Valeda Brian. Patient is  living w ith her  she is on disability related to her fibromyositis. Social Determinants of Health     Financial Resource Strain:     Difficulty of Paying Living Expenses: Not on file   Food Insecurity:     Worried About Running Out of Food in the Last Year: Not on file    Nasim of Food in the Last Year: Not on file   Transportation Needs:     Lack of Transportation (Medical): Not on file    Lack of Transportation (Non-Medical):  Not on file   Physical Activity:     Days of Exercise per Week: Not on file    Minutes of Exercise per Session: Not on file   Stress:     Feeling of Stress : Not on file   Social Connections:     Frequency of Communication with Friends and Family: Not on file    Frequency of Social Gatherings with Friends and Family: Not on file    Attends Muslim Services: Not on file    Active Member of Clubs or Organizations: Not on file    Attends Club or Organization Meetings: Not on file    Marital Status: Not on file   Intimate Partner Violence:     Fear of Current or Ex-Partner: Not on file    Emotionally Abused: Not on file    Physically Abused: Not on file    Sexually Abused: Not on file   Housing Stability:     Unable to Pay for Housing in the Last Year: Not on file    Number of Places Lived in the Last Year: Not on file    Unstable Housing in the Last Year: Not on file         ALLERGIES: Aleve [naproxen sodium], Dilaudid [hydromorphone (bulk)], Crestor [rosuvastatin], Diovan [valsartan], Morphine, and Sulfa (sulfonamide antibiotics)    Review of Systems   Constitutional: Negative for activity change, chills and fever. HENT: Negative for nosebleeds, sore throat, trouble swallowing and voice change. Eyes: Negative for visual disturbance. Respiratory: Negative for shortness of breath. Cardiovascular: Negative for chest pain and palpitations. Gastrointestinal: Positive for abdominal pain and nausea. Negative for anorexia, constipation, diarrhea, flatus, hematochezia, melena and vomiting. Genitourinary: Negative for difficulty urinating, dysuria, frequency, hematuria and urgency. Musculoskeletal: Negative for arthralgias, back pain, myalgias, neck pain and neck stiffness. Skin: Negative for color change. Allergic/Immunologic: Negative for immunocompromised state. Neurological: Negative for dizziness, seizures, syncope, weakness, light-headedness, numbness and headaches. Psychiatric/Behavioral: Negative for behavioral problems, confusion, hallucinations, self-injury and suicidal ideas. Vitals:    02/16/22 1313   BP: (!) 158/105   Pulse: 68   Resp: 18   Temp: 98.5 °F (36.9 °C)   SpO2: 98%            Physical Exam  Vitals and nursing note reviewed. Constitutional:       General: She is not in acute distress. Appearance: She is well-developed. She is not diaphoretic. HENT:      Head: Atraumatic. Neck:      Trachea: No tracheal deviation. Cardiovascular:      Comments: Warm and well perfused  Pulmonary:      Effort: Pulmonary effort is normal. No respiratory distress. Abdominal:      Tenderness:  There is abdominal tenderness in the epigastric area and left upper quadrant. Musculoskeletal:         General: Normal range of motion. Skin:     General: Skin is warm and dry. Neurological:      Mental Status: She is alert. Coordination: Coordination normal.   Psychiatric:         Behavior: Behavior normal.         Thought Content: Thought content normal.         Judgment: Judgment normal.          MDM     This is a 56-year-old female with past medical history, review of systems, physical exam as above, presenting with complaints of approximately 10 days of left-sided abdominal pain. Patient states pain has been gradually worsening, refractory to oral Tylenol. Patient notes allergies to NSAIDs and narcotic pain medications. She endorses a history of diverticular disease, denies episodes of pancreatitis. She states previous colonoscopies have been interpreted as normal.  She endorses nausea without vomiting, denies fevers or chills, chest pain, states bowel movements have been \"small\", however denies constipation. She was evaluated by her primary care physician earlier today who ordered lab work, outpatient CT and prescribed oral antibiotics, and then had a follow-up appointment with her gastroenterologist who referred her to the emergency department. Physical exam is remarkable for well-appearing middle-aged female, in no acute distress noted to be hypertensive, afebrile without tachycardia, satting well on room air. She has left upper quadrant and epigastric tenderness to palpation otherwise benign abdomen. Differential includes constipation, diverticulitis, pancreatitis. Plan to provide pain control, antiemetics, obtain CMP, CBC, UA, lipase, CT scan of the abdomen and pelvis. We will reassess, and make a disposition. Procedures    Update:  Patient remains in no acute distress. Lab work and imaging without acute process. Results discussed with patient at bedside.   Will provide antiemetics and encourage use of over-the-counter pain medications as needed, encourage primary care and GI follow-up, return precautions given.

## 2022-02-16 NOTE — PROGRESS NOTES
Chief Complaint   Patient presents with    Coagulation disorder    Abdominal Pain     1. Have you been to the ER, urgent care clinic since your last visit? Hospitalized since your last visit? No    2. Have you seen or consulted any other health care providers outside of the [address] [place] [address] since your last visit? Include any pap smears or colon screening.  No  Visit Vitals  BP (!) 145/82   Pulse 60   Temp 98.4 °F (36.9 °C) (Oral)   Resp 20   Ht 5' 2\" (1.575 m)   Wt 183 lb 6.4 oz (83.2 kg)   SpO2 100%   BMI 33.54 kg/m²

## 2022-02-17 LAB
ALBUMIN SERPL-MCNC: 4 G/DL (ref 3.5–5)
ANION GAP SERPL CALC-SCNC: 6 MMOL/L (ref 5–15)
BASOPHILS # BLD: 0 K/UL (ref 0–0.1)
BASOPHILS NFR BLD: 1 % (ref 0–1)
BUN SERPL-MCNC: 21 MG/DL (ref 6–20)
BUN/CREAT SERPL: 17 (ref 12–20)
CALCIUM SERPL-MCNC: 9.8 MG/DL (ref 8.5–10.1)
CHLORIDE SERPL-SCNC: 105 MMOL/L (ref 97–108)
CO2 SERPL-SCNC: 27 MMOL/L (ref 21–32)
CREAT SERPL-MCNC: 1.22 MG/DL (ref 0.55–1.02)
DIFFERENTIAL METHOD BLD: ABNORMAL
EOSINOPHIL # BLD: 0 K/UL (ref 0–0.4)
EOSINOPHIL NFR BLD: 1 % (ref 0–7)
ERYTHROCYTE [DISTWIDTH] IN BLOOD BY AUTOMATED COUNT: 14.8 % (ref 11.5–14.5)
GLUCOSE SERPL-MCNC: 91 MG/DL (ref 65–100)
HCT VFR BLD AUTO: 44.8 % (ref 35–47)
HGB BLD-MCNC: 14 G/DL (ref 11.5–16)
IMM GRANULOCYTES # BLD AUTO: 0 K/UL (ref 0–0.04)
IMM GRANULOCYTES NFR BLD AUTO: 0 % (ref 0–0.5)
LYMPHOCYTES # BLD: 1.6 K/UL (ref 0.8–3.5)
LYMPHOCYTES NFR BLD: 42 % (ref 12–49)
MCH RBC QN AUTO: 28.6 PG (ref 26–34)
MCHC RBC AUTO-ENTMCNC: 31.3 G/DL (ref 30–36.5)
MCV RBC AUTO: 91.4 FL (ref 80–99)
MONOCYTES # BLD: 0.4 K/UL (ref 0–1)
MONOCYTES NFR BLD: 11 % (ref 5–13)
NEUTS SEG # BLD: 1.7 K/UL (ref 1.8–8)
NEUTS SEG NFR BLD: 45 % (ref 32–75)
NRBC # BLD: 0 K/UL (ref 0–0.01)
NRBC BLD-RTO: 0 PER 100 WBC
PHOSPHATE SERPL-MCNC: 3.7 MG/DL (ref 2.6–4.7)
PLATELET # BLD AUTO: 203 K/UL (ref 150–400)
PMV BLD AUTO: 12 FL (ref 8.9–12.9)
POTASSIUM SERPL-SCNC: 4 MMOL/L (ref 3.5–5.1)
RBC # BLD AUTO: 4.9 M/UL (ref 3.8–5.2)
SODIUM SERPL-SCNC: 138 MMOL/L (ref 136–145)
WBC # BLD AUTO: 3.8 K/UL (ref 3.6–11)

## 2022-02-21 LAB
BACTERIA SPEC CULT: NORMAL
SERVICE CMNT-IMP: NORMAL

## 2022-02-23 DIAGNOSIS — R06.02 SOB (SHORTNESS OF BREATH): Primary | ICD-10-CM

## 2022-03-02 ENCOUNTER — OFFICE VISIT (OUTPATIENT)
Dept: INTERNAL MEDICINE CLINIC | Age: 67
End: 2022-03-02
Payer: MEDICARE

## 2022-03-02 VITALS
HEIGHT: 62 IN | SYSTOLIC BLOOD PRESSURE: 151 MMHG | HEART RATE: 60 BPM | DIASTOLIC BLOOD PRESSURE: 79 MMHG | WEIGHT: 183.2 LBS | OXYGEN SATURATION: 98 % | TEMPERATURE: 97.8 F | RESPIRATION RATE: 16 BRPM | BODY MASS INDEX: 33.71 KG/M2

## 2022-03-02 DIAGNOSIS — M79.7 FIBROMYOSITIS: ICD-10-CM

## 2022-03-02 DIAGNOSIS — I27.82 CHRONIC PULMONARY EMBOLISM WITHOUT ACUTE COR PULMONALE, UNSPECIFIED PULMONARY EMBOLISM TYPE (HCC): ICD-10-CM

## 2022-03-02 DIAGNOSIS — K76.0 HEPATIC STEATOSIS: ICD-10-CM

## 2022-03-02 DIAGNOSIS — K21.9 GASTROESOPHAGEAL REFLUX DISEASE WITHOUT ESOPHAGITIS: ICD-10-CM

## 2022-03-02 DIAGNOSIS — I10 ESSENTIAL HYPERTENSION, BENIGN: ICD-10-CM

## 2022-03-02 DIAGNOSIS — Z79.01 ENCOUNTER FOR MONITORING COUMADIN THERAPY: ICD-10-CM

## 2022-03-02 DIAGNOSIS — K58.9 IRRITABLE BOWEL SYNDROME WITHOUT DIARRHEA: Primary | ICD-10-CM

## 2022-03-02 DIAGNOSIS — E27.8 ADRENAL NODULE (HCC): ICD-10-CM

## 2022-03-02 DIAGNOSIS — R93.421 ABNORMAL RADIOLOGIC FINDINGS ON DIAGNOSTIC IMAGING OF RIGHT KIDNEY: ICD-10-CM

## 2022-03-02 DIAGNOSIS — Z51.81 ENCOUNTER FOR MONITORING COUMADIN THERAPY: ICD-10-CM

## 2022-03-02 PROBLEM — D35.01 ADRENAL ADENOMA, RIGHT: Status: ACTIVE | Noted: 2022-02-16

## 2022-03-02 LAB
INR BLD: 1.6
PT POC: 19.6 SECONDS
VALID INTERNAL CONTROL?: YES

## 2022-03-02 PROCEDURE — G8417 CALC BMI ABV UP PARAM F/U: HCPCS | Performed by: INTERNAL MEDICINE

## 2022-03-02 PROCEDURE — 85610 PROTHROMBIN TIME: CPT | Performed by: INTERNAL MEDICINE

## 2022-03-02 PROCEDURE — G8754 DIAS BP LESS 90: HCPCS | Performed by: INTERNAL MEDICINE

## 2022-03-02 PROCEDURE — 99213 OFFICE O/P EST LOW 20 MIN: CPT | Performed by: INTERNAL MEDICINE

## 2022-03-02 PROCEDURE — G8399 PT W/DXA RESULTS DOCUMENT: HCPCS | Performed by: INTERNAL MEDICINE

## 2022-03-02 PROCEDURE — G8536 NO DOC ELDER MAL SCRN: HCPCS | Performed by: INTERNAL MEDICINE

## 2022-03-02 PROCEDURE — G8753 SYS BP > OR = 140: HCPCS | Performed by: INTERNAL MEDICINE

## 2022-03-02 PROCEDURE — 1090F PRES/ABSN URINE INCON ASSESS: CPT | Performed by: INTERNAL MEDICINE

## 2022-03-02 PROCEDURE — 1101F PT FALLS ASSESS-DOCD LE1/YR: CPT | Performed by: INTERNAL MEDICINE

## 2022-03-02 PROCEDURE — G9899 SCRN MAM PERF RSLTS DOC: HCPCS | Performed by: INTERNAL MEDICINE

## 2022-03-02 PROCEDURE — G8427 DOCREV CUR MEDS BY ELIG CLIN: HCPCS | Performed by: INTERNAL MEDICINE

## 2022-03-02 PROCEDURE — G8510 SCR DEP NEG, NO PLAN REQD: HCPCS | Performed by: INTERNAL MEDICINE

## 2022-03-02 PROCEDURE — 3017F COLORECTAL CA SCREEN DOC REV: CPT | Performed by: INTERNAL MEDICINE

## 2022-03-02 RX ORDER — HYDRALAZINE HYDROCHLORIDE 25 MG/1
25 TABLET, FILM COATED ORAL 2 TIMES DAILY
Qty: 60 TABLET | Refills: 5 | Status: SHIPPED | OUTPATIENT
Start: 2022-03-02 | End: 2022-06-15

## 2022-03-02 RX ORDER — DICYCLOMINE HYDROCHLORIDE 10 MG/1
10 CAPSULE ORAL 4 TIMES DAILY
Qty: 120 CAPSULE | Refills: 11 | Status: SHIPPED | OUTPATIENT
Start: 2022-03-02 | End: 2022-08-11

## 2022-03-02 NOTE — PATIENT INSTRUCTIONS
Fatty Liver Disease diet INstructions     Dr. Andino Gresham                                                                               Goal: 20% weight loss                            Yes                                                                                       no                                            All fruits                                                                                      breads                 All veggies except corn                                                            grains                 All meats                                                                                     Pasta                 All animal byproducts                                                               baked goods                  (Butter, dairy, cheese, yogurt)                                                  (muffins, biscuits, pies, cakes)                Nuts                                                                                             rice or corn products                Beans                                                                                          sugars/sweets                                     Drink 80 to 100 ounces of water a day

## 2022-03-02 NOTE — PROGRESS NOTES
SPORTS MEDICINE AND PRIMARY CARE  Zenaida Wells MD, [address] [address]  Phone:  [phone]  Fax: [phone]      Chief Complaint   Patient presents with    ED Follow-up    Physical         SUBECTIVE:    Deepthi Davis is a 74 y.o. female Since we last saw her, on the day that we saw her she saw Dr. Juan Garcia, who was concerned enough about the abdominal discomfort that he sent her to the ER. He requested a CT of the abdomen and pelvis, which was accomplished, and fortunately was unremarkable. There was a fatty liver and a right 13 mm adrenal nodule, which will be investigated. She had CBC and chemistries that were likewise unremarkable. She sent me a MyChart note indicating she was still having pain. We suggested she reach out to Dr. Juan Garcia since he had just recently seen her and had made those recommendations. Other medical problems include fibromyositis, DJD, GERD, recurrent DVT and PE, primary hypertension and is seen for evaluation. Dr. Estelle Bonds office gave her MiraLAX and Protonix. Patient states her stools have changed in that every time she urinates she has to move her bowels, the bowels are soft, there is no diarrhea, but on one occasion she moved it six times as she urinated six times. Patient is concerned about the findings on the CT scan. She has an adrenal adenoma and fatty tumor. She is also worried because her father  of cancer that started off as a small nodule and \"they were just watching it\". Current Outpatient Medications   Medication Sig Dispense Refill    hydrALAZINE (APRESOLINE) 25 mg tablet Take 1 Tablet by mouth two (2) times a day. 60 Tablet 5    dicyclomine (BENTYL) 10 mg capsule Take 1 Capsule by mouth four (4) times daily. 120 Capsule 11    ondansetron hcl (Zofran) 4 mg tablet Take 1 Tablet by mouth every eight (8) hours as needed for Nausea.  20 Tablet 0    ergocalciferol (ERGOCALCIFEROL) 1,250 mcg (50,000 unit) capsule TAKE 1 CAPSULE EVERY 7 DAYS 12 Capsule 3    bisoprolol-hydroCHLOROthiazide (ZIAC) 10-6.25 mg per tablet Take 1 Tablet by mouth daily. 90 Tablet 3    predniSONE (DELTASONE) 20 mg tablet Take 40 mg by mouth daily (with breakfast). For 5 days as need for gout atack 10 Tablet 5    warfarin (COUMADIN) 2.5 mg tablet Take 1 Tablet by mouth daily. And 5 mg on  and thursday 180 Tablet 3    amLODIPine (NORVASC) 10 mg tablet TAKE 1 TABLET DAILY 90 Tab 3    traZODone (DESYREL) 50 mg tablet TAKE 1 TABLET NIGHTLY AS NEEDED FOR SLEEP 90 Tab 4    lidocaine (LIDODERM) 5 % Apply patch to the affected area for 12 hours a day and remove for 12 hours a day. DX.[id number] 90 Each 3    LACTOBACILLUS ACIDOPHILUS (PROBIOTIC PO) Take 1 Cap by mouth as needed.  carica papaya (PAPAYA ENZYME) tab Take  by mouth. Takes 4 tabs po 1-2 times daily after meals.  ONETOUCH ULTRA2 monitoring kit       MICROLET LANCET misc USE TO TEST BLOOD SUGAR EVERY  Each 11    cyanocobalamin (VITAMIN B-12) 1,000 mcg tablet Take 1,000 mcg by mouth as needed.       rosuvastatin (CRESTOR) 10 mg tablet TAKE ONE TABLET BY MOUTH EVERY DAY - TO LOWER CHOLESTEROL (Patient not taking: Reported on 3/2/2022) 30 Tablet 11     Past Medical History:   Diagnosis Date    Adrenal nodule ([facility] 2022    Indeterminate 13 mm right adrenal nodule    Adverse effect of anesthesia     \"hard time waking me up\"    Axillary pain, right     Bereavement 2019    79 yo - heart attack -  in sleep    Chronic pain     d/t fibromyalgia    CKD (chronic kidney disease), stage III ([address] 2020    Coagulation disorder ([address]     on eliquis -d/c due to gi upset - now on coumadin previously followed by dr. Stalin Lees Depression with anxiety     Diverticulosis     DJD (degenerative joint disease)     Dyslipidemia     Encounter for Hemoccult screening 2017    neg    Epicondylitis, lateral, right 2021    Fibromyalgia  Gait instability     GERD (gastroesophageal reflux disease)     Hypertension     IBS (irritable bowel syndrome)     Ill-defined condition     gastroparesis    Ill-defined condition     CT - 3/18/2018 - esophageal diverticulum    Ill-defined condition     pericarditis    Mastodynia, left greater than right 8/17/2011    Menopause     Normal cardiac stress test 3.14    Positive D dimer 9-23-15    Prediabetes     Preventative health care 03/03/2021    Pulmonary embolism ([address] 03/2013    rll    Restless leg syndrome     S/P colonoscopy 01/27/2011    kenny hernandez md    S/P colonoscopy 04/10/2018    Nika Arreaga MD repeat5 yrs    Saphenous vein occlusion, 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 ENDOSCOPY    HX BREAST BIOPSY Left     HX COLONOSCOPY      HX ORTHOPAEDIC  2009    foot surgery-left - bunionectomy    HX ORTHOPAEDIC      cyst removed from left hand - ganglion cyst    HX OTHER SURGICAL      right and left leg muscle biopsies - elevated CPK - muscle enzymes were elevated    HX TUBAL LIGATION      ND ABDOMEN SURGERY PROC UNLISTED      exploratory years ago    ND BREAST SURGERY PROCEDURE UNLISTED  2006    left breast [id number]. Roselynn Done     Allergies   Allergen Reactions    Aleve [Naproxen Sodium] Hoarseness    Dilaudid [Hydromorphone (Bulk)] Anaphylaxis     Respiratory arrest and throat closes    Crestor [Rosuvastatin] Nausea Only    Diovan [Valsartan] Palpitations    Morphine Other (comments) Patch-vomiting,weakness, fainting    Sulfa (Sulfonamide Antibiotics) Hives, Rash and Other (comments)     fainting       REVIEW OF SYSTEMS:   No nausea or vomiting, but continued abdominal discomfort. Social History     Socioeconomic History    Marital status:    Tobacco Use    Smoking status: Never Smoker    Smokeless tobacco: Never Used   Vaping Use    Vaping Use: Never used   Substance and Sexual Activity    Alcohol use: No    Drug use: No    Sexual activity: Yes     Partners: Male     Birth control/protection: None   Social History Narrative         Family History: Mother: alive 80 yrs, High Blood Pressure, cva with cognitive deficitsFather:  36 yrs, myocardial infarctionSister(s): aliveBrother(s): unknow n2    sister(s) . 40 daughter no choildren -  walked out works for board of directors for [employer] . Social History: Alcohol Use Patient does not use alcohol. Smoking Status Patient is a never smoker. Caffeine: occasional. Drugs:    prescription narcotics. Diet: Regular. Exercise: None. Marital Status: . Lives w ith: spouse. Children: children. Tenriism: Vic Ginny. Patient is  living w ith her  she is on disability related to her fibromyositis. r  Family History   Problem Relation Age of Onset    Hypertension Mother     Kidney Disease Mother         1 kidney    Heart Disease Father     Heart Attack Father         late 35s or early 45s    Heart Attack Maternal Grandmother     Cancer Maternal Grandfather         ? lung or stomach       OBJECTIVE:  Visit Vitals  BP (!) 151/79   Pulse 60   Temp 97.8 °F (36.6 °C) (Oral)   Resp 16   Ht 5' 2\" (1.575 m)   Wt 183 lb 3.2 oz (83.1 kg)   SpO2 98%   BMI 33.51 kg/m²     ENT: perrla,  eom intact  NECK: supple.  Thyroid normal  CHEST: clear to ascultation and percussion   HEART: regular rate and rhythm  ABD: soft, bowel sounds active  EXTREMITIES: no edema, pulse 1+     Office Visit on 2022 Component Date Value Ref Range Status    VALID INTERNAL CONTROL POC 03/02/2022 Yes   Final    Prothrombin time (POC) 03/02/2022 19.6  seconds Final    INR POC 03/02/2022 1.6   Final   Admission on 02/16/2022, Discharged on 02/16/2022   Component Date Value Ref Range Status    WBC 02/16/2022 4.5  3.6 - 11.0 K/uL Final    RBC 02/16/2022 4.93  3.80 - 5.20 M/uL Final    HGB 02/16/2022 14.5  11.5 - 16.0 g/dL Final    HCT 02/16/2022 44.1  35.0 - 47.0 % Final    MCV 02/16/2022 89.5  80.0 - 99.0 FL Final    MCH 02/16/2022 29.4  26.0 - 34.0 PG Final    MCHC 02/16/2022 32.9  30.0 - 36.5 g/dL Final    RDW 02/16/2022 14.5  11.5 - 14.5 % Final    PLATELET 63/09/7193 515* 150 - 400 K/uL Final    MPV 02/16/2022 11.8  8.9 - 12.9 FL Final    NRBC 02/16/2022 0.0  0  WBC Final    ABSOLUTE NRBC 02/16/2022 0.00  0.00 - 0.01 K/uL Final    NEUTROPHILS 02/16/2022 55  32 - 75 % Final    LYMPHOCYTES 02/16/2022 33  12 - 49 % Final    MONOCYTES 02/16/2022 10  5 - 13 % Final    EOSINOPHILS 02/16/2022 1  0 - 7 % Final    BASOPHILS 02/16/2022 1  0 - 1 % Final    IMMATURE GRANULOCYTES 02/16/2022 0  0.0 - 0.5 % Final    ABS. NEUTROPHILS 02/16/2022 2.4  1.8 - 8.0 K/UL Final    ABS. LYMPHOCYTES 02/16/2022 1.5  0.8 - 3.5 K/UL Final    ABS. MONOCYTES 02/16/2022 0.5  0.0 - 1.0 K/UL Final    ABS. EOSINOPHILS 02/16/2022 0.1  0.0 - 0.4 K/UL Final    ABS. BASOPHILS 02/16/2022 0.1  0.0 - 0.1 K/UL Final    ABS. IMM.  GRANS. 02/16/2022 0.0  0.00 - 0.04 K/UL Final    DF 02/16/2022 AUTOMATED    Final    Sodium 02/16/2022 138  136 - 145 mmol/L Final    Potassium 02/16/2022 3.8  3.5 - 5.1 mmol/L Final    Chloride 02/16/2022 105  97 - 108 mmol/L Final    CO2 02/16/2022 27  21 - 32 mmol/L Final    Anion gap 02/16/2022 6  5 - 15 mmol/L Final    Glucose 02/16/2022 86  65 - 100 mg/dL Final    BUN 02/16/2022 22* 6 - 20 MG/DL Final    Creatinine 02/16/2022 1.29* 0.55 - 1.02 MG/DL Final    BUN/Creatinine ratio 02/16/2022 17  12 - 20   Final    GFR est AA 02/16/2022 50* >60 ml/min/1.73m2 Final    GFR est non-AA 02/16/2022 41* >60 ml/min/1.73m2 Final    Estimated GFR is calculated using the IDMS-traceable Modification of Diet in Renal Disease (MDRD) Study equation, reported for both  Americans (GFRAA) and non- Americans (GFRNA), and normalized to 1.73m2 body surface area. The physician must decide which value applies to the patient.  Calcium 02/16/2022 9.8  8.5 - 10.1 MG/DL Final    Bilirubin, total 02/16/2022 0.2  0.2 - 1.0 MG/DL Final    ALT (SGPT) 02/16/2022 44  12 - 78 U/L Final    AST (SGOT) 02/16/2022 33  15 - 37 U/L Final    Alk. phosphatase 02/16/2022 47  45 - 117 U/L Final    Protein, total 02/16/2022 8.0  6.4 - 8.2 g/dL Final    Albumin 02/16/2022 3.9  3.5 - 5.0 g/dL Final    Globulin 02/16/2022 4.1* 2.0 - 4.0 g/dL Final    A-G Ratio 02/16/2022 1.0* 1.1 - 2.2   Final    SAMPLES BEING HELD 02/16/2022 1RED   Final    COMMENT 02/16/2022 Add-on orders for these samples will be processed based on acceptable specimen integrity and analyte stability, which may vary by analyte. Final    Lipase 02/16/2022 228  73 - 393 U/L Final    INR 02/16/2022 1.9* 0.9 - 1.1   Final    A single therapeutic range for Vit K antagonists may not be optimal for all indications - see June, 2008 issue of Chest, American College of Chest Physicians Evidence-Based Clinical Practice Guidelines, 8th Edition.     Prothrombin time 02/16/2022 18.9* 9.0 - 11.1 sec Final    aPTT 02/16/2022 32.8* 22.1 - 31.0 sec Final    In addition to factor deficiency, monitoring heparin therapy, etc., evaluation of a prolonged aPTT result should include consideration of preanalytic variables such as heparin flush contamination, specimen integrity issues, etc.    aPTT, therapeutic range 02/16/2022      58.0 - 77.0 SECS Final    Special Requests: 02/16/2022 NO SPECIAL REQUESTS    Final    Culture result: 02/16/2022 NO GROWTH 5 DAYS    Final    Lactic acid 02/16/2022 0.9  0.4 - 2.0 MMOL/L Final   Office Visit on 02/16/2022   Component Date Value Ref Range Status    Sodium 02/16/2022 138  136 - 145 mmol/L Final    Potassium 02/16/2022 4.0  3.5 - 5.1 mmol/L Final    Chloride 02/16/2022 105  97 - 108 mmol/L Final    CO2 02/16/2022 27  21 - 32 mmol/L Final    Anion gap 02/16/2022 6  5 - 15 mmol/L Final    Glucose 02/16/2022 91  65 - 100 mg/dL Final    BUN 02/16/2022 21* 6 - 20 MG/DL Final    Creatinine 02/16/2022 1.22* 0.55 - 1.02 MG/DL Final    BUN/Creatinine ratio 02/16/2022 17  12 - 20   Final    GFR est AA 02/16/2022 53* >60 ml/min/1.73m2 Final    GFR est non-AA 02/16/2022 44* >60 ml/min/1.73m2 Final    Comment: Estimated GFR is calculated using the IDMS-traceable Modification of Diet in  Renal Disease (MDRD) Study equation, reported for both  Americans  (GFRAA) and non- Americans (GFRNA), and normalized to 1.73m2 body  surface area. The physician must decide which value applies to the patient.       Calcium 02/16/2022 9.8  8.5 - 10.1 MG/DL Final    Phosphorus 02/16/2022 3.7  2.6 - 4.7 MG/DL Final    Albumin 02/16/2022 4.0  3.5 - 5.0 g/dL Final    WBC 02/16/2022 3.8  3.6 - 11.0 K/uL Final    RBC 02/16/2022 4.90  3.80 - 5.20 M/uL Final    HGB 02/16/2022 14.0  11.5 - 16.0 g/dL Final    HCT 02/16/2022 44.8  35.0 - 47.0 % Final    MCV 02/16/2022 91.4  80.0 - 99.0 FL Final    MCH 02/16/2022 28.6  26.0 - 34.0 PG Final    MCHC 02/16/2022 31.3  30.0 - 36.5 g/dL Final    RDW 02/16/2022 14.8* 11.5 - 14.5 % Final    PLATELET 56/23/6945 579  150 - 400 K/uL Final    MPV 02/16/2022 12.0  8.9 - 12.9 FL Final    NRBC 02/16/2022 0.0  0  WBC Final    ABSOLUTE NRBC 02/16/2022 0.00  0.00 - 0.01 K/uL Final    NEUTROPHILS 02/16/2022 45  32 - 75 % Final    LYMPHOCYTES 02/16/2022 42  12 - 49 % Final    MONOCYTES 02/16/2022 11  5 - 13 % Final    EOSINOPHILS 02/16/2022 1  0 - 7 % Final    BASOPHILS 02/16/2022 1  0 - 1 % Final    IMMATURE GRANULOCYTES 02/16/2022 0  0.0 - 0.5 % Final    ABS. NEUTROPHILS 02/16/2022 1.7* 1.8 - 8.0 K/UL Final    ABS. LYMPHOCYTES 02/16/2022 1.6  0.8 - 3.5 K/UL Final    ABS. MONOCYTES 02/16/2022 0.4  0.0 - 1.0 K/UL Final    ABS. EOSINOPHILS 02/16/2022 0.0  0.0 - 0.4 K/UL Final    ABS. BASOPHILS 02/16/2022 0.0  0.0 - 0.1 K/UL Final    ABS. IMM. GRANS. 02/16/2022 0.0  0.00 - 0.04 K/UL Final    DF 02/16/2022 AUTOMATED    Final    VALID INTERNAL CONTROL POC 02/16/2022 Yes   Final    Prothrombin time (POC) 02/16/2022 24.0  seconds Final    INR POC 02/16/2022 2.0   Final   Clinical Support on 01/12/2022   Component Date Value Ref Range Status    VALID INTERNAL CONTROL POC 01/12/2022 Yes   Final    Prothrombin time (POC) 01/12/2022 42.9  seconds Final    INR POC 01/12/2022 3.6   Final    WBC 01/12/2022 4.0  3.6 - 11.0 K/uL Final    RBC 01/12/2022 4.59  3.80 - 5.20 M/uL Final    HGB 01/12/2022 13.4  11.5 - 16.0 g/dL Final    HCT 01/12/2022 42.0  35.0 - 47.0 % Final    MCV 01/12/2022 91.5  80.0 - 99.0 FL Final    MCH 01/12/2022 29.2  26.0 - 34.0 PG Final    MCHC 01/12/2022 31.9  30.0 - 36.5 g/dL Final    RDW 01/12/2022 15.3* 11.5 - 14.5 % Final    PLATELET 15/75/5934 468  150 - 400 K/uL Final    MPV 01/12/2022 11.9  8.9 - 12.9 FL Final    NRBC 01/12/2022 0.0  0  WBC Final    ABSOLUTE NRBC 01/12/2022 0.00  0.00 - 0.01 K/uL Final    NEUTROPHILS 01/12/2022 43  32 - 75 % Final    LYMPHOCYTES 01/12/2022 42  12 - 49 % Final    MONOCYTES 01/12/2022 12  5 - 13 % Final    EOSINOPHILS 01/12/2022 2  0 - 7 % Final    BASOPHILS 01/12/2022 1  0 - 1 % Final    IMMATURE GRANULOCYTES 01/12/2022 0  0.0 - 0.5 % Final    ABS. NEUTROPHILS 01/12/2022 1.7* 1.8 - 8.0 K/UL Final    ABS. LYMPHOCYTES 01/12/2022 1.7  0.8 - 3.5 K/UL Final    ABS. MONOCYTES 01/12/2022 0.5  0.0 - 1.0 K/UL Final    ABS. EOSINOPHILS 01/12/2022 0.1  0.0 - 0.4 K/UL Final    ABS.  BASOPHILS 01/12/2022 0.0  0.0 - 0.1 K/UL Final    ABS. IMM. GRANS. 01/12/2022 0.0  0.00 - 0.04 K/UL Final    DF 01/12/2022 AUTOMATED    Final   Office Visit on 12/08/2021   Component Date Value Ref Range Status    VALID INTERNAL CONTROL POC 12/08/2021 Yes   Final    Prothrombin time (POC) 12/08/2021 20.5  seconds Final    INR POC 12/08/2021 1.7   Final    Vitamin D 25-Hydroxy 12/08/2021 58.0  30 - 100 ng/mL Final    Comment: (NOTE)  Deficiency               <20 ng/mL  Insufficiency          20-30 ng/mL  Sufficient             ng/mL  Possible toxicity       >100 ng/mL    The Method used is Siemens Advia Centaur currently standardized to a   Center of Disease Control and Prevention (CDC) certified reference   [facility]. Samples containing fluorescein dye can produce falsely   elevated values when tested with the ADVIA Centaur Vitamin D Assay. It is recommended that results in the toxic range, >100 ng/mL, be   retested 72 hours post fluorescein exposure.  Cholesterol, total 12/08/2021 197  <200 MG/DL Final    Triglyceride 12/08/2021 106  <150 MG/DL Final    Comment: Based on NCEP-ATP III:  Triglycerides <150 mg/dL  is considered normal, 150-199  mg/dL  borderline high,  200-499 mg/dL high and  greater than or equal to 500  mg/dL very high.  HDL Cholesterol 12/08/2021 49  MG/DL Final    Comment: Based on NCEP ATP III, HDL Cholesterol <40 mg/dL is considered low and >60  mg/dL is elevated.       LDL, calculated 12/08/2021 126.8* 0 - 100 MG/DL Final    Comment: Based on the NCEP-ATP: LDL-C concentrations are considered  optimal <100 mg/dL,  near optimal/above Normal 100-129 mg/dL Borderline High: 130-159, High: 160-189  mg/dL Very High: Greater than or equal to 190 mg/dL      VLDL, calculated 12/08/2021 21.2  MG/DL Final    CHOL/HDL Ratio 12/08/2021 4.0  0.0 - 5.0   Final    Sodium 12/08/2021 143  136 - 145 mmol/L Final    Potassium 12/08/2021 4.0  3.5 - 5.1 mmol/L Final    Chloride 12/08/2021 109* 97 - 108 mmol/L Final    CO2 12/08/2021 27  21 - 32 mmol/L Final    Anion gap 12/08/2021 7  5 - 15 mmol/L Final    Glucose 12/08/2021 84  65 - 100 mg/dL Final    BUN 12/08/2021 20  6 - 20 MG/DL Final    Creatinine 12/08/2021 1.25* 0.55 - 1.02 MG/DL Final    BUN/Creatinine ratio 12/08/2021 16  12 - 20   Final    GFR est AA 12/08/2021 52* >60 ml/min/1.73m2 Final    GFR est non-AA 12/08/2021 43* >60 ml/min/1.73m2 Final    Comment: Estimated GFR is calculated using the IDMS-traceable Modification of Diet in  Renal Disease (MDRD) Study equation, reported for both  Americans  (GFRAA) and non- Americans (GFRNA), and normalized to 1.73m2 body  surface area. The physician must decide which value applies to the patient.  Calcium 12/08/2021 9.4  8.5 - 10.1 MG/DL Final    Bilirubin, total 12/08/2021 0.3  0.2 - 1.0 MG/DL Final    ALT (SGPT) 12/08/2021 57  12 - 78 U/L Final    AST (SGOT) 12/08/2021 29  15 - 37 U/L Final    Alk.  phosphatase 12/08/2021 42* 45 - 117 U/L Final    Protein, total 12/08/2021 7.3  6.4 - 8.2 g/dL Final    Albumin 12/08/2021 3.7  3.5 - 5.0 g/dL Final    Globulin 12/08/2021 3.6  2.0 - 4.0 g/dL Final    A-G Ratio 12/08/2021 1.0* 1.1 - 2.2   Final    WBC 12/08/2021 2.9* 3.6 - 11.0 K/uL Final    RBC 12/08/2021 4.79  3.80 - 5.20 M/uL Final    HGB 12/08/2021 13.9  11.5 - 16.0 g/dL Final    HCT 12/08/2021 43.2  35.0 - 47.0 % Final    MCV 12/08/2021 90.2  80.0 - 99.0 FL Final    MCH 12/08/2021 29.0  26.0 - 34.0 PG Final    MCHC 12/08/2021 32.2  30.0 - 36.5 g/dL Final    RDW 12/08/2021 15.0* 11.5 - 14.5 % Final    PLATELET 39/78/1231 359  150 - 400 K/uL Final    MPV 12/08/2021 12.0  8.9 - 12.9 FL Final    NRBC 12/08/2021 0.0  0  WBC Final    ABSOLUTE NRBC 12/08/2021 0.00  0.00 - 0.01 K/uL Final    NEUTROPHILS 12/08/2021 24* 32 - 75 % Final    LYMPHOCYTES 12/08/2021 49  12 - 49 % Final    MONOCYTES 12/08/2021 24* 5 - 13 % Final    EOSINOPHILS 12/08/2021 2  0 - 7 % Final    BASOPHILS 12/08/2021 1  0 - 1 % Final    IMMATURE GRANULOCYTES 12/08/2021 0  0.0 - 0.5 % Final    ABS. NEUTROPHILS 12/08/2021 0.7* 1.8 - 8.0 K/UL Final    ABS. LYMPHOCYTES 12/08/2021 1.4  0.8 - 3.5 K/UL Final    ABS. MONOCYTES 12/08/2021 0.7  0.0 - 1.0 K/UL Final    ABS. EOSINOPHILS 12/08/2021 0.1  0.0 - 0.4 K/UL Final    ABS. BASOPHILS 12/08/2021 0.0  0.0 - 0.1 K/UL Final    ABS. IMM. GRANS. 12/08/2021 0.0  0.00 - 0.04 K/UL Final    DF 12/08/2021 SMEAR SCANNED    Final    RBC COMMENTS 12/08/2021 NORMOCYTIC, NORMOCHROMIC    Final          ASSESSMENT:  1. Irritable bowel syndrome without diarrhea    2. Encounter for monitoring Coumadin therapy    3. Gastroesophageal reflux disease without esophagitis    4. Essential hypertension, benign    5. Chronic pulmonary embolism without acute cor pulmonale, unspecified pulmonary embolism type ([address]    6. Fibromyositis    7. Adrenal nodule ([address]    8. Abnormal radiologic findings on diagnostic imaging of right kidney       With a negative CT scan of the abdomen, negative laboratory evaluation and negative colonoscopy in 2018, the diagnosis of IBS would be a diagnosis of exclusion and I suspect that is the etiology of the discomfort. We suggest that to her. We will give her some Bentyl to see if that will make her more comfortable. She will reach out to Dr. Lux De Souza for a follow up appointment. Her INR is subtherapeutic and we will increase her Coumadin to 2.5 mg daily, 5 mg on Sundays and Thursdays. No symptoms related to GERD, although she has epigastric discomfort. Her gastroenterologist started her on Protonix. Blood pressure elevation is noted. We ask her to check her blood pressure at home. We will also check it the next time she comes in. If it remains in the 140s or higher, then we will make another adjustment in the medications.   For her blood pressure, we will add Hydralazine 25 b.i.d.  I would like to use an ARB, but she had a reaction to Valsartan, so I would have to discuss it with her further before I start an ARB. She has recurrent DVT, PE, which is the reason for the Coumadin. Fibromyositis is the most likely cause for the back discomfort. She has an adrenal nodule, for which we will do the adrenal protocol CT scan. I suspect it is benign. She will be back to see us in two weeks for a P-T and BP check. I have discussed the diagnosis with the patient and the intended plan as seen in the  orders above. The patient understands and agees with the plan. The patient has   received an after visit summary and questions were answered concerning  future plans  Patient labs and/or xrays were reviewed  Past records were reviewed. PLAN:  .  Orders Placed This Encounter    CT ABD W CONT    GAMMOPATHY EVAL, SPEP/GABRIEL, IG QT/FLC    RENAL FUNCTION PANEL    AMB POC PT/INR    hydrALAZINE (APRESOLINE) 25 mg tablet    dicyclomine (BENTYL) 10 mg capsule       Follow-up and Dispositions    · Return in about 2 weeks (around 3/16/2022) for bp check, pt/inr. ATTENTION:   This medical record was transcribed using an electronic medical records system. Although proofread, it may and can contain electronic and spelling errors. Other human spelling and other errors may be present. Corrections may be executed at a later time. Please feel free to contact us for any clarifications as needed.

## 2022-03-02 NOTE — PROGRESS NOTES
1. Have you been to the ER, urgent care clinic since your last visit? Hospitalized since your last visit? Yes When: 2-16-22 Where: [facility] Reason for visit: abdominal pain    2. Have you seen or consulted any other health care providers outside of the [address] [place] [address] since your last visit? Include any pap smears or colon screening.  No    ED follow up  physical

## 2022-03-03 LAB
ALBUMIN SERPL-MCNC: 4.2 G/DL (ref 3.5–5)
ANION GAP SERPL CALC-SCNC: 4 MMOL/L (ref 5–15)
BUN SERPL-MCNC: 24 MG/DL (ref 6–20)
BUN/CREAT SERPL: 19 (ref 12–20)
CALCIUM SERPL-MCNC: 9.8 MG/DL (ref 8.5–10.1)
CHLORIDE SERPL-SCNC: 107 MMOL/L (ref 97–108)
CO2 SERPL-SCNC: 28 MMOL/L (ref 21–32)
CREAT SERPL-MCNC: 1.25 MG/DL (ref 0.55–1.02)
GLUCOSE SERPL-MCNC: 94 MG/DL (ref 65–100)
PHOSPHATE SERPL-MCNC: 3.2 MG/DL (ref 2.6–4.7)
POTASSIUM SERPL-SCNC: 4 MMOL/L (ref 3.5–5.1)
SODIUM SERPL-SCNC: 139 MMOL/L (ref 136–145)

## 2022-03-07 LAB
ALBUMIN SERPL ELPH-MCNC: 4.1 G/DL (ref 2.9–4.4)
ALBUMIN/GLOB SERPL: 1.3 {RATIO} (ref 0.7–1.7)
ALPHA1 GLOB SERPL ELPH-MCNC: 0.2 G/DL (ref 0–0.4)
ALPHA2 GLOB SERPL ELPH-MCNC: 0.7 G/DL (ref 0.4–1)
B-GLOBULIN SERPL ELPH-MCNC: 1.1 G/DL (ref 0.7–1.3)
GAMMA GLOB SERPL ELPH-MCNC: 1.4 G/DL (ref 0.4–1.8)
GLOBULIN SER-MCNC: 3.3 G/DL (ref 2.2–3.9)
IGA SERPL-MCNC: 202 MG/DL (ref 87–352)
IGG SERPL-MCNC: 1541 MG/DL (ref 586–1602)
IGM SERPL-MCNC: 81 MG/DL (ref 26–217)
INTERPRETATION SERPL IEP-IMP: NORMAL
KAPPA LC FREE SER-MCNC: 18.9 MG/L (ref 3.3–19.4)
KAPPA LC FREE/LAMBDA FREE SER: 1.64 {RATIO} (ref 0.26–1.65)
LAMBDA LC FREE SERPL-MCNC: 11.5 MG/L (ref 5.7–26.3)
M PROTEIN SERPL ELPH-MCNC: NORMAL G/DL
PROT SERPL-MCNC: 7.4 G/DL (ref 6–8.5)

## 2022-03-09 ENCOUNTER — HOSPITAL ENCOUNTER (OUTPATIENT)
Dept: CT IMAGING | Age: 67
Discharge: HOME | End: 2022-03-09
Attending: INTERNAL MEDICINE
Payer: MEDICARE

## 2022-03-09 DIAGNOSIS — E27.8 ADRENAL NODULE (HCC): ICD-10-CM

## 2022-03-09 DIAGNOSIS — Q39.6 ESOPHAGEAL DIVERTICULUM: Primary | ICD-10-CM

## 2022-03-09 DIAGNOSIS — R93.421 ABNORMAL RADIOLOGIC FINDINGS ON DIAGNOSTIC IMAGING OF RIGHT KIDNEY: ICD-10-CM

## 2022-03-09 PROCEDURE — 74170 CT ABD WO CNTRST FLWD CNTRST: CPT | Performed by: INTERNAL MEDICINE

## 2022-03-18 PROBLEM — K76.0 HEPATIC STEATOSIS: Status: ACTIVE | Noted: 2022-02-16

## 2022-03-18 PROBLEM — E27.8 ADRENAL NODULE (HCC): Status: ACTIVE | Noted: 2022-02-16

## 2022-03-18 PROBLEM — M10.9 GOUT: Status: ACTIVE | Noted: 2019-11-13

## 2022-03-18 PROBLEM — E27.9 ADRENAL NODULE (HCC): Status: ACTIVE | Noted: 2022-02-16

## 2022-03-19 PROBLEM — I82.811: Status: ACTIVE | Noted: 2017-05-31

## 2022-03-19 PROBLEM — D35.01 ADRENAL ADENOMA, RIGHT: Status: ACTIVE | Noted: 2022-02-16

## 2022-03-19 PROBLEM — M77.11 EPICONDYLITIS, LATERAL, RIGHT: Status: ACTIVE | Noted: 2021-03-03

## 2022-03-19 PROBLEM — N18.30 CKD (CHRONIC KIDNEY DISEASE), STAGE III (HCC): Status: ACTIVE | Noted: 2021-08-20

## 2022-03-19 PROBLEM — I73.9 PERIPHERAL VASCULAR DISEASE (HCC): Status: ACTIVE | Noted: 2019-09-05

## 2022-03-20 PROBLEM — Z00.00 PREVENTATIVE HEALTH CARE: Status: ACTIVE | Noted: 2017-06-28

## 2022-03-20 PROBLEM — M76.61 ACHILLES TENDINITIS OF RIGHT LOWER EXTREMITY: Status: ACTIVE | Noted: 2021-09-08

## 2022-03-20 PROBLEM — K57.90 DIVERTICULOSIS: Status: ACTIVE | Noted: 2018-01-01

## 2022-03-31 ENCOUNTER — TRANSCRIBE ORDER (OUTPATIENT)
Dept: SCHEDULING | Age: 67
End: 2022-03-31

## 2022-03-31 DIAGNOSIS — R06.09 DOE (DYSPNEA ON EXERTION): Primary | ICD-10-CM

## 2022-04-12 ENCOUNTER — HOSPITAL ENCOUNTER (OUTPATIENT)
Dept: NON INVASIVE DIAGNOSTICS | Age: 67
Discharge: HOME | End: 2022-04-12
Attending: INTERNAL MEDICINE
Payer: MEDICARE

## 2022-04-12 VITALS
DIASTOLIC BLOOD PRESSURE: 79 MMHG | BODY MASS INDEX: 33.67 KG/M2 | SYSTOLIC BLOOD PRESSURE: 151 MMHG | WEIGHT: 182.98 LBS | HEIGHT: 62 IN

## 2022-04-12 DIAGNOSIS — R06.09 DOE (DYSPNEA ON EXERTION): ICD-10-CM

## 2022-04-12 LAB
ECHO AO ROOT DIAM: 3.1 CM
ECHO AO ROOT INDEX: 1.68 CM/M2
ECHO AV PEAK GRADIENT: 8 MMHG
ECHO AV PEAK VELOCITY: 1.4 M/S
ECHO AV VELOCITY RATIO: 0.79
ECHO LA DIAMETER INDEX: 1.79 CM/M2
ECHO LA DIAMETER: 3.3 CM
ECHO LA TO AORTIC ROOT RATIO: 1.06
ECHO LV E' LATERAL VELOCITY: 11 CM/S
ECHO LV E' SEPTAL VELOCITY: 7 CM/S
ECHO LV FRACTIONAL SHORTENING: 33 % (ref 28–44)
ECHO LV INTERNAL DIMENSION DIASTOLE INDEX: 1.96 CM/M2
ECHO LV INTERNAL DIMENSION DIASTOLIC: 3.6 CM (ref 3.9–5.3)
ECHO LV INTERNAL DIMENSION SYSTOLIC INDEX: 1.3 CM/M2
ECHO LV INTERNAL DIMENSION SYSTOLIC: 2.4 CM
ECHO LV IVSD: 1.3 CM (ref 0.6–0.9)
ECHO LV MASS 2D: 124.1 G (ref 67–162)
ECHO LV MASS INDEX 2D: 67.5 G/M2 (ref 43–95)
ECHO LV POSTERIOR WALL DIASTOLIC: 0.9 CM (ref 0.6–0.9)
ECHO LV RELATIVE WALL THICKNESS RATIO: 0.5
ECHO LVOT PEAK GRADIENT: 4 MMHG
ECHO LVOT PEAK VELOCITY: 1.1 M/S
ECHO MV A VELOCITY: 0.76 M/S
ECHO MV E DECELERATION TIME (DT): 221.6 MS
ECHO MV E VELOCITY: 0.82 M/S
ECHO MV E/A RATIO: 1.08
ECHO MV E/E' LATERAL: 7.45
ECHO MV E/E' RATIO (AVERAGED): 9.58
ECHO MV E/E' SEPTAL: 11.71
ECHO PV MAX VELOCITY: 1.1 M/S
ECHO PV PEAK GRADIENT: 4 MMHG
ECHO RV TAPSE: 2.3 CM (ref 1.7–?)
ECHO TV REGURGITANT MAX VELOCITY: 2.02 M/S
ECHO TV REGURGITANT PEAK GRADIENT: 16 MMHG

## 2022-04-12 PROCEDURE — 93306 TTE W/DOPPLER COMPLETE: CPT

## 2022-04-20 ENCOUNTER — CLINICAL SUPPORT (OUTPATIENT)
Dept: INTERNAL MEDICINE CLINIC | Age: 67
End: 2022-04-20

## 2022-04-20 DIAGNOSIS — N18.31 STAGE 3A CHRONIC KIDNEY DISEASE (HCC): Primary | ICD-10-CM

## 2022-04-21 LAB
ALBUMIN SERPL-MCNC: 3.9 G/DL (ref 3.5–5)
ANION GAP SERPL CALC-SCNC: 8 MMOL/L (ref 5–15)
BASOPHILS # BLD: 0 K/UL (ref 0–0.1)
BASOPHILS NFR BLD: 1 % (ref 0–1)
BUN SERPL-MCNC: 20 MG/DL (ref 6–20)
BUN/CREAT SERPL: 16 (ref 12–20)
CALCIUM SERPL-MCNC: 9.6 MG/DL (ref 8.5–10.1)
CHLORIDE SERPL-SCNC: 107 MMOL/L (ref 97–108)
CO2 SERPL-SCNC: 25 MMOL/L (ref 21–32)
CREAT SERPL-MCNC: 1.28 MG/DL (ref 0.55–1.02)
DIFFERENTIAL METHOD BLD: ABNORMAL
EOSINOPHIL # BLD: 0.1 K/UL (ref 0–0.4)
EOSINOPHIL NFR BLD: 1 % (ref 0–7)
ERYTHROCYTE [DISTWIDTH] IN BLOOD BY AUTOMATED COUNT: 15.1 % (ref 11.5–14.5)
GLUCOSE SERPL-MCNC: 114 MG/DL (ref 65–100)
HCT VFR BLD AUTO: 42.1 % (ref 35–47)
HGB BLD-MCNC: 13.6 G/DL (ref 11.5–16)
IMM GRANULOCYTES # BLD AUTO: 0 K/UL (ref 0–0.04)
IMM GRANULOCYTES NFR BLD AUTO: 0 % (ref 0–0.5)
LYMPHOCYTES # BLD: 1.5 K/UL (ref 0.8–3.5)
LYMPHOCYTES NFR BLD: 41 % (ref 12–49)
MCH RBC QN AUTO: 29.1 PG (ref 26–34)
MCHC RBC AUTO-ENTMCNC: 32.3 G/DL (ref 30–36.5)
MCV RBC AUTO: 90 FL (ref 80–99)
MONOCYTES # BLD: 0.4 K/UL (ref 0–1)
MONOCYTES NFR BLD: 10 % (ref 5–13)
NEUTS SEG # BLD: 1.8 K/UL (ref 1.8–8)
NEUTS SEG NFR BLD: 47 % (ref 32–75)
NRBC # BLD: 0 K/UL (ref 0–0.01)
NRBC BLD-RTO: 0 PER 100 WBC
PHOSPHATE SERPL-MCNC: 3.5 MG/DL (ref 2.6–4.7)
PLATELET # BLD AUTO: 185 K/UL (ref 150–400)
PMV BLD AUTO: 12.5 FL (ref 8.9–12.9)
POTASSIUM SERPL-SCNC: 3.7 MMOL/L (ref 3.5–5.1)
RBC # BLD AUTO: 4.68 M/UL (ref 3.8–5.2)
SODIUM SERPL-SCNC: 140 MMOL/L (ref 136–145)
WBC # BLD AUTO: 3.8 K/UL (ref 3.6–11)

## 2022-04-22 DIAGNOSIS — I10 ESSENTIAL HYPERTENSION: ICD-10-CM

## 2022-04-22 RX ORDER — AMLODIPINE BESYLATE 10 MG/1
TABLET ORAL
Qty: 90 TABLET | Refills: 3 | Status: SHIPPED | OUTPATIENT
Start: 2022-04-22 | End: 2022-06-15 | Stop reason: SDDI

## 2022-05-05 ENCOUNTER — ANESTHESIA EVENT (OUTPATIENT)
Dept: ENDOSCOPY | Age: 67
End: 2022-05-05
Payer: MEDICARE

## 2022-05-05 ENCOUNTER — HOSPITAL ENCOUNTER (OUTPATIENT)
Age: 67
Setting detail: OUTPATIENT SURGERY
Discharge: HOME | End: 2022-05-05
Attending: INTERNAL MEDICINE | Admitting: INTERNAL MEDICINE
Payer: MEDICARE

## 2022-05-05 ENCOUNTER — ANESTHESIA (OUTPATIENT)
Dept: ENDOSCOPY | Age: 67
End: 2022-05-05
Payer: MEDICARE

## 2022-05-05 VITALS
HEIGHT: 62 IN | RESPIRATION RATE: 16 BRPM | WEIGHT: 175 LBS | TEMPERATURE: 97.1 F | SYSTOLIC BLOOD PRESSURE: 139 MMHG | BODY MASS INDEX: 32.2 KG/M2 | DIASTOLIC BLOOD PRESSURE: 81 MMHG | HEART RATE: 57 BPM | OXYGEN SATURATION: 97 %

## 2022-05-05 PROCEDURE — 76060000032 HC ANESTHESIA 0.5 TO 1 HR: Performed by: INTERNAL MEDICINE

## 2022-05-05 PROCEDURE — C1726 CATH, BAL DIL, NON-VASCULAR: HCPCS | Performed by: INTERNAL MEDICINE

## 2022-05-05 PROCEDURE — 74011000250 HC RX REV CODE- 250: Performed by: NURSE ANESTHETIST, CERTIFIED REGISTERED

## 2022-05-05 PROCEDURE — 88305 TISSUE EXAM BY PATHOLOGIST: CPT

## 2022-05-05 PROCEDURE — 77030018712 HC DEV BLLN INFL BSC -B: Performed by: INTERNAL MEDICINE

## 2022-05-05 PROCEDURE — 76040000007: Performed by: INTERNAL MEDICINE

## 2022-05-05 PROCEDURE — 74011250636 HC RX REV CODE- 250/636: Performed by: NURSE ANESTHETIST, CERTIFIED REGISTERED

## 2022-05-05 PROCEDURE — 74011250637 HC RX REV CODE- 250/637: Performed by: INTERNAL MEDICINE

## 2022-05-05 PROCEDURE — 2709999900 HC NON-CHARGEABLE SUPPLY: Performed by: INTERNAL MEDICINE

## 2022-05-05 PROCEDURE — 77030021593 HC FCPS BIOP ENDOSC BSC -A: Performed by: INTERNAL MEDICINE

## 2022-05-05 RX ORDER — SODIUM CHLORIDE 0.9 % (FLUSH) 0.9 %
5-40 SYRINGE (ML) INJECTION EVERY 8 HOURS
Status: DISCONTINUED | OUTPATIENT
Start: 2022-05-05 | End: 2022-05-05 | Stop reason: HOSPADM

## 2022-05-05 RX ORDER — PROPOFOL 10 MG/ML
INJECTION, EMULSION INTRAVENOUS AS NEEDED
Status: DISCONTINUED | OUTPATIENT
Start: 2022-05-05 | End: 2022-05-05 | Stop reason: HOSPADM

## 2022-05-05 RX ORDER — MIDAZOLAM HYDROCHLORIDE 1 MG/ML
.25-5 INJECTION, SOLUTION INTRAMUSCULAR; INTRAVENOUS
Status: DISCONTINUED | OUTPATIENT
Start: 2022-05-05 | End: 2022-05-05 | Stop reason: HOSPADM

## 2022-05-05 RX ORDER — EPINEPHRINE 0.1 MG/ML
1 INJECTION INTRACARDIAC; INTRAVENOUS
Status: DISCONTINUED | OUTPATIENT
Start: 2022-05-05 | End: 2022-05-05 | Stop reason: HOSPADM

## 2022-05-05 RX ORDER — SODIUM CHLORIDE 9 MG/ML
50 INJECTION, SOLUTION INTRAVENOUS CONTINUOUS
Status: DISCONTINUED | OUTPATIENT
Start: 2022-05-05 | End: 2022-05-05 | Stop reason: HOSPADM

## 2022-05-05 RX ORDER — SODIUM CHLORIDE 9 MG/ML
INJECTION, SOLUTION INTRAVENOUS
Status: DISCONTINUED | OUTPATIENT
Start: 2022-05-05 | End: 2022-05-05 | Stop reason: HOSPADM

## 2022-05-05 RX ORDER — LIDOCAINE HYDROCHLORIDE 20 MG/ML
INJECTION, SOLUTION EPIDURAL; INFILTRATION; INTRACAUDAL; PERINEURAL AS NEEDED
Status: DISCONTINUED | OUTPATIENT
Start: 2022-05-05 | End: 2022-05-05 | Stop reason: HOSPADM

## 2022-05-05 RX ORDER — FENTANYL CITRATE 50 UG/ML
25-200 INJECTION, SOLUTION INTRAMUSCULAR; INTRAVENOUS
Status: DISCONTINUED | OUTPATIENT
Start: 2022-05-05 | End: 2022-05-05 | Stop reason: HOSPADM

## 2022-05-05 RX ORDER — FLUMAZENIL 0.1 MG/ML
0.2 INJECTION INTRAVENOUS
Status: DISCONTINUED | OUTPATIENT
Start: 2022-05-05 | End: 2022-05-05 | Stop reason: HOSPADM

## 2022-05-05 RX ORDER — NALOXONE HYDROCHLORIDE 0.4 MG/ML
0.4 INJECTION, SOLUTION INTRAMUSCULAR; INTRAVENOUS; SUBCUTANEOUS
Status: DISCONTINUED | OUTPATIENT
Start: 2022-05-05 | End: 2022-05-05 | Stop reason: HOSPADM

## 2022-05-05 RX ORDER — DEXTROMETHORPHAN/PSEUDOEPHED 2.5-7.5/.8
1.2 DROPS ORAL
Status: DISCONTINUED | OUTPATIENT
Start: 2022-05-05 | End: 2022-05-05 | Stop reason: HOSPADM

## 2022-05-05 RX ORDER — SODIUM CHLORIDE 0.9 % (FLUSH) 0.9 %
5-40 SYRINGE (ML) INJECTION AS NEEDED
Status: DISCONTINUED | OUTPATIENT
Start: 2022-05-05 | End: 2022-05-05 | Stop reason: HOSPADM

## 2022-05-05 RX ORDER — ATROPINE SULFATE 0.1 MG/ML
0.5 INJECTION INTRAVENOUS
Status: DISCONTINUED | OUTPATIENT
Start: 2022-05-05 | End: 2022-05-05 | Stop reason: HOSPADM

## 2022-05-05 RX ADMIN — PROPOFOL 50 MG: 10 INJECTION, EMULSION INTRAVENOUS at 14:00

## 2022-05-05 RX ADMIN — PROPOFOL 50 MG: 10 INJECTION, EMULSION INTRAVENOUS at 13:56

## 2022-05-05 RX ADMIN — PROPOFOL 75 MG: 10 INJECTION, EMULSION INTRAVENOUS at 13:54

## 2022-05-05 RX ADMIN — LIDOCAINE HYDROCHLORIDE 100 MG: 20 INJECTION, SOLUTION EPIDURAL; INFILTRATION; INTRACAUDAL; PERINEURAL at 13:54

## 2022-05-05 RX ADMIN — SODIUM CHLORIDE: 900 INJECTION, SOLUTION INTRAVENOUS at 13:15

## 2022-05-05 RX ADMIN — PROPOFOL 50 MG: 10 INJECTION, EMULSION INTRAVENOUS at 13:58

## 2022-05-05 RX ADMIN — PROPOFOL 25 MG: 10 INJECTION, EMULSION INTRAVENOUS at 14:06

## 2022-05-05 RX ADMIN — PROPOFOL 25 MG: 10 INJECTION, EMULSION INTRAVENOUS at 14:10

## 2022-05-05 RX ADMIN — PROPOFOL 25 MG: 10 INJECTION, EMULSION INTRAVENOUS at 14:03

## 2022-05-05 RX ADMIN — PROPOFOL 25 MG: 10 INJECTION, EMULSION INTRAVENOUS at 14:13

## 2022-05-05 NOTE — ROUTINE PROCESS
Blaise Gunnar  1955  [id number]    Situation:  Verbal report received from:Abigail  Procedure: Procedure(s):  COLONOSCOPY  ESOPHAGOGASTRODUODENOSCOPY (EGD)  ESOPHAGEAL DILATION  ESOPHAGOGASTRODUODENAL (EGD) BIOPSY    Background:    Preoperative diagnosis: CHANGE IN BOWEL HABITS, ABDOMINAL PAIN, DYSPHAGIA  Postoperative diagnosis: Schatkzi's ring, esophageal diverticulum, hiatal herna, diverticulosis    :  Dr. Nel Dewey  Assistant(s): Endoscopy Technician-1: Kareem Steele  Endoscopy RN-1: Lacey Warren RN    Specimens:   ID Type Source Tests Collected by Time Destination   1 : duodenum bx Preservative Duodenum  Marco Cortez MD 5/5/2022 1358 Pathology   2 : gastric bx Preservative Gastric  Marco Cortez MD 5/5/2022 1400 Pathology     H. Pylori  no    Assessment:  Intra-procedure medications   Anesthesia gave intra-procedure sedation and medications, see anesthesia flow sheet yes    Intravenous fluids: NS@ KVO     Vital signs stable     Abdominal assessment: round and soft     Recommendation:  Discharge patient per MD order.   Family or Friend Spouse in [facility]  Permission to share finding with family or friend yes

## 2022-05-05 NOTE — PROCEDURES
[address]  [address]        Esophago- Gastroduodenoscopy (EGD) Procedure Note    [facility]  1955  [id number]      Procedure: Endoscopic Gastroduodenoscopy with biopsy, esophageal dilation    Indication:  Abdominal pain, epigastric, Dysphagia/odynophagia     Pre-operative Diagnosis: see indication above    Post-operative Diagnosis: see findings below    : Anaid Huang MD    Surgical Assistant: Endoscopy Technician-1: Tarah Palacios  Endoscopy RN-1: Martha Burrell RN    Implants:  None    Referring Provider:  Yamileth Cruz MD      Anesthesia/Sedation:  MAC anesthesia Propofol        Procedure Details     After infomed consent was obtained for the procedure, with all risks and benefits of procedure explained the patient was taken to the endoscopy suite and placed in the left lateral decubitus position. Following sequential administration of sedation as per above, the endoscope was inserted into the mouth and advanced under direct vision to third portion of the duodenum. A careful inspection was made as the gastroscope was withdrawn, including a retroflexed view of the proximal stomach; findings and interventions are described below. Findings:   Esophagus:there was schatzki ring at G-E junction, dilated gradually with CRE balloon to 18 mm, kept at 18 mm for 1 minute     There was around 4 cm in diameter diverticulum in distal esophagus, non obstructing, no food seen in it, located around 4 cm above G-E junction    2 cm hiatal hernia        Stomach: normal   Random biopsies taken   Duodenum/jejunum: normal  Random biopsies taken     Therapies:  none    Specimens: as above         EBL: None      Complications:   None; patient tolerated the procedure well. Impression:    -See post-procedure diagnoses.     Recommendations:  -colonoscopy today    Signed By: Anaid Huang MD     5/5/2022  2:28 PM

## 2022-05-05 NOTE — ANESTHESIA POSTPROCEDURE EVALUATION
Post-Anesthesia Evaluation and Assessment    Patient: Luiz Nazario MRN: [id number]  SSN: [id number]    YOB: 1955  Age: 74 y.o. Sex: female      I have evaluated the patient and they are stable and ready for discharge from the PACU. Cardiovascular Function/Vital Signs  Visit Vitals  /67   Pulse 62   Temp 36.2 °C (97.1 °F)   Resp 20   Ht 5' 2\" (1.575 m)   Wt 79.4 kg (175 lb)   SpO2 94%   BMI 32.01 kg/m²       Patient is status post MAC anesthesia for Procedure(s):  COLONOSCOPY  ESOPHAGOGASTRODUODENOSCOPY (EGD)  ESOPHAGEAL DILATION  ESOPHAGOGASTRODUODENAL (EGD) BIOPSY. Nausea/Vomiting: None    Postoperative hydration reviewed and adequate. Pain:  Pain Scale 1: Visual (05/05/22 1422)  Pain Intensity 1: 0 (05/05/22 1422)   Managed    Neurological Status: At baseline    Mental Status, Level of Consciousness: Alert and  oriented to person, place, and time    Pulmonary Status:   O2 Device: None (Room air) (05/05/22 1422)   Adequate oxygenation and airway patent    Complications related to anesthesia: None    Post-anesthesia assessment completed. No concerns    Signed By: Sabrina Sam MD     May 5, 2022              Procedure(s):  COLONOSCOPY  ESOPHAGOGASTRODUODENOSCOPY (EGD)  ESOPHAGEAL DILATION  ESOPHAGOGASTRODUODENAL (EGD) BIOPSY. MAC    <BSHSIANPOST>    INITIAL Post-op Vital signs:   Vitals Value Taken Time   /70 05/05/22 1425   Temp 36.2 °C (97.1 °F) 05/05/22 1422   Pulse 57 05/05/22 1426   Resp 18 05/05/22 1426   SpO2 94 % 05/05/22 1426   Vitals shown include unvalidated device data.

## 2022-05-05 NOTE — H&P
The patient is a 74year old female who presents with a complaint of Abdominal Pain. The patient presents for for routine follow-up. The onset of the abdominal pain has been gradual and has been occurring in an intermittent pattern for weeks. The abdominal pain is described as is described as being located in the left upper quadrant, epigastrium and left lower quadrant .  The abdominal pain radiates to the left flank. The symptoms have been associated with abdominal distention, bloating, constipation and heartburn,  while the symptoms have not been associated with amenorrhea, anorexia, bloody stools, black stools, bulky stools, bone pain, chest pain, dark urine, diarrhea, dysuria, early satiety, family history of colon cancer, fever, hematemesis, hematuria, jaundice, melena, nausea, passing worms, pica, use of alcohol, vaginal bleeding, vaginal discharge, vomiting, weight loss or other. Note for \"Abdominal Pain\": she has h/o heartburn, well controlled on diet, also h/o esophageal diverticulum.  she c/o for 2 weeks of worsening abdominal pain, more in left side with radiation to back, on coumadin, also noticed more constipation, she saw Dr Desmond Castleman this am who ordered CT scan abdomen and po ceftin , no rectal bleeding    3/16/22 she is still having same complaints of unexplained epigastric, LUQ, LLQ pain , also back pain, h/o FM, she got 2 CT scan of abdomen within last month and they were negative for any acute findings, she is also c/o more dysphagia, she has h/o esophageal diverticulum , c/o more constipation, miralax up to 3 times/d did not help, taking daily prune juice is working      Problem List/Past Medical (Carmine Mancia; 3/16/2022 9:17 AM)  Abdominal pain (R10.9)    Pulmonary Embolism    Epigastric pain (R10.13)    Left upper quadrant pain (R10.12)    Lower abdominal pain (R10.30)    Personal history of colonic polyps (Z86.010)    Bloating (R14.0)    Acid reflux (K21.9)    Dysphagia (R13.10)    Hepatic steatosis (K76.0)    Early satiety (R68.81)    Hair loss (L65.9)    Hypertension    Fibromyalgia    DVT    Gastroesophageal Reflux Disease    Anxiety (F41.9)    Degenerative Joint Disease    Irritable bowel syndrome    Pre-diabetes (R73.03)    Restless Leg Syndrome    Sleep Apnea    CPAP (continuous positive airway pressure) dependence (Z99.89)    Saphenous vein occlusion, 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 Warfarin Sodium  (2.5MG Tablet, 1 tablet Oral 5 days a week) Active. Protonix  (40MG Tablet DR, 1 (one) Oral daily 30 mon before breakfast, Taken starting 02/25/2022) Active. MiraLax  (17GM Packet, 1 (one) Oral daily, Taken starting 02/25/2022) Active. TraZODone HCl  (Oral b4 bed) Specific strength unknown - Active. Lidocaine  (5% Patch, External) Active. Probiotic  (250MG Capsule, Oral as needed) Active. Papaya and Enzymes  (Oral as needed) Active. Vitamin D3  (1000UNIT Tablet, 1 Oral daily) Active. Vitamin B-12  (1000MCG Tablet, Oral as needed) Active. Ziac  (10-6.25MG Tablet, 1 Oral daily) Active. Norvasc  (10MG Tablet, 1 Oral daily) Active. Medications Reconciled     Family History (Paolo Guadarrama; 3/16/2022 9:17 AM)  Hypertension   Mother. Heart attack (I21.9)   Father. Kidney Disease   Mother. Social History (Paolo Guadarrama; 3/16/2022 9:17 AM)  Tobacco Use   Never smoker. Alcohol Use   Has never drank. Marital status   . Employment status   Disabled.   Blood Transfusion   No.    Diagnostic Studies History (Polly Shah; 3/16/2022 9:17 AM)  Endoscopy   Date: 4/2018. Ultrasound   Date: 2018. right breast  Colonoscopy   Date: 4/2018. MRI   Date: 2018. both breast    Health Maintenance History (Polly Shah; 3/16/2022 9:17 AM)  Flu Vaccine   Date: 2018. Pneumovax   none        Review of Systems (Polly Shah; 3/16/2022 9:20 AM)  General Not Present- Chronic Fatigue, Poor Appetite, Weight Gain and Weight Loss. Skin Not Present- Itching, Rash and Skin Color Changes. HEENT Not Present- Hearing Loss and Vertigo. Respiratory Not Present- Difficulty Breathing and TB exposure. Cardiovascular Not Present- Chest Pain, Use of Antibiotics before Dental Procedures and Use of Blood Thinners. Gastrointestinal Present- See HPI. Musculoskeletal Not Present- Arthritis, Hip Replacement Surgery and Knee Replacement Surgery. Neurological Not Present- Weakness. Psychiatric Not Present- Depression. Endocrine Not Present- Diabetes and Thyroid Problems. Hematology Not Present- Anemia. Vitals (Polly Shah; 3/16/2022 9:16 AM)  3/16/2022 9:13 AM  Weight: 183 lb   Height: 62 in   Height was reported by patient. Body Surface Area: 1.84 m²   Body Mass Index: 33.47 kg/m²    Temp.: 98.1° F  (Thermal Scan)    Pulse: 59 (Regular)    Resp. : 16 (Unlabored)     BP: 124/72(Sitting, Left Arm, Standard)              Physical Exam Thomas Blackman MD; 3/16/2022 5:40 PM)  General  Mental Status - Alert. General Appearance - Cooperative, Pleasant, Not in acute distress. Orientation - Oriented X3. Build & Nutrition - Well nourished and Well developed. Note:  walks with a rollator      Integumentary  General Characteristics  Color - normal coloration of skin. Temperature - normal warmth is noted. Mobility & Turgor - normal mobility and turgor. Head and Neck  Head - normocephalic, atraumatic with no lesions or palpable masses.   Neck  Global Assessment - full range of motion and supple. Trachea - midline. Eye  Eyeball - Bilateral - No Exophthalmos - Bilateral.  Sclera/Conjunctiva - Left - No Jaundice - Left. Sclera/Conjunctiva - Right - No Jaundice - Right. Chest and Lung Exam  Chest and lung exam reveals  - quiet, even and easy respiratory effort with no use of accessory muscles. Auscultation  Breath sounds - Normal. Adventitious sounds - No Adventitious sounds. Cardiovascular  Auscultation  Rhythm - Regular, No Tachycardia, No Bradycardia . Heart Sounds - Normal heart sounds , S1 WNL and S2 WNL, No S3, No Summation Gallop. Murmurs & Other Heart Sounds - Auscultation of the heart reveals - No Murmurs. Abdomen  Inspection  Inspection of the abdomen reveals - Soft and Obese. Palpation/Percussion  Tenderness - Epigastrium and Generalized. Rebound tenderness - No rebound. Rigidity (guarding) - No Rigidity. Dullness to percussion - No abnormal dullness to percussion. Liver - No hepatosplenomegaly. Abdominal Mass Palpable - No masses. Other Characteristics - No Ascites. Auscultation  Auscultation of the abdomen reveals - Bowel sounds normal, No Abdominal bruits and No Succussion splash. Rectal - Did not examine. Peripheral Vascular  Upper Extremity  Inspection - Left - Normal - No Clubbing, No Cyanosis, No Edema, Pulses Intact. Inspection - Right - Normal - No Clubbing, No Cyanosis, No Edema, Pulses Intact. Palpation - Edema - Left - No edema - Left. Edema - Right - No edema - Right. Neurologic  Neurologic evaluation reveals  - Cranial nerves grossly intact, no focal neurologic deficits. Musculoskeletal  Global Assessment  Gait and Station - normal gait and station.         Assessment & Plan Devon Pope MD; 3/16/2022 5:44 PM)  Epigastric pain (R10.13)  Impression: she is still having same complaints of unexplained epigastric, LUQ, LLQ pain , also back pain, h/o FM, she got 2 CT scan of abdomen within last month and they were negative for any acute findings, she is also c/o more dysphagia, she has h/o esophageal diverticulum , c/o more constipation, miralax up to 3 times/d did not help, taking daily prune juice is working  suspect that some of her symptoms are musculoskeletal and may be coming from her back, to follow up with her PCP about that, consider rheumatology consult  EGD to assess her esophagus for any obstruction by the diverticulum, also to look for any ulcers  instructed to hold  Current Plans  EGD W/BIOPSY (23630)  Pt Education - How to access health information online: discussed with patient and provided information. Left upper quadrant pain (R10.12)  Abdominal pain (R10.9)  Left lower quadrant pain (R10.32)  Impression: colonoscopy to r/o any colitis and colonic neoplasm  Current Plans  Colonoscopy (11302) (Discussed risks and benefits with the patient to include:; perforation, post polypectomy, or post biopsy bleeding, missed lesions, and sedation reactions.)  Started Suprep Bowel Prep Kit 17.5-3.13-1.6 GM/177ML Oral Solution, 1 (one) KIt container Milliliter Take as directed before Colonoscopy, 354 Milliliter, 03/16/2022, No Refill. Local Order:  Pharmacist Notes: Coupon Code Fabiola 45: [id number] PCN: CN Group: [id number] ID: [id number]  Mail Order:  Pharmacist Notes: Coupon Code BIN: [id number] PCN: CN Group: [id number] ID: [id number]  Dysphagia (R13.10)  Change in bowel habits (R19.8)  Constipation (K59.00)  Impression: continue on daily prune juice    start daily metamucil  Current Plans  Pt Education - High Fiber Diet: discussed with patient and provided information. Date of Surgery Update:  Lavaughn Crigler was seen and examined. History and physical has been reviewed. The patient has been examined.  There have been no significant clinical changes since the completion of the originally dated History and Physical.  The patient was counseled at length about the risks of lurdes Covid-19 in the elliott-operative and post-operative states including the recovery window of their procedure. The patient was made aware that lurdes Covid-19 after a surgical procedure may worsen their prognosis for recovering from the virus and lend to a higher morbidity and or mortality risk. The patient was given the options of postponing their procedure. All of the risks, benefits, and alternatives were discussed. The patient does  wish to proceed with the procedure.     Signed By: Elizabeth Harris MD     May 5, 2022 1:49 PM

## 2022-05-05 NOTE — PROCEDURES
[address]  174 [facility], [address] [place] [address]      Colonoscopy Operative Report    Lavaughn Crigler  [id number]  1955      Procedure Type:   Colonoscopy --diagnostic     Indications:    Abdominal pain, LLQ       Pre-operative Diagnosis: see indication above    Post-operative Diagnosis:  See findings below    :  Zeyad Rodriguez MD    Surgical Assistant: Endoscopy Technician-1: Cecilia Chairez  Endoscopy RN-1: Leigh Decker RN    Implants:  None    Referring Provider: Lucrecia Reddy MD      Sedation:  MAC anesthesia Propofol      Procedure Details:  After informed consent was obtained with all risks and benefits of procedure explained and preoperative exam completed, the patient was taken to the endoscopy suite and placed in the left lateral decubitus position. Upon sequential sedation as per above, a digital rectal exam was performed demonstrating internal hemorrhoids. The Olympus videocolonoscope  was inserted in the rectum and carefully advanced to the cecum, which was identified by the ileocecal valve and appendiceal orifice. The cecum was identified by the ileocecal valve and appendiceal orifice. The quality of preparation was good. The colonoscope was slowly withdrawn with careful evaluation between folds. Retroflexion in the rectum was completed . Findings:     Rectum: normal  Sigmoid: MODERATE DIVERTICULOSIS  Descending Colon: MILD DIVERTICULOSIS  Transverse Colon: normal  Ascending Colon: normal  Cecum: normal  Terminal Ileum: normal     Specimen Removed:  none    Complications: None. EBL:  None. Impression:    see findings    Recommendations: --Repeat colonoscopy in 5 years.     High fiber diet  F/u in 2 months  Resume coumadin 5/6/22  Suspect her symptoms from IBS    Signed By: Zeyad Rodriguez MD     5/5/2022  2:31 PM

## 2022-05-05 NOTE — DISCHARGE INSTRUCTIONS
[address] [facility]  [address] [facility], [facility] [address]    EGD and COLON DISCHARGE INSTRUCTIONS    Ruslan Tomlinson  [id number]  1955    Discomfort:  Redness at IV site- apply warm compress to area; if redness or soreness persist- contact your physician  There may be a slight amount of blood passed from the rectum  Gaseous discomfort- walking, belching will help relieve any discomfort  You may not operate a vehicle for 12 hours  You may not engage in an occupation involving machinery or appliances for rest of today  You may not drink alcoholic beverages for at least 12 hours  Avoid making any critical decisions for at least 24 hour  DIET:  You may resume your regular diet - however -  remember your colon is empty and a heavy meal will produce gas. Avoid these foods:  vegetables, fried / greasy foods, carbonated drinks     ACTIVITY:  You may  resume your normal daily activities it is recommended that you spend the remainder of the day resting -  avoid any strenuous activity. CALL M.D.   ANY SIGN OF:   Increasing pain, nausea, vomiting  Abdominal distension (swelling)  New increased bleeding (oral or rectal)  Fever (chills)  Pain in chest area  Bloody discharge from nose or mouth  Shortness of breath      Follow-up Instructions:   Call Dr. Liliana Perdomo for any questions or problems at [phone] and follow up in 2 months   High fiber diet   Resume coumadin on 5/6/22   Suspect your symptoms are from irritable bowel           ENDOSCOPY FINDINGS:   Your colonoscopy showed only diverticulosis   Your endoscopy showed no change in your diverticulum in esophagus, no food seen in it, you have benign esophageal ring, I dilated, small hiatal hernia, rest of exam was normal.  Telephone # [phone]      Signed By: Liliana Perdomo MD     5/5/2022  2:34 PM       DISCHARGE SUMMARY from Nurse    The following personal items collected during your admission are returned to you:   Dental Appliance: Dental Appliances: None  Vision: Visual Aid: Glasses  Hearing Aid:    Jewelry:    Clothing:    Other Valuables:    Valuables sent to safe:        Learning About Coronavirus (COVID-19)  Coronavirus (COVID-19): Overview  What is coronavirus (COVID-19)? The coronavirus disease (COVID-19) is caused by a virus. It is an illness that was first found in [place], [place], in December 2019. It has since spread worldwide. The virus can cause fever, cough, and trouble breathing. In severe cases, it can cause pneumonia and make it hard to breathe without help. It can cause death. Coronaviruses are a large group of viruses. They cause the common cold. They also cause more serious illnesses like Middle East respiratory syndrome (MERS) and severe acute respiratory syndrome (SARS). COVID-19 is caused by a novel coronavirus. That means it's a new type that has not been seen in people before. This virus spreads person-to-person through droplets from coughing and sneezing. It can also spread when you are close to someone who is infected. And it can spread when you touch something that has the virus on it, such as a doorknob or a tabletop. What can you do to protect yourself from coronavirus (COVID-19)? The best way to protect yourself from getting sick is to:  · Avoid areas where there is an outbreak. · Avoid contact with people who may be infected. · Wash your hands often with soap or alcohol-based hand sanitizers. · Avoid crowds and try to stay at least 6 feet away from other people. · Wash your hands often, especially after you cough or sneeze. Use soap and water, and scrub for at least 20 seconds. If soap and water aren't available, use an alcohol-based hand . · Avoid touching your mouth, nose, and eyes. What can you do to avoid spreading the virus to others? To help avoid spreading the virus to others:  · Cover your mouth with a tissue when you cough or sneeze. Then throw the tissue in the trash.   · Use a disinfectant to clean things that you touch often. · Stay home if you are sick or have been exposed to the virus. Don't go to school, work, or public areas. And don't use public transportation. · If you are sick:  ? Leave your home only if you need to get medical care. But call the doctor's office first so they know you're coming. And wear a face mask, if you have one.  ? If you have a face mask, wear it whenever you're around other people. It can help stop the spread of the virus when you cough or sneeze. ? Clean and disinfect your home every day. Use household  and disinfectant wipes or sprays. Take special care to clean things that you grab with your hands. These include doorknobs, remote controls, phones, and handles on your refrigerator and microwave. And don't forget countertops, tabletops, bathrooms, and computer keyboards. When to call for help  Call 911 anytime you think you may need emergency care. For example, call if:  · You have severe trouble breathing. (You can't talk at all.)  · You have constant chest pain or pressure. · You are severely dizzy or lightheaded. · You are confused or can't think clearly. · Your face and lips have a blue color. · You pass out (lose consciousness) or are very hard to wake up. Call your doctor now if you develop symptoms such as:  · Shortness of breath. · Fever. · Cough. If you need to get care, call ahead to the doctor's office for instructions before you go. Make sure you wear a face mask, if you have one, to prevent exposing other people to the virus. Where can you get the latest information? The following health organizations are tracking and studying this virus. Their websites contain the most up-to-date information. [facility] also learn what to do if you think you may have been exposed to the virus. · U.S. Centers for Disease Control and Prevention (CDC): The CDC provides updated news about the disease and travel advice.  The website also tells you how to prevent the spread of infection. www.cdc.gov  · World Health Organization [facility]): WHO offers information about the virus outbreaks. WHO also has travel advice. www.who.int  Current as of: April 1, 2020               Content Version: 12.4  © 8790-3788 [employer]. Care instructions adapted under license by your healthcare professional. If you have questions about a medical condition or this instruction, always ask your healthcare professional. [address] any warranty or liability for your use of this information.

## 2022-05-05 NOTE — PERIOP NOTES
.Patient has been evaluated by anesthesia pre-procedure. Patient alert and oriented. Vital signs will not be charted by the Endoscopy nurse. All vitals, airway, and loc are monitored by anesthesia staff throughout procedure. .CRE balloon dilatation of the esophagus   18 mm Balloon inflated to 3ATMs and held for 60 seconds. No subcutaneous crepitus of the chest or cervical region was noted post dilatation. .Endoscope will be pre-cleaned at bedside immediately following procedure by STEVE.

## 2022-06-06 RX ORDER — DEXTROMETHORPHAN HYDROBROMIDE, GUAIFENESIN 20; 400 MG/20ML; MG/20ML
5 SOLUTION ORAL
Qty: 177 ML | Refills: 5 | Status: SHIPPED | OUTPATIENT
Start: 2022-06-06

## 2022-06-06 RX ORDER — WARFARIN 2.5 MG/1
TABLET ORAL
Qty: 40 TABLET | Refills: 5 | Status: SHIPPED | OUTPATIENT
Start: 2022-06-06 | End: 2022-06-06

## 2022-06-06 RX ORDER — WARFARIN 2.5 MG/1
TABLET ORAL
Qty: 180 TABLET | Refills: 3 | Status: SHIPPED | OUTPATIENT
Start: 2022-06-06 | End: 2022-06-07 | Stop reason: SDUPTHER

## 2022-06-06 RX ORDER — AZITHROMYCIN 250 MG/1
TABLET, FILM COATED ORAL
Qty: 6 TABLET | Refills: 0 | Status: SHIPPED | OUTPATIENT
Start: 2022-06-06 | End: 2022-06-06

## 2022-06-07 RX ORDER — WARFARIN 2.5 MG/1
TABLET ORAL
Qty: 180 TABLET | Refills: 3 | Status: SHIPPED | OUTPATIENT
Start: 2022-06-07

## 2022-06-15 ENCOUNTER — OFFICE VISIT (OUTPATIENT)
Dept: INTERNAL MEDICINE CLINIC | Age: 67
End: 2022-06-15
Payer: MEDICARE

## 2022-06-15 VITALS
WEIGHT: 178 LBS | SYSTOLIC BLOOD PRESSURE: 128 MMHG | OXYGEN SATURATION: 98 % | HEIGHT: 62 IN | HEART RATE: 63 BPM | BODY MASS INDEX: 32.76 KG/M2 | RESPIRATION RATE: 16 BRPM | TEMPERATURE: 98.2 F | DIASTOLIC BLOOD PRESSURE: 76 MMHG

## 2022-06-15 DIAGNOSIS — E78.5 DYSLIPIDEMIA: ICD-10-CM

## 2022-06-15 DIAGNOSIS — R73.02 IGT (IMPAIRED GLUCOSE TOLERANCE): ICD-10-CM

## 2022-06-15 DIAGNOSIS — N18.31 STAGE 3A CHRONIC KIDNEY DISEASE (HCC): ICD-10-CM

## 2022-06-15 DIAGNOSIS — I82.811: Primary | ICD-10-CM

## 2022-06-15 DIAGNOSIS — I73.9 PERIPHERAL VASCULAR DISEASE (HCC): ICD-10-CM

## 2022-06-15 DIAGNOSIS — K76.0 HEPATIC STEATOSIS: ICD-10-CM

## 2022-06-15 DIAGNOSIS — R26.81 GAIT INSTABILITY: ICD-10-CM

## 2022-06-15 DIAGNOSIS — M19.042 PRIMARY OSTEOARTHRITIS OF LEFT HAND: ICD-10-CM

## 2022-06-15 DIAGNOSIS — M79.7 FIBROMYALGIA: ICD-10-CM

## 2022-06-15 PROCEDURE — 1101F PT FALLS ASSESS-DOCD LE1/YR: CPT | Performed by: INTERNAL MEDICINE

## 2022-06-15 PROCEDURE — G8427 DOCREV CUR MEDS BY ELIG CLIN: HCPCS | Performed by: INTERNAL MEDICINE

## 2022-06-15 PROCEDURE — 1123F ACP DISCUSS/DSCN MKR DOCD: CPT | Performed by: INTERNAL MEDICINE

## 2022-06-15 PROCEDURE — 99214 OFFICE O/P EST MOD 30 MIN: CPT | Performed by: INTERNAL MEDICINE

## 2022-06-15 PROCEDURE — 1090F PRES/ABSN URINE INCON ASSESS: CPT | Performed by: INTERNAL MEDICINE

## 2022-06-15 PROCEDURE — G8536 NO DOC ELDER MAL SCRN: HCPCS | Performed by: INTERNAL MEDICINE

## 2022-06-15 PROCEDURE — G8510 SCR DEP NEG, NO PLAN REQD: HCPCS | Performed by: INTERNAL MEDICINE

## 2022-06-15 PROCEDURE — G8752 SYS BP LESS 140: HCPCS | Performed by: INTERNAL MEDICINE

## 2022-06-15 PROCEDURE — G8754 DIAS BP LESS 90: HCPCS | Performed by: INTERNAL MEDICINE

## 2022-06-15 PROCEDURE — G8399 PT W/DXA RESULTS DOCUMENT: HCPCS | Performed by: INTERNAL MEDICINE

## 2022-06-15 PROCEDURE — G9899 SCRN MAM PERF RSLTS DOC: HCPCS | Performed by: INTERNAL MEDICINE

## 2022-06-15 PROCEDURE — G8417 CALC BMI ABV UP PARAM F/U: HCPCS | Performed by: INTERNAL MEDICINE

## 2022-06-15 PROCEDURE — 3017F COLORECTAL CA SCREEN DOC REV: CPT | Performed by: INTERNAL MEDICINE

## 2022-06-15 RX ORDER — LOSARTAN POTASSIUM 100 MG/1
100 TABLET ORAL DAILY
Qty: 90 TABLET | Refills: 3 | Status: SHIPPED | OUTPATIENT
Start: 2022-06-15 | End: 2022-06-16

## 2022-06-15 NOTE — PROGRESS NOTES
SPORTS MEDICINE AND PRIMARY CARE  Patel Brandt MD, [address] [facility],[address] [address]  Phone:  [phone]  Fax: [phone]       Chief Complaint   Patient presents with    Hypertension     Patient is here for a follow up    . SUBJECTIVE:    Jayro Shah is a 74 y.o. female Patient returns today with a known history of saphenous vein occlusion, recurrent DVT, on Warfarin, chronic kidney disease stage 3A, hepatic steatosis, peripheral vascular disease, osteoarthritis left hand, dyslipidemia, gait instability, impaired glucose tolerance, fibromyalgia, and is seen for evaluation. We will change her Coumadin dose to two tablets on Sundays and Thursdays and one tablet on Monday, Tuesday, Wednesday, Friday, Saturday. Patient wants to stop her blood pressure pills, specifically she would like to stop Amlodipine and go on to Losartan. We remind her she was on Valsartan in the past and had palpitations. So therefore we stop Amlodipine as requested. She is concerned about her kidneys and is responding to the medication we called in. Patient is seen for evaluation. Current Outpatient Medications   Medication Sig Dispense Refill    losartan (COZAAR) 100 mg tablet Take 1 Tablet by mouth daily. 90 Tablet 3    warfarin (COUMADIN) 2.5 mg tablet TAKE 1 TO 2 TABLETS DAILY 180 Tablet 3    dextromethorphan-guaiFENesin (Robitussin Cough-Chest Alfredo DM) 5-100 mg/5 mL liqd Take 5 mL by mouth four (4) times daily as needed for Cough or Congestion. 177 mL 5    ondansetron hcl (Zofran) 4 mg tablet Take 1 Tablet by mouth every eight (8) hours as needed for Nausea. 20 Tablet 0    ergocalciferol (ERGOCALCIFEROL) 1,250 mcg (50,000 unit) capsule TAKE 1 CAPSULE EVERY 7 DAYS 12 Capsule 3    bisoprolol-hydroCHLOROthiazide (ZIAC) 10-6.25 mg per tablet Take 1 Tablet by mouth daily. 90 Tablet 3    predniSONE (DELTASONE) 20 mg tablet Take 40 mg by mouth daily (with breakfast).  For 5 days as need for gout atack 10 Tablet 5    rosuvastatin (CRESTOR) 10 mg tablet TAKE ONE TABLET BY MOUTH EVERY DAY - TO LOWER CHOLESTEROL 30 Tablet 11    traZODone (DESYREL) 50 mg tablet TAKE 1 TABLET NIGHTLY AS NEEDED FOR SLEEP 90 Tab 4    lidocaine (LIDODERM) 5 % Apply patch to the affected area for 12 hours a day and remove for 12 hours a day. DX.M79.7 90 Each 3    LACTOBACILLUS ACIDOPHILUS (PROBIOTIC PO) Take 1 Cap by mouth as needed.  carica papaya (PAPAYA ENZYME) tab Take  by mouth. Takes 4 tabs po 1-2 times daily after meals.  ONETOUCH ULTRA2 monitoring kit       MICROLET LANCET misc USE TO TEST BLOOD SUGAR EVERY  Each 11    cyanocobalamin (VITAMIN B-12) 1,000 mcg tablet Take 1,000 mcg by mouth as needed.  dicyclomine (BENTYL) 10 mg capsule Take 1 Capsule by mouth four (4) times daily.  120 Capsule 11     Past Medical History:   Diagnosis Date    Adrenal nodule ([facility] [address] 2022    Indeterminate 13 mm right adrenal nodule    Adverse effect of anesthesia     \"hard time waking me up\"    Axillary pain, right     Bereavement 2019    77 yo - heart attack -  in sleep    Chronic pain     d/t fibromyalgia    CKD (chronic kidney disease), stage III ([address] 2020    Coagulation disorder ([address]     on eliquis -d/c due to gi upset - now on coumadin previously followed by dr. Miladys Hyaes Depression with anxiety     Diverticulosis     DJD (degenerative joint disease)     Dyslipidemia     Encounter for Hemoccult screening 2017    neg    Epicondylitis, lateral, right 2021    Fibromyalgia     Gait instability     GERD (gastroesophageal reflux disease)     Hepatic steatosis 2022    Hepatic steatosis 2022    Hypertension     IBS (irritable bowel syndrome)     Ill-defined condition     gastroparesis    Ill-defined condition     CT - 3/18/2018 - esophageal diverticulum    Ill-defined condition     pericarditis    Mastodynia, left greater than right 8/17/2011    Menopause     Normal cardiac stress test 3.14    Positive D dimer 9-23-15    Prediabetes     Preventative health care 03/03/2021    Pulmonary embolism ([facility] [address] 03/2013    rll    Restless leg syndrome     S/P colonoscopy 01/27/2011    kenny hernandez md    S/P colonoscopy 04/10/2018    Jacky Irwin MD repeat5 yrs    S/P colonoscopy 05/05/2022    Jacky Irwin MD - 5 yrs    Saphenous vein occlusion, 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    [facility] acute occlusive superificial vein thrombosis - Melody Dyer md    Saphenous vein thrombophlebitis, right 02/2018    and acute l posterior tibial vein - this is the 2nd DVT putting her on lifelong anticoagualtion / Sueanne Yesica    Sleep apnea     cpap 9cm    SOB (shortness of breath)     White coat hypertension      Past Surgical History:   Procedure Laterality Date    COLONOSCOPY N/A 4/10/2018    COLONOSCOPY,EGD performed by Lit Rodriguez MD at [address] COLONOSCOPY N/A 5/5/2022    COLONOSCOPY performed by Jacky Irwin MD at [address] HX BREAST BIOPSY Left     HX COLONOSCOPY      HX ORTHOPAEDIC  2009    foot surgery-left - bunionectomy    HX ORTHOPAEDIC      cyst removed from left hand - ganglion cyst    HX OTHER SURGICAL      right and left leg muscle biopsies - elevated CPK - muscle enzymes were elevated    HX TUBAL LIGATION      [place] ABDOMEN SURGERY PROC UNLISTED      exploratory years ago    [place] BREAST SURGERY PROCEDURE UNLISTED  2006    left breast [id number]. Geovany Rousseau     Allergies   Allergen Reactions    Aleve [Naproxen Sodium] Hoarseness    Dilaudid [Hydromorphone (Bulk)] Anaphylaxis     Respiratory arrest and throat closes    Crestor [Rosuvastatin] Nausea Only    Diovan [Valsartan] Palpitations    Morphine Other (comments)     Patch-vomiting,weakness, fainting    Sulfa (Sulfonamide Antibiotics) Hives, Rash and Other (comments)     fainting         REVIEW OF SYSTEMS:  General: negative for - chills or fever  ENT: negative for - headaches, nasal congestion or tinnitus  Respiratory: negative for - cough, hemoptysis, shortness of breath or wheezing  Cardiovascular : negative for - chest pain, edema, palpitations or shortness of breath  Gastrointestinal: negative for - abdominal pain, blood in stools, heartburn or nausea/vomiting  Genito-Urinary: no dysuria, trouble voiding, or hematuria  Musculoskeletal: negative for - gait disturbance, joint pain, joint stiffness or joint swelling  Neurological: no TIA or stroke symptoms  Hematologic: no bruises, no bleeding, no swollen glands  Integument: no lumps, mole changes, nail changes or rash  Endocrine: no malaise/lethargy or unexpected weight changes      Social History     Socioeconomic History    Marital status:    Tobacco Use    Smoking status: Never Smoker    Smokeless tobacco: Never Used   Vaping Use    Vaping Use: Never used   Substance and Sexual Activity    Alcohol use: No    Drug use: No    Sexual activity: Yes     Partners: Male     Birth control/protection: None   Social History Narrative         Family History: Mother: alive 80 yrs, High Blood Pressure, cva with cognitive deficitsFather:  36 yrs, myocardial infarctionSister(s): aliveBrother(s): unknow n2    sister(s) . 40 daughter no choildren -  walked out works for board of directors for [employer] . Social History: Alcohol Use Patient does not use alcohol. Smoking Status Patient is a never smoker. Caffeine: occasional. Drugs:    prescription narcotics. Diet: Regular. Exercise: None. Marital Status: . Lives w ith: spouse. Children: children. Yazidi: Magdaleno Patience. Patient is  living w ith her  she is on disability related to her fibromyositis.      Family History   Problem Relation Age of Onset    Hypertension Mother     Kidney Disease Mother         1 kidney    Heart Disease Father     Heart Attack Father         late 35s or early 45s    Heart Attack Maternal Grandmother     Cancer Maternal Grandfather         ? lung or stomach       OBJECTIVE:    Visit Vitals  /76   Pulse 63   Temp 98.2 °F (36.8 °C)   Resp 16   Ht 5' 2\" (1.575 m)   Wt 178 lb (80.7 kg)   SpO2 98%   BMI 32.56 kg/m²     CONSTITUTIONAL: well , well nourished, appears age appropriate  EYES: perrla, eom intact  ENMT:moist mucous membranes, pharynx clear  NECK: supple. Thyroid normal  RESPIRATORY: Chest: clear bilaterally   CARDIOVASCULAR: Heart: regular rate and rhythm  GASTROINTESTINAL: Abdomen: soft, bowel sounds active  HEMATOLOGIC: no pathological lymph nodes palpated  MUSCULOSKELETAL: Extremities: no edema, pulse 1+   INTEGUMENT: No unusual rashes or suspicious skin lesions noted. Nails appear normal.  NEUROLOGIC: non-focal exam   MENTAL STATUS: alert and oriented, appropriate affect           ASSESSMENT:  1. Saphenous vein occlusion, right    2. Stage 3a chronic kidney disease ([address]    3. Hepatic steatosis    4. Peripheral vascular disease ([address]    5. Primary osteoarthritis of left hand    6. Dyslipidemia    7. Gait instability    8. IGT (impaired glucose tolerance)    9. Fibromyalgia      INR is supratherapeutic. We will decrease the Coumadin, encourage her to use greens as noted above. She will take two tablets on Sundays and Thursdays and one tablet all other days. She agrees with the plan and will be back in a month for a P-T check. She has CKD stage 3A. We review her studies with her, advise her of nephrotoxic agents and drugs to avoid. We note she had a CT scan back in February that showed unremarkable kidneys. _______________ (clipped audio) was normal.  We encouraged fluid intake. Hepatic steatosis. Encouraged a heart healthy diet. She has peripheral vascular occlusive disease that is currently asymptomatic. The osteoarthritis of her hand is intermittent.     Dyslipidemia is approaching goal.    For gait instability she uses a Roll-A-Bout walker. She has impaired glucose tolerance, which we follow periodically. Fortunately, no fibromyalgia symptoms today. She will be back to see us in a month. For her blood pressure we have discontinued Hydralazine, which she was not taking. We stopped the Amlodipine at her request.  We place her on Losartan at her request. We will look at her kidney studies in a month to be sure she is tolerating Losartan without significant change in her renal function. She will be back for a P-T check in one month. I have discussed the diagnosis with the patient and the intended plan as seen in the  Orders. The patient understands and agees with the plan. The patient has   received an after visit summary and questions were answered concerning  future plans  Patient labs and/or xrays were reviewed  Past records were reviewed. PLAN:  .  Orders Placed This Encounter    RENAL FUNCTION PANEL    HEMOGLOBIN A1C WITH EAG    losartan (COZAAR) 100 mg tablet       Follow-up and Dispositions    · Return in about 4 weeks (around 7/13/2022) for pt/inr, bp check. ATTENTION:   This medical record was transcribed using an electronic medical records system. Although proofread, it may and can contain electronic and spelling errors. Other human spelling and other errors may be present. Corrections may be executed at a later time. Please feel free to contact us for any clarifications as needed.

## 2022-06-15 NOTE — PROGRESS NOTES
Chief Complaint   Patient presents with    Hypertension     Patient is here for a follow up      1. Have you been to the ER, urgent care clinic since your last visit? Hospitalized since your last visit? No    2. Have you seen or consulted any other health care providers outside of the [address] [place] [address] since your last visit? Include any pap smears or colon screening.  No

## 2022-06-16 LAB
ALBUMIN SERPL-MCNC: 3.8 G/DL (ref 3.5–5)
ANION GAP SERPL CALC-SCNC: 5 MMOL/L (ref 5–15)
BUN SERPL-MCNC: 20 MG/DL (ref 6–20)
BUN/CREAT SERPL: 15 (ref 12–20)
CALCIUM SERPL-MCNC: 9.7 MG/DL (ref 8.5–10.1)
CHLORIDE SERPL-SCNC: 107 MMOL/L (ref 97–108)
CO2 SERPL-SCNC: 29 MMOL/L (ref 21–32)
CREAT SERPL-MCNC: 1.3 MG/DL (ref 0.55–1.02)
EST. AVERAGE GLUCOSE BLD GHB EST-MCNC: 126 MG/DL
GLUCOSE SERPL-MCNC: 102 MG/DL (ref 65–100)
HBA1C MFR BLD: 6 % (ref 4–5.6)
PHOSPHATE SERPL-MCNC: 2.8 MG/DL (ref 2.6–4.7)
POTASSIUM SERPL-SCNC: 3.7 MMOL/L (ref 3.5–5.1)
SODIUM SERPL-SCNC: 141 MMOL/L (ref 136–145)

## 2022-06-16 RX ORDER — LOSARTAN POTASSIUM 25 MG/1
100 TABLET ORAL DAILY
Qty: 90 TABLET | Refills: 3 | Status: SHIPPED | OUTPATIENT
Start: 2022-06-16 | End: 2022-06-22

## 2022-06-22 RX ORDER — LOSARTAN POTASSIUM 50 MG/1
50 TABLET ORAL DAILY
Qty: 90 TABLET | Refills: 3 | Status: SHIPPED | OUTPATIENT
Start: 2022-06-22 | End: 2022-06-24 | Stop reason: SDUPTHER

## 2022-06-24 RX ORDER — LOSARTAN POTASSIUM 50 MG/1
50 TABLET ORAL DAILY
Qty: 90 TABLET | Refills: 3 | Status: SHIPPED | OUTPATIENT
Start: 2022-06-24 | End: 2022-07-27 | Stop reason: DRUGHIGH

## 2022-07-27 ENCOUNTER — OFFICE VISIT (OUTPATIENT)
Dept: INTERNAL MEDICINE CLINIC | Age: 67
End: 2022-07-27
Payer: MEDICARE

## 2022-07-27 VITALS
SYSTOLIC BLOOD PRESSURE: 191 MMHG | BODY MASS INDEX: 32.87 KG/M2 | DIASTOLIC BLOOD PRESSURE: 81 MMHG | HEART RATE: 57 BPM | WEIGHT: 178.6 LBS | TEMPERATURE: 98 F | OXYGEN SATURATION: 97 % | RESPIRATION RATE: 16 BRPM | HEIGHT: 62 IN

## 2022-07-27 DIAGNOSIS — E78.5 DYSLIPIDEMIA: ICD-10-CM

## 2022-07-27 DIAGNOSIS — G89.4 CHRONIC PAIN SYNDROME: ICD-10-CM

## 2022-07-27 DIAGNOSIS — I82.811: ICD-10-CM

## 2022-07-27 DIAGNOSIS — N18.31 STAGE 3A CHRONIC KIDNEY DISEASE (HCC): ICD-10-CM

## 2022-07-27 DIAGNOSIS — K21.9 GASTROESOPHAGEAL REFLUX DISEASE WITHOUT ESOPHAGITIS: ICD-10-CM

## 2022-07-27 DIAGNOSIS — I10 ESSENTIAL HYPERTENSION, BENIGN: Primary | ICD-10-CM

## 2022-07-27 DIAGNOSIS — I27.82 CHRONIC PULMONARY EMBOLISM WITHOUT ACUTE COR PULMONALE, UNSPECIFIED PULMONARY EMBOLISM TYPE (HCC): ICD-10-CM

## 2022-07-27 PROCEDURE — G8417 CALC BMI ABV UP PARAM F/U: HCPCS | Performed by: INTERNAL MEDICINE

## 2022-07-27 PROCEDURE — 3017F COLORECTAL CA SCREEN DOC REV: CPT | Performed by: INTERNAL MEDICINE

## 2022-07-27 PROCEDURE — 1101F PT FALLS ASSESS-DOCD LE1/YR: CPT | Performed by: INTERNAL MEDICINE

## 2022-07-27 PROCEDURE — G8427 DOCREV CUR MEDS BY ELIG CLIN: HCPCS | Performed by: INTERNAL MEDICINE

## 2022-07-27 PROCEDURE — 1090F PRES/ABSN URINE INCON ASSESS: CPT | Performed by: INTERNAL MEDICINE

## 2022-07-27 PROCEDURE — 1123F ACP DISCUSS/DSCN MKR DOCD: CPT | Performed by: INTERNAL MEDICINE

## 2022-07-27 PROCEDURE — G9899 SCRN MAM PERF RSLTS DOC: HCPCS | Performed by: INTERNAL MEDICINE

## 2022-07-27 PROCEDURE — G8536 NO DOC ELDER MAL SCRN: HCPCS | Performed by: INTERNAL MEDICINE

## 2022-07-27 PROCEDURE — G8399 PT W/DXA RESULTS DOCUMENT: HCPCS | Performed by: INTERNAL MEDICINE

## 2022-07-27 PROCEDURE — G8754 DIAS BP LESS 90: HCPCS | Performed by: INTERNAL MEDICINE

## 2022-07-27 PROCEDURE — G8510 SCR DEP NEG, NO PLAN REQD: HCPCS | Performed by: INTERNAL MEDICINE

## 2022-07-27 PROCEDURE — G8753 SYS BP > OR = 140: HCPCS | Performed by: INTERNAL MEDICINE

## 2022-07-27 PROCEDURE — 99213 OFFICE O/P EST LOW 20 MIN: CPT | Performed by: INTERNAL MEDICINE

## 2022-07-27 RX ORDER — LOSARTAN POTASSIUM 100 MG/1
100 TABLET ORAL DAILY
Qty: 90 TABLET | Refills: 3 | Status: SHIPPED | OUTPATIENT
Start: 2022-07-27

## 2022-07-27 RX ORDER — GLUCOSAMINE SULFATE 1500 MG
1 POWDER IN PACKET (EA) ORAL
COMMUNITY

## 2022-07-27 RX ORDER — LIDOCAINE 50 MG/G
PATCH TOPICAL
Qty: 90 EACH | Refills: 3 | Status: SHIPPED | OUTPATIENT
Start: 2022-07-27

## 2022-07-27 RX ORDER — DIPHENHYDRAMINE HCL 25 MG
25 CAPSULE ORAL
COMMUNITY

## 2022-07-27 RX ORDER — NIFEDIPINE 30 MG/1
30 TABLET, FILM COATED, EXTENDED RELEASE ORAL DAILY
Qty: 90 TABLET | Refills: 5 | Status: SHIPPED | OUTPATIENT
Start: 2022-07-27 | End: 2022-08-24

## 2022-07-27 RX ORDER — NIFEDIPINE 30 MG/1
30 TABLET, FILM COATED, EXTENDED RELEASE ORAL DAILY
Qty: 30 TABLET | Refills: 5 | Status: SHIPPED | OUTPATIENT
Start: 2022-07-27 | End: 2022-08-11

## 2022-07-27 NOTE — PROGRESS NOTES
SPORTS MEDICINE AND PRIMARY CARE  Caron Byrne MD, [address] [address]  Phone:  [phone]  Fax: [phone]      Chief Complaint   Patient presents with    Follow-up     Medication (losartan) + INR review          SUBECTIVE:    Florencio Hoover is a 74 y.o. female Patient returns today following sending us a MyChart note with challenges with her blood pressure. She has a known history of primary hypertension, chronic pain syndrome, COPD, CKD stage 3A, dyslipidemia, GERD and is seen for evaluation. Current Outpatient Medications   Medication Sig Dispense Refill    cholecalciferol (VITAMIN D3) 25 mcg (1,000 unit) cap Take 1 Tablet by mouth. diphenhydrAMINE (BENADRYL) 25 mg capsule Take 25 mg by mouth every six (6) hours as needed. losartan (COZAAR) 50 mg tablet Take 1 Tablet by mouth daily. 90 Tablet 3    warfarin (COUMADIN) 2.5 mg tablet TAKE 1 TO 2 TABLETS DAILY 180 Tablet 3    dextromethorphan-guaiFENesin (Robitussin Cough-Chest Alfredo DM) 5-100 mg/5 mL liqd Take 5 mL by mouth four (4) times daily as needed for Cough or Congestion. 177 mL 5    ondansetron hcl (Zofran) 4 mg tablet Take 1 Tablet by mouth every eight (8) hours as needed for Nausea. 20 Tablet 0    bisoprolol-hydroCHLOROthiazide (ZIAC) 10-6.25 mg per tablet Take 1 Tablet by mouth daily. 90 Tablet 3    predniSONE (DELTASONE) 20 mg tablet Take 40 mg by mouth daily (with breakfast). For 5 days as need for gout atack 10 Tablet 5    traZODone (DESYREL) 50 mg tablet TAKE 1 TABLET NIGHTLY AS NEEDED FOR SLEEP 90 Tab 4    lidocaine (LIDODERM) 5 % Apply patch to the affected area for 12 hours a day and remove for 12 hours a day. DX.M79.7 90 Each 3    LACTOBACILLUS ACIDOPHILUS (PROBIOTIC PO) Take 1 Cap by mouth as needed. carica papaya tab Take  by mouth. Takes 4 tabs po 1-2 times daily after meals.       ONETOUCH ULTRA2 monitoring kit       MICROLET LANCET misc USE TO TEST BLOOD SUGAR EVERY  Each 11 cyanocobalamin 1,000 mcg tablet Take 1,000 mcg by mouth as needed. dicyclomine (BENTYL) 10 mg capsule Take 1 Capsule by mouth four (4) times daily.  120 Capsule 11    ergocalciferol (ERGOCALCIFEROL) 1,250 mcg (50,000 unit) capsule TAKE 1 CAPSULE EVERY 7 DAYS (Patient not taking: Reported on 2022) 12 Capsule 3    rosuvastatin (CRESTOR) 10 mg tablet TAKE ONE TABLET BY MOUTH EVERY DAY - TO LOWER CHOLESTEROL (Patient not taking: Reported on 2022) 30 Tablet 11     Past Medical History:   Diagnosis Date    Adrenal nodule ([facility] [address] 2022    Indeterminate 13 mm right adrenal nodule    Adverse effect of anesthesia     \"hard time waking me up\"    Axillary pain, right     Bereavement 2019    79 yo - heart attack -  in sleep    Chronic pain     d/t fibromyalgia    CKD (chronic kidney disease), stage III ([address] 2020    Coagulation disorder ([address]     on eliquis -d/c due to gi upset - now on coumadin previously followed by dr. Eneida [facility]    Depression with anxiety     Diverticulosis     DJD (degenerative joint disease)     Dyslipidemia     Encounter for Hemoccult screening 2017    neg    Epicondylitis, lateral, right 2021    Fibromyalgia     Gait instability     GERD (gastroesophageal reflux disease)     Hepatic steatosis 2022    Hepatic steatosis 2022    Hypertension     IBS (irritable bowel syndrome)     Ill-defined condition     gastroparesis    Ill-defined condition     CT - 3/18/2018 - esophageal diverticulum    Ill-defined condition     pericarditis    Mastodynia, left greater than right 2011    Menopause     Normal cardiac stress test 3.14    Positive D dimer 9-23-15    Prediabetes     Preventative health care 2021    Pulmonary embolism ([facility] [address] 2013    rll    Restless leg syndrome     S/P colonoscopy 2011    kenny hernandez md    S/P colonoscopy 04/10/2018    Beena Diaz MD repeat5 yrs    S/P colonoscopy 2022    Mike-Wes, MD Merary - 5 yrs    Saphenous vein occlusion, 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    [facility] acute occlusive superificial vein thrombosis - Melody Dyer md    Saphenous vein thrombophlebitis, right 02/2018    and acute l posterior tibial vein - this is the 2nd DVT putting her on lifelong anticoagualtion / [place] Allen    Sleep apnea     cpap 9cm    SOB (shortness of breath)     White coat hypertension      Past Surgical History:   Procedure Laterality Date    COLONOSCOPY N/A 4/10/2018    COLONOSCOPY,EGD performed by Moriah Cruz MD at [address] [place] [address] ENDOSCOPY    COLONOSCOPY N/A 5/5/2022    COLONOSCOPY performed by Clarisa Sifuentes MD at [address] [place] [address] ENDOSCOPY    HX BREAST BIOPSY Left     HX COLONOSCOPY      HX ORTHOPAEDIC  2009    foot surgery-left - bunionectomy    HX ORTHOPAEDIC      cyst removed from left hand - ganglion cyst    HX OTHER SURGICAL      right and left leg muscle biopsies - elevated CPK - muscle enzymes were elevated    HX TUBAL LIGATION      [place] ABDOMEN SURGERY PROC UNLISTED      exploratory years ago    [address]  2006    left breast [id number]. Rubin Severe     Allergies   Allergen Reactions    Aleve [Naproxen Sodium] Hoarseness    Dilaudid [Hydromorphone (Bulk)] Anaphylaxis     Respiratory arrest and throat closes    Crestor [Rosuvastatin] Nausea Only    Diovan [Valsartan] Palpitations    Morphine Other (comments)     Patch-vomiting,weakness, fainting    Sulfa (Sulfonamide Antibiotics) Hives, Rash and Other (comments)     fainting       REVIEW OF SYSTEMS:   No visual disturbances, no headaches. Social History     Socioeconomic History    Marital status:    Tobacco Use    Smoking status: Never    Smokeless tobacco: Never   Vaping Use    Vaping Use: Never used   Substance and Sexual Activity    Alcohol use: No    Drug use: No    Sexual activity: Yes     Partners: Male     Birth control/protection: None   Social History Narrative         Family History:  Mother: alive 80 yrs, High Blood Pressure, cva with cognitive deficitsFather:  36 yrs, myocardial infarctionSister(s): aliveBrother(s): unknow n2    sister(s) . 40 daughter no choildren -  walked out works for board of directors for [employer] . Social History: Alcohol Use Patient does not use alcohol. Smoking Status Patient is a never smoker. Caffeine: occasional. Drugs:    prescription narcotics. Diet: Regular. Exercise: None. Marital Status: . Lives w ith: spouse. Children: children. Alevism: [employer] [place]. Patient is  living w ith her  she is on disability related to her fibromyositis. r  Family History   Problem Relation Age of Onset    Hypertension Mother     Kidney Disease Mother         1 kidney    Heart Disease Father     Heart Attack Father         late 35s or early 45s    Heart Attack Maternal Grandmother     Cancer Maternal Grandfather         ? lung or stomach       OBJECTIVE:  Visit Vitals  BP (!) 191/81 (BP 1 Location: Left upper arm, BP Patient Position: Sitting, BP Cuff Size: Adult)   Pulse (!) 57   Temp 98 °F (36.7 °C) (Oral)   Resp 16   Ht 5' 2\" (1.575 m)   Wt 178 lb 9.6 oz (81 kg)   SpO2 97%   BMI 32.67 kg/m²     ENT: perrla,  eom intact  NECK: supple.  Thyroid normal  CHEST: clear to ascultation and percussion   HEART: regular rate and rhythm  ABD: soft, bowel sounds active  EXTREMITIES: no edema, pulse 1+     Office Visit on 06/15/2022   Component Date Value Ref Range Status    Hemoglobin A1c 06/15/2022 6.0 (A) 4.0 - 5.6 % Final    Comment: NEW METHOD  PLEASE NOTE NEW REFERENCE RANGE  (NOTE)  HbA1C Interpretive Ranges  <5.7              Normal  5.7 - 6.4         Consider Prediabetes  >6.5              Consider Diabetes      Est. average glucose 06/15/2022 126  mg/dL Final    Sodium 06/15/2022 141  136 - 145 mmol/L Final    Potassium 06/15/2022 3.7  3.5 - 5.1 mmol/L Final    Chloride 06/15/2022 107  97 - 108 mmol/L Final    CO2 06/15/2022 29  21 - 32 mmol/L Final    Anion gap 06/15/2022 5  5 - 15 mmol/L Final    Glucose 06/15/2022 102 (A) 65 - 100 mg/dL Final    BUN 06/15/2022 20  6 - 20 MG/DL Final    Creatinine 06/15/2022 1.30 (A) 0.55 - 1.02 MG/DL Final    BUN/Creatinine ratio 06/15/2022 15  12 - 20   Final    GFR est AA 06/15/2022 49 (A) >60 ml/min/1.73m2 Final    GFR est non-AA 06/15/2022 41 (A) >60 ml/min/1.73m2 Final    Estimated GFR is calculated using the IDMS-traceable Modification of Diet in Renal Disease (MDRD) Study equation, reported for both  Americans (GFRAA) and non- Americans (GFRNA), and normalized to 1.73m2 body surface area. The physician must decide which value applies to the patient. Calcium 06/15/2022 9.7  8.5 - 10.1 MG/DL Final    Phosphorus 06/15/2022 2.8  2.6 - 4.7 MG/DL Final    Albumin 06/15/2022 3.8  3.5 - 5.0 g/dL Final   Clinical Support on 05/13/2022   Component Date Value Ref Range Status    VALID INTERNAL CONTROL POC 05/13/2022 Yes   Final    Prothrombin time (POC) 05/13/2022 19.4  seconds Final    INR POC 05/13/2022 1.6   Final   Clinical Support on 05/04/2022   Component Date Value Ref Range Status    VALID INTERNAL CONTROL POC 05/04/2022 Yes   Final    Prothrombin time (POC) 05/04/2022 2.7  seconds Final    INR POC 05/04/2022 32.6   Final          ASSESSMENT:  1. Essential hypertension, benign    2. Chronic pain syndrome    3. Chronic pulmonary embolism without acute cor pulmonale, unspecified pulmonary embolism type (HCC)    4. Stage 3a chronic kidney disease ([address]    5. Dyslipidemia    6. Saphenous vein occlusion, right    7. Gastroesophageal reflux disease without esophagitis      Blood pressure control is lacking, repeat blood pressure is down to 180/84. She stopped the Amlodipine because she thought it was causing her to have some problems with her legs. She feels it is improved since she stopped the medication. We will replace the Amlodipine with Nifedipine.   Will start at 30 mg and gradually increase the dose. In addition, we will increase the dose of Losartan to 100 mg daily and continue the current dose of Ziac. She agrees with that plan. She will check her blood pressure at home and report to us the results. Chronic _______________ (inaudible) are little changed. I suspect that was the reason for the discomfort in her legs. I do not think it had anything to do with the Amlodipine, but she believes it did. INR is therapeutic, no adjustments will be made in the Coumadin. CKD stage 3A. Wonders if she should take something else for the CKD and we mention the drug [facility]. She is also taking a medication for right venous saphenous vein occlusion. No symptoms related to GERD. She will come back in one month for a P-T check. I have discussed the diagnosis with the patient and the intended plan as seen in the  orders above. The patient understands and agees with the plan. The patient has   received an after visit summary and questions were answered concerning  future plans  Patient labs and/or xrays were reviewed  Past records were reviewed. PLAN:  .  Orders Placed This Encounter    cholecalciferol (VITAMIN D3) 25 mcg (1,000 unit) cap    diphenhydrAMINE (BENADRYL) 25 mg capsule                     ATTENTION:   This medical record was transcribed using an electronic medical records system. Although proofread, it may and can contain electronic and spelling errors. Other human spelling and other errors may be present. Corrections may be executed at a later time. Please feel free to contact us for any clarifications as needed.

## 2022-07-27 NOTE — PROGRESS NOTES
Chief Complaint   Patient presents with    Follow-up     Medication (losartan) + INR review           Vitals:    07/27/22 1051   BP: (!) 191/81   Pulse: (!) 57   Resp: 16   Temp: 98 °F (36.7 °C)   TempSrc: Oral   SpO2: 97%   Weight: 178 lb 9.6 oz (81 kg)   Height: 5' 2\" (1.575 m)   PainSc:   0 - No pain       Current Outpatient Medications on File Prior to Visit   Medication Sig Dispense Refill    cholecalciferol (VITAMIN D3) 25 mcg (1,000 unit) cap Take 1 Tablet by mouth. diphenhydrAMINE (BENADRYL) 25 mg capsule Take 25 mg by mouth every six (6) hours as needed. losartan (COZAAR) 50 mg tablet Take 1 Tablet by mouth daily. 90 Tablet 3    warfarin (COUMADIN) 2.5 mg tablet TAKE 1 TO 2 TABLETS DAILY 180 Tablet 3    dextromethorphan-guaiFENesin (Robitussin Cough-Chest Alfredo DM) 5-100 mg/5 mL liqd Take 5 mL by mouth four (4) times daily as needed for Cough or Congestion. 177 mL 5    ondansetron hcl (Zofran) 4 mg tablet Take 1 Tablet by mouth every eight (8) hours as needed for Nausea. 20 Tablet 0    bisoprolol-hydroCHLOROthiazide (ZIAC) 10-6.25 mg per tablet Take 1 Tablet by mouth daily. 90 Tablet 3    predniSONE (DELTASONE) 20 mg tablet Take 40 mg by mouth daily (with breakfast). For 5 days as need for gout atack 10 Tablet 5    traZODone (DESYREL) 50 mg tablet TAKE 1 TABLET NIGHTLY AS NEEDED FOR SLEEP 90 Tab 4    lidocaine (LIDODERM) 5 % Apply patch to the affected area for 12 hours a day and remove for 12 hours a day. DX.M79.7 90 Each 3    LACTOBACILLUS ACIDOPHILUS (PROBIOTIC PO) Take 1 Cap by mouth as needed. carica papaya tab Take  by mouth. Takes 4 tabs po 1-2 times daily after meals. ONETOUCH ULTRA2 monitoring kit       MICROLET LANCET misc USE TO TEST BLOOD SUGAR EVERY  Each 11    cyanocobalamin 1,000 mcg tablet Take 1,000 mcg by mouth as needed. dicyclomine (BENTYL) 10 mg capsule Take 1 Capsule by mouth four (4) times daily.  120 Capsule 11    ergocalciferol (ERGOCALCIFEROL) 1,250 mcg (50,000 unit) capsule TAKE 1 CAPSULE EVERY 7 DAYS (Patient not taking: Reported on 7/27/2022) 12 Capsule 3    rosuvastatin (CRESTOR) 10 mg tablet TAKE ONE TABLET BY MOUTH EVERY DAY - TO LOWER CHOLESTEROL (Patient not taking: Reported on 7/27/2022) 30 Tablet 11     No current facility-administered medications on file prior to visit. Health Maintenance Due   Topic    Shingrix Vaccine Age 50> (1 of 2)    Pneumococcal 65+ years (1 - PCV)    COVID-19 Vaccine (4 - Booster for Landeros Peter series)       1. \"Have you been to the ER, urgent care clinic since your last visit? Hospitalized since your last visit? \" No    2. \"Have you seen or consulted any other health care providers outside of the [address] [place] [address] since your last visit? \" No     3. For patients aged 39-70: Has the patient had a colonoscopy / FIT/ Cologuard? Yes - no Care Gap present      If the patient is female:    4. For patients aged 41-77: Has the patient had a mammogram within the past 2 years? Yes - no Care Gap present      5. For patients aged 21-65: Has the patient had a pap smear?  NA - based on age or sex

## 2022-08-11 ENCOUNTER — OFFICE VISIT (OUTPATIENT)
Dept: SLEEP MEDICINE | Age: 67
End: 2022-08-11
Payer: MEDICARE

## 2022-08-11 VITALS
OXYGEN SATURATION: 99 % | WEIGHT: 178.2 LBS | RESPIRATION RATE: 20 BRPM | SYSTOLIC BLOOD PRESSURE: 146 MMHG | DIASTOLIC BLOOD PRESSURE: 78 MMHG | BODY MASS INDEX: 32.79 KG/M2 | TEMPERATURE: 98.1 F | HEIGHT: 62 IN | HEART RATE: 51 BPM

## 2022-08-11 DIAGNOSIS — G47.33 OBSTRUCTIVE SLEEP APNEA (ADULT) (PEDIATRIC): Primary | ICD-10-CM

## 2022-08-11 DIAGNOSIS — E66.9 OBESITY (BMI 30.0-34.9): ICD-10-CM

## 2022-08-11 DIAGNOSIS — I10 ESSENTIAL HYPERTENSION, BENIGN: ICD-10-CM

## 2022-08-11 PROCEDURE — G8753 SYS BP > OR = 140: HCPCS | Performed by: INTERNAL MEDICINE

## 2022-08-11 PROCEDURE — 99213 OFFICE O/P EST LOW 20 MIN: CPT | Performed by: INTERNAL MEDICINE

## 2022-08-11 PROCEDURE — 1090F PRES/ABSN URINE INCON ASSESS: CPT | Performed by: INTERNAL MEDICINE

## 2022-08-11 PROCEDURE — G8427 DOCREV CUR MEDS BY ELIG CLIN: HCPCS | Performed by: INTERNAL MEDICINE

## 2022-08-11 PROCEDURE — G8536 NO DOC ELDER MAL SCRN: HCPCS | Performed by: INTERNAL MEDICINE

## 2022-08-11 PROCEDURE — G9899 SCRN MAM PERF RSLTS DOC: HCPCS | Performed by: INTERNAL MEDICINE

## 2022-08-11 PROCEDURE — G8399 PT W/DXA RESULTS DOCUMENT: HCPCS | Performed by: INTERNAL MEDICINE

## 2022-08-11 PROCEDURE — 1123F ACP DISCUSS/DSCN MKR DOCD: CPT | Performed by: INTERNAL MEDICINE

## 2022-08-11 PROCEDURE — 1101F PT FALLS ASSESS-DOCD LE1/YR: CPT | Performed by: INTERNAL MEDICINE

## 2022-08-11 PROCEDURE — 3017F COLORECTAL CA SCREEN DOC REV: CPT | Performed by: INTERNAL MEDICINE

## 2022-08-11 PROCEDURE — G8417 CALC BMI ABV UP PARAM F/U: HCPCS | Performed by: INTERNAL MEDICINE

## 2022-08-11 PROCEDURE — G8754 DIAS BP LESS 90: HCPCS | Performed by: INTERNAL MEDICINE

## 2022-08-11 PROCEDURE — G8432 DEP SCR NOT DOC, RNG: HCPCS | Performed by: INTERNAL MEDICINE

## 2022-08-11 NOTE — PROGRESS NOTES
[address] [place] [place], [address]  Tel.  [phone]  Fax. [address] [facility] [address]  [place], [address]  Tel.  [phone]  Fax. [phone] [address] [facility] [place]   Tel.  [phone]  Fax. [phone]     S>Lisa WICK Ayden Kwong is a 74 y.o. female seen for a positive airway pressure follow-up. She reports no problems using the device. The following problems are identified:    Drowsiness no Problems exhaling no   Snoring No, but  says she is breathing  funny when on her back  Forget to put on no   Mask Comfortable yes Can't fall asleep no   Dry Mouth no Mask falls off no   Air Leaking no Frequent awakenings no     Download reviewed. She admits that her sleep has improved. Therapy Apnea Index averaged over PAP use: 0.6 /hr which reflects improved sleep breathing condition. Allergies   Allergen Reactions    Aleve [Naproxen Sodium] Hoarseness    Dilaudid [Hydromorphone (Bulk)] Anaphylaxis     Respiratory arrest and throat closes    Crestor [Rosuvastatin] Nausea Only    Diovan [Valsartan] Palpitations    Morphine Other (comments)     Patch-vomiting,weakness, fainting    Sulfa (Sulfonamide Antibiotics) Hives, Rash and Other (comments)     fainting       She has a current medication list which includes the following prescription(s): cholecalciferol, diphenhydramine, lidocaine, losartan, nifedipine er, warfarin, robitussin cough-chest malgorzata dm, ondansetron hcl, bisoprolol-hydrochlorothiazide, prednisone, trazodone, lactobacillus acidophilus, carica papaya, onetouch ultra2 meter, microlet lancet, cyanocobalamin, nifedipine er, and dicyclomine. .      She  has a past medical history of Adrenal nodule ([facility] (02/16/2022), Adverse effect of anesthesia, Axillary pain, right, Bereavement (03/06/2019), Chronic pain, CKD (chronic kidney disease), stage III ([address] (08/20/2020), Coagulation disorder ([facility] Depression with anxiety, Diverticulosis (2018), DJD (degenerative joint disease), Dyslipidemia, Encounter for Hemoccult screening (06/28/2017), Epicondylitis, lateral, right (03/03/2021), Fibromyalgia, Gait instability, GERD (gastroesophageal reflux disease), Hepatic steatosis (02/16/2022), Hepatic steatosis (02/16/2022), Hypertension, IBS (irritable bowel syndrome), Ill-defined condition, Ill-defined condition, Ill-defined condition, Mastodynia, left greater than right (8/17/2011), Menopause, Normal cardiac stress test (3.14), Positive D dimer (9-23-15), Prediabetes, Preventative health care (03/03/2021), Pulmonary embolism ([facility] (03/2013), Restless leg syndrome, S/P colonoscopy (01/27/2011), S/P colonoscopy (04/10/2018), S/P colonoscopy (05/05/2022), Saphenous vein occlusion, 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 [place] Sleepiness Score: 7   and Modified F.O.S.Q. Score Total / 2: 14   which reflect improved sleep quality over therapy time. O>    Visit Vitals  BP (!) 146/78 (BP 1 Location: Right arm, BP Patient Position: Sitting, BP Cuff Size: Large adult)   Pulse (!) 51   Temp 98.1 °F (36.7 °C) (Temporal)   Resp 20   Ht 5' 2\" (1.575 m)   Wt 178 lb 3.2 oz (80.8 kg)   SpO2 99%   BMI 32.59 kg/m²           General:   Alert, oriented, not in distress   Neck:   No JVD    Chest/Lungs:  symetrical lung expansion , no accessory muscle use    Extremities:  no obvious rashes , negative edema    Neuro:  No focal deficits ; No obvious tremor    Psych:  Normal affect ,  Normal countenance ;         A>    ICD-10-CM ICD-9-CM    1. Obstructive sleep apnea (adult) (pediatric)  G47.33 327.23 AMB SUPPLY ORDER      2. Essential hypertension, benign  I10 401.1       3. Obesity (BMI 30.0-34. 9)  E66.9 278.00          On CPAP, Resmed :  9-13 cmH2O.     Compliant:      yes    Therapeutic Response:  Positive    P>      *   Follow-up and Dispositions    Return in about 1 year (around 8/11/2023). change setting to 10-13 cmH20  I have ordered replacement supplies    * She was asked to contact our office for any problems regarding PAP therapy. * Counseling was provided regarding the importance of regular PAP use and on proper sleep hygiene and safe driving. * Re-enforced proper and regular cleaning for the device. 2. Hypertension - she continues on her current regimen. I have reviewed the relationship between hypertension as it relates to sleep-disordered breathing. 3. Obesity - I have counseled the patient regarding the benefits of weight loss.     Electronically signed by    Tim Clarke MD  Diplomate in Sleep Medicine  [facility]  8/11/2022

## 2022-08-11 NOTE — PATIENT INSTRUCTIONS
[address] [facility] [place] [place], [address]  Tel.  [phone]  Fax. 100 [facility] 60  [place], [address] [facility]  Tel.  [phone]  Fax. [phone] [address]  Tel.  [phone]  Fax. [phone]     PROPER SLEEP HYGIENE    What to avoid  Do not have drinks with caffeine, such as coffee or black tea, for 8 hours before bed. Do not smoke or use other types of tobacco near bedtime. Nicotine is a stimulant and can keep you awake. Avoid drinking alcohol late in the evening, because it can cause you to wake in the middle of the night. Do not eat a big meal close to bedtime. If you are hungry, eat a light snack. Do not drink a lot of water close to bedtime, because the need to urinate may wake you up during the night. Do not read or watch TV in bed. Use the bed only for sleeping and sexual activity. What to try  Go to bed at the same time every night, and wake up at the same time every morning. Do not take naps during the day. Keep your bedroom quiet, dark, and cool. Get regular exercise, but not within 3 to 4 hours of your bedtime. .  Sleep on a comfortable pillow and mattress. If watching the clock makes you anxious, turn it facing away from you so you cannot see the time. If you worry when you lie down, start a worry book. Well before bedtime, write down your worries, and then set the book and your concerns aside. Try meditation or other relaxation techniques before you go to bed. If you cannot fall asleep, get up and go to another room until you feel sleepy. Do something relaxing. Repeat your bedtime routine before you go to bed again. Make your house quiet and calm about an hour before bedtime. Turn down the lights, turn off the TV, log off the computer, and turn down the volume on music. This can help you relax after a busy day.     Drowsy Driving  The [address] [place] [address] Road Traffic Safety Administration cites drowsiness as a causing factor in more than 492,746 police reported crashes annually, resulting in 76,000 injuries and 1,500 deaths. Other surveys suggest 55% of people polled have driven while drowsy in the past year, 23% had fallen asleep but not crashed, 3% crashed, and 2% had and accident due to drowsy driving. Who is at risk? Young Drivers: One study of drowsy driving accidents states that 55% of the drivers were under 25 years. Of those, 75% were male. Shift Workers and Travelers: People who work overnight or travel across time zones frequently are at higher risk of experiencing Circadian Rhythm Disorders. They are trying to work and function when their body is programed to sleep. Sleep Deprived: Lack of sleep has a serious impact on your ability to pay attention or focus on a task. Consistently getting less than the average of 8 hours your body needs creates partial or cumulative sleep deprivation. Untreated Sleep Disorders: Sleep Apnea, Narcolepsy, R.L.S., and other sleep disorders (untreated) prevent a person from getting enough restful sleep. This leads to excessive daytime sleepiness and increases the risk for drowsy driving accidents by up to 7 times. Medications / Alcohol: Even over the counter medications can cause drowsiness. Medications that impair a drivers attention should have a warning label. Alcohol naturally makes you sleepy and on its own can cause accidents. Combined with excessive drowsiness its effects are amplified. Signs of Drowsy Driving:   * You don't remember driving the last few miles   * You may drift out of your abrahan   * You are unable to focus and your thoughts wander   * You may yawn more often than normal   * You have difficulty keeping your eyes open / nodding off   * Missing traffic signs, speeding, or tailgating  Prevention-   Good sleep hygiene, lifestyle and behavioral choices have the most impact on drowsy driving.  There is no substitute for sleep and the average person requires 8 hours nightly. If you find yourself driving drowsy, stop and sleep. Consider the sleep hygiene tips provided during your visit as well. Medication Refill Policy: Refills for all medications require 1 week advance notice. Please have your pharmacy fax a refill request. We are unable to fax, or call in \"controled substance\" medications and you will need to pick these prescriptions up from our office. [employer] Activation    Thank you for requesting access to [employer]. Please follow the instructions below to securely access and download your online medical record. [employer] allows you to send messages to your doctor, view your test results, renew your prescriptions, schedule appointments, and more. How Do I Sign Up? In your internet browser, go to [email or web address]. Click on the First Time User? Click Here link in the Sign In box. You will see the New Member Sign Up page. Enter your [employer] Access Code exactly as it appears below. You will not need to use this code after youve completed the sign-up process. If you do not sign up before the expiration date, you must request a new code. [employer] Access Code: Activation code not generated  Current [employer] Status: Active (This is the date your Trueffect access code will )    Enter the last four digits of your Social Security Number (xxxx) and Date of Birth (mm/dd/yyyy) as indicated and click Submit. You will be taken to the next sign-up page. Create a [employer]ffect ID. This will be your [employer] login ID and cannot be changed, so think of one that is secure and easy to remember. Create a [employer] password. You can change your password at any time. Enter your Password Reset Question and Answer. This can be used at a later time if you forget your password. Enter your e-mail address. You will receive e-mail notification when new information is available in [address] Click Sign Up.  You can now view and download portions of your medical record. Click the [employer] link to download a portable copy of your medical information. Additional Information    If you have questions, please call [phone]. Remember, [employer] is NOT to be used for urgent needs. For medical emergencies, dial 911.

## 2022-08-15 ENCOUNTER — TELEPHONE (OUTPATIENT)
Dept: SLEEP MEDICINE | Age: 67
End: 2022-08-15

## 2022-08-15 NOTE — TELEPHONE ENCOUNTER
Patient called and asked to speak with a tech regarding a settings change on her machine. She said the numbers are not reflecting the change. She can be reached at [phone].

## 2022-08-24 ENCOUNTER — CLINICAL SUPPORT (OUTPATIENT)
Dept: INTERNAL MEDICINE CLINIC | Age: 67
End: 2022-08-24
Payer: MEDICARE

## 2022-08-24 VITALS — SYSTOLIC BLOOD PRESSURE: 146 MMHG | DIASTOLIC BLOOD PRESSURE: 84 MMHG

## 2022-08-24 DIAGNOSIS — I10 ESSENTIAL HYPERTENSION, BENIGN: ICD-10-CM

## 2022-08-24 DIAGNOSIS — D68.9 COAGULATION DEFECT (HCC): Primary | ICD-10-CM

## 2022-08-24 LAB
INR BLD: 1.9
PT POC: 23.1 SECONDS
VALID INTERNAL CONTROL?: YES

## 2022-08-24 PROCEDURE — 85610 PROTHROMBIN TIME: CPT | Performed by: INTERNAL MEDICINE

## 2022-08-24 RX ORDER — NIFEDIPINE 60 MG/1
60 TABLET, EXTENDED RELEASE ORAL DAILY
Qty: 90 TABLET | Refills: 3 | Status: SHIPPED | OUTPATIENT
Start: 2022-08-24

## 2022-08-24 NOTE — PROGRESS NOTES
Chief Complaint   Patient presents with    Anticoagulation    Blood Pressure Check     Blood pressure (!) 146/84. Coco De is a 74 y.o. female who presents today for Anticoagulation monitoring. Indication: Atrial Fibrillation  INR Goal: 2.0-3.0. Current dose:  Coumadin 5mg daily. Missed Coumadin Doses:  None  Medication Changes:  no  Dietary Changes:  no    Symptoms: taking coumadin appropriately without any bleeding. Latest INRs:  Lab Results   Component Value Date/Time    INR 1.9 (H) 02/16/2022 02:56 PM    INR 1.6 (H) 11/07/2018 01:20 PM    INR 1.7 (H) 04/02/2014 03:53 AM    INR, External 4.6 10/15/2021 12:00 AM    INR, External 1.6 09/02/2014 12:00 AM    INR POC 1.9 08/24/2022 11:00 AM    INR POC 1.6 05/13/2022 10:00 AM    INR POC 32.6 05/04/2022 09:31 AM    Prothrombin time 18.9 (H) 02/16/2022 02:56 PM    Prothrombin time 15.7 (H) 11/07/2018 01:20 PM    Prothrombin time 16.9 (H) 04/02/2014 03:53 AM        New Coumadin dose:.current treatment plan is effective, no change in therapy. Next check to be scheduled for  3 weeks.

## 2022-08-29 ENCOUNTER — DOCUMENTATION ONLY (OUTPATIENT)
Dept: SLEEP MEDICINE | Age: 67
End: 2022-08-29

## 2022-09-14 ENCOUNTER — OFFICE VISIT (OUTPATIENT)
Dept: INTERNAL MEDICINE CLINIC | Age: 67
End: 2022-09-14
Payer: MEDICARE

## 2022-09-14 VITALS
RESPIRATION RATE: 18 BRPM | SYSTOLIC BLOOD PRESSURE: 137 MMHG | HEIGHT: 62 IN | HEART RATE: 57 BPM | DIASTOLIC BLOOD PRESSURE: 82 MMHG | WEIGHT: 180.1 LBS | TEMPERATURE: 98 F | OXYGEN SATURATION: 98 % | BODY MASS INDEX: 33.14 KG/M2

## 2022-09-14 DIAGNOSIS — M79.7 FIBROMYOSITIS: ICD-10-CM

## 2022-09-14 DIAGNOSIS — E78.5 DYSLIPIDEMIA: ICD-10-CM

## 2022-09-14 DIAGNOSIS — Z23 ENCOUNTER FOR IMMUNIZATION: ICD-10-CM

## 2022-09-14 DIAGNOSIS — R73.02 IGT (IMPAIRED GLUCOSE TOLERANCE): ICD-10-CM

## 2022-09-14 DIAGNOSIS — K21.9 GASTROESOPHAGEAL REFLUX DISEASE WITHOUT ESOPHAGITIS: ICD-10-CM

## 2022-09-14 DIAGNOSIS — I27.82 CHRONIC PULMONARY EMBOLISM WITHOUT ACUTE COR PULMONALE, UNSPECIFIED PULMONARY EMBOLISM TYPE (HCC): ICD-10-CM

## 2022-09-14 DIAGNOSIS — K76.0 HEPATIC STEATOSIS: ICD-10-CM

## 2022-09-14 DIAGNOSIS — I10 ESSENTIAL HYPERTENSION, BENIGN: Primary | ICD-10-CM

## 2022-09-14 DIAGNOSIS — N18.31 STAGE 3A CHRONIC KIDNEY DISEASE (HCC): ICD-10-CM

## 2022-09-14 DIAGNOSIS — G89.4 CHRONIC PAIN SYNDROME: ICD-10-CM

## 2022-09-14 DIAGNOSIS — E66.9 CLASS 1 OBESITY WITH BODY MASS INDEX (BMI) OF 33.0 TO 33.9 IN ADULT, UNSPECIFIED OBESITY TYPE, UNSPECIFIED WHETHER SERIOUS COMORBIDITY PRESENT: ICD-10-CM

## 2022-09-14 DIAGNOSIS — K58.9 IRRITABLE BOWEL SYNDROME WITHOUT DIARRHEA: ICD-10-CM

## 2022-09-14 DIAGNOSIS — I82.811: ICD-10-CM

## 2022-09-14 LAB
ALBUMIN SERPL-MCNC: 4.1 G/DL (ref 3.5–5)
ANION GAP SERPL CALC-SCNC: 3 MMOL/L (ref 5–15)
BUN SERPL-MCNC: 22 MG/DL (ref 6–20)
BUN/CREAT SERPL: 19 (ref 12–20)
CALCIUM SERPL-MCNC: 9.5 MG/DL (ref 8.5–10.1)
CHLORIDE SERPL-SCNC: 109 MMOL/L (ref 97–108)
CHOLEST SERPL-MCNC: 212 MG/DL
CO2 SERPL-SCNC: 27 MMOL/L (ref 21–32)
CREAT SERPL-MCNC: 1.17 MG/DL (ref 0.55–1.02)
EST. AVERAGE GLUCOSE BLD GHB EST-MCNC: 123 MG/DL
GLUCOSE SERPL-MCNC: 94 MG/DL (ref 65–100)
HBA1C MFR BLD: 5.9 % (ref 4–5.6)
HDLC SERPL-MCNC: 64 MG/DL
HDLC SERPL: 3.3 {RATIO} (ref 0–5)
LDLC SERPL CALC-MCNC: 130 MG/DL (ref 0–100)
PHOSPHATE SERPL-MCNC: 3.2 MG/DL (ref 2.6–4.7)
POTASSIUM SERPL-SCNC: 4.2 MMOL/L (ref 3.5–5.1)
SODIUM SERPL-SCNC: 139 MMOL/L (ref 136–145)
TRIGL SERPL-MCNC: 90 MG/DL (ref ?–150)
VLDLC SERPL CALC-MCNC: 18 MG/DL

## 2022-09-14 PROCEDURE — G8399 PT W/DXA RESULTS DOCUMENT: HCPCS | Performed by: INTERNAL MEDICINE

## 2022-09-14 PROCEDURE — G8432 DEP SCR NOT DOC, RNG: HCPCS | Performed by: INTERNAL MEDICINE

## 2022-09-14 PROCEDURE — G8752 SYS BP LESS 140: HCPCS | Performed by: INTERNAL MEDICINE

## 2022-09-14 PROCEDURE — 90694 VACC AIIV4 NO PRSRV 0.5ML IM: CPT | Performed by: INTERNAL MEDICINE

## 2022-09-14 PROCEDURE — 1101F PT FALLS ASSESS-DOCD LE1/YR: CPT | Performed by: INTERNAL MEDICINE

## 2022-09-14 PROCEDURE — G9899 SCRN MAM PERF RSLTS DOC: HCPCS | Performed by: INTERNAL MEDICINE

## 2022-09-14 PROCEDURE — G0008 ADMIN INFLUENZA VIRUS VAC: HCPCS | Performed by: INTERNAL MEDICINE

## 2022-09-14 PROCEDURE — 99214 OFFICE O/P EST MOD 30 MIN: CPT | Performed by: INTERNAL MEDICINE

## 2022-09-14 PROCEDURE — G8417 CALC BMI ABV UP PARAM F/U: HCPCS | Performed by: INTERNAL MEDICINE

## 2022-09-14 PROCEDURE — 3017F COLORECTAL CA SCREEN DOC REV: CPT | Performed by: INTERNAL MEDICINE

## 2022-09-14 PROCEDURE — 1090F PRES/ABSN URINE INCON ASSESS: CPT | Performed by: INTERNAL MEDICINE

## 2022-09-14 PROCEDURE — G8754 DIAS BP LESS 90: HCPCS | Performed by: INTERNAL MEDICINE

## 2022-09-14 PROCEDURE — G8536 NO DOC ELDER MAL SCRN: HCPCS | Performed by: INTERNAL MEDICINE

## 2022-09-14 PROCEDURE — G8427 DOCREV CUR MEDS BY ELIG CLIN: HCPCS | Performed by: INTERNAL MEDICINE

## 2022-09-14 PROCEDURE — 1123F ACP DISCUSS/DSCN MKR DOCD: CPT | Performed by: INTERNAL MEDICINE

## 2022-09-14 RX ORDER — ATORVASTATIN CALCIUM 10 MG/1
10 TABLET, FILM COATED ORAL DAILY
Qty: 30 TABLET | Refills: 5 | Status: SHIPPED | OUTPATIENT
Start: 2022-09-14

## 2022-09-14 NOTE — PROGRESS NOTES
Sweta Daily is a 74 y.o. female      Chief Complaint   Patient presents with    Hypertension     1. Have you been to the ER, urgent care clinic since your last visit? Hospitalized since your last visit? No    2. Have you seen or consulted any other health care providers outside of the [address] [place] [address] since your last visit? Include any pap smears or colon screening.  No

## 2022-09-14 NOTE — PROGRESS NOTES
SPORTS MEDICINE AND PRIMARY CARE  Driss Kaye MD, [address] [place] [address],[address] [address]  Phone:  [phone]  Fax: [phone]       Chief Complaint   Patient presents with    Hypertension   . SUBJECTIVE:    Slime Lo is a 74 y.o. female He patient returns today concerned about her hemoglobin A1c, dizziness particularly when she is walking around, sometimes blood pressure is lower than 90 but she has not checked the blood pressure when she is dizzy. She notes easy bruisability, wonder about vaccines, she wanted to get a flu shot today. The patient is seen for evaluation. Impaired glucose tolerance, primary hypertension, fibromyositis, chronic pain syndrome, pulmonary embolus, hepatic steatosis, chronic kidney disease stage 3A, obesity, dyslipidemia, irritable bowel syndrome and is seen for evaluation. Current Outpatient Medications   Medication Sig Dispense Refill    NIFEdipine ER (ADALAT CC) 60 mg ER tablet Take 1 Tablet by mouth daily. 90 Tablet 3    cholecalciferol (VITAMIN D3) 25 mcg (1,000 unit) cap Take 1 Tablet by mouth. diphenhydrAMINE (BENADRYL) 25 mg capsule Take 25 mg by mouth every six (6) hours as needed. lidocaine (LIDODERM) 5 % Apply patch to the affected area for 12 hours a day and remove for 12 hours a day. DX.M79.7 90 Each 3    losartan (COZAAR) 100 mg tablet Take 1 Tablet by mouth in the morning. 90 Tablet 3    warfarin (COUMADIN) 2.5 mg tablet TAKE 1 TO 2 TABLETS DAILY 180 Tablet 3    dextromethorphan-guaiFENesin (Robitussin Cough-Chest Alfredo DM) 5-100 mg/5 mL liqd Take 5 mL by mouth four (4) times daily as needed for Cough or Congestion. 177 mL 5    ondansetron hcl (Zofran) 4 mg tablet Take 1 Tablet by mouth every eight (8) hours as needed for Nausea. 20 Tablet 0    bisoprolol-hydroCHLOROthiazide (ZIAC) 10-6.25 mg per tablet Take 1 Tablet by mouth daily.  90 Tablet 3    predniSONE (DELTASONE) 20 mg tablet Take 40 mg by mouth daily (with breakfast). For 5 days as need for gout atack 10 Tablet 5    traZODone (DESYREL) 50 mg tablet TAKE 1 TABLET NIGHTLY AS NEEDED FOR SLEEP 90 Tab 4    LACTOBACILLUS ACIDOPHILUS (PROBIOTIC PO) Take 1 Cap by mouth as needed. carica papaya tab Take  by mouth. Takes 4 tabs po 1-2 times daily after meals. ONETOUCH ULTRA2 monitoring kit       MICROLET LANCET misc USE TO TEST BLOOD SUGAR EVERY  Each 11    cyanocobalamin 1,000 mcg tablet Take 1,000 mcg by mouth as needed.        Past Medical History:   Diagnosis Date    Adrenal nodule ([facility] [address] 2022    Indeterminate 13 mm right adrenal nodule    Adverse effect of anesthesia     \"hard time waking me up\"    Axillary pain, right     Bereavement 2019    79 yo - heart attack -  in sleep    Chronic pain     d/t fibromyalgia    CKD (chronic kidney disease), stage III ([address] 2020    Coagulation disorder ([address]     on eliquis -d/c due to gi upset - now on coumadin previously followed by dr. Ronit Sotomayor    Depression with anxiety     Diverticulosis     DJD (degenerative joint disease)     Dyslipidemia     Encounter for Hemoccult screening 2017    neg    Epicondylitis, lateral, right 2021    Fibromyalgia     Gait instability     GERD (gastroesophageal reflux disease)     Hepatic steatosis 2022    Hepatic steatosis 2022    Hypertension     IBS (irritable bowel syndrome)     Ill-defined condition     gastroparesis    Ill-defined condition     CT - 3/18/2018 - esophageal diverticulum    Ill-defined condition     pericarditis    Mastodynia, left greater than right 2011    Menopause     Normal cardiac stress test 3.14    Positive D dimer 15    Prediabetes     Preventative health care 2021    Pulmonary embolism ([address] 2013    rll    Restless leg syndrome     S/P colonoscopy 2011    kenny hernandez md    S/P colonoscopy 04/10/2018    Juanjose Douglas MD repeat5 yrs    S/P colonoscopy 2022 Joaquin Shaw MD - 5 yrs    Saphenous vein occlusion, 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    [facility] acute occlusive superificial vein thrombosis - Melody Dyer md    Saphenous vein thrombophlebitis, right 02/2018    and acute l posterior tibial vein - this is the 2nd DVT putting her on lifelong anticoagualtion / Nicanor Crape    Sleep apnea     cpap 9cm    SOB (shortness of breath)     White coat hypertension      Past Surgical History:   Procedure Laterality Date    COLONOSCOPY N/A 4/10/2018    COLONOSCOPY,EGD performed by Dorian Singh MD at [facility] ENDOSCOPY    COLONOSCOPY N/A 5/5/2022    COLONOSCOPY performed by Joaquin Shaw MD at [facility] ENDOSCOPY    HX BREAST BIOPSY Left     HX COLONOSCOPY      HX ORTHOPAEDIC  2009    foot surgery-left - bunionectomy    HX ORTHOPAEDIC      cyst removed from left hand - ganglion cyst    HX OTHER SURGICAL      right and left leg muscle biopsies - elevated CPK - muscle enzymes were elevated    HX TUBAL LIGATION      DC ABDOMEN SURGERY PROC UNLISTED      exploratory years ago    DC BREAST SURGERY PROCEDURE UNLISTED  2006    left breast [id number]. Tan Cuellar     Allergies   Allergen Reactions    Aleve [Naproxen Sodium] Hoarseness    Dilaudid [Hydromorphone (Bulk)] Anaphylaxis     Respiratory arrest and throat closes    Crestor [Rosuvastatin] Nausea Only    Diovan [Valsartan] Palpitations    Morphine Other (comments)     Patch-vomiting,weakness, fainting    Sulfa (Sulfonamide Antibiotics) Hives, Rash and Other (comments)     fainting         REVIEW OF SYSTEMS:  General: negative for - chills or fever  ENT: negative for - headaches, nasal congestion or tinnitus  Respiratory: negative for - cough, hemoptysis, shortness of breath or wheezing  Cardiovascular : negative for - chest pain, edema, palpitations or shortness of breath  Gastrointestinal: negative for - abdominal pain, blood in stools, heartburn or nausea/vomiting  Genito-Urinary: no dysuria, trouble voiding, or hematuria  Musculoskeletal: negative for - gait disturbance, joint pain, joint stiffness or joint swelling  Neurological: no TIA or stroke symptoms  Hematologic: no bruises, no bleeding, no swollen glands  Integument: no lumps, mole changes, nail changes or rash  Endocrine: no malaise/lethargy or unexpected weight changes      Social History     Socioeconomic History    Marital status:    Tobacco Use    Smoking status: Never    Smokeless tobacco: Never   Vaping Use    Vaping Use: Never used   Substance and Sexual Activity    Alcohol use: No    Drug use: No    Sexual activity: Yes     Partners: Male     Birth control/protection: None   Social History Narrative         Family History: Mother: alive 80 yrs, High Blood Pressure, cva with cognitive deficitsFather:  36 yrs, myocardial infarctionSister(s): aliveBrother(s): unknow n2    sister(s) . 40 daughter no choildren -  walked out works for board of directors for [employer] . Social History: Alcohol Use Patient does not use alcohol. Smoking Status Patient is a never smoker. Caffeine: occasional. Drugs:    prescription narcotics. Diet: Regular. Exercise: None. Marital Status: . Lives w ith: spouse. Children: children. Syn[employer]: [employer]. Patient is  living w ith her  she is on disability related to her fibromyositis.      Family History   Problem Relation Age of Onset    Hypertension Mother     Kidney Disease Mother         1 kidney    Heart Disease Father     Heart Attack Father         late 35s or early 45s    Heart Attack Maternal Grandmother     Cancer Maternal Grandfather         ? lung or stomach       OBJECTIVE:    Visit Vitals  /82 (BP 1 Location: Left upper arm, BP Patient Position: Sitting)   Pulse (!) 57   Temp 98 °F (36.7 °C) (Oral)   Resp 18   Ht 5' 2\" (1.575 m)   Wt 180 lb 1.6 oz (81.7 kg)   SpO2 98%   BMI 32.94 kg/m²     CONSTITUTIONAL: well , well nourished, appears age appropriate  EYES: perrla, eom intact  ENMT:moist mucous membranes, pharynx clear  NECK: supple. Thyroid normal  RESPIRATORY: Chest: clear bilaterally   CARDIOVASCULAR: Heart: regular rate and rhythm  GASTROINTESTINAL: Abdomen: soft, bowel sounds active  HEMATOLOGIC: no pathological lymph nodes palpated  MUSCULOSKELETAL: Extremities: no edema, pulse 1+   INTEGUMENT: No unusual rashes or suspicious skin lesions noted. Nails appear normal.  NEUROLOGIC: non-focal exam   MENTAL STATUS: alert and oriented, appropriate affect           ASSESSMENT:  1. Essential hypertension, benign    2. Fibromyositis    3. Chronic pain syndrome    4. Chronic pulmonary embolism without acute cor pulmonale, unspecified pulmonary embolism type ([address]    5. Hepatic steatosis    6. Stage 3a chronic kidney disease (HCC)    7. Class 1 obesity with body mass index (BMI) of 33.0 to 33.9 in adult, unspecified obesity type, unspecified whether serious comorbidity present    8. Dyslipidemia    9. Saphenous vein occlusion, right    10. Irritable bowel syndrome without diarrhea    11. IGT (impaired glucose tolerance)    12. Gastroesophageal reflux disease without esophagitis      Blood pressure control is good. She states she had a blood pressure reading of 90, but she used a wrist blood pressure monitor. We suggest that when she feels dizzy, she should check her blood pressure to be sure it is appropriate. She continues to have problems with chronic pain related to fibromyositis that she is dealing with. Her INR is therapeutic today, no adjustments to coumadin will be made. She will come back in one month for a PT check. Hepatic steatosis, we encouraged an appropriate diet. She has gained 2 pounds since we last saw her. She has dyslipidemia. Her LDL was 120 the last time we checked it. I would like to see it less than 100 and therefore we will repeat the lipid studies today and make the appropriate adjustments.      She is not symptomatic regarding her IBS, nor is she symptomatic from the GERD. She will be back for a PT check in one month; 3 months she will see me. I have discussed the diagnosis with the patient and the intended plan as seen in the  Orders. The patient understands and agees with the plan. The patient has   received an after visit summary and questions were answered concerning  future plans  Patient labs and/or xrays were reviewed  Past records were reviewed. PLAN:  .  Orders Placed This Encounter    RENAL FUNCTION PANEL    HEMOGLOBIN A1C WITH EAG    LIPID PANEL       Follow-up and Dispositions    Return in about 4 weeks (around 10/12/2022) for pt/inr. ATTENTION:   This medical record was transcribed using an electronic medical records system. Although proofread, it may and can contain electronic and spelling errors. Other human spelling and other errors may be present. Corrections may be executed at a later time. Please feel free to contact us for any clarifications as needed.

## 2022-09-15 NOTE — PROGRESS NOTES
Your cholesterol remains elevated. You develop nausea with the Crestar or rosuvastatin. I suggest we try Lipitor or atorvastatin and a small dose and after supper. If you agree with the recommendation the prescriptions at the pharmacies.   If you disagree we can discuss alternatives when you come by for your PT check in 1 month

## 2022-10-02 RX ORDER — PREDNISONE 20 MG/1
40 TABLET ORAL
Qty: 10 TABLET | Refills: 5 | Status: SHIPPED | OUTPATIENT
Start: 2022-10-02

## 2022-11-10 ENCOUNTER — TRANSCRIBE ORDER (OUTPATIENT)
Dept: SCHEDULING | Age: 67
End: 2022-11-10

## 2022-11-10 DIAGNOSIS — Z12.31 OTHER SCREENING MAMMOGRAM: Primary | ICD-10-CM

## 2022-11-10 RX ORDER — NIFEDIPINE 60 MG/1
60 TABLET, EXTENDED RELEASE ORAL DAILY
Qty: 90 TABLET | Refills: 3 | Status: SHIPPED | OUTPATIENT
Start: 2022-11-10 | End: 2022-11-25 | Stop reason: SDUPTHER

## 2022-11-10 RX ORDER — NIFEDIPINE 60 MG/1
60 TABLET, EXTENDED RELEASE ORAL DAILY
Qty: 90 TABLET | Refills: 3 | Status: SHIPPED | OUTPATIENT
Start: 2022-11-10 | End: 2022-11-10 | Stop reason: SDUPTHER

## 2022-11-16 ENCOUNTER — CLINICAL SUPPORT (OUTPATIENT)
Dept: INTERNAL MEDICINE CLINIC | Age: 67
End: 2022-11-16

## 2022-11-16 DIAGNOSIS — Z51.81 ENCOUNTER FOR MONITORING COUMADIN THERAPY: Primary | ICD-10-CM

## 2022-11-16 DIAGNOSIS — Z79.01 ENCOUNTER FOR MONITORING COUMADIN THERAPY: Primary | ICD-10-CM

## 2022-11-16 NOTE — PROGRESS NOTES
Patient in for PT/INR. States she is taking 5 mg of coumadin on Sunday and Thursday, and take 21/2 mg on Mon, Tues, Wed, Fri and Sat. PT= 32.1, INR= 2.7. Dr. Yo Salinas notified. Pt instructed to stay on same dosing and return for re-check in one month per Dr. Yo Salinas. Pt. Verbalized understanding.

## 2022-11-25 RX ORDER — NIFEDIPINE 60 MG/1
60 TABLET, EXTENDED RELEASE ORAL DAILY
Qty: 90 TABLET | Refills: 3 | Status: SHIPPED | OUTPATIENT
Start: 2022-11-25

## 2022-12-14 ENCOUNTER — OFFICE VISIT (OUTPATIENT)
Dept: INTERNAL MEDICINE CLINIC | Age: 67
End: 2022-12-14
Payer: MEDICARE

## 2022-12-14 VITALS
OXYGEN SATURATION: 98 % | WEIGHT: 180.9 LBS | DIASTOLIC BLOOD PRESSURE: 88 MMHG | HEART RATE: 57 BPM | RESPIRATION RATE: 20 BRPM | HEIGHT: 62 IN | SYSTOLIC BLOOD PRESSURE: 147 MMHG | TEMPERATURE: 98.1 F | BODY MASS INDEX: 33.29 KG/M2

## 2022-12-14 DIAGNOSIS — I82.811: ICD-10-CM

## 2022-12-14 DIAGNOSIS — Z79.01 ENCOUNTER FOR MONITORING COUMADIN THERAPY: ICD-10-CM

## 2022-12-14 DIAGNOSIS — M19.042 PRIMARY OSTEOARTHRITIS OF LEFT HAND: ICD-10-CM

## 2022-12-14 DIAGNOSIS — I10 ESSENTIAL HYPERTENSION, BENIGN: ICD-10-CM

## 2022-12-14 DIAGNOSIS — Z00.00 MEDICARE ANNUAL WELLNESS VISIT, SUBSEQUENT: Primary | ICD-10-CM

## 2022-12-14 DIAGNOSIS — K58.9 IRRITABLE BOWEL SYNDROME WITHOUT DIARRHEA: ICD-10-CM

## 2022-12-14 DIAGNOSIS — Z51.81 ENCOUNTER FOR MONITORING COUMADIN THERAPY: ICD-10-CM

## 2022-12-14 DIAGNOSIS — E78.5 DYSLIPIDEMIA: ICD-10-CM

## 2022-12-14 DIAGNOSIS — K21.9 GASTROESOPHAGEAL REFLUX DISEASE WITHOUT ESOPHAGITIS: ICD-10-CM

## 2022-12-14 DIAGNOSIS — E66.9 CLASS 1 OBESITY WITH BODY MASS INDEX (BMI) OF 33.0 TO 33.9 IN ADULT, UNSPECIFIED OBESITY TYPE, UNSPECIFIED WHETHER SERIOUS COMORBIDITY PRESENT: ICD-10-CM

## 2022-12-14 DIAGNOSIS — Z13.39 SCREENING FOR ALCOHOLISM: ICD-10-CM

## 2022-12-14 PROBLEM — U07.1 COVID-19 VIRUS INFECTION: Status: ACTIVE | Noted: 2022-11-28

## 2022-12-14 LAB
INR BLD: 1.9
PT POC: 22.8 SECONDS
VALID INTERNAL CONTROL?: YES

## 2022-12-14 NOTE — PROGRESS NOTES
This is the Subsequent Medicare Annual Wellness Exam, performed 12 months or more after the Initial AWV or the last Subsequent AWV    I have reviewed the patient's medical history in detail and updated the computerized patient record. Assessment/Plan   Education and counseling provided:  Are appropriate based on today's review and evaluation      Depression Risk Factor Screening     3 most recent PHQ Screens 12/14/2022   PHQ Not Done -   Little interest or pleasure in doing things Not at all   Feeling down, depressed, irritable, or hopeless Not at all   Total Score PHQ 2 0   Trouble falling or staying asleep, or sleeping too much -   Feeling tired or having little energy -   Poor appetite, weight loss, or overeating -   Feeling bad about yourself - or that you are a failure or have let yourself or your family down -   Trouble concentrating on things such as school, work, reading, or watching TV -   Moving or speaking so slowly that other people could have noticed; or the opposite being so fidgety that others notice -   Thoughts of being better off dead, or hurting yourself in some way -   PHQ 9 Score -   How difficult have these problems made it for you to do your work, take care of your home and get along with others -       Alcohol & Drug Abuse Risk Screen    Do you average more than 1 drink per night or more than 7 drinks a week:  No    On any one occasion in the past three months have you have had more than 3 drinks containing alcohol:  No          Functional Ability and Level of Safety    Hearing: Hearing is good. Activities of Daily Living: The home contains: handrails and grab bars  Patient needs help with:  dressing and bathing      Ambulation: with difficulty, uses a walker     Fall Risk:  Fall Risk Assessment, last 12 mths 12/14/2022   Able to walk? Yes   Fall in past 12 months? 0   Do you feel unsteady?  1   Are you worried about falling 1   Is the gait abnormal? 1   Number of falls in past 12 months 0   Fall with injury?  -      Abuse Screen:  Patient is not abused       Cognitive Screening    Has your family/caregiver stated any concerns about your memory: no     Cognitive Screening: Normal - Verbal Fluency Test    Health Maintenance Due     Health Maintenance Due   Topic Date Due    Shingrix Vaccine Age 49> (1 of 2) Never done    Pneumococcal 65+ years (1 - PCV) Never done    COVID-19 Vaccine (5 - Booster for Pfizer series) 10/12/2022    Medicare Yearly Exam  12/09/2022       Patient Care Team   Patient Care Team:  Dariel Casey MD as PCP - General (Internal Medicine Physician)  Darile Casey MD as PCP - [facility] Provider    History     Patient Active Problem List   Diagnosis Code    Sleep apnea G47.30    GERD (gastroesophageal reflux disease) K21.9    Restless leg syndrome G25.81    Fibromyalgia M79.7    Mastodynia, left greater than right N64.4    Altered mental status R41.82    Chronic pain G89.29    Fibromyositis M79.7    Essential hypertension, benign I10    IGT (impaired glucose tolerance) R73.02    IBS (irritable bowel syndrome) K58.9    Gait instability R26.81    Normal cardiac stress test [id number]    White coat hypertension [id number]    Positive D dimer R79.89    SOB (shortness of breath) R06.02    S/P colonoscopy Z98.890    Saphenous vein occlusion, right I82.811    Dyslipidemia E78.5    Preventative health care Z00.00    DJD (degenerative joint disease) M19.90    Axillary pain, right M79.621    Obesity E66.9    Peripheral vascular disease (HCC) I73.9    Gout M10.9    Epicondylitis, lateral, right M77.11    Achilles tendinitis of right lower extremity M76.61    CKD (chronic kidney disease), stage III (HCC) N18.30    Diverticulosis K57.90    Adrenal adenoma, right D35.01    Adrenal nodule (HCC) E27.8    Hepatic steatosis K76.0     Past Medical History:   Diagnosis Date    Adrenal nodule ([facility] [address] 02/16/2022    Indeterminate 13 mm right adrenal nodule    Adverse effect of anesthesia     \"hard time waking me up\"    Axillary pain, right     Bereavement 2019    79 yo - heart attack -  in sleep    Chronic pain     d/t fibromyalgia    CKD (chronic kidney disease), stage III ([facility] [address] 2020    Coagulation disorder ([facility] [address]     on eliquis -d/c due to gi upset - now on coumadin previously followed by dr. Johnson Creaime    Depression with anxiety     Diverticulosis     DJD (degenerative joint disease)     Dyslipidemia     Encounter for Hemoccult screening 2017    neg    Epicondylitis, lateral, right 2021    Fibromyalgia     Gait instability     GERD (gastroesophageal reflux disease)     Hepatic steatosis 2022    Hepatic steatosis 2022    Hypertension     IBS (irritable bowel syndrome)     Ill-defined condition     gastroparesis    Ill-defined condition     CT - 3/18/2018 - esophageal diverticulum    Ill-defined condition     pericarditis    Mastodynia, left greater than right 2011    Menopause     Normal cardiac stress test 3.14    Positive D dimer 9-23-15    Prediabetes     Preventative health care 2021    Pulmonary embolism ([facility] [address] 2013    rll    Restless leg syndrome     S/P colonoscopy 2011    kenny hernandez md    S/P colonoscopy 04/10/2018    Clive Mcconnell MD repeat5 yrs    S/P colonoscopy 2022    Clive Mcconnell MD - 5 yrs    Saphenous vein occlusion, right 2017    [facility] acute occlusive superificial vein thrombosis - Melody Dyer md    Saphenous vein thrombophlebitis, right 2018    and acute l posterior tibial vein - this is the 2nd DVT putting her on lifelong anticoagualtion / Vertis Phuong    Sleep apnea     cpap 9cm    SOB (shortness of breath)     White coat hypertension       Past Surgical History:   Procedure Laterality Date    COLONOSCOPY N/A 4/10/2018    COLONOSCOPY,EGD performed by Ophelia Song MD at [facility] ENDOSCOPY    COLONOSCOPY N/A 2022    COLONOSCOPY performed by Bettina Acevedo MD at [facility] ENDOSCOPY    HX BREAST BIOPSY Left     HX COLONOSCOPY      HX ORTHOPAEDIC  2009    foot surgery-left - bunionectomy    HX ORTHOPAEDIC      cyst removed from left hand - ganglion cyst    HX OTHER SURGICAL      right and left leg muscle biopsies - elevated CPK - muscle enzymes were elevated    HX TUBAL LIGATION      MS ABDOMEN SURGERY PROC UNLISTED      exploratory years ago    MS BREAST SURGERY PROCEDURE UNLISTED  2006    left breast [id number]. Denisha Chakraborty     Current Outpatient Medications   Medication Sig Dispense Refill    NIFEdipine ER (ADALAT CC) 60 mg ER tablet Take 1 Tablet by mouth daily. 90 Tablet 3    predniSONE (DELTASONE) 20 mg tablet Take 2 Tablets by mouth daily (with breakfast). For 5 days as need for gout atack 10 Tablet 5    cholecalciferol (VITAMIN D3) 25 mcg (1,000 unit) cap Take 1 Tablet by mouth. diphenhydrAMINE (BENADRYL) 25 mg capsule Take 25 mg by mouth every six (6) hours as needed. losartan (COZAAR) 100 mg tablet Take 1 Tablet by mouth in the morning. 90 Tablet 3    warfarin (COUMADIN) 2.5 mg tablet TAKE 1 TO 2 TABLETS DAILY 180 Tablet 3    dextromethorphan-guaiFENesin (Robitussin Cough-Chest Alfredo DM) 5-100 mg/5 mL liqd Take 5 mL by mouth four (4) times daily as needed for Cough or Congestion. 177 mL 5    bisoprolol-hydroCHLOROthiazide (ZIAC) 10-6.25 mg per tablet Take 1 Tablet by mouth daily. 90 Tablet 3    traZODone (DESYREL) 50 mg tablet TAKE 1 TABLET NIGHTLY AS NEEDED FOR SLEEP 90 Tab 4    LACTOBACILLUS ACIDOPHILUS (PROBIOTIC PO) Take 1 Cap by mouth as needed. ONETOUCH ULTRA2 monitoring kit       MICROLET LANCET misc USE TO TEST BLOOD SUGAR EVERY  Each 11    cyanocobalamin 1,000 mcg tablet Take 1,000 mcg by mouth as needed. nirmatrelvir-ritonavir (PAXLOVID) 300 mg (150 mg x 2)-100 mg Take 3 Tablets by mouth every twelve (12) hours.  Do not take prednisone or trazodone while taking this medicine (Patient not taking: Reported on 12/14/2022) 1 Box 0    atorvastatin (LIPITOR) 10 mg tablet Take 1 Tablet by mouth daily. (Patient not taking: Reported on 12/14/2022) 30 Tablet 5    lidocaine (LIDODERM) 5 % Apply patch to the affected area for 12 hours a day and remove for 12 hours a day. DX.M79.7 (Patient not taking: Reported on 12/14/2022) 90 Each 3    ondansetron hcl (Zofran) 4 mg tablet Take 1 Tablet by mouth every eight (8) hours as needed for Nausea. 20 Tablet 0    carica papaya tab Take  by mouth. Takes 4 tabs po 1-2 times daily after meals.        Allergies   Allergen Reactions    Aleve [Naproxen Sodium] Hoarseness    Dilaudid [Hydromorphone (Bulk)] Anaphylaxis     Respiratory arrest and throat closes    Crestor [Rosuvastatin] Nausea Only    Diovan [Valsartan] Palpitations    Morphine Other (comments)     Patch-vomiting,weakness, fainting    Sulfa (Sulfonamide Antibiotics) Hives, Rash and Other (comments)     fainting       Family History   Problem Relation Age of Onset    Hypertension Mother     Kidney Disease Mother         1 kidney    Heart Disease Father     Heart Attack Father         late 35s or early 45s    Heart Attack Maternal Grandmother     Cancer Maternal Grandfather         ? lung or stomach     Social History     Tobacco Use    Smoking status: Never    Smokeless tobacco: Never   Substance Use Topics    Alcohol use: No         Alicia Magallon LPN

## 2022-12-14 NOTE — PROGRESS NOTES
Chief Complaint   Patient presents with    Hypertension     1. Have you been to the ER, urgent care clinic since your last visit? Hospitalized since your last visit? No    2. Have you seen or consulted any other health care providers outside of the [address] [place] [address] since your last visit? Include any pap smears or colon screening.  No

## 2022-12-14 NOTE — PATIENT INSTRUCTIONS
Medicare Wellness Visit, Female     The best way to live healthy is to have a lifestyle where you eat a well-balanced diet, exercise regularly, limit alcohol use, and quit all forms of tobacco/nicotine, if applicable. Regular preventive services are another way to keep healthy. Preventive services (vaccines, screening tests, monitoring & exams) can help personalize your care plan, which helps you manage your own care. Screening tests can find health problems at the earliest stages, when they are easiest to treat. [facility] follows the current, evidence-based guidelines published by the [facility] Jasvir Stark ([employer]) when recommending preventive services for our patients. Because we follow these guidelines, sometimes recommendations change over time as research supports it. (For example, mammograms used to be recommended annually. Even though Medicare will still pay for an annual mammogram, the newer guidelines recommend a mammogram every two years for women of average risk). Of course, you and your doctor may decide to screen more often for some diseases, based on your risk and your co-morbidities (chronic disease you are already diagnosed with). Preventive services for you include:  - Medicare offers their members a free annual wellness visit, which is time for you and your primary care provider to discuss and plan for your preventive service needs.  Take advantage of this benefit every year!    -Over the age of 72 should receive the recommended pneumonia vaccines.    -All adults should have a flu vaccine yearly.  -All adults should have a tetanus vaccine every 10 years.   -Over the age 48 should receive the shingles vaccines.        -All adults should be screened once for Hepatitis C.  -All adults age 38-68 who are overweight should have a diabetes screening test once every three years.   -Other screening tests and preventive services for persons with diabetes include: an eye exam to screen for diabetic retinopathy, a kidney function test, a foot exam, and stricter control over your cholesterol.   -Cardiovascular screening for adults with routine risk involves an electrocardiogram (ECG) at intervals determined by your doctor.     -Colorectal cancer screenings should be done for adults age 39-70 with no increased risk factors for colorectal cancer. There are a number of acceptable methods of screening for this type of cancer. Each test has its own benefits and drawbacks. Discuss with your doctor what is most appropriate for you during your annual wellness visit. The different tests include: colonoscopy (considered the best screening method), a fecal occult blood test, a fecal DNA test, and sigmoidoscopy.    -Lung cancer screening is recommended annually with a low dose CT scan for adults between age 54 and 68, who have smoked at least 30 pack years (equivalent of 1 pack per day for 30 days), and who is a current smoker or quit less than 15 years ago.    -A bone mass density test is recommended when a woman turns 65 to screen for osteoporosis. This test is only recommended one time, as a screening. Some providers will use this same test as a disease monitoring tool if you already have osteoporosis. -Breast cancer screenings are recommended every other year for women of normal risk, age 54-69.    -Cervical cancer screenings for women over age 72 are only recommended with certain risk factors.      Here is a list of your current Health Maintenance items (your personalized list of preventive services) with a due date:  Health Maintenance Due   Topic Date Due    Shingles Vaccine (1 of 2) Never done    Pneumococcal Vaccine (1 - PCV) Never done    COVID-19 Vaccine (5 - Booster for Landeros Peter series) 10/12/2022

## 2022-12-14 NOTE — PROGRESS NOTES
SPORTS MEDICINE [facility]ARE  Kalia Springer MD, [address] [facility] [address]  Phone:  [phone]  Fax: [phone]      Chief Complaint   Patient presents with    Hypertension         SUBECTIVE:    Irma Yates is a 69 y.o. female  Dictation on: 12/14/2022 11:08 AM by: Dolly Queen [[id number]]       Current Outpatient Medications   Medication Sig Dispense Refill    NIFEdipine ER (ADALAT CC) 60 mg ER tablet Take 1 Tablet by mouth daily. 90 Tablet 3    predniSONE (DELTASONE) 20 mg tablet Take 2 Tablets by mouth daily (with breakfast). For 5 days as need for gout atack 10 Tablet 5    cholecalciferol (VITAMIN D3) 25 mcg (1,000 unit) cap Take 1 Tablet by mouth. diphenhydrAMINE (BENADRYL) 25 mg capsule Take 25 mg by mouth every six (6) hours as needed. losartan (COZAAR) 100 mg tablet Take 1 Tablet by mouth in the morning. 90 Tablet 3    warfarin (COUMADIN) 2.5 mg tablet TAKE 1 TO 2 TABLETS DAILY 180 Tablet 3    dextromethorphan-guaiFENesin (Robitussin Cough-Chest Alfredo DM) 5-100 mg/5 mL liqd Take 5 mL by mouth four (4) times daily as needed for Cough or Congestion. 177 mL 5    bisoprolol-hydroCHLOROthiazide (ZIAC) 10-6.25 mg per tablet Take 1 Tablet by mouth daily. 90 Tablet 3    traZODone (DESYREL) 50 mg tablet TAKE 1 TABLET NIGHTLY AS NEEDED FOR SLEEP 90 Tab 4    LACTOBACILLUS ACIDOPHILUS (PROBIOTIC PO) Take 1 Cap by mouth as needed. ONETOUCH ULTRA2 monitoring kit       MICROLET LANCET misc USE TO TEST BLOOD SUGAR EVERY  Each 11    cyanocobalamin 1,000 mcg tablet Take 1,000 mcg by mouth as needed. atorvastatin (LIPITOR) 10 mg tablet Take 1 Tablet by mouth daily. (Patient not taking: Reported on 12/14/2022) 30 Tablet 5    ondansetron hcl (Zofran) 4 mg tablet Take 1 Tablet by mouth every eight (8) hours as needed for Nausea. 20 Tablet 0    carica papaya tab Take  by mouth. Takes 4 tabs po 1-2 times daily after meals.        Past Medical History:   Diagnosis Date    Adrenal nodule ([facility] [address] 2022    Indeterminate 13 mm right adrenal nodule    Adverse effect of anesthesia     \"hard time waking me up\"    Axillary pain, right     Bereavement 2019    79 yo - heart attack -  in sleep    Chronic pain     d/t fibromyalgia    CKD (chronic kidney disease), stage III ([facility] [address] 2020    Coagulation disorder ([facility] [address]     on eliquis -d/c due to gi upset - now on coumadin previously followed by dr. Hargrove Ros    COVID-19 virus infection 2022    Depression with anxiety     Diverticulosis     DJD (degenerative joint disease)     Dyslipidemia     Encounter for Hemoccult screening 2017    neg    Epicondylitis, lateral, right 2021    Fibromyalgia     Gait instability     GERD (gastroesophageal reflux disease)     Hepatic steatosis 2022    Hepatic steatosis 2022    Hypertension     IBS (irritable bowel syndrome)     Ill-defined condition     gastroparesis    Ill-defined condition     CT - 3/18/2018 - esophageal diverticulum    Ill-defined condition     pericarditis    Mastodynia, left greater than right 2011    Menopause     Normal cardiac stress test 2014    Positive D dimer 2015    Prediabetes     Preventative health care 2021    Pulmonary embolism ([facility] [address] 2013    rll    Restless leg syndrome     S/P colonoscopy 2011    kenny hernandez md    S/P colonoscopy 04/10/2018    Portillo Teresa MD repeat5 yrs    S/P colonoscopy 2022    Portillo Teresa MD - 5 yrs    Saphenous vein occlusion, right 2017    [facility] acute occlusive superificial vein thrombosis - Melody Dyer md    Saphenous vein thrombophlebitis, right 2018    and acute l posterior tibial vein - this is the 2nd DVT putting her on lifelong anticoagualtion / Denette Leonel    Sleep apnea     cpap 9cm    SOB (shortness of breath)     White coat hypertension      Past Surgical History:   Procedure Laterality Date COLONOSCOPY N/A 4/10/2018    COLONOSCOPY,EGD performed by Shae Looney MD at [facility] ENDOSCOPY    COLONOSCOPY N/A 2022    COLONOSCOPY performed by Lyssa Carmichael MD at [facility] ENDOSCOPY    HX BREAST BIOPSY Left     HX COLONOSCOPY      HX ORTHOPAEDIC  2009    foot surgery-left - bunionectomy    HX ORTHOPAEDIC      cyst removed from left hand - ganglion cyst    HX OTHER SURGICAL      right and left leg muscle biopsies - elevated CPK - muscle enzymes were elevated    HX TUBAL LIGATION      MI ABDOMEN SURGERY PROC UNLISTED      exploratory years ago    MI BREAST SURGERY PROCEDURE UNLISTED  2006    left breast [id number]. Wally Iba     Allergies   Allergen Reactions    Aleve [Naproxen Sodium] Hoarseness    Dilaudid [Hydromorphone (Bulk)] Anaphylaxis     Respiratory arrest and throat closes    Crestor [Rosuvastatin] Nausea Only    Diovan [Valsartan] Palpitations    Morphine Other (comments)     Patch-vomiting,weakness, fainting    Sulfa (Sulfonamide Antibiotics) Hives, Rash and Other (comments)     fainting       REVIEW OF SYSTEMS:   No other symptoms related to COVID. Social History     Socioeconomic History    Marital status:    Tobacco Use    Smoking status: Never    Smokeless tobacco: Never   Vaping Use    Vaping Use: Never used   Substance and Sexual Activity    Alcohol use: No    Drug use: No    Sexual activity: Yes     Partners: Male     Birth control/protection: None   Social History Narrative         Family History: Mother: alive 80 yrs, High Blood Pressure, cva with cognitive deficitsFather:  36 yrs, myocardial infarctionSister(s): aliveBrother(s): unknow n2    sister(s) . 40 daughter no choildren -  walked out works for board of directors for [employer] . Social History: Alcohol Use Patient does not use alcohol. Smoking Status Patient is a never smoker. Caffeine: occasional. Drugs:    prescription narcotics. Diet: Regular. Exercise: None.  Marital Status: . Lives w ith: spouse. Children: children. Restorationism: Tildon Crew. Patient is  living w ith her  she is on disability related to her fibromyositis. r  Family History   Problem Relation Age of Onset    Hypertension Mother     Kidney Disease Mother         1 kidney    Heart Disease Father     Heart Attack Father         late 35s or early 45s    Heart Attack Maternal Grandmother     Cancer Maternal Grandfather         ? lung or stomach       OBJECTIVE:  Visit Vitals  BP (!) 147/88 (BP 1 Location: Left upper arm, BP Patient Position: Sitting)   Pulse (!) 57   Temp 98.1 °F (36.7 °C) (Oral)   Resp 20   Ht 5' 2\" (1.575 m)   Wt 180 lb 14.4 oz (82.1 kg)   SpO2 98%   BMI 33.09 kg/m²     ENT: perrla,  eom intact  NECK: supple. Thyroid normal  CHEST: clear to ascultation and percussion   HEART: regular rate and rhythm  ABD: soft, bowel sounds active  EXTREMITIES: no edema, pulse 1+     Office Visit on 12/14/2022   Component Date Value Ref Range Status    VALID INTERNAL CONTROL POC 12/14/2022 Yes   Final    Prothrombin time (POC) 12/14/2022 22.8  seconds Final    INR POC 12/14/2022 1.9   Final   Clinical Support on 10/14/2022   Component Date Value Ref Range Status    VALID INTERNAL CONTROL POC 10/14/2022 Yes   Final    Prothrombin time (POC) 10/14/2022 26.5  seconds Final    INR POC 10/14/2022 2.2   Final          ASSESSMENT:  1. Medicare annual wellness visit, subsequent    2. Encounter for monitoring Coumadin therapy    3. Screening for alcoholism    4. Saphenous vein occlusion, right    5. Dyslipidemia    6. Primary osteoarthritis of left hand    7. Class 1 obesity with body mass index (BMI) of 33.0 to 33.9 in adult, unspecified obesity type, unspecified whether serious comorbidity present    8. Irritable bowel syndrome without diarrhea    9. Essential hypertension, benign    10.  Gastroesophageal reflux disease without esophagitis       Dictation on: 12/14/2022 11:14 AM by: Anu Briggs [[id number]] I have discussed the diagnosis with the patient and the intended plan as seen in the  orders above. The patient understands and agees with the plan. The patient has   received an after visit summary and questions were answered concerning  future plans  Patient labs and/or xrays were reviewed  Past records were reviewed. PLAN:  .  Orders Placed This Encounter    AMB POC PT/INR       Follow-up and Dispositions    Return for pt/inr, bp check. ATTENTION:   This medical record was transcribed using an electronic medical records system. Although proofread, it may and can contain electronic and spelling errors. Other human spelling and other errors may be present. Corrections may be executed at a later time. Please feel free to contact us for any clarifications as needed.

## 2022-12-15 NOTE — PROGRESS NOTES
Here in the office, her  sent her a note that revealed that she was PCR positive for COVID. This would imply that the home test was a false negative. She leaves the office through the back door and we will call her with appointment and she agrees to that. She is going to continue to wear a mask and follow CDC guidelines regarding positive COVID after five days of quarantine. She is on Coumadin for the saphenous vein occlusion which is recurrent DVT and she is INR therapeutic, so no adjustments will be made. History of dyslipidemia. Osteoarthritis of her hands which is currently quiescent. Obesity is with no change in weight. IBS may be a contributing factor to the diarrhea that she is having as the colon and bowel gets distressed particularly since she was positive and negative and positive again. Blood pressure is elevated related to the stress of COVID. No symptoms related to GERD. She will be back to see us in one month for PT/INR.

## 2022-12-15 NOTE — PROGRESS NOTES
The patient returns today with known history of recurrent DVT, dyslipidemia, osteoarthritis, obesity, irritable bowel syndrome, primary hypertension, gastroesophageal reflux disease, and is seen for evaluation. The patient states she was COVID positive on November 28. We gave her a course of Paxlovid. She subsequently became negative but then a day or two later, became positive again on December 10. Yesterday, she took, it was negative and her  sent her a note today, it is positive. She wanted to know what is going on. She is asymptomatic and no symptoms related to COVID. No fever and is only having diarrhea and is seen for evaluation.

## 2023-01-12 RX ORDER — BISOPROLOL FUMARATE AND HYDROCHLOROTHIAZIDE 10; 6.25 MG/1; MG/1
TABLET ORAL
Qty: 90 TABLET | Refills: 3 | Status: SHIPPED | OUTPATIENT
Start: 2023-01-12

## 2023-01-18 ENCOUNTER — OFFICE VISIT (OUTPATIENT)
Dept: INTERNAL MEDICINE CLINIC | Age: 68
End: 2023-01-18
Payer: MEDICARE

## 2023-01-18 ENCOUNTER — PATIENT MESSAGE (OUTPATIENT)
Dept: INTERNAL MEDICINE CLINIC | Age: 68
End: 2023-01-18

## 2023-01-18 VITALS
HEART RATE: 60 BPM | SYSTOLIC BLOOD PRESSURE: 155 MMHG | TEMPERATURE: 98.4 F | WEIGHT: 183.5 LBS | OXYGEN SATURATION: 99 % | DIASTOLIC BLOOD PRESSURE: 89 MMHG | BODY MASS INDEX: 33.77 KG/M2 | RESPIRATION RATE: 18 BRPM | HEIGHT: 62 IN

## 2023-01-18 DIAGNOSIS — M79.7 FIBROMYALGIA: ICD-10-CM

## 2023-01-18 DIAGNOSIS — K76.0 HEPATIC STEATOSIS: ICD-10-CM

## 2023-01-18 DIAGNOSIS — Z79.01 ENCOUNTER FOR MONITORING COUMADIN THERAPY: Primary | ICD-10-CM

## 2023-01-18 DIAGNOSIS — E78.5 DYSLIPIDEMIA: ICD-10-CM

## 2023-01-18 DIAGNOSIS — S46.811A STRAIN OF RIGHT TRAPEZIUS MUSCLE, INITIAL ENCOUNTER: ICD-10-CM

## 2023-01-18 DIAGNOSIS — I10 ESSENTIAL HYPERTENSION, BENIGN: ICD-10-CM

## 2023-01-18 DIAGNOSIS — Z51.81 ENCOUNTER FOR MONITORING COUMADIN THERAPY: Primary | ICD-10-CM

## 2023-01-18 DIAGNOSIS — K21.9 GASTROESOPHAGEAL REFLUX DISEASE WITHOUT ESOPHAGITIS: ICD-10-CM

## 2023-01-18 DIAGNOSIS — E27.8 ADRENAL NODULE (HCC): ICD-10-CM

## 2023-01-18 DIAGNOSIS — N18.31 STAGE 3A CHRONIC KIDNEY DISEASE (HCC): ICD-10-CM

## 2023-01-18 DIAGNOSIS — I73.9 PERIPHERAL VASCULAR DISEASE (HCC): ICD-10-CM

## 2023-01-18 PROBLEM — S46.819A TRAPEZIUS MUSCLE STRAIN: Status: ACTIVE | Noted: 2023-01-18

## 2023-01-18 LAB
INR BLD: 2.4
PT POC: 28.9 SECONDS
VALID INTERNAL CONTROL?: YES

## 2023-01-18 PROCEDURE — G8399 PT W/DXA RESULTS DOCUMENT: HCPCS | Performed by: INTERNAL MEDICINE

## 2023-01-18 PROCEDURE — 73030 X-RAY EXAM OF SHOULDER: CPT | Performed by: INTERNAL MEDICINE

## 2023-01-18 PROCEDURE — 99214 OFFICE O/P EST MOD 30 MIN: CPT | Performed by: INTERNAL MEDICINE

## 2023-01-18 PROCEDURE — 1101F PT FALLS ASSESS-DOCD LE1/YR: CPT | Performed by: INTERNAL MEDICINE

## 2023-01-18 PROCEDURE — 3017F COLORECTAL CA SCREEN DOC REV: CPT | Performed by: INTERNAL MEDICINE

## 2023-01-18 PROCEDURE — G8536 NO DOC ELDER MAL SCRN: HCPCS | Performed by: INTERNAL MEDICINE

## 2023-01-18 PROCEDURE — G8427 DOCREV CUR MEDS BY ELIG CLIN: HCPCS | Performed by: INTERNAL MEDICINE

## 2023-01-18 PROCEDURE — 85610 PROTHROMBIN TIME: CPT | Performed by: INTERNAL MEDICINE

## 2023-01-18 PROCEDURE — G8417 CALC BMI ABV UP PARAM F/U: HCPCS | Performed by: INTERNAL MEDICINE

## 2023-01-18 PROCEDURE — 72050 X-RAY EXAM NECK SPINE 4/5VWS: CPT | Performed by: INTERNAL MEDICINE

## 2023-01-18 PROCEDURE — G8432 DEP SCR NOT DOC, RNG: HCPCS | Performed by: INTERNAL MEDICINE

## 2023-01-18 PROCEDURE — 3077F SYST BP >= 140 MM HG: CPT | Performed by: INTERNAL MEDICINE

## 2023-01-18 PROCEDURE — 3079F DIAST BP 80-89 MM HG: CPT | Performed by: INTERNAL MEDICINE

## 2023-01-18 PROCEDURE — 1090F PRES/ABSN URINE INCON ASSESS: CPT | Performed by: INTERNAL MEDICINE

## 2023-01-18 PROCEDURE — G9899 SCRN MAM PERF RSLTS DOC: HCPCS | Performed by: INTERNAL MEDICINE

## 2023-01-18 PROCEDURE — 1123F ACP DISCUSS/DSCN MKR DOCD: CPT | Performed by: INTERNAL MEDICINE

## 2023-01-18 NOTE — PROGRESS NOTES
SPORTS MEDICINE AND PRIMARY CARE  Carmine Edmond MD, [address] [address]  Phone:  [phone]  Fax: [phone]       Chief Complaint   Patient presents with    Hypertension   . SUBJECTIVE:    Rishi Dumont is a 69 y.o. female Patient returns today with a known history of hypertension, GERD, fibromyalgia, dyslipidemia, adrenal nodule, hepatic steatosis, chronic kidney disease stage 3A, peripheral vascular disease, and complains of right shoulder discomfort. Actually, the pain is in the trapezius muscle distribution on the right. Other new complaints denied and patient is seen for evaluation. She is having discomfort in her calves and legs bilaterally, which she relates to fibromyalgia. Current Outpatient Medications   Medication Sig Dispense Refill    bisoprolol-hydroCHLOROthiazide (ZIAC) 10-6.25 mg per tablet TAKE 1 TABLET DAILY 90 Tablet 3    NIFEdipine ER (ADALAT CC) 60 mg ER tablet Take 1 Tablet by mouth daily. 90 Tablet 3    predniSONE (DELTASONE) 20 mg tablet Take 2 Tablets by mouth daily (with breakfast). For 5 days as need for gout atack 10 Tablet 5    cholecalciferol (VITAMIN D3) 25 mcg (1,000 unit) cap Take 1 Tablet by mouth. diphenhydrAMINE (BENADRYL) 25 mg capsule Take 25 mg by mouth every six (6) hours as needed. losartan (COZAAR) 100 mg tablet Take 1 Tablet by mouth in the morning. 90 Tablet 3    warfarin (COUMADIN) 2.5 mg tablet TAKE 1 TO 2 TABLETS DAILY 180 Tablet 3    dextromethorphan-guaiFENesin (Robitussin Cough-Chest Alfredo DM) 5-100 mg/5 mL liqd Take 5 mL by mouth four (4) times daily as needed for Cough or Congestion. 177 mL 5    ondansetron hcl (Zofran) 4 mg tablet Take 1 Tablet by mouth every eight (8) hours as needed for Nausea. 20 Tablet 0    traZODone (DESYREL) 50 mg tablet TAKE 1 TABLET NIGHTLY AS NEEDED FOR SLEEP 90 Tab 4    LACTOBACILLUS ACIDOPHILUS (PROBIOTIC PO) Take 1 Cap by mouth as needed.       carica papaya tab Take by mouth. Takes 4 tabs po 1-2 times daily after meals. ONETOUCH ULTRA2 monitoring kit       MICROLET LANCET misc USE TO TEST BLOOD SUGAR EVERY  Each 11    cyanocobalamin 1,000 mcg tablet Take 1,000 mcg by mouth as needed. Past Medical History:   Diagnosis Date    Adrenal nodule ([facility] [address] 2022    13 mm right adrenal noduladenoma.  Further evaluation of this adrenal gland is not necessary.e    Adverse effect of anesthesia     \"hard time waking me up\"    Axillary pain, right     Bereavement 2019    79 yo - heart attack -  in sleep    Chronic pain     d/t fibromyalgia    CKD (chronic kidney disease), stage III ([facility] [address] 2020    Coagulation disorder ([facility] [address]     on eliquis -d/c due to gi upset - now on coumadin previously followed by dr. Daniel [facility]    COVID-19 virus infection 2022    Depression with anxiety     Diverticulosis     DJD (degenerative joint disease)     Dyslipidemia     Encounter for Hemoccult screening 2017    neg    Epicondylitis, lateral, right 2021    Fibromyalgia     Gait instability     GERD (gastroesophageal reflux disease)     Hepatic steatosis 2022    Hepatic steatosis 2022    Hypertension     IBS (irritable bowel syndrome)     Ill-defined condition     gastroparesis    Ill-defined condition     CT - 3/18/2018 - esophageal diverticulum    Ill-defined condition     pericarditis    Mastodynia, left greater than right 2011    Menopause     Normal cardiac stress test 2014    Positive D dimer 2015    Prediabetes     Preventative health care 2021    Pulmonary embolism ([facility] [address] 2013    rll    Restless leg syndrome     S/P colonoscopy 2011    kenny hernandez md    S/P colonoscopy 04/10/2018    Teresa Rodriguez MD repeat5 yrs    S/P colonoscopy 2022    Teresa Rodriguez MD - 5 yrs    Saphenous vein occlusion, right 2017    [facility] acute occlusive superificial vein thrombosis - [facility] md Manisha    Saphenous vein thrombophlebitis, right 02/2018    and acute l posterior tibial vein - this is the 2nd DVT putting her on lifelong anticoagualtion / Geezio Junes    Sleep apnea     cpap 9cm    SOB (shortness of breath)     Trapezius muscle strain 01/18/2023    White coat hypertension      Past Surgical History:   Procedure Laterality Date    COLONOSCOPY N/A 4/10/2018    COLONOSCOPY,EGD performed by Soco Burgos MD at [facility] ENDOSCOPY    COLONOSCOPY N/A 5/5/2022    COLONOSCOPY performed by Tania Bennett MD at [facility] ENDOSCOPY    HX BREAST BIOPSY Left     HX COLONOSCOPY      HX ORTHOPAEDIC  2009    foot surgery-left - bunionectomy    HX ORTHOPAEDIC      cyst removed from left hand - ganglion cyst    HX OTHER SURGICAL      right and left leg muscle biopsies - elevated CPK - muscle enzymes were elevated    HX TUBAL LIGATION      MO UNLISTED PROCEDURE ABDOMEN PERITONEUM & OMENTUM      exploratory years ago    MO UNLISTED PROCEDURE BREAST  2006    left breast [id number]. Eddye Cools     Allergies   Allergen Reactions    Aleve [Naproxen Sodium] Hoarseness    Dilaudid [Hydromorphone (Bulk)] Anaphylaxis     Respiratory arrest and throat closes    Crestor [Rosuvastatin] Nausea Only    Diovan [Valsartan] Palpitations    Lipitor [Atorvastatin] Nausea Only     dizzy    Morphine Other (comments)     Patch-vomiting,weakness, fainting    Sulfa (Sulfonamide Antibiotics) Hives, Rash and Other (comments)     fainting         REVIEW OF SYSTEMS:  General: negative for - chills or fever  ENT: negative for - headaches, nasal congestion or tinnitus  Respiratory: negative for - cough, hemoptysis, shortness of breath or wheezing  Cardiovascular : negative for - chest pain, edema, palpitations or shortness of breath  Gastrointestinal: negative for - abdominal pain, blood in stools, heartburn or nausea/vomiting  Genito-Urinary: no dysuria, trouble voiding, or hematuria  Musculoskeletal: negative for - gait disturbance, joint pain, joint stiffness or joint swelling  Neurological: no TIA or stroke symptoms  Hematologic: no bruises, no bleeding, no swollen glands  Integument: no lumps, mole changes, nail changes or rash  Endocrine: no malaise/lethargy or unexpected weight changes      Social History     Socioeconomic History    Marital status:    Tobacco Use    Smoking status: Never    Smokeless tobacco: Never   Vaping Use    Vaping Use: Never used   Substance and Sexual Activity    Alcohol use: No    Drug use: No    Sexual activity: Yes     Partners: Male     Birth control/protection: None   Social History Narrative         Family History: Mother: alive 80 yrs, High Blood Pressure, cva with cognitive deficitsFather:  36 yrs, myocardial infarctionSister(s): aliveBrother(s): unknow n2    sister(s) . 40 daughter no choildren -  walked out works for board of directors for [employer] . Social History: Alcohol Use Patient does not use alcohol. Smoking Status Patient is a never smoker. Caffeine: occasional. Drugs:    prescription narcotics. Diet: Regular. Exercise: None. Marital Status: . Lives w ith: spouse. Children: children. Roman Catholic: Bernardo Copa. Patient is  living w ith her  she is on disability related to her fibromyositis.      Family History   Problem Relation Age of Onset    Hypertension Mother     Kidney Disease Mother         1 kidney    Heart Disease Father     Heart Attack Father         late 35s or early 45s    Heart Attack Maternal Grandmother     Cancer Maternal Grandfather         ? lung or stomach       OBJECTIVE:    Visit Vitals  BP (!) 155/89 (BP 1 Location: Left upper arm, BP Patient Position: Sitting)   Pulse 60   Temp 98.4 °F (36.9 °C) (Oral)   Resp 18   Ht 5' 2\" (1.575 m)   Wt 183 lb 8 oz (83.2 kg)   SpO2 99%   BMI 33.56 kg/m²     CONSTITUTIONAL: well , well nourished, appears age appropriate  EYES: perrla, eom intact  ENMT:moist mucous membranes, pharynx clear  NECK: supple. Thyroid normal  RESPIRATORY: Chest: clear bilaterally   CARDIOVASCULAR: Heart: regular rate and rhythm  GASTROINTESTINAL: Abdomen: soft, bowel sounds active  HEMATOLOGIC: no pathological lymph nodes palpated  MUSCULOSKELETAL: Extremities: no edema, pulse 1+   INTEGUMENT: No unusual rashes or suspicious skin lesions noted. Nails appear normal.  NEUROLOGIC: non-focal exam   MENTAL STATUS: alert and oriented, appropriate affect           ASSESSMENT:  1. Encounter for monitoring Coumadin therapy    2. Essential hypertension, benign    3. Gastroesophageal reflux disease without esophagitis    4. Fibromyalgia    5. Dyslipidemia    6. Adrenal nodule ([address]    7. Hepatic steatosis    8. Stage 3a chronic kidney disease ([address]    9. Peripheral vascular disease ([address]    10. Strain of right trapezius muscle, initial encounter      PT-INR Is therapeutic, no adjustments are made. She will continue the current dose of Coumadin. She has primary hypertension and has typical white coat hypertension. At home her blood pressures are in the 110s-120s. No symptoms related to GERD. Fibromyalgia continues to flare periodically, primarily in her legs today. History of dyslipidemia. Last lipid panel was still elevated. She is intolerant of Rosuvastatin. She is willing to try another medication for her cholesterol. Regarding Lipitor, she states, \"I don't like the way it made me feel with dizziness and nausea\". She states reluctantly she is willing to try something else. We will check her lipid panel today and depending on that result we will make the appropriate recommendation. She has an adrenal nodule that is being observed. It was indeterminate in February at 13 mm. Stage 3 CKD and we will check a renal panel. History of hepatic steatosis with normal LFTs. She has peripheral vascular occlusive disease, but is asymptomatic.     The discomfort in her shoulder I think is strain of the trapezius muscle. I do not think it is related to the cervical disease or shoulder disease, but we will xray both areas to exclude the possibility. She will be back in one month for P-T check, three months to see me. I have discussed the diagnosis with the patient and the intended plan as seen in the  Orders. The patient understands and agees with the plan. The patient has   received an after visit summary and questions were answered concerning  future plans  Patient labs and/or xrays were reviewed  Past records were reviewed. PLAN:  .  Orders Placed This Encounter    XR SPINE CERV 4 OR 5 V    XR SHOULDER LT AP/LAT MIN 2 V    RENAL FUNCTION PANEL    LIPID PANEL    URINALYSIS W/ REFLEX CULTURE    REFERRAL TO PHYSICAL THERAPY    AMB POC PT/INR       Follow-up and Dispositions    Return in about 4 weeks (around 2/15/2023) for pt/inr, bp check. ATTENTION:   This medical record was transcribed using an electronic medical records system. Although proofread, it may and can contain electronic and spelling errors. Other human spelling and other errors may be present. Corrections may be executed at a later time. Please feel free to contact us for any clarifications as needed.

## 2023-01-18 NOTE — PROGRESS NOTES
Jim Arthur is a 69 y.o. female    Chief Complaint   Patient presents with    Hypertension     1. Have you been to the ER, urgent care clinic since your last visit? Hospitalized since your last visit? No    2. Have you seen or consulted any other health care providers outside of the [address] [place] [address] since your last visit? Include any pap smears or colon screening.  No

## 2023-01-19 LAB
ALBUMIN SERPL-MCNC: 4.1 G/DL (ref 3.5–5)
ANION GAP SERPL CALC-SCNC: 7 MMOL/L (ref 5–15)
APPEARANCE UR: CLEAR
BACTERIA URNS QL MICRO: NEGATIVE /HPF
BILIRUB UR QL: NEGATIVE
BUN SERPL-MCNC: 24 MG/DL (ref 6–20)
BUN/CREAT SERPL: 19 (ref 12–20)
CALCIUM SERPL-MCNC: 9.6 MG/DL (ref 8.5–10.1)
CHLORIDE SERPL-SCNC: 108 MMOL/L (ref 97–108)
CHOLEST SERPL-MCNC: 235 MG/DL
CO2 SERPL-SCNC: 27 MMOL/L (ref 21–32)
COLOR UR: NORMAL
CREAT SERPL-MCNC: 1.27 MG/DL (ref 0.55–1.02)
EPITH CASTS URNS QL MICRO: NORMAL /LPF
GLUCOSE SERPL-MCNC: 96 MG/DL (ref 65–100)
GLUCOSE UR STRIP.AUTO-MCNC: NEGATIVE MG/DL
HDLC SERPL-MCNC: 59 MG/DL
HDLC SERPL: 4 (ref 0–5)
HGB UR QL STRIP: NEGATIVE
HYALINE CASTS URNS QL MICRO: NORMAL /LPF (ref 0–5)
KETONES UR QL STRIP.AUTO: NEGATIVE MG/DL
LDLC SERPL CALC-MCNC: 158 MG/DL (ref 0–100)
LEUKOCYTE ESTERASE UR QL STRIP.AUTO: NEGATIVE
NITRITE UR QL STRIP.AUTO: NEGATIVE
PH UR STRIP: 5 (ref 5–8)
PHOSPHATE SERPL-MCNC: 3.3 MG/DL (ref 2.6–4.7)
POTASSIUM SERPL-SCNC: 4 MMOL/L (ref 3.5–5.1)
PROT UR STRIP-MCNC: NEGATIVE MG/DL
RBC #/AREA URNS HPF: NORMAL /HPF (ref 0–5)
SODIUM SERPL-SCNC: 142 MMOL/L (ref 136–145)
SP GR UR REFRACTOMETRY: 1.01 (ref 1–1.03)
TRIGL SERPL-MCNC: 90 MG/DL (ref ?–150)
UA: UC IF INDICATED,UAUC: NORMAL
UROBILINOGEN UR QL STRIP.AUTO: 0.2 EU/DL (ref 0.2–1)
VLDLC SERPL CALC-MCNC: 18 MG/DL
WBC URNS QL MICRO: NORMAL /HPF (ref 0–4)

## 2023-01-20 NOTE — PROGRESS NOTES
Your cholesterol remains elevated. As discussed in the office we will try Livalo.   I have sent that to your pharmacy

## 2023-01-24 ENCOUNTER — HOSPITAL ENCOUNTER (OUTPATIENT)
Dept: MAMMOGRAPHY | Age: 68
Discharge: HOME | End: 2023-01-24
Attending: INTERNAL MEDICINE
Payer: MEDICARE

## 2023-01-24 DIAGNOSIS — Z12.31 OTHER SCREENING MAMMOGRAM: ICD-10-CM

## 2023-01-24 PROCEDURE — 77063 BREAST TOMOSYNTHESIS BI: CPT

## 2023-01-24 RX ORDER — PRAVASTATIN SODIUM 20 MG/1
20 TABLET ORAL
Qty: 30 TABLET | Refills: 5 | Status: SHIPPED | OUTPATIENT
Start: 2023-01-24

## 2023-01-25 NOTE — ED NOTES
Pt reports LUQ pain that radiates to the back x1 week with accompanying constipation and nausea. Pt denies v/d. Hx of GERD. Hydroxyzine Counseling: Patient advised that the medication is sedating and not to drive a car after taking this medication.  Patient informed of potential adverse effects including but not limited to dry mouth, urinary retention, and blurry vision.  The patient verbalized understanding of the proper use and possible adverse effects of hydroxyzine.  All of the patient's questions and concerns were addressed.

## 2023-02-22 ENCOUNTER — CLINICAL SUPPORT (OUTPATIENT)
Dept: INTERNAL MEDICINE CLINIC | Age: 68
End: 2023-02-22
Payer: MEDICARE

## 2023-02-22 ENCOUNTER — DOCUMENTATION ONLY (OUTPATIENT)
Dept: INTERNAL MEDICINE CLINIC | Age: 68
End: 2023-02-22

## 2023-02-22 DIAGNOSIS — Z51.81 ENCOUNTER FOR MONITORING COUMADIN THERAPY: Primary | ICD-10-CM

## 2023-02-22 DIAGNOSIS — Z79.01 ENCOUNTER FOR MONITORING COUMADIN THERAPY: Primary | ICD-10-CM

## 2023-02-22 LAB
INR BLD: 2.7
PT POC: 32.3 SECONDS
VALID INTERNAL CONTROL?: YES

## 2023-02-22 NOTE — PROGRESS NOTES
Pt in for PT/INR check today. States that her cholesterol medication makes her legs hurt worse. Dr. Noam Landis notified. Pt advised to stop pravastatin and consider starting Repatha per Dr. Noam Landis. Pt verbalized understanding. States she want to do some research on Repatha and then send Dr. Noam Landis  a  [employer] message whether or not she want to start taking it.

## 2023-02-22 NOTE — PROGRESS NOTES
Patient in for PT/INR. States she is taking 2.5 mg of coumadin on Sun and Thurs and 5 mg all other days. PT=32.3, INR=2.7. Dr. Weaver  notified. Patient instructed to stay on same dose and return in one month for recheck. Pt.verbalized understanding.

## 2023-03-22 ENCOUNTER — OFFICE VISIT (OUTPATIENT)
Dept: INTERNAL MEDICINE CLINIC | Age: 68
End: 2023-03-22
Payer: MEDICARE

## 2023-03-22 ENCOUNTER — PATIENT MESSAGE (OUTPATIENT)
Dept: INTERNAL MEDICINE CLINIC | Age: 68
End: 2023-03-22

## 2023-03-22 VITALS
OXYGEN SATURATION: 100 % | DIASTOLIC BLOOD PRESSURE: 84 MMHG | TEMPERATURE: 98.1 F | RESPIRATION RATE: 20 BRPM | WEIGHT: 182.4 LBS | HEIGHT: 62 IN | BODY MASS INDEX: 33.57 KG/M2 | HEART RATE: 57 BPM | SYSTOLIC BLOOD PRESSURE: 128 MMHG

## 2023-03-22 DIAGNOSIS — G89.29 CHRONIC RIGHT SHOULDER PAIN: ICD-10-CM

## 2023-03-22 DIAGNOSIS — E27.8 ADRENAL NODULE (HCC): ICD-10-CM

## 2023-03-22 DIAGNOSIS — K76.0 HEPATIC STEATOSIS: ICD-10-CM

## 2023-03-22 DIAGNOSIS — N18.31 STAGE 3A CHRONIC KIDNEY DISEASE (HCC): ICD-10-CM

## 2023-03-22 DIAGNOSIS — E66.09 CLASS 1 OBESITY DUE TO EXCESS CALORIES WITHOUT SERIOUS COMORBIDITY WITH BODY MASS INDEX (BMI) OF 33.0 TO 33.9 IN ADULT: ICD-10-CM

## 2023-03-22 DIAGNOSIS — Z79.01 ENCOUNTER FOR MONITORING COUMADIN THERAPY: Primary | ICD-10-CM

## 2023-03-22 DIAGNOSIS — Z51.81 ENCOUNTER FOR MONITORING COUMADIN THERAPY: Primary | ICD-10-CM

## 2023-03-22 DIAGNOSIS — I73.9 PERIPHERAL VASCULAR DISEASE (HCC): ICD-10-CM

## 2023-03-22 DIAGNOSIS — M25.511 CHRONIC RIGHT SHOULDER PAIN: ICD-10-CM

## 2023-03-22 PROBLEM — D35.01 ADRENAL ADENOMA, RIGHT: Status: RESOLVED | Noted: 2022-02-16 | Resolved: 2023-03-22

## 2023-03-22 PROBLEM — Z00.00 PREVENTATIVE HEALTH CARE: Status: RESOLVED | Noted: 2017-06-28 | Resolved: 2023-03-22

## 2023-03-22 PROBLEM — M77.11 EPICONDYLITIS, LATERAL, RIGHT: Status: RESOLVED | Noted: 2021-03-03 | Resolved: 2023-03-22

## 2023-03-22 PROBLEM — U07.1 COVID-19 VIRUS INFECTION: Status: RESOLVED | Noted: 2022-11-28 | Resolved: 2023-03-22

## 2023-03-22 LAB
INR BLD: 2.4
PT POC: 28.8 SECONDS
VALID INTERNAL CONTROL?: YES

## 2023-03-22 PROCEDURE — G9899 SCRN MAM PERF RSLTS DOC: HCPCS | Performed by: INTERNAL MEDICINE

## 2023-03-22 PROCEDURE — G8399 PT W/DXA RESULTS DOCUMENT: HCPCS | Performed by: INTERNAL MEDICINE

## 2023-03-22 PROCEDURE — 99214 OFFICE O/P EST MOD 30 MIN: CPT | Performed by: INTERNAL MEDICINE

## 2023-03-22 PROCEDURE — 3079F DIAST BP 80-89 MM HG: CPT | Performed by: INTERNAL MEDICINE

## 2023-03-22 PROCEDURE — 3017F COLORECTAL CA SCREEN DOC REV: CPT | Performed by: INTERNAL MEDICINE

## 2023-03-22 PROCEDURE — G8536 NO DOC ELDER MAL SCRN: HCPCS | Performed by: INTERNAL MEDICINE

## 2023-03-22 PROCEDURE — G8427 DOCREV CUR MEDS BY ELIG CLIN: HCPCS | Performed by: INTERNAL MEDICINE

## 2023-03-22 PROCEDURE — 1090F PRES/ABSN URINE INCON ASSESS: CPT | Performed by: INTERNAL MEDICINE

## 2023-03-22 PROCEDURE — 3074F SYST BP LT 130 MM HG: CPT | Performed by: INTERNAL MEDICINE

## 2023-03-22 PROCEDURE — 1123F ACP DISCUSS/DSCN MKR DOCD: CPT | Performed by: INTERNAL MEDICINE

## 2023-03-22 PROCEDURE — 1101F PT FALLS ASSESS-DOCD LE1/YR: CPT | Performed by: INTERNAL MEDICINE

## 2023-03-22 PROCEDURE — G8417 CALC BMI ABV UP PARAM F/U: HCPCS | Performed by: INTERNAL MEDICINE

## 2023-03-22 PROCEDURE — 85610 PROTHROMBIN TIME: CPT | Performed by: INTERNAL MEDICINE

## 2023-03-22 PROCEDURE — G8510 SCR DEP NEG, NO PLAN REQD: HCPCS | Performed by: INTERNAL MEDICINE

## 2023-03-22 NOTE — PROGRESS NOTES
SPORTS MEDICINE AND PRIMARY CARE  Bejna Forrest MD, [address] [address]  Phone:  [phone]  Fax: [phone]      Chief Complaint   Patient presents with    Anticoagulation    Hypertension         SUBECTIVE:    Javier Carmona is a 69 y.o. female The patient returns today with known history of primary hypertension, chronic kidney disease stage 3, history of chronic pulmonary embolism requiring long-term anticoagulation, peripheral vascular disease, hepatic steatosis, and right adrenal nodule. Pain in the right shoulder and also had the swelling in the shoulder, both noted by her daughter as well as her . We did an x-ray of the shoulder back in January. It showed mild shoulder DJD but no acute abnormality and certainly no acute changes seen. About her A1c that is rising, the last was 5.9, which remains prediabetic. The patient is seen for evaluation. Current Outpatient Medications   Medication Sig Dispense Refill    pravastatin (PRAVACHOL) 20 mg tablet Take 1 Tablet by mouth nightly. 30 Tablet 5    bisoprolol-hydroCHLOROthiazide (ZIAC) 10-6.25 mg per tablet TAKE 1 TABLET DAILY 90 Tablet 3    NIFEdipine ER (ADALAT CC) 60 mg ER tablet Take 1 Tablet by mouth daily. 90 Tablet 3    predniSONE (DELTASONE) 20 mg tablet Take 2 Tablets by mouth daily (with breakfast). For 5 days as need for gout atack 10 Tablet 5    cholecalciferol (VITAMIN D3) 25 mcg (1,000 unit) cap Take 1 Tablet by mouth. diphenhydrAMINE (BENADRYL) 25 mg capsule Take 25 mg by mouth every six (6) hours as needed. losartan (COZAAR) 100 mg tablet Take 1 Tablet by mouth in the morning. 90 Tablet 3    warfarin (COUMADIN) 2.5 mg tablet TAKE 1 TO 2 TABLETS DAILY 180 Tablet 3    dextromethorphan-guaiFENesin (Robitussin Cough-Chest Alfredo DM) 5-100 mg/5 mL liqd Take 5 mL by mouth four (4) times daily as needed for Cough or Congestion.  177 mL 5    ondansetron hcl (Zofran) 4 mg tablet Take 1 Tablet by mouth every eight (8) hours as needed for Nausea. 20 Tablet 0    LACTOBACILLUS ACIDOPHILUS (PROBIOTIC PO) Take 1 Cap by mouth as needed. carica papaya tab Take  by mouth. Takes 4 tabs po 1-2 times daily after meals. ONETOUCH ULTRA2 monitoring kit       MICROLET LANCET misc USE TO TEST BLOOD SUGAR EVERY  Each 11    cyanocobalamin 1,000 mcg tablet Take 1,000 mcg by mouth as needed. traZODone (DESYREL) 50 mg tablet TAKE 1 TABLET NIGHTLY AS NEEDED FOR SLEEP (Patient not taking: Reported on 3/22/2023) 90 Tab 4     Past Medical History:   Diagnosis Date    Adrenal nodule ([address] 2022    13 mm right adrenal noduladenoma.  Further evaluation of this adrenal gland is not necessary.e    Adverse effect of anesthesia     \"hard time waking me up\"    Axillary pain, right     Bereavement 2019    79 yo - heart attack -  in sleep    Chronic pain     d/t fibromyalgia    CKD (chronic kidney disease), stage III ([address] 2020    Coagulation disorder ([address]     on eliquis -d/c due to gi upset - now on coumadin previously followed by dr. Laura Litten    COVID-19 virus infection 2022    Depression with anxiety     Diverticulosis     DJD (degenerative joint disease)     Dyslipidemia     Encounter for Hemoccult screening 2017    neg    Epicondylitis, lateral, right 2021    Fibromyalgia     Gait instability     GERD (gastroesophageal reflux disease)     Hepatic steatosis 2022    Hepatic steatosis 2022    Hypertension     IBS (irritable bowel syndrome)     Ill-defined condition     gastroparesis    Ill-defined condition     CT - 3/18/2018 - esophageal diverticulum    Ill-defined condition     pericarditis    Mastodynia, left greater than right 2011    Menopause     Normal cardiac stress test 2014    Positive D dimer 2015    Prediabetes     Preventative health care 2021    Pulmonary embolism ([address] 2013    rll    Restless leg syndrome     S/P colonoscopy 01/27/2011    kenny hernandez md    S/P colonoscopy 04/10/2018    Saumya Burden MD repeat5 yrs    S/P colonoscopy 05/05/2022    Saumya Burden MD - 5 yrs    Saphenous vein occlusion, 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    [facility] acute occlusive superificial vein thrombosis - Melody Dyer md    Saphenous vein thrombophlebitis, right 02/2018    and acute l posterior tibial vein - this is the 2nd DVT putting her on lifelong anticoagualtion / Eliza Key    Sleep apnea     cpap 9cm    SOB (shortness of breath)     Trapezius muscle strain 01/18/2023    White coat hypertension      Past Surgical History:   Procedure Laterality Date    COLONOSCOPY N/A 4/10/2018    COLONOSCOPY,EGD performed by Karla Peña MD at [facility] ENDOSCOPY    COLONOSCOPY N/A 5/5/2022    COLONOSCOPY performed by Saumya Burden MD at [facility] ENDOSCOPY    HX BREAST BIOPSY Left     HX COLONOSCOPY      HX ORTHOPAEDIC  2009    foot surgery-left - bunionectomy    HX ORTHOPAEDIC      cyst removed from left hand - ganglion cyst    HX OTHER SURGICAL      right and left leg muscle biopsies - elevated CPK - muscle enzymes were elevated    HX TUBAL LIGATION      NV UNLISTED PROCEDURE ABDOMEN PERITONEUM & OMENTUM      exploratory years ago    NV UNLISTED PROCEDURE BREAST  2006    left breast [id number]. Maureen Sever     Allergies   Allergen Reactions    Aleve [Naproxen Sodium] Hoarseness    Dilaudid [Hydromorphone (Bulk)] Anaphylaxis     Respiratory arrest and throat closes    Crestor [Rosuvastatin] Nausea Only    Diovan [Valsartan] Palpitations    Lipitor [Atorvastatin] Nausea Only     dizzy    Morphine Other (comments)     Patch-vomiting,weakness, fainting    Pravastatin Other (comments)     myalgia    Sulfa (Sulfonamide Antibiotics) Hives, Rash and Other (comments)     fainting       REVIEW OF SYSTEMS:   No chest pain. No shortness of breath.         Social History     Socioeconomic History    Marital status:    Tobacco Use Smoking status: Never    Smokeless tobacco: Never   Vaping Use    Vaping Use: Never used   Substance and Sexual Activity    Alcohol use: No    Drug use: No    Sexual activity: Yes     Partners: Male     Birth control/protection: None   Social History Narrative         Family History: Mother: alive 80 yrs, High Blood Pressure, cva with cognitive deficitsFather:  36 yrs, myocardial infarctionSister(s): aliveBrother(s): unknow n2    sister(s) . 40 daughter no choildren -  walked out works for board of directors for [employer] . Social History: Alcohol Use Patient does not use alcohol. Smoking Status Patient is a never smoker. Caffeine: occasional. Drugs:    prescription narcotics. Diet: Regular. Exercise: None. Marital Status: . Lives w ith: spouse. Children: children. Restoration: Hulen Benjamin. Patient is  living w ith her  she is on disability related to her fibromyositis. r  Family History   Problem Relation Age of Onset    Hypertension Mother     Kidney Disease Mother         1 kidney    Heart Disease Father     Heart Attack Father         late 35s or early 45s    Heart Attack Maternal Grandmother     Cancer Maternal Grandfather         ? lung or stomach       OBJECTIVE:  Visit Vitals  /84 (BP 1 Location: Left upper arm, BP Patient Position: Sitting)   Pulse (!) 57   Temp 98.1 °F (36.7 °C) (Oral)   Resp 20   Ht 5' 2\" (1.575 m)   Wt 182 lb 6.4 oz (82.7 kg)   SpO2 100%   BMI 33.36 kg/m²     ENT: perrla,  eom intact  NECK: supple.  Thyroid normal  CHEST: clear to ascultation and percussion   HEART: regular rate and rhythm  ABD: soft, bowel sounds active  EXTREMITIES: no edema, pulse 1+     Office Visit on 2023   Component Date Value Ref Range Status    VALID INTERNAL CONTROL POC 2023 Yes   Final    Prothrombin time (POC) 2023 28.8  seconds Final    INR POC 2023 2.4   Final   Clinical Support on 2023   Component Date Value Ref Range Status    VALID INTERNAL CONTROL POC 02/22/2023 Yes   Final    Prothrombin time (POC) 02/22/2023 32.3  seconds Final    INR POC 02/22/2023 2.7   Final   Office Visit on 01/18/2023   Component Date Value Ref Range Status    VALID INTERNAL CONTROL POC 01/18/2023 Yes   Final    Prothrombin time (POC) 01/18/2023 28.9  seconds Final    INR POC 01/18/2023 2.4   Final    Color 01/18/2023 YELLOW/STRAW    Final    Color Reference Range: Straw, Yellow or Dark Yellow    Appearance 01/18/2023 CLEAR  CLEAR   Final    Specific gravity 01/18/2023 1.006  1.003 - 1.030   Final    pH (UA) 01/18/2023 5.0  5.0 - 8.0   Final    Protein 01/18/2023 Negative  Negative mg/dL Final    Glucose 01/18/2023 Negative  Negative mg/dL Final    Ketone 01/18/2023 Negative  Negative mg/dL Final    Bilirubin 01/18/2023 Negative  Negative   Final    Blood 01/18/2023 Negative  Negative   Final    Urobilinogen 01/18/2023 0.2  0.2 - 1.0 EU/dL Final    Nitrites 01/18/2023 Negative  Negative   Final    Leukocyte Esterase 01/18/2023 Negative  Negative   Final    UA:UC IF INDICATED 01/18/2023 CULTURE NOT INDICATED BY UA RESULT  CULTURE NOT INDICATED BY UA RESULT   Final    WBC 01/18/2023 0-4  0 - 4 /hpf Final    RBC 01/18/2023 0-5  0 - 5 /hpf Final    Epithelial cells 01/18/2023 FEW  FEW /lpf Final    Epithelial cell category consists of squamous cells and /or transitional urothelial cells. Renal tubular cells, if present, are separately identified as such.     Bacteria 01/18/2023 Negative  Negative /hpf Final    Hyaline cast 01/18/2023 0-2  0 - 5 /lpf Final    Sodium 01/18/2023 142  136 - 145 mmol/L Final    Potassium 01/18/2023 4.0  3.5 - 5.1 mmol/L Final    Chloride 01/18/2023 108  97 - 108 mmol/L Final    CO2 01/18/2023 27  21 - 32 mmol/L Final    Anion gap 01/18/2023 7  5 - 15 mmol/L Final    Glucose 01/18/2023 96  65 - 100 mg/dL Final    BUN 01/18/2023 24 (A)  6 - 20 MG/DL Final    Creatinine 01/18/2023 1.27 (A)  0.55 - 1.02 MG/DL Final    BUN/Creatinine ratio 01/18/2023 19  12 - 20   Final    eGFR 01/18/2023 46 (A)  >60 ml/min/1.73m2 Final    Comment:   Pediatric calculator link: Jemal.at. org/professionals/kdoqi/gfr_calculatorped    These results are not intended for use in patients <25years of age. eGFR results are calculated without a race factor using  the 2021 CKD-EPI equation. Careful clinical correlation is recommended, particularly when comparing to results calculated using previous equations. The CKD-EPI equation is less accurate in patients with extremes of muscle mass, extra-renal metabolism of creatinine, excessive creatine ingestion, or following therapy that affects renal tubular secretion. Calcium 01/18/2023 9.6  8.5 - 10.1 MG/DL Final    Phosphorus 01/18/2023 3.3  2.6 - 4.7 MG/DL Final    Albumin 01/18/2023 4.1  3.5 - 5.0 g/dL Final    Cholesterol, total 01/18/2023 235 (A)  <200 MG/DL Final    Triglyceride 01/18/2023 90  <150 MG/DL Final    Based on NCEP-ATP III:  Triglycerides <150 mg/dL  is considered normal, 150-199 mg/dL  borderline high,  200-499 mg/dL high and  greater than or equal to 500 mg/dL very high. HDL Cholesterol 01/18/2023 59  MG/DL Final    Based on NCEP ATP III, HDL Cholesterol <40 mg/dL is considered low and >60 mg/dL is elevated. LDL, calculated 01/18/2023 158 (A)  0 - 100 MG/DL Final    Comment: Based on the NCEP-ATP: LDL-C concentrations are considered  optimal <100 mg/dL,  near optimal/above Normal 100-129 mg/dL  Borderline High: 130-159, High: 160-189 mg/dL  Very High: Greater than or equal to 190 mg/dL      VLDL, calculated 01/18/2023 18  MG/DL Final    CHOL/HDL Ratio 01/18/2023 4.0  0.0 - 5.0   Final          ASSESSMENT:  1. Encounter for monitoring Coumadin therapy    2. Stage 3a chronic kidney disease ([address]    3. Peripheral vascular disease ([address]    4. Hepatic steatosis    5. Adrenal nodule ([address]    6. Chronic right shoulder pain    7.  Class 1 obesity due to excess calories without serious comorbidity with body mass index (BMI) of 33.0 to 33.9 in adult      Kidney disease is monitored on regular basis and remains at stage 3A as of January. She has peripheral vascular disease but currently asymptomatic. Known history of hepatic steatosis. We encourage a heart-healthy weight-reducing diet. She has adrenal nodule that has been stable and no further evaluation is indicated. She has chronic right shoulder pain. She has full range of motion of the shoulder. I suspect this is a tendinopathy. She feels the shoulder is swollen. I do not see a significant edema to the two shoulders and we will give a referral to orthopedics for their opinion. She states she is eating less but gaining weight and would like to see weight management. She will be back in one month for a PT check. I have discussed the diagnosis with the patient and the intended plan as seen in the  orders above. The patient understands and agees with the plan. The patient has   received an after visit summary and questions were answered concerning  future plans  Patient labs and/or xrays were reviewed  Past records were reviewed. PLAN:  .  Orders Placed This Encounter    [facility]    REFERRAL TO WEIGHT LOSS    AMB POC PT/INR       Follow-up and Dispositions    Return in about 4 weeks (around 4/19/2023) for pt/inr. ATTENTION:   This medical record was transcribed using an electronic medical records system. Although proofread, it may and can contain electronic and spelling errors. Other human spelling and other errors may be present. Corrections may be executed at a later time. Please feel free to contact us for any clarifications as needed.

## 2023-03-22 NOTE — PROGRESS NOTES
Andres Solis is a 69 y.o. female    Chief Complaint   Patient presents with    Anticoagulation    Hypertension     1. Have you been to the ER, urgent care clinic since your last visit? Hospitalized since your last visit? No    2. Have you seen or consulted any other health care providers outside of the [address] [place] [address] since your last visit? Include any pap smears or colon screening.  No  Results for orders placed or performed in visit on 03/22/23   AMB POC PT/INR   Result Value Ref Range    VALID INTERNAL CONTROL POC Yes     Prothrombin time (POC) 28.8 seconds    INR POC 2.4      Pt is taking 2.5mg everyday except Thursday and Sunday she take 5mg

## 2023-03-24 ENCOUNTER — TELEPHONE (OUTPATIENT)
Dept: SURGERY | Age: 68
End: 2023-03-24

## 2023-03-24 NOTE — TELEPHONE ENCOUNTER
Called patient regarding referral to our [address] [place] [address] [facility]. Patient did not answer so I left a vmail with our contact information.

## 2023-03-29 DIAGNOSIS — E66.01 MORBID OBESITY (HCC): ICD-10-CM

## 2023-03-29 DIAGNOSIS — Z76.89 ENCOUNTER FOR WEIGHT MANAGEMENT: Primary | ICD-10-CM

## 2023-03-31 ENCOUNTER — TRANSCRIBE ORDER (OUTPATIENT)
Dept: SCHEDULING | Age: 68
End: 2023-03-31

## 2023-03-31 DIAGNOSIS — M25.511 RIGHT SHOULDER PAIN: Primary | ICD-10-CM

## 2023-04-17 ENCOUNTER — HOSPITAL ENCOUNTER (OUTPATIENT)
Dept: MRI IMAGING | Age: 68
Discharge: HOME | End: 2023-04-17
Attending: ORTHOPAEDIC SURGERY
Payer: MEDICARE

## 2023-04-17 DIAGNOSIS — M25.511 RIGHT SHOULDER PAIN: ICD-10-CM

## 2023-04-17 PROCEDURE — 73221 MRI JOINT UPR EXTREM W/O DYE: CPT

## 2023-04-21 RX ORDER — WARFARIN 2.5 MG/1
TABLET ORAL
Qty: 180 TABLET | Refills: 3 | Status: SHIPPED | OUTPATIENT
Start: 2023-04-21

## 2023-04-21 RX ORDER — BISOPROLOL FUMARATE AND HYDROCHLOROTHIAZIDE 10; 6.25 MG/1; MG/1
TABLET ORAL
Qty: 90 TABLET | Refills: 3 | Status: SHIPPED | OUTPATIENT
Start: 2023-04-21

## 2023-04-26 ENCOUNTER — CLINICAL SUPPORT (OUTPATIENT)
Dept: INTERNAL MEDICINE CLINIC | Age: 68
End: 2023-04-26
Payer: MEDICARE

## 2023-04-26 DIAGNOSIS — Z51.81 ENCOUNTER FOR MONITORING COUMADIN THERAPY: Primary | ICD-10-CM

## 2023-04-26 DIAGNOSIS — Z79.01 ENCOUNTER FOR MONITORING COUMADIN THERAPY: Primary | ICD-10-CM

## 2023-04-26 LAB
INR BLD: 2.3
PT POC: 27.8 SECONDS
VALID INTERNAL CONTROL?: YES

## 2023-04-26 PROCEDURE — 85610 PROTHROMBIN TIME: CPT | Performed by: INTERNAL MEDICINE

## 2023-05-22 ENCOUNTER — NURSE ONLY (OUTPATIENT)
Facility: CLINIC | Age: 68
End: 2023-05-22
Payer: COMMERCIAL

## 2023-05-22 DIAGNOSIS — Z79.01 LONG TERM (CURRENT) USE OF ANTICOAGULANTS: Primary | ICD-10-CM

## 2023-05-22 LAB
POC INR: 2.5
PROTHROMBIN TIME, POC: 29.6

## 2023-05-22 PROCEDURE — 85610 PROTHROMBIN TIME: CPT | Performed by: INTERNAL MEDICINE

## 2023-05-22 NOTE — PROGRESS NOTES
Patient in for PT/INR check. States she is taking 5 mg of Coumadin on Sun and Thursdays and 2.5 mg all other days. PT=29.6 and INR=2.5. Dr Kelley Munson notified. Pt informed no adjustments will be made at this time. Stay on same dosing and return in one month for recheck per Dr. Kelley Munson. Patient verbalized understanding.

## 2023-06-28 ENCOUNTER — NURSE ONLY (OUTPATIENT)
Facility: CLINIC | Age: 68
End: 2023-06-28
Payer: MEDICARE

## 2023-06-28 DIAGNOSIS — Z79.01 LONG TERM (CURRENT) USE OF ANTICOAGULANTS: Primary | ICD-10-CM

## 2023-06-28 LAB
POC INR: 2.6
PROTHROMBIN TIME, POC: 31.1

## 2023-06-28 PROCEDURE — 85610 PROTHROMBIN TIME: CPT | Performed by: INTERNAL MEDICINE

## 2023-08-03 ENCOUNTER — OFFICE VISIT (OUTPATIENT)
Facility: CLINIC | Age: 68
End: 2023-08-03
Payer: MEDICARE

## 2023-08-03 VITALS
RESPIRATION RATE: 16 BRPM | WEIGHT: 185.3 LBS | HEIGHT: 62 IN | BODY MASS INDEX: 34.1 KG/M2 | OXYGEN SATURATION: 98 % | HEART RATE: 59 BPM | SYSTOLIC BLOOD PRESSURE: 134 MMHG | DIASTOLIC BLOOD PRESSURE: 80 MMHG | TEMPERATURE: 98 F

## 2023-08-03 DIAGNOSIS — G89.29 OTHER CHRONIC PAIN: ICD-10-CM

## 2023-08-03 DIAGNOSIS — M79.7 FIBROMYALGIA: ICD-10-CM

## 2023-08-03 DIAGNOSIS — E78.5 DYSLIPIDEMIA: ICD-10-CM

## 2023-08-03 DIAGNOSIS — I10 ESSENTIAL HYPERTENSION, BENIGN: Primary | ICD-10-CM

## 2023-08-03 DIAGNOSIS — I73.9 PERIPHERAL VASCULAR DISEASE (HCC): ICD-10-CM

## 2023-08-03 DIAGNOSIS — L65.9 HAIR LOSS: ICD-10-CM

## 2023-08-03 DIAGNOSIS — N18.31 STAGE 3A CHRONIC KIDNEY DISEASE (HCC): ICD-10-CM

## 2023-08-03 DIAGNOSIS — R73.02 IGT (IMPAIRED GLUCOSE TOLERANCE): ICD-10-CM

## 2023-08-03 DIAGNOSIS — Z79.01 LONG TERM (CURRENT) USE OF ANTICOAGULANTS: ICD-10-CM

## 2023-08-03 DIAGNOSIS — G47.30 SLEEP APNEA, UNSPECIFIED TYPE: ICD-10-CM

## 2023-08-03 DIAGNOSIS — E27.8 ADRENAL NODULE (HCC): ICD-10-CM

## 2023-08-03 LAB
ALBUMIN SERPL-MCNC: 4 G/DL (ref 3.5–5)
ALBUMIN/GLOB SERPL: 1.1 (ref 1.1–2.2)
ALP SERPL-CCNC: 41 U/L (ref 45–117)
ALT SERPL-CCNC: 53 U/L (ref 12–78)
ANION GAP SERPL CALC-SCNC: 3 MMOL/L (ref 5–15)
APPEARANCE UR: CLEAR
AST SERPL-CCNC: 32 U/L (ref 15–37)
BACTERIA URNS QL MICRO: NEGATIVE /HPF
BASOPHILS # BLD: 0.1 K/UL (ref 0–0.1)
BASOPHILS NFR BLD: 1 % (ref 0–1)
BILIRUB SERPL-MCNC: 0.3 MG/DL (ref 0.2–1)
BILIRUB UR QL: NEGATIVE
BUN SERPL-MCNC: 22 MG/DL (ref 6–20)
BUN/CREAT SERPL: 17 (ref 12–20)
CALCIUM SERPL-MCNC: 9.6 MG/DL (ref 8.5–10.1)
CHLORIDE SERPL-SCNC: 110 MMOL/L (ref 97–108)
CHOLEST SERPL-MCNC: 225 MG/DL
CO2 SERPL-SCNC: 27 MMOL/L (ref 21–32)
COLOR UR: ABNORMAL
CREAT SERPL-MCNC: 1.26 MG/DL (ref 0.55–1.02)
DIFFERENTIAL METHOD BLD: ABNORMAL
EOSINOPHIL # BLD: 0 K/UL (ref 0–0.4)
EOSINOPHIL NFR BLD: 1 % (ref 0–7)
EPITH CASTS URNS QL MICRO: ABNORMAL /LPF
ERYTHROCYTE [DISTWIDTH] IN BLOOD BY AUTOMATED COUNT: 15.3 % (ref 11.5–14.5)
EST. AVERAGE GLUCOSE BLD GHB EST-MCNC: 114 MG/DL
GLOBULIN SER CALC-MCNC: 3.5 G/DL (ref 2–4)
GLUCOSE SERPL-MCNC: 100 MG/DL (ref 65–100)
GLUCOSE UR STRIP.AUTO-MCNC: NEGATIVE MG/DL
HBA1C MFR BLD: 5.6 % (ref 4–5.6)
HCT VFR BLD AUTO: 42.4 % (ref 35–47)
HDLC SERPL-MCNC: 57 MG/DL
HDLC SERPL: 3.9 (ref 0–5)
HGB BLD-MCNC: 13.2 G/DL (ref 11.5–16)
HGB UR QL STRIP: NEGATIVE
IMM GRANULOCYTES # BLD AUTO: 0 K/UL (ref 0–0.04)
IMM GRANULOCYTES NFR BLD AUTO: 0 % (ref 0–0.5)
KETONES UR QL STRIP.AUTO: NEGATIVE MG/DL
LDLC SERPL CALC-MCNC: 149.4 MG/DL (ref 0–100)
LEUKOCYTE ESTERASE UR QL STRIP.AUTO: NEGATIVE
LYMPHOCYTES # BLD: 1.8 K/UL (ref 0.8–3.5)
LYMPHOCYTES NFR BLD: 42 % (ref 12–49)
MCH RBC QN AUTO: 28.9 PG (ref 26–34)
MCHC RBC AUTO-ENTMCNC: 31.1 G/DL (ref 30–36.5)
MCV RBC AUTO: 92.8 FL (ref 80–99)
MONOCYTES # BLD: 0.4 K/UL (ref 0–1)
MONOCYTES NFR BLD: 11 % (ref 5–13)
NEUTS SEG # BLD: 1.8 K/UL (ref 1.8–8)
NEUTS SEG NFR BLD: 45 % (ref 32–75)
NITRITE UR QL STRIP.AUTO: NEGATIVE
NRBC # BLD: 0 K/UL (ref 0–0.01)
NRBC BLD-RTO: 0 PER 100 WBC
PH UR STRIP: 5.5 (ref 5–8)
PLATELET # BLD AUTO: 196 K/UL (ref 150–400)
PMV BLD AUTO: 11.7 FL (ref 8.9–12.9)
POC INR: 1.7
POTASSIUM SERPL-SCNC: 4.3 MMOL/L (ref 3.5–5.1)
PROT SERPL-MCNC: 7.5 G/DL (ref 6.4–8.2)
PROT UR STRIP-MCNC: 100 MG/DL
PROTHROMBIN TIME, POC: 20.3
RBC # BLD AUTO: 4.57 M/UL (ref 3.8–5.2)
RBC #/AREA URNS HPF: ABNORMAL /HPF (ref 0–5)
SODIUM SERPL-SCNC: 140 MMOL/L (ref 136–145)
SP GR UR REFRACTOMETRY: 1.02 (ref 1–1.03)
TRIGL SERPL-MCNC: 93 MG/DL
UROBILINOGEN UR QL STRIP.AUTO: 0.2 EU/DL (ref 0.2–1)
VLDLC SERPL CALC-MCNC: 18.6 MG/DL
WBC # BLD AUTO: 4.1 K/UL (ref 3.6–11)
WBC URNS QL MICRO: ABNORMAL /HPF (ref 0–4)

## 2023-08-03 PROCEDURE — 3079F DIAST BP 80-89 MM HG: CPT | Performed by: INTERNAL MEDICINE

## 2023-08-03 PROCEDURE — 99214 OFFICE O/P EST MOD 30 MIN: CPT | Performed by: INTERNAL MEDICINE

## 2023-08-03 PROCEDURE — 3077F SYST BP >= 140 MM HG: CPT | Performed by: INTERNAL MEDICINE

## 2023-08-03 PROCEDURE — 1123F ACP DISCUSS/DSCN MKR DOCD: CPT | Performed by: INTERNAL MEDICINE

## 2023-08-03 PROCEDURE — 36415 COLL VENOUS BLD VENIPUNCTURE: CPT | Performed by: INTERNAL MEDICINE

## 2023-08-03 PROCEDURE — 85610 PROTHROMBIN TIME: CPT | Performed by: INTERNAL MEDICINE

## 2023-08-03 RX ORDER — WARFARIN SODIUM 5 MG/1
5 TABLET ORAL
Qty: 12 TABLET | Refills: 3 | Status: SHIPPED | OUTPATIENT
Start: 2023-08-09

## 2023-08-03 RX ORDER — WARFARIN SODIUM 2.5 MG/1
2.5 TABLET ORAL
Qty: 52 TABLET | Refills: 3 | Status: SHIPPED | OUTPATIENT
Start: 2023-08-03

## 2023-08-03 SDOH — ECONOMIC STABILITY: HOUSING INSECURITY: IN THE LAST 12 MONTHS, HOW MANY PLACES HAVE YOU LIVED?: 1

## 2023-08-03 SDOH — ECONOMIC STABILITY: HOUSING INSECURITY
IN THE LAST 12 MONTHS, WAS THERE A TIME WHEN YOU DID NOT HAVE A STEADY PLACE TO SLEEP OR SLEPT IN A SHELTER (INCLUDING NOW)?: NO

## 2023-08-03 SDOH — ECONOMIC STABILITY: TRANSPORTATION INSECURITY
IN THE PAST 12 MONTHS, HAS LACK OF TRANSPORTATION KEPT YOU FROM MEETINGS, WORK, OR FROM GETTING THINGS NEEDED FOR DAILY LIVING?: NO

## 2023-08-03 SDOH — HEALTH STABILITY: PHYSICAL HEALTH: ON AVERAGE, HOW MANY DAYS PER WEEK DO YOU ENGAGE IN MODERATE TO STRENUOUS EXERCISE (LIKE A BRISK WALK)?: 0 DAYS

## 2023-08-03 SDOH — ECONOMIC STABILITY: INCOME INSECURITY: IN THE LAST 12 MONTHS, WAS THERE A TIME WHEN YOU WERE NOT ABLE TO PAY THE MORTGAGE OR RENT ON TIME?: NO

## 2023-08-03 SDOH — HEALTH STABILITY: PHYSICAL HEALTH: ON AVERAGE, HOW MANY MINUTES DO YOU ENGAGE IN EXERCISE AT THIS LEVEL?: 0 MIN

## 2023-08-03 SDOH — ECONOMIC STABILITY: FOOD INSECURITY: WITHIN THE PAST 12 MONTHS, YOU WORRIED THAT YOUR FOOD WOULD RUN OUT BEFORE YOU GOT MONEY TO BUY MORE.: NEVER TRUE

## 2023-08-03 SDOH — ECONOMIC STABILITY: TRANSPORTATION INSECURITY
IN THE PAST 12 MONTHS, HAS THE LACK OF TRANSPORTATION KEPT YOU FROM MEDICAL APPOINTMENTS OR FROM GETTING MEDICATIONS?: NO

## 2023-08-03 SDOH — ECONOMIC STABILITY: FOOD INSECURITY: WITHIN THE PAST 12 MONTHS, THE FOOD YOU BOUGHT JUST DIDN'T LAST AND YOU DIDN'T HAVE MONEY TO GET MORE.: NEVER TRUE

## 2023-08-03 ASSESSMENT — LIFESTYLE VARIABLES
HOW OFTEN DO YOU HAVE A DRINK CONTAINING ALCOHOL: NEVER
HOW MANY STANDARD DRINKS CONTAINING ALCOHOL DO YOU HAVE ON A TYPICAL DAY: PATIENT DOES NOT DRINK

## 2023-08-03 ASSESSMENT — PATIENT HEALTH QUESTIONNAIRE - PHQ9
SUM OF ALL RESPONSES TO PHQ QUESTIONS 1-9: 0
1. LITTLE INTEREST OR PLEASURE IN DOING THINGS: 0
SUM OF ALL RESPONSES TO PHQ QUESTIONS 1-9: 0
2. FEELING DOWN, DEPRESSED OR HOPELESS: 0
SUM OF ALL RESPONSES TO PHQ9 QUESTIONS 1 & 2: 0

## 2023-08-03 ASSESSMENT — SOCIAL DETERMINANTS OF HEALTH (SDOH)
HOW OFTEN DO YOU GET TOGETHER WITH FRIENDS OR RELATIVES?: ONCE A WEEK
DO YOU BELONG TO ANY CLUBS OR ORGANIZATIONS SUCH AS CHURCH GROUPS UNIONS, FRATERNAL OR ATHLETIC GROUPS, OR SCHOOL GROUPS?: NO
HOW OFTEN DO YOU ATTEND CHURCH OR RELIGIOUS SERVICES?: MORE THAN 4 TIMES PER YEAR
HOW OFTEN DO YOU ATTENT MEETINGS OF THE CLUB OR ORGANIZATION YOU BELONG TO?: NEVER
WITHIN THE LAST YEAR, HAVE YOU BEEN KICKED, HIT, SLAPPED, OR OTHERWISE PHYSICALLY HURT BY YOUR PARTNER OR EX-PARTNER?: NO
IN A TYPICAL WEEK, HOW MANY TIMES DO YOU TALK ON THE PHONE WITH FAMILY, FRIENDS, OR NEIGHBORS?: MORE THAN THREE TIMES A WEEK
WITHIN THE LAST YEAR, HAVE YOU BEEN AFRAID OF YOUR PARTNER OR EX-PARTNER?: NO
HOW HARD IS IT FOR YOU TO PAY FOR THE VERY BASICS LIKE FOOD, HOUSING, MEDICAL CARE, AND HEATING?: NOT HARD AT ALL
WITHIN THE LAST YEAR, HAVE TO BEEN RAPED OR FORCED TO HAVE ANY KIND OF SEXUAL ACTIVITY BY YOUR PARTNER OR EX-PARTNER?: NO
WITHIN THE LAST YEAR, HAVE YOU BEEN HUMILIATED OR EMOTIONALLY ABUSED IN OTHER WAYS BY YOUR PARTNER OR EX-PARTNER?: NO

## 2023-08-03 ASSESSMENT — ANXIETY QUESTIONNAIRES
GAD7 TOTAL SCORE: 0
3. WORRYING TOO MUCH ABOUT DIFFERENT THINGS: 0
7. FEELING AFRAID AS IF SOMETHING AWFUL MIGHT HAPPEN: 0
4. TROUBLE RELAXING: 0
6. BECOMING EASILY ANNOYED OR IRRITABLE: 0
5. BEING SO RESTLESS THAT IT IS HARD TO SIT STILL: 0
1. FEELING NERVOUS, ANXIOUS, OR ON EDGE: 0
IF YOU CHECKED OFF ANY PROBLEMS ON THIS QUESTIONNAIRE, HOW DIFFICULT HAVE THESE PROBLEMS MADE IT FOR YOU TO DO YOUR WORK, TAKE CARE OF THINGS AT HOME, OR GET ALONG WITH OTHER PEOPLE: NOT DIFFICULT AT ALL
2. NOT BEING ABLE TO STOP OR CONTROL WORRYING: 0

## 2023-08-04 LAB — TSH SERPL DL<=0.05 MIU/L-ACNC: 1.35 UIU/ML (ref 0.36–3.74)

## 2023-08-05 RX ORDER — EZETIMIBE 10 MG/1
10 TABLET ORAL DAILY
Qty: 90 TABLET | Refills: 3 | Status: SHIPPED | OUTPATIENT
Start: 2023-08-05

## 2023-08-11 ENCOUNTER — TELEPHONE (OUTPATIENT)
Age: 68
End: 2023-08-11

## 2023-08-11 DIAGNOSIS — G47.33 OBSTRUCTIVE SLEEP APNEA (ADULT) (PEDIATRIC): Primary | ICD-10-CM

## 2023-08-11 NOTE — TELEPHONE ENCOUNTER
Spoke to patient on 08/11/2023 at 4:06pm, patient has another appointment at the same time as her yearly follow up on 08/15/2023 at 11:20am. Patient wants to know if she can get a script for supplies and reschedule to the next available appointment.  Please advise

## 2023-08-15 ENCOUNTER — OFFICE VISIT (OUTPATIENT)
Age: 68
End: 2023-08-15
Payer: MEDICARE

## 2023-08-15 ENCOUNTER — CLINICAL DOCUMENTATION (OUTPATIENT)
Age: 68
End: 2023-08-15

## 2023-08-15 VITALS
SYSTOLIC BLOOD PRESSURE: 119 MMHG | HEART RATE: 62 BPM | BODY MASS INDEX: 32.39 KG/M2 | OXYGEN SATURATION: 93 % | DIASTOLIC BLOOD PRESSURE: 67 MMHG | HEIGHT: 62 IN | WEIGHT: 176 LBS | TEMPERATURE: 98.6 F

## 2023-08-15 DIAGNOSIS — I10 ESSENTIAL (PRIMARY) HYPERTENSION: ICD-10-CM

## 2023-08-15 DIAGNOSIS — G47.33 OBSTRUCTIVE SLEEP APNEA (ADULT) (PEDIATRIC): Primary | ICD-10-CM

## 2023-08-15 PROCEDURE — 99213 OFFICE O/P EST LOW 20 MIN: CPT | Performed by: INTERNAL MEDICINE

## 2023-08-15 PROCEDURE — 3078F DIAST BP <80 MM HG: CPT | Performed by: INTERNAL MEDICINE

## 2023-08-15 PROCEDURE — 1123F ACP DISCUSS/DSCN MKR DOCD: CPT | Performed by: INTERNAL MEDICINE

## 2023-08-15 PROCEDURE — 3074F SYST BP LT 130 MM HG: CPT | Performed by: INTERNAL MEDICINE

## 2023-08-15 ASSESSMENT — SLEEP AND FATIGUE QUESTIONNAIRES
HOW LIKELY ARE YOU TO NOD OFF OR FALL ASLEEP WHILE SITTING AND TALKING TO SOMEONE: 0
ESS TOTAL SCORE: 3
HOW LIKELY ARE YOU TO NOD OFF OR FALL ASLEEP IN A CAR, WHILE STOPPED FOR A FEW MINUTES IN TRAFFIC: 0
HOW LIKELY ARE YOU TO NOD OFF OR FALL ASLEEP WHILE LYING DOWN TO REST IN THE AFTERNOON WHEN CIRCUMSTANCES PERMIT: 1
HOW LIKELY ARE YOU TO NOD OFF OR FALL ASLEEP WHEN YOU ARE A PASSENGER IN A CAR FOR AN HOUR WITHOUT A BREAK: 1
HOW LIKELY ARE YOU TO NOD OFF OR FALL ASLEEP WHILE SITTING AND READING: 0
HOW LIKELY ARE YOU TO NOD OFF OR FALL ASLEEP WHILE SITTING INACTIVE IN A PUBLIC PLACE: 0
HOW LIKELY ARE YOU TO NOD OFF OR FALL ASLEEP WHILE WATCHING TV: 1
HOW LIKELY ARE YOU TO NOD OFF OR FALL ASLEEP WHILE SITTING QUIETLY AFTER LUNCH WITHOUT ALCOHOL: 0

## 2023-08-15 NOTE — PROGRESS NOTES
[facility] [place], [address]  Tel.  [phone]  Fax. [address]  [address] [facility], [address] [place] [address]  Tel.  [phone]  Fax. [phone] [facility], [address] [facility]  Tel.  [phone]  Fax. [phone]     S>Rachel IRELAND Manuelito Jin is a 69 y.o. female seen for a positive airway pressure follow-up. She reports no problems using the device. The following problems are identified:    Drowsiness no Problems exhaling no   Snoring no Forget to put on no   Mask Comfortable yes Can't fall asleep no   Dry Mouth At times Mask falls off no   Air Leaking no Frequent awakenings no     Estimated AHI flow from download shows 1/hour. minimal significant leak  Averaging almost 9 /hours use on nights used  Days with usage 100%  Adherence 100%  Weight from last visit 178 lb     Occasional nightmares  She admits that her sleep has improved. .     Allergies   Allergen Reactions    Hydromorphone Anaphylaxis     Respiratory arrest and throat closes    Naproxen Sodium      Other reaction(s): Hoarseness    Atorvastatin Nausea Only     dizzy    Morphine Other (See Comments)     Patch-vomiting,weakness, fainting    Pravastatin Other (See Comments)     myalgia    Rosuvastatin Nausea Only    Sulfa Antibiotics Hives, Other (See Comments) and Rash     fainting    Valsartan Palpitations       She has a current medication list which includes the following prescription(s): ezetimibe, warfarin, warfarin, bisoprolol-hydrochlorothiazide, vitamin d, cyanocobalamin, diphenhydramine, losartan, nifedipine, ondansetron, prednisone, and warfarin. .      She  has a past medical history of Adrenal nodule ([address]), Adverse effect of anesthesia, Axillary pain, right, Bereavement, Chronic pain, CKD (chronic kidney disease), stage III ([address]), Coagulation disorder ([address]), COVID-19 virus infection, Depression with anxiety, Diverticulosis, DJD (degenerative joint disease), Dyslipidemia, Encounter for

## 2023-08-15 NOTE — PATIENT INSTRUCTIONS
[address] [place], [address]  Tel.  [phone]  Fax. [address] [facility], [facility]  Tel.  [phone]  Fax. [phone] [facility], [address] [facility]  Tel.  [phone]  Fax. [phone]     PROPER SLEEP HYGIENE    What to avoid  Do not have drinks with caffeine, such as coffee or black tea, for 8 hours before bed. Do not smoke or use other types of tobacco near bedtime. Nicotine is a stimulant and can keep you awake. Avoid drinking alcohol late in the evening, because it can cause you to wake in the middle of the night. Do not eat a big meal close to bedtime. If you are hungry, eat a light snack. Do not drink a lot of water close to bedtime, because the need to urinate may wake you up during the night. Do not read or watch TV in bed. Use the bed only for sleeping and sexual activity. What to try  Go to bed at the same time every night, and wake up at the same time every morning. Do not take naps during the day. Keep your bedroom quiet, dark, and cool. Get regular exercise, but not within 3 to 4 hours of your bedtime. .  Sleep on a comfortable pillow and mattress. If watching the clock makes you anxious, turn it facing away from you so you cannot see the time. If you worry when you lie down, start a worry book. Well before bedtime, write down your worries, and then set the book and your concerns aside. Try meditation or other relaxation techniques before you go to bed. If you cannot fall asleep, get up and go to another room until you feel sleepy. Do something relaxing. Repeat your bedtime routine before you go to bed again. Make your house quiet and calm about an hour before bedtime. Turn down the lights, turn off the TV, log off the computer, and turn down the volume on music. This can help you relax after a busy day.     Drowsy Driving  The [address] [place] [address] Traffic Safety Administration cites drowsiness as a

## 2023-08-20 ENCOUNTER — PATIENT MESSAGE (OUTPATIENT)
Facility: CLINIC | Age: 68
End: 2023-08-20

## 2023-08-21 ENCOUNTER — NURSE ONLY (OUTPATIENT)
Facility: CLINIC | Age: 68
End: 2023-08-21
Payer: MEDICARE

## 2023-08-21 DIAGNOSIS — Z79.01 LONG TERM (CURRENT) USE OF ANTICOAGULANTS: Primary | ICD-10-CM

## 2023-08-21 LAB
POC INR: 2.4
PROTHROMBIN TIME, POC: 28.3

## 2023-08-21 PROCEDURE — 85610 PROTHROMBIN TIME: CPT | Performed by: INTERNAL MEDICINE

## 2023-08-21 NOTE — TELEPHONE ENCOUNTER
From: Jeri Peterson  To: Dr. Burgos Burrell: 8/20/2023 7:36 PM EDT  Subject: Medicines(zetia & warfin     Will Zetia be safe for me since I have chronic  kidney disease & a fatty liver. My blood work  show that my BUN level is high. [facility]  is sending the 10 MG of Zetia. Can I take half &  the pill(5 mg) & see how 5 MG does. Also you talked to me about my warfin increase. I  decided after I got home to cut back on my  [facility] & stay taking 2.5mg everyday except  on Mon.& Thursday take 5mg & then you  can check my INR/PT reading at my next  visit in 2 weeks. I am praying that I don't   have any bad side effects from Zetia but  willing to try it.

## 2023-08-21 NOTE — TELEPHONE ENCOUNTER
Spoke with patient, states she has not received the Zetia from her mail order pharmacy as of yet. Patient advised to take the 10 mg as ordered and contact office if she develops any side effects. Patient states she has been taking 2.5 mg of coumadin everyday except Mondays and Thursdays she has been taking 5 mg. States she has been taking this dose since her last visit on 8/3/23. Patient advised to come in today for PT/INR and we can adjust her dosing accordingly.

## 2023-08-21 NOTE — PROGRESS NOTES
Patient in for PT/INR. States she is taking  2.5 mg of coumadin on Sundays and Thursdays and 5 mg all other days. PT=28.3, INR=2.4. Dr. Jaime Carr notified. Patient informed no adjustment will be made at this time. Continue on same dosing and return in one month for recheck per Dr. Jaime Carr.

## 2023-08-24 ENCOUNTER — TELEPHONE (OUTPATIENT)
Age: 68
End: 2023-08-24

## 2023-08-24 NOTE — TELEPHONE ENCOUNTER
Representative with [employer] phoned the office for pressure clarification. Notes stated that pressures are 10:13. Order states 8:12. Please advise.

## 2023-08-28 NOTE — TELEPHONE ENCOUNTER
Mishel Songtter should follow the order of 8-12 cm. She was having dry mouth/nose.  I lowered the setting

## 2023-09-21 ENCOUNTER — OFFICE VISIT (OUTPATIENT)
Facility: CLINIC | Age: 68
End: 2023-09-21
Payer: MEDICARE

## 2023-09-21 VITALS
RESPIRATION RATE: 16 BRPM | HEART RATE: 59 BPM | OXYGEN SATURATION: 98 % | DIASTOLIC BLOOD PRESSURE: 70 MMHG | TEMPERATURE: 97.9 F | SYSTOLIC BLOOD PRESSURE: 134 MMHG | WEIGHT: 185.1 LBS | BODY MASS INDEX: 34.06 KG/M2 | HEIGHT: 62 IN

## 2023-09-21 DIAGNOSIS — K76.0 HEPATIC STEATOSIS: ICD-10-CM

## 2023-09-21 DIAGNOSIS — R21 RASH: ICD-10-CM

## 2023-09-21 DIAGNOSIS — I10 ESSENTIAL HYPERTENSION, BENIGN: ICD-10-CM

## 2023-09-21 DIAGNOSIS — G89.29 OTHER CHRONIC PAIN: ICD-10-CM

## 2023-09-21 DIAGNOSIS — R73.02 IGT (IMPAIRED GLUCOSE TOLERANCE): ICD-10-CM

## 2023-09-21 DIAGNOSIS — Z79.01 LONG TERM (CURRENT) USE OF ANTICOAGULANTS: Primary | ICD-10-CM

## 2023-09-21 DIAGNOSIS — N18.31 STAGE 3A CHRONIC KIDNEY DISEASE (HCC): ICD-10-CM

## 2023-09-21 DIAGNOSIS — M79.7 FIBROMYALGIA: ICD-10-CM

## 2023-09-21 DIAGNOSIS — E66.09 CLASS 1 OBESITY DUE TO EXCESS CALORIES WITHOUT SERIOUS COMORBIDITY WITH BODY MASS INDEX (BMI) OF 33.0 TO 33.9 IN ADULT: ICD-10-CM

## 2023-09-21 PROBLEM — E27.8 ADRENAL NODULE (HCC): Status: RESOLVED | Noted: 2022-02-16 | Resolved: 2023-09-21

## 2023-09-21 PROBLEM — E27.9 ADRENAL NODULE (HCC): Status: RESOLVED | Noted: 2022-02-16 | Resolved: 2023-09-21

## 2023-09-21 LAB
ALBUMIN SERPL-MCNC: 4.1 G/DL (ref 3.5–5)
ALBUMIN/GLOB SERPL: 1.2 (ref 1.1–2.2)
ALP SERPL-CCNC: 47 U/L (ref 45–117)
ALT SERPL-CCNC: 49 U/L (ref 12–78)
ANION GAP SERPL CALC-SCNC: 3 MMOL/L (ref 5–15)
AST SERPL-CCNC: 27 U/L (ref 15–37)
BASOPHILS # BLD: 0 K/UL (ref 0–0.1)
BASOPHILS NFR BLD: 1 % (ref 0–1)
BILIRUB SERPL-MCNC: 0.3 MG/DL (ref 0.2–1)
BUN SERPL-MCNC: 26 MG/DL (ref 6–20)
BUN/CREAT SERPL: 21 (ref 12–20)
CALCIUM SERPL-MCNC: 9.6 MG/DL (ref 8.5–10.1)
CHLORIDE SERPL-SCNC: 110 MMOL/L (ref 97–108)
CO2 SERPL-SCNC: 29 MMOL/L (ref 21–32)
CREAT SERPL-MCNC: 1.24 MG/DL (ref 0.55–1.02)
DIFFERENTIAL METHOD BLD: ABNORMAL
EOSINOPHIL # BLD: 0.1 K/UL (ref 0–0.4)
EOSINOPHIL NFR BLD: 2 % (ref 0–7)
ERYTHROCYTE [DISTWIDTH] IN BLOOD BY AUTOMATED COUNT: 14.6 % (ref 11.5–14.5)
GLOBULIN SER CALC-MCNC: 3.5 G/DL (ref 2–4)
GLUCOSE SERPL-MCNC: 92 MG/DL (ref 65–100)
HCT VFR BLD AUTO: 41.6 % (ref 35–47)
HGB BLD-MCNC: 13.5 G/DL (ref 11.5–16)
IMM GRANULOCYTES # BLD AUTO: 0 K/UL (ref 0–0.04)
IMM GRANULOCYTES NFR BLD AUTO: 0 % (ref 0–0.5)
LYMPHOCYTES # BLD: 1.9 K/UL (ref 0.8–3.5)
LYMPHOCYTES NFR BLD: 41 % (ref 12–49)
MCH RBC QN AUTO: 28.9 PG (ref 26–34)
MCHC RBC AUTO-ENTMCNC: 32.5 G/DL (ref 30–36.5)
MCV RBC AUTO: 89.1 FL (ref 80–99)
MONOCYTES # BLD: 0.5 K/UL (ref 0–1)
MONOCYTES NFR BLD: 11 % (ref 5–13)
NEUTS SEG # BLD: 2.1 K/UL (ref 1.8–8)
NEUTS SEG NFR BLD: 45 % (ref 32–75)
NRBC # BLD: 0 K/UL (ref 0–0.01)
NRBC BLD-RTO: 0 PER 100 WBC
PLATELET # BLD AUTO: 184 K/UL (ref 150–400)
PMV BLD AUTO: 11.9 FL (ref 8.9–12.9)
POC INR: 2.8
POTASSIUM SERPL-SCNC: 4.4 MMOL/L (ref 3.5–5.1)
PROT SERPL-MCNC: 7.6 G/DL (ref 6.4–8.2)
PROTHROMBIN TIME, POC: 33.4
RBC # BLD AUTO: 4.67 M/UL (ref 3.8–5.2)
SODIUM SERPL-SCNC: 142 MMOL/L (ref 136–145)
WBC # BLD AUTO: 4.6 K/UL (ref 3.6–11)

## 2023-09-21 PROCEDURE — 1123F ACP DISCUSS/DSCN MKR DOCD: CPT | Performed by: INTERNAL MEDICINE

## 2023-09-21 PROCEDURE — 85610 PROTHROMBIN TIME: CPT | Performed by: INTERNAL MEDICINE

## 2023-09-21 PROCEDURE — 3078F DIAST BP <80 MM HG: CPT | Performed by: INTERNAL MEDICINE

## 2023-09-21 PROCEDURE — 3075F SYST BP GE 130 - 139MM HG: CPT | Performed by: INTERNAL MEDICINE

## 2023-09-21 PROCEDURE — 99214 OFFICE O/P EST MOD 30 MIN: CPT | Performed by: INTERNAL MEDICINE

## 2023-09-21 RX ORDER — TRIAMCINOLONE ACETONIDE 0.25 MG/G
OINTMENT TOPICAL
Qty: 80 G | Refills: 11 | Status: SHIPPED | OUTPATIENT
Start: 2023-09-21 | End: 2023-09-28

## 2023-09-21 SDOH — ECONOMIC STABILITY: INCOME INSECURITY: HOW HARD IS IT FOR YOU TO PAY FOR THE VERY BASICS LIKE FOOD, HOUSING, MEDICAL CARE, AND HEATING?: NOT HARD AT ALL

## 2023-09-21 SDOH — ECONOMIC STABILITY: FOOD INSECURITY: WITHIN THE PAST 12 MONTHS, THE FOOD YOU BOUGHT JUST DIDN'T LAST AND YOU DIDN'T HAVE MONEY TO GET MORE.: NEVER TRUE

## 2023-09-21 SDOH — ECONOMIC STABILITY: FOOD INSECURITY: WITHIN THE PAST 12 MONTHS, YOU WORRIED THAT YOUR FOOD WOULD RUN OUT BEFORE YOU GOT MONEY TO BUY MORE.: NEVER TRUE

## 2023-09-21 ASSESSMENT — ANXIETY QUESTIONNAIRES
3. WORRYING TOO MUCH ABOUT DIFFERENT THINGS: 0
4. TROUBLE RELAXING: 0
2. NOT BEING ABLE TO STOP OR CONTROL WORRYING: 0
IF YOU CHECKED OFF ANY PROBLEMS ON THIS QUESTIONNAIRE, HOW DIFFICULT HAVE THESE PROBLEMS MADE IT FOR YOU TO DO YOUR WORK, TAKE CARE OF THINGS AT HOME, OR GET ALONG WITH OTHER PEOPLE: NOT DIFFICULT AT ALL
7. FEELING AFRAID AS IF SOMETHING AWFUL MIGHT HAPPEN: 0
GAD7 TOTAL SCORE: 0
5. BEING SO RESTLESS THAT IT IS HARD TO SIT STILL: 0
1. FEELING NERVOUS, ANXIOUS, OR ON EDGE: 0
6. BECOMING EASILY ANNOYED OR IRRITABLE: 0

## 2023-09-21 ASSESSMENT — PATIENT HEALTH QUESTIONNAIRE - PHQ9
SUM OF ALL RESPONSES TO PHQ QUESTIONS 1-9: 0
1. LITTLE INTEREST OR PLEASURE IN DOING THINGS: 0
SUM OF ALL RESPONSES TO PHQ9 QUESTIONS 1 & 2: 0
2. FEELING DOWN, DEPRESSED OR HOPELESS: 0

## 2023-09-23 RX ORDER — LOSARTAN POTASSIUM 100 MG/1
TABLET ORAL
Qty: 90 TABLET | Refills: 3 | Status: SHIPPED | OUTPATIENT
Start: 2023-09-23 | End: 2023-09-23 | Stop reason: SDUPTHER

## 2023-09-24 RX ORDER — LOSARTAN POTASSIUM 100 MG/1
100 TABLET ORAL DAILY
Qty: 90 TABLET | Refills: 3 | Status: SHIPPED | OUTPATIENT
Start: 2023-09-24

## 2023-09-25 RX ORDER — LOSARTAN POTASSIUM 100 MG/1
100 TABLET ORAL DAILY
Qty: 90 TABLET | Refills: 3 | Status: SHIPPED | OUTPATIENT
Start: 2023-09-25

## 2023-10-18 ENCOUNTER — NURSE ONLY (OUTPATIENT)
Facility: CLINIC | Age: 68
End: 2023-10-18
Payer: MEDICARE

## 2023-10-18 DIAGNOSIS — Z79.01 LONG TERM (CURRENT) USE OF ANTICOAGULANTS: Primary | ICD-10-CM

## 2023-10-18 LAB
POC INR: 2
PROTHROMBIN TIME, POC: 24.2

## 2023-10-18 PROCEDURE — 85610 PROTHROMBIN TIME: CPT | Performed by: INTERNAL MEDICINE

## 2023-11-13 ENCOUNTER — TELEMEDICINE (OUTPATIENT)
Age: 68
End: 2023-11-13
Payer: MEDICARE

## 2023-11-13 DIAGNOSIS — G47.33 OBSTRUCTIVE SLEEP APNEA (ADULT) (PEDIATRIC): Primary | ICD-10-CM

## 2023-11-13 DIAGNOSIS — I10 ESSENTIAL (PRIMARY) HYPERTENSION: ICD-10-CM

## 2023-11-13 PROCEDURE — 1123F ACP DISCUSS/DSCN MKR DOCD: CPT | Performed by: INTERNAL MEDICINE

## 2023-11-13 PROCEDURE — 99213 OFFICE O/P EST LOW 20 MIN: CPT | Performed by: INTERNAL MEDICINE

## 2023-11-13 ASSESSMENT — SLEEP AND FATIGUE QUESTIONNAIRES
HOW LIKELY ARE YOU TO NOD OFF OR FALL ASLEEP IN A CAR, WHILE STOPPED FOR A FEW MINUTES IN TRAFFIC: 0
ESS TOTAL SCORE: 3
HOW LIKELY ARE YOU TO NOD OFF OR FALL ASLEEP WHEN YOU ARE A PASSENGER IN A CAR FOR AN HOUR WITHOUT A BREAK: 1
HOW LIKELY ARE YOU TO NOD OFF OR FALL ASLEEP WHILE SITTING AND TALKING TO SOMEONE: 0
HOW LIKELY ARE YOU TO NOD OFF OR FALL ASLEEP WHILE WATCHING TV: 1
HOW LIKELY ARE YOU TO NOD OFF OR FALL ASLEEP WHILE LYING DOWN TO REST IN THE AFTERNOON WHEN CIRCUMSTANCES PERMIT: 1
HOW LIKELY ARE YOU TO NOD OFF OR FALL ASLEEP WHILE SITTING AND READING: 0
HOW LIKELY ARE YOU TO NOD OFF OR FALL ASLEEP WHILE SITTING QUIETLY AFTER LUNCH WITHOUT ALCOHOL: 0
HOW LIKELY ARE YOU TO NOD OFF OR FALL ASLEEP WHILE SITTING INACTIVE IN A PUBLIC PLACE: 0

## 2023-11-13 NOTE — PROGRESS NOTES
importance of regular PAP use and on proper sleep hygiene and safe driving. * Re-enforced proper and regular cleaning for the device. she was advised to follow 's recommendations regarding cleaning of PAP device and PAP supplies. All of her questions were addressed. 2. Hypertension - controlled on current regimen. she will continue her current regimen. she will continue to monitor at home and with her PMD for further adjustments as needed. I have reviewed the relationship between hypertension as it relates to sleep-disordered breathing. Services were provided through a video synchronous discussion virtually to substitute for in-person clinic visit.     Breana Harrington MD    Electronically signed by    Demetria Blackmon MD  Diplomate in Sleep Medicine  [facility]

## 2023-11-17 ENCOUNTER — NURSE ONLY (OUTPATIENT)
Facility: CLINIC | Age: 68
End: 2023-11-17
Payer: MEDICARE

## 2023-11-17 DIAGNOSIS — Z79.01 ENCOUNTER FOR MONITORING COUMADIN THERAPY: Primary | ICD-10-CM

## 2023-11-17 DIAGNOSIS — Z51.81 ENCOUNTER FOR MONITORING COUMADIN THERAPY: Primary | ICD-10-CM

## 2023-11-17 LAB
POC INR: 1.8
PROTHROMBIN TIME, POC: 21.5

## 2023-11-17 PROCEDURE — 85610 PROTHROMBIN TIME: CPT | Performed by: INTERNAL MEDICINE

## 2023-11-18 NOTE — PROGRESS NOTES
Chief Complaint   Patient presents with    Coagulation Disorder     Patient is here for a PT/INR. Patient states she takes Warfarin 5mg on Sundays and 2.5 mg all the other days. Results for orders placed or performed in visit on 11/17/23   AMB POC PT/INR   Result Value Ref Range    Prothrombin time, POC 21.5     POC INR 1.8      Per Dr. Mariah Sanon no adjustments and patient should return in 1 month for a PT/INR check.

## 2023-11-29 ENCOUNTER — TELEPHONE (OUTPATIENT)
Facility: CLINIC | Age: 68
End: 2023-11-29

## 2023-11-29 NOTE — TELEPHONE ENCOUNTER
Bridget -    Pt  Rachel Dorantes  [birth year]  [phone]      Patient still waiting on a referral her \"bone density\", need to have it scheduled asap because of her insurance.    She saw you on 11/17/23 when you took her INR PT.  She said, that you were on going to pass it on to Dr. Sommer, so she can get the referal.    Please contact her.

## 2023-11-30 DIAGNOSIS — Z78.0 POST-MENOPAUSAL: Primary | ICD-10-CM

## 2023-11-30 DIAGNOSIS — Z13.820 SCREENING FOR OSTEOPOROSIS: ICD-10-CM

## 2023-12-08 ENCOUNTER — HOSPITAL ENCOUNTER (OUTPATIENT)
Facility: HOSPITAL | Age: 68
End: 2023-12-08
Attending: INTERNAL MEDICINE
Payer: MEDICARE

## 2023-12-08 DIAGNOSIS — Z13.820 SCREENING FOR OSTEOPOROSIS: ICD-10-CM

## 2023-12-08 DIAGNOSIS — Z78.0 POST-MENOPAUSAL: ICD-10-CM

## 2023-12-08 PROCEDURE — 77080 DXA BONE DENSITY AXIAL: CPT

## 2023-12-12 ENCOUNTER — OFFICE VISIT (OUTPATIENT)
Facility: CLINIC | Age: 68
End: 2023-12-12
Payer: MEDICARE

## 2023-12-12 VITALS
OXYGEN SATURATION: 20 % | SYSTOLIC BLOOD PRESSURE: 140 MMHG | RESPIRATION RATE: 20 BRPM | WEIGHT: 189.2 LBS | HEART RATE: 63 BPM | DIASTOLIC BLOOD PRESSURE: 80 MMHG | TEMPERATURE: 98.1 F | HEIGHT: 62 IN | BODY MASS INDEX: 34.82 KG/M2

## 2023-12-12 DIAGNOSIS — M79.7 FIBROMYOSITIS: ICD-10-CM

## 2023-12-12 DIAGNOSIS — N18.31 STAGE 3A CHRONIC KIDNEY DISEASE (HCC): ICD-10-CM

## 2023-12-12 DIAGNOSIS — K76.0 HEPATIC STEATOSIS: ICD-10-CM

## 2023-12-12 DIAGNOSIS — G89.29 OTHER CHRONIC PAIN: ICD-10-CM

## 2023-12-12 DIAGNOSIS — I27.82 OTHER CHRONIC PULMONARY EMBOLISM WITHOUT ACUTE COR PULMONALE (HCC): ICD-10-CM

## 2023-12-12 DIAGNOSIS — I10 ESSENTIAL HYPERTENSION, BENIGN: ICD-10-CM

## 2023-12-12 DIAGNOSIS — R00.2 PALPITATIONS: ICD-10-CM

## 2023-12-12 DIAGNOSIS — Z79.01 LONG TERM (CURRENT) USE OF ANTICOAGULANTS: Primary | ICD-10-CM

## 2023-12-12 DIAGNOSIS — G47.33 OBSTRUCTIVE SLEEP APNEA SYNDROME: ICD-10-CM

## 2023-12-12 DIAGNOSIS — Z12.31 ENCOUNTER FOR SCREENING MAMMOGRAM FOR MALIGNANT NEOPLASM OF BREAST: ICD-10-CM

## 2023-12-12 DIAGNOSIS — E78.5 DYSLIPIDEMIA: ICD-10-CM

## 2023-12-12 DIAGNOSIS — R73.02 IGT (IMPAIRED GLUCOSE TOLERANCE): ICD-10-CM

## 2023-12-12 PROBLEM — I82.811: Status: RESOLVED | Noted: 2017-05-31 | Resolved: 2023-12-12

## 2023-12-12 LAB
ANION GAP SERPL CALC-SCNC: 3 MMOL/L (ref 5–15)
BUN SERPL-MCNC: 26 MG/DL (ref 6–20)
BUN/CREAT SERPL: 16 (ref 12–20)
CALCIUM SERPL-MCNC: 9.3 MG/DL (ref 8.5–10.1)
CHLORIDE SERPL-SCNC: 109 MMOL/L (ref 97–108)
CHOLEST SERPL-MCNC: 229 MG/DL
CO2 SERPL-SCNC: 29 MMOL/L (ref 21–32)
CREAT SERPL-MCNC: 1.58 MG/DL (ref 0.55–1.02)
CRP SERPL HS-MCNC: 5.1 MG/L
GLUCOSE SERPL-MCNC: 98 MG/DL (ref 65–100)
HDLC SERPL-MCNC: 51 MG/DL
HDLC SERPL: 4.5 (ref 0–5)
LDLC SERPL CALC-MCNC: 143.6 MG/DL (ref 0–100)
POC INR: 1.8
POTASSIUM SERPL-SCNC: 4.3 MMOL/L (ref 3.5–5.1)
PROTHROMBIN TIME, POC: 21.1
SODIUM SERPL-SCNC: 141 MMOL/L (ref 136–145)
TRIGL SERPL-MCNC: 172 MG/DL
VLDLC SERPL CALC-MCNC: 34.4 MG/DL

## 2023-12-12 PROCEDURE — 99214 OFFICE O/P EST MOD 30 MIN: CPT | Performed by: INTERNAL MEDICINE

## 2023-12-12 PROCEDURE — 3077F SYST BP >= 140 MM HG: CPT | Performed by: INTERNAL MEDICINE

## 2023-12-12 PROCEDURE — 1123F ACP DISCUSS/DSCN MKR DOCD: CPT | Performed by: INTERNAL MEDICINE

## 2023-12-12 PROCEDURE — 85610 PROTHROMBIN TIME: CPT | Performed by: INTERNAL MEDICINE

## 2023-12-12 PROCEDURE — 36415 COLL VENOUS BLD VENIPUNCTURE: CPT | Performed by: INTERNAL MEDICINE

## 2023-12-12 PROCEDURE — 3079F DIAST BP 80-89 MM HG: CPT | Performed by: INTERNAL MEDICINE

## 2023-12-12 ASSESSMENT — PATIENT HEALTH QUESTIONNAIRE - PHQ9
SUM OF ALL RESPONSES TO PHQ9 QUESTIONS 1 & 2: 0
SUM OF ALL RESPONSES TO PHQ QUESTIONS 1-9: 0
SUM OF ALL RESPONSES TO PHQ QUESTIONS 1-9: 0
1. LITTLE INTEREST OR PLEASURE IN DOING THINGS: 0
SUM OF ALL RESPONSES TO PHQ QUESTIONS 1-9: 0
2. FEELING DOWN, DEPRESSED OR HOPELESS: 0
SUM OF ALL RESPONSES TO PHQ QUESTIONS 1-9: 0

## 2023-12-13 DIAGNOSIS — E78.5 DYSLIPIDEMIA: Primary | ICD-10-CM

## 2023-12-13 DIAGNOSIS — T46.6X5A STATIN-INDUCED MYOSITIS: ICD-10-CM

## 2023-12-13 DIAGNOSIS — N18.32 STAGE 3B CHRONIC KIDNEY DISEASE (HCC): ICD-10-CM

## 2023-12-13 DIAGNOSIS — M60.9 STATIN-INDUCED MYOSITIS: ICD-10-CM

## 2024-01-10 ENCOUNTER — NURSE ONLY (OUTPATIENT)
Facility: CLINIC | Age: 69
End: 2024-01-10

## 2024-01-10 ENCOUNTER — TELEPHONE (OUTPATIENT)
Facility: CLINIC | Age: 69
End: 2024-01-10

## 2024-01-10 DIAGNOSIS — Z79.01 LONG TERM (CURRENT) USE OF ANTICOAGULANTS: Primary | ICD-10-CM

## 2024-01-10 NOTE — PROGRESS NOTES
Patient in for PT/INR. States she is taking 2.5 mg of coumadin Mondays thru Saturdays and 5 mg on Sundays. PT-21.8, INR= 1.8. Dr. Sommer notified. Patient inform no adjustments will be made at this time, return in one month for recheck, per Dr. Sommer.

## 2024-01-12 RX ORDER — NIFEDIPINE 60 MG/1
60 TABLET, FILM COATED, EXTENDED RELEASE ORAL DAILY
Qty: 90 TABLET | Refills: 3 | Status: SHIPPED | OUTPATIENT
Start: 2024-01-12

## 2024-02-14 ENCOUNTER — NURSE ONLY (OUTPATIENT)
Facility: CLINIC | Age: 69
End: 2024-02-14
Payer: MEDICARE

## 2024-02-14 DIAGNOSIS — Z79.01 LONG TERM (CURRENT) USE OF ANTICOAGULANTS: Primary | ICD-10-CM

## 2024-02-14 LAB
POC INR: 1.8
PROTHROMBIN TIME, POC: 21.4

## 2024-02-14 PROCEDURE — 85610 PROTHROMBIN TIME: CPT | Performed by: INTERNAL MEDICINE

## 2024-02-14 NOTE — PROGRESS NOTES
Patient in for PT/INR.States she is taking 2.5 mg of coumadin Mondays thru Saturdays and 5 mg on Sundays. Pt=21.4, INR=1.8. Dr. Sommer notified. Pt informed no adjustments will be made at this time, continue on same dosing and return in one month for recheck per Dr. Sommer.

## 2024-02-29 ENCOUNTER — HOSPITAL ENCOUNTER (OUTPATIENT)
Facility: HOSPITAL | Age: 69
Discharge: HOME | End: 2024-02-29
Attending: INTERNAL MEDICINE
Payer: MEDICARE

## 2024-02-29 DIAGNOSIS — N18.31 CHRONIC KIDNEY DISEASE, STAGE 3A (HCC): ICD-10-CM

## 2024-02-29 PROCEDURE — 76770 US EXAM ABDO BACK WALL COMP: CPT

## 2024-03-01 ENCOUNTER — HOSPITAL ENCOUNTER (OUTPATIENT)
Facility: HOSPITAL | Age: 69
End: 2024-03-01
Attending: INTERNAL MEDICINE
Payer: MEDICARE

## 2024-03-01 DIAGNOSIS — I10 ESSENTIAL HYPERTENSION, BENIGN: ICD-10-CM

## 2024-03-01 DIAGNOSIS — Z12.31 ENCOUNTER FOR SCREENING MAMMOGRAM FOR MALIGNANT NEOPLASM OF BREAST: ICD-10-CM

## 2024-03-01 PROCEDURE — 77063 BREAST TOMOSYNTHESIS BI: CPT

## 2024-03-13 ENCOUNTER — NURSE ONLY (OUTPATIENT)
Facility: CLINIC | Age: 69
End: 2024-03-13
Payer: MEDICARE

## 2024-03-13 DIAGNOSIS — Z79.01 LONG TERM (CURRENT) USE OF ANTICOAGULANTS: Primary | ICD-10-CM

## 2024-03-13 LAB
POC INR: 1.9
PROTHROMBIN TIME, POC: 22.5

## 2024-03-13 PROCEDURE — 85610 PROTHROMBIN TIME: CPT | Performed by: INTERNAL MEDICINE

## 2024-03-13 NOTE — PROGRESS NOTES
Chief Complaint   Patient presents with    Office Visit for Anticoagulation Management     Patient present for PT INR check     Patient states \" she takes warfarin on Sun 5 mg and Mon- Sat 2.5 mg    Results for orders placed or performed in visit on 03/13/24   AMB POC PT/INR   Result Value Ref Range    Prothrombin time, POC 22.5     POC INR 1.9      Reviewed results with Dr. Kenny Sommer and he advised patient as follows:  Advised patient no change and return in one month

## 2024-04-03 ENCOUNTER — OFFICE VISIT (OUTPATIENT)
Facility: CLINIC | Age: 69
End: 2024-04-03
Payer: MEDICARE

## 2024-04-03 VITALS
BODY MASS INDEX: 33.82 KG/M2 | HEIGHT: 62 IN | WEIGHT: 183.8 LBS | RESPIRATION RATE: 20 BRPM | OXYGEN SATURATION: 98 % | HEART RATE: 57 BPM | DIASTOLIC BLOOD PRESSURE: 70 MMHG | SYSTOLIC BLOOD PRESSURE: 128 MMHG | TEMPERATURE: 97.9 F

## 2024-04-03 DIAGNOSIS — Z00.00 MEDICARE ANNUAL WELLNESS VISIT, SUBSEQUENT: Primary | ICD-10-CM

## 2024-04-03 DIAGNOSIS — E78.5 DYSLIPIDEMIA: ICD-10-CM

## 2024-04-03 DIAGNOSIS — N18.31 STAGE 3A CHRONIC KIDNEY DISEASE (HCC): ICD-10-CM

## 2024-04-03 DIAGNOSIS — I27.82 OTHER CHRONIC PULMONARY EMBOLISM WITHOUT ACUTE COR PULMONALE (HCC): ICD-10-CM

## 2024-04-03 DIAGNOSIS — Z79.01 LONG TERM (CURRENT) USE OF ANTICOAGULANTS: ICD-10-CM

## 2024-04-03 DIAGNOSIS — E66.09 CLASS 1 OBESITY DUE TO EXCESS CALORIES WITHOUT SERIOUS COMORBIDITY WITH BODY MASS INDEX (BMI) OF 33.0 TO 33.9 IN ADULT: ICD-10-CM

## 2024-04-03 DIAGNOSIS — R29.898 WEAKNESS OF BOTH LOWER EXTREMITIES: ICD-10-CM

## 2024-04-03 DIAGNOSIS — I10 ESSENTIAL HYPERTENSION, BENIGN: ICD-10-CM

## 2024-04-03 DIAGNOSIS — R73.02 IGT (IMPAIRED GLUCOSE TOLERANCE): ICD-10-CM

## 2024-04-03 DIAGNOSIS — I73.9 PERIPHERAL VASCULAR DISEASE (HCC): ICD-10-CM

## 2024-04-03 PROBLEM — S46.819A TRAPEZIUS MUSCLE STRAIN: Status: RESOLVED | Noted: 2023-01-18 | Resolved: 2024-04-03

## 2024-04-03 LAB
POC INR: 1.8
PROTHROMBIN TIME, POC: 21.3

## 2024-04-03 PROCEDURE — G0439 PPPS, SUBSEQ VISIT: HCPCS | Performed by: INTERNAL MEDICINE

## 2024-04-03 PROCEDURE — 99214 OFFICE O/P EST MOD 30 MIN: CPT | Performed by: INTERNAL MEDICINE

## 2024-04-03 PROCEDURE — 1123F ACP DISCUSS/DSCN MKR DOCD: CPT | Performed by: INTERNAL MEDICINE

## 2024-04-03 PROCEDURE — 3074F SYST BP LT 130 MM HG: CPT | Performed by: INTERNAL MEDICINE

## 2024-04-03 PROCEDURE — 3078F DIAST BP <80 MM HG: CPT | Performed by: INTERNAL MEDICINE

## 2024-04-03 ASSESSMENT — PATIENT HEALTH QUESTIONNAIRE - PHQ9
1. LITTLE INTEREST OR PLEASURE IN DOING THINGS: NOT AT ALL
SUM OF ALL RESPONSES TO PHQ QUESTIONS 1-9: 0
SUM OF ALL RESPONSES TO PHQ9 QUESTIONS 1 & 2: 0
2. FEELING DOWN, DEPRESSED OR HOPELESS: NOT AT ALL
SUM OF ALL RESPONSES TO PHQ QUESTIONS 1-9: 0
1. LITTLE INTEREST OR PLEASURE IN DOING THINGS: NOT AT ALL
SUM OF ALL RESPONSES TO PHQ9 QUESTIONS 1 & 2: 0
2. FEELING DOWN, DEPRESSED OR HOPELESS: NOT AT ALL
SUM OF ALL RESPONSES TO PHQ QUESTIONS 1-9: 0

## 2024-04-03 NOTE — PATIENT INSTRUCTIONS
range of motion in joints and muscles.  Includes upper arm stretches, calf stretches, and gentle yoga.  Aim for at least twice a week, preferably after your muscles are warmed up from other activities.  It can help you function better in daily life.  Balancing.  This helps you stay coordinated and have good posture.  Includes heel-to-toe walking, nohemi chi, and certain types of yoga.  Aim for at least 3 days a week.  It can reduce your risk of falling.  Even if you have a hard time meeting the recommendations, it's better to be more active than less active. All activity done in each category counts toward your weekly total. You'd be surprised how daily things like carrying groceries, keeping up with grandchildren, and taking the stairs can add up.  What keeps you from being active?  If you've had a hard time being more active, you're not alone. Maybe you remember being able to do more. Or maybe you've never thought of yourself as being active. It's frustrating when you can't do the things you want. Being more active can help. What's holding you back?  Getting started.  Have a goal, but break it into easy tasks. Small steps build into big accomplishments.  Staying motivated.  If you feel like skipping your activity, remember your goal. Maybe you want to move better and stay independent. Every activity gets you one step closer.  Not feeling your best.  Start with 5 minutes of an activity you enjoy. Prove to yourself you can do it. As you get comfortable, increase your time.  You may not be where you want to be. But you're in the process of getting there. Everyone starts somewhere.  How can you find safe ways to stay active?  Talk with your doctor about any physical challenges you're facing. Make a plan with your doctor if you have a health problem or aren't sure how to get started with activity.  If you're already active, ask your doctor if there is anything you should change to stay safe as your body and health

## 2024-04-03 NOTE — PROGRESS NOTES
Chief Complaint   Patient presents with    Medicare AWV     \"Have you been to the ER, urgent care clinic since your last visit?  Hospitalized since your last visit?\"    NO    “Have you seen or consulted any other health care providers outside of [facility] since your last visit?”    NO            Click Here for Release of Records Request  
Medicare Annual Wellness Visit    Rachel Dorantes is here for Medicare AWV    Assessment & Plan   Long term (current) use of anticoagulants  -     AMB POC PT/INR  Other chronic pulmonary embolism without acute cor pulmonale (HCC)  Assessment & Plan:   Asymptomatic, continue current medications  Peripheral vascular disease (HCC)  Assessment & Plan:   Asymptomatic, continue current medications  Stage 3a chronic kidney disease (HCC)  Assessment & Plan:   Asymptomatic, continue current medications  Class 1 obesity due to excess calories without serious comorbidity with body mass index (BMI) of 33.0 to 33.9 in adult  IGT (impaired glucose tolerance)  Irritable bowel syndrome, unspecified type  Essential hypertension, benign  Medicare annual wellness visit, subsequent    Recommendations for Preventive Services Due: see orders and patient instructions/AVS.  Recommended screening schedule for the next 5-10 years is provided to the patient in written form: see Patient Instructions/AVS.     Return in about 4 weeks (around 5/1/2024) for pt/inr.     Subjective       Patient's complete Health Risk Assessment and screening values have been reviewed and are found in Flowsheets. The following problems were reviewed today and where indicated follow up appointments were made and/or referrals ordered.    Positive Risk Factor Screenings with Interventions:    Fall Risk:  Do you feel unsteady or are you worried about falling? : (!) yes  2 or more falls in past year?: no  Fall with injury in past year?: no     Interventions:    Reviewed medications, home hazards, visual acuity, and co-morbidities that can increase risk for falls            General HRA Questions:  Select all that apply: (!) New or Increased Pain    Pain Interventions:  See A/P for plan and any pertinent orders      Activity, Diet, and Weight:  On average, how many days per week do you engage in moderate to strenuous exercise (like a brisk walk)?: 0 days  On average, how 
negative for - abdominal pain, blood in stools, heartburn or nausea/vomiting  Genito-Urinary: no dysuria, trouble voiding, or hematuria  Musculoskeletal: negative for - gait disturbance, joint pain, joint stiffness or joint swelling  Neurological: no TIA or stroke symptoms  Hematologic: no bruises, no bleeding, no swollen glands  Integument: no lumps, mole changes, nail changes or rash  Endocrine: no malaise/lethargy or unexpected weight changes      Social History     Socioeconomic History    Marital status:      Spouse name: Jose Elias Dorantes    Number of children: 1    Years of education: 12    Highest education level: High school graduate   Tobacco Use    Smoking status: Never     Passive exposure: Never    Smokeless tobacco: Never   Vaping Use    Vaping Use: Never used   Substance and Sexual Activity    Alcohol use: No    Drug use: No    Sexual activity: Yes     Partners: Male     Birth control/protection: None   Social History Narrative    th: spouse. Children: children. Holiness: [place].  Patient is  living w ith her  she is on disability related to her fibromyositis.    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  Social History: Alcohol Use Patient does not use alcohol. Smoking Status Patient is a never smoker. Caffeine: occasional. Drugs:  prescription narcotics. Diet: Regular. Exercise: None. Marital Status: . Lives w i       Family History: Mother: alive 84 yrs, High Blood Pressure, cva with cognitive deficitsFather:  40 yrs, myocardial infarctionSister(s): aliveBrother(s): unknow n2  sister(s) . 37 daughter no choildren -  walked out works for board of dir     Social Determinants of Health     Financial Resource Strain: Low Risk  (2023)    Overall Financial Resource Strain (CARDIA)     Difficulty of Paying Living Expenses: Not hard at all   Transportation Needs: No Transportation Needs (2023)    PRAPARE - Transportation     Lack of Transportation (Medical): No

## 2024-05-08 ENCOUNTER — NURSE ONLY (OUTPATIENT)
Facility: CLINIC | Age: 69
End: 2024-05-08
Payer: MEDICARE

## 2024-05-08 DIAGNOSIS — Z79.01 LONG TERM (CURRENT) USE OF ANTICOAGULANTS: Primary | ICD-10-CM

## 2024-05-08 LAB
POC INR: 1.8
PROTHROMBIN TIME, POC: 21.3

## 2024-05-08 PROCEDURE — 85610 PROTHROMBIN TIME: CPT | Performed by: INTERNAL MEDICINE

## 2024-05-08 NOTE — PROGRESS NOTES
Patient in for PT/INR. States she is taking 5 mg of coumadin on Sundays and 2.5 mgs on Mondays thru Saturday.  PT=21.3, INR= 1.8. Dr. Sommer notified.Patient informed no adjustments will be made at this time. Continue on same dosing,  Return in one month for recheck per Dr. Sommer.

## 2024-06-12 ENCOUNTER — NURSE ONLY (OUTPATIENT)
Facility: CLINIC | Age: 69
End: 2024-06-12
Payer: MEDICARE

## 2024-06-12 DIAGNOSIS — Z79.01 LONG TERM (CURRENT) USE OF ANTICOAGULANTS: Primary | ICD-10-CM

## 2024-06-12 LAB
POC INR: 2.3
PROTHROMBIN TIME, POC: 27.3

## 2024-06-12 PROCEDURE — 85610 PROTHROMBIN TIME: CPT | Performed by: INTERNAL MEDICINE

## 2024-06-13 NOTE — PROGRESS NOTES
Late Note:  Patient in for PT/INR. States she is taking 5 mg of coumadin on Sundays and 2.5 mg Mondays thru Saturdays.  PT= 27.3, INR = 2.3. Dr. Sommer notified. Patient notified that no adjustments will be made at this time,   Continue on same dosing and return in one month for recheck per Dr. Sommer.

## 2024-06-24 RX ORDER — BISOPROLOL FUMARATE AND HYDROCHLOROTHIAZIDE 10; 6.25 MG/1; MG/1
1 TABLET ORAL DAILY
Qty: 90 TABLET | Refills: 3 | Status: SHIPPED | OUTPATIENT
Start: 2024-06-24

## 2024-07-16 ENCOUNTER — NURSE ONLY (OUTPATIENT)
Facility: CLINIC | Age: 69
End: 2024-07-16
Payer: MEDICARE

## 2024-07-16 DIAGNOSIS — Z79.01 LONG TERM (CURRENT) USE OF ANTICOAGULANTS: Primary | ICD-10-CM

## 2024-07-16 LAB
POC INR: 2.1
PROTHROMBIN TIME, POC: 24.8

## 2024-07-16 PROCEDURE — 85610 PROTHROMBIN TIME: CPT | Performed by: INTERNAL MEDICINE

## 2024-07-17 NOTE — PROGRESS NOTES
Late Entry:  Patient in for PT/INR. States she is taking 2.5 mg of coumadin Monday thru Saturday and 5 mg on Sundays.  PT = 24.8 and INR= 2.1 Dr. Sommer notified.  Patient informed no adjustments will be made at this time.   Continue on same dosing and return in one month for recheck per Dr. Sommer.

## 2024-07-18 ENCOUNTER — TELEPHONE (OUTPATIENT)
Age: 69
End: 2024-07-18

## 2024-07-26 PROBLEM — Z12.11 ENCOUNTER FOR FIT (FECAL IMMUNOCHEMICAL TEST) SCREENING: Status: ACTIVE | Noted: 2024-07-18

## 2024-08-02 RX ORDER — WARFARIN SODIUM 5 MG/1
TABLET ORAL
Qty: 12 TABLET | Refills: 12 | Status: SHIPPED | OUTPATIENT
Start: 2024-08-02

## 2024-08-07 ENCOUNTER — CLINICAL DOCUMENTATION (OUTPATIENT)
Facility: CLINIC | Age: 69
End: 2024-08-07

## 2024-08-07 ENCOUNTER — OFFICE VISIT (OUTPATIENT)
Facility: CLINIC | Age: 69
End: 2024-08-07
Payer: MEDICARE

## 2024-08-07 VITALS
DIASTOLIC BLOOD PRESSURE: 86 MMHG | HEIGHT: 62 IN | RESPIRATION RATE: 20 BRPM | OXYGEN SATURATION: 97 % | WEIGHT: 182.8 LBS | HEART RATE: 55 BPM | SYSTOLIC BLOOD PRESSURE: 136 MMHG | TEMPERATURE: 97.5 F | BODY MASS INDEX: 33.64 KG/M2

## 2024-08-07 DIAGNOSIS — I27.82 OTHER CHRONIC PULMONARY EMBOLISM WITHOUT ACUTE COR PULMONALE (HCC): Primary | ICD-10-CM

## 2024-08-07 DIAGNOSIS — M10.071 ACUTE IDIOPATHIC GOUT OF RIGHT ANKLE: ICD-10-CM

## 2024-08-07 DIAGNOSIS — E66.09 CLASS 1 OBESITY DUE TO EXCESS CALORIES WITHOUT SERIOUS COMORBIDITY WITH BODY MASS INDEX (BMI) OF 33.0 TO 33.9 IN ADULT: ICD-10-CM

## 2024-08-07 DIAGNOSIS — I82.811: ICD-10-CM

## 2024-08-07 DIAGNOSIS — N18.31 STAGE 3A CHRONIC KIDNEY DISEASE (HCC): ICD-10-CM

## 2024-08-07 DIAGNOSIS — Z79.01 LONG TERM (CURRENT) USE OF ANTICOAGULANTS: ICD-10-CM

## 2024-08-07 LAB
ANION GAP SERPL CALC-SCNC: 4 MMOL/L (ref 5–15)
BASOPHILS # BLD: 0 K/UL (ref 0–0.1)
BASOPHILS NFR BLD: 1 % (ref 0–1)
BUN SERPL-MCNC: 29 MG/DL (ref 6–20)
BUN/CREAT SERPL: 21 (ref 12–20)
CALCIUM SERPL-MCNC: 10.1 MG/DL (ref 8.5–10.1)
CHLORIDE SERPL-SCNC: 109 MMOL/L (ref 97–108)
CO2 SERPL-SCNC: 27 MMOL/L (ref 21–32)
CREAT SERPL-MCNC: 1.38 MG/DL (ref 0.55–1.02)
DIFFERENTIAL METHOD BLD: NORMAL
EOSINOPHIL # BLD: 0.1 K/UL (ref 0–0.4)
EOSINOPHIL NFR BLD: 2 % (ref 0–7)
ERYTHROCYTE [DISTWIDTH] IN BLOOD BY AUTOMATED COUNT: 14.5 % (ref 11.5–14.5)
GLUCOSE SERPL-MCNC: 98 MG/DL (ref 65–100)
HCT VFR BLD AUTO: 40.2 % (ref 35–47)
HGB BLD-MCNC: 13.5 G/DL (ref 11.5–16)
IMM GRANULOCYTES # BLD AUTO: 0 K/UL (ref 0–0.04)
IMM GRANULOCYTES NFR BLD AUTO: 0 % (ref 0–0.5)
LYMPHOCYTES # BLD: 1.7 K/UL (ref 0.8–3.5)
LYMPHOCYTES NFR BLD: 39 % (ref 12–49)
MCH RBC QN AUTO: 29.7 PG (ref 26–34)
MCHC RBC AUTO-ENTMCNC: 33.6 G/DL (ref 30–36.5)
MCV RBC AUTO: 88.5 FL (ref 80–99)
MONOCYTES # BLD: 0.6 K/UL (ref 0–1)
MONOCYTES NFR BLD: 13 % (ref 5–13)
NEUTS SEG # BLD: 2 K/UL (ref 1.8–8)
NEUTS SEG NFR BLD: 45 % (ref 32–75)
NRBC # BLD: 0 K/UL (ref 0–0.01)
NRBC BLD-RTO: 0 PER 100 WBC
PLATELET # BLD AUTO: 215 K/UL (ref 150–400)
PMV BLD AUTO: 11.9 FL (ref 8.9–12.9)
POC INR: 1.7
POTASSIUM SERPL-SCNC: 4.4 MMOL/L (ref 3.5–5.1)
PROTHROMBIN TIME, POC: 20.6
RBC # BLD AUTO: 4.54 M/UL (ref 3.8–5.2)
SODIUM SERPL-SCNC: 140 MMOL/L (ref 136–145)
URATE SERPL-MCNC: 7.9 MG/DL (ref 2.6–6)
WBC # BLD AUTO: 4.4 K/UL (ref 3.6–11)

## 2024-08-07 PROCEDURE — 3079F DIAST BP 80-89 MM HG: CPT | Performed by: INTERNAL MEDICINE

## 2024-08-07 PROCEDURE — 99214 OFFICE O/P EST MOD 30 MIN: CPT | Performed by: INTERNAL MEDICINE

## 2024-08-07 PROCEDURE — 3075F SYST BP GE 130 - 139MM HG: CPT | Performed by: INTERNAL MEDICINE

## 2024-08-07 PROCEDURE — 85610 PROTHROMBIN TIME: CPT | Performed by: INTERNAL MEDICINE

## 2024-08-07 PROCEDURE — 36415 COLL VENOUS BLD VENIPUNCTURE: CPT | Performed by: INTERNAL MEDICINE

## 2024-08-07 PROCEDURE — 1123F ACP DISCUSS/DSCN MKR DOCD: CPT | Performed by: INTERNAL MEDICINE

## 2024-08-07 RX ORDER — PREDNISONE 20 MG/1
40 TABLET ORAL
Status: CANCELLED | OUTPATIENT
Start: 2024-08-07

## 2024-08-07 RX ORDER — PREDNISONE 20 MG/1
40 TABLET ORAL
Qty: 90 TABLET | Refills: 11 | Status: SHIPPED | OUTPATIENT
Start: 2024-08-07 | End: 2024-08-09 | Stop reason: SDUPTHER

## 2024-08-07 ASSESSMENT — PATIENT HEALTH QUESTIONNAIRE - PHQ9
SUM OF ALL RESPONSES TO PHQ QUESTIONS 1-9: 0
2. FEELING DOWN, DEPRESSED OR HOPELESS: NOT AT ALL
1. LITTLE INTEREST OR PLEASURE IN DOING THINGS: NOT AT ALL
SUM OF ALL RESPONSES TO PHQ QUESTIONS 1-9: 0
SUM OF ALL RESPONSES TO PHQ9 QUESTIONS 1 & 2: 0
SUM OF ALL RESPONSES TO PHQ QUESTIONS 1-9: 0
SUM OF ALL RESPONSES TO PHQ QUESTIONS 1-9: 0

## 2024-08-07 NOTE — PROGRESS NOTES
Never   Vaping Use    Vaping Use: Never used   Substance and Sexual Activity    Alcohol use: No    Drug use: No    Sexual activity: Yes     Partners: Male     Birth control/protection: None   Social History Narrative    th: spouse. Children: children. Adventist: [place].  Patient is  living w ith her  she is on disability related to her fibromyositis.    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  Social History: Alcohol Use Patient does not use alcohol. Smoking Status Patient is a never smoker. Caffeine: occasional. Drugs:  prescription narcotics. Diet: Regular. Exercise: None. Marital Status: . Lives w i       Family History: Mother: alive 84 yrs, High Blood Pressure, cva with cognitive deficitsFather:  40 yrs, myocardial infarctionSister(s): aliveBrother(s): unknow n2  sister(s) . 37 daughter no choildren -  walked out works for board of dir     Social Determinants of Health     Financial Resource Strain: Low Risk  (2023)    Overall Financial Resource Strain (CARDIA)     Difficulty of Paying Living Expenses: Not hard at all   Transportation Needs: Unknown (2023)    PRAPARE - Transportation     Lack of Transportation (Non-Medical): No   Physical Activity: Inactive (4/3/2024)    Exercise Vital Sign     Days of Exercise per Week: 0 days     Minutes of Exercise per Session: 0 min   Stress: No Stress Concern Present (8/3/2023)    [place] [employer] - Occupational Stress Questionnaire     Feeling of Stress : Not at all   Social Connections: Moderately Integrated (8/3/2023)    Social Connection and Isolation Panel [NHANES]     Frequency of Communication with Friends and Family: More than three times a week     Frequency of Social Gatherings with Friends and Family: Once a week     Attends Yazidism Services: More than 4 times per year     Active Member of Clubs or Organizations: No     Attends Club or Organization Meetings: Never     Marital Status:

## 2024-08-08 RX ORDER — WARFARIN SODIUM 5 MG/1
TABLET ORAL
Qty: 24 TABLET | Refills: 3 | OUTPATIENT
Start: 2024-08-08

## 2024-08-08 RX ORDER — WARFARIN SODIUM 2.5 MG/1
TABLET ORAL
Qty: 90 TABLET | Refills: 3 | OUTPATIENT
Start: 2024-08-08

## 2024-08-09 RX ORDER — PREDNISONE 20 MG/1
40 TABLET ORAL
Qty: 60 TABLET | Refills: 0 | Status: SHIPPED | OUTPATIENT
Start: 2024-08-09

## 2024-08-19 ENCOUNTER — TELEPHONE (OUTPATIENT)
Facility: CLINIC | Age: 69
End: 2024-08-19

## 2024-08-19 NOTE — TELEPHONE ENCOUNTER
[facility]   part of [facility]     Hospitalist Progress Note     Tobias Chou Patient Status:  Inpatient    1941 MRN [id number]   Location [facility] 3NW-A Attending Honey Alejandro Patient  called stating she thinks she is having side effects from the Repatha with stomach cramping and when she passes gas she see a liquid with blood streaks, patient asked to D/C THE REPATHA AND IF CONTINUED PAIN GO TO ER, SHE IS SCHEDULED TO SEE DR. MAIER UPON HIS RETURN   

## 2024-08-25 PROBLEM — Z12.11 ENCOUNTER FOR FIT (FECAL IMMUNOCHEMICAL TEST) SCREENING: Status: RESOLVED | Noted: 2024-07-18 | Resolved: 2024-08-25

## 2024-09-11 ENCOUNTER — OFFICE VISIT (OUTPATIENT)
Facility: CLINIC | Age: 69
End: 2024-09-11
Payer: MEDICARE

## 2024-09-11 VITALS
HEART RATE: 58 BPM | BODY MASS INDEX: 33.6 KG/M2 | DIASTOLIC BLOOD PRESSURE: 78 MMHG | TEMPERATURE: 98.1 F | RESPIRATION RATE: 16 BRPM | WEIGHT: 182.6 LBS | OXYGEN SATURATION: 100 % | HEIGHT: 62 IN | SYSTOLIC BLOOD PRESSURE: 118 MMHG

## 2024-09-11 DIAGNOSIS — M54.42 CHRONIC BILATERAL LOW BACK PAIN WITH BILATERAL SCIATICA: ICD-10-CM

## 2024-09-11 DIAGNOSIS — M79.7 FIBROMYOSITIS: ICD-10-CM

## 2024-09-11 DIAGNOSIS — M54.16 LUMBAR RADICULOPATHY: ICD-10-CM

## 2024-09-11 DIAGNOSIS — R73.02 IGT (IMPAIRED GLUCOSE TOLERANCE): ICD-10-CM

## 2024-09-11 DIAGNOSIS — M54.41 CHRONIC BILATERAL LOW BACK PAIN WITH BILATERAL SCIATICA: ICD-10-CM

## 2024-09-11 DIAGNOSIS — E78.5 DYSLIPIDEMIA: ICD-10-CM

## 2024-09-11 DIAGNOSIS — G89.29 CHRONIC BILATERAL LOW BACK PAIN WITH BILATERAL SCIATICA: ICD-10-CM

## 2024-09-11 DIAGNOSIS — I10 ESSENTIAL HYPERTENSION, BENIGN: ICD-10-CM

## 2024-09-11 DIAGNOSIS — Z79.01 LONG TERM (CURRENT) USE OF ANTICOAGULANTS: Primary | ICD-10-CM

## 2024-09-11 DIAGNOSIS — N18.31 STAGE 3A CHRONIC KIDNEY DISEASE (HCC): ICD-10-CM

## 2024-09-11 DIAGNOSIS — I27.82 OTHER CHRONIC PULMONARY EMBOLISM WITHOUT ACUTE COR PULMONALE (HCC): ICD-10-CM

## 2024-09-11 DIAGNOSIS — K76.0 HEPATIC STEATOSIS: ICD-10-CM

## 2024-09-11 LAB
POC INR: 2.3
PROTHROMBIN TIME, POC: 27

## 2024-09-11 PROCEDURE — 3077F SYST BP >= 140 MM HG: CPT | Performed by: INTERNAL MEDICINE

## 2024-09-11 PROCEDURE — 1123F ACP DISCUSS/DSCN MKR DOCD: CPT | Performed by: INTERNAL MEDICINE

## 2024-09-11 PROCEDURE — 85610 PROTHROMBIN TIME: CPT | Performed by: INTERNAL MEDICINE

## 2024-09-11 PROCEDURE — 3078F DIAST BP <80 MM HG: CPT | Performed by: INTERNAL MEDICINE

## 2024-09-11 PROCEDURE — 99214 OFFICE O/P EST MOD 30 MIN: CPT | Performed by: INTERNAL MEDICINE

## 2024-09-11 RX ORDER — WARFARIN SODIUM 2.5 MG/1
2.5 TABLET ORAL DAILY
Qty: 90 TABLET | Refills: 3 | Status: SHIPPED | OUTPATIENT
Start: 2024-09-11

## 2024-09-11 SDOH — ECONOMIC STABILITY: FOOD INSECURITY: WITHIN THE PAST 12 MONTHS, YOU WORRIED THAT YOUR FOOD WOULD RUN OUT BEFORE YOU GOT MONEY TO BUY MORE.: NEVER TRUE

## 2024-09-11 SDOH — ECONOMIC STABILITY: FOOD INSECURITY: WITHIN THE PAST 12 MONTHS, THE FOOD YOU BOUGHT JUST DIDN'T LAST AND YOU DIDN'T HAVE MONEY TO GET MORE.: NEVER TRUE

## 2024-09-11 SDOH — ECONOMIC STABILITY: INCOME INSECURITY: HOW HARD IS IT FOR YOU TO PAY FOR THE VERY BASICS LIKE FOOD, HOUSING, MEDICAL CARE, AND HEATING?: NOT HARD AT ALL

## 2024-09-11 ASSESSMENT — PATIENT HEALTH QUESTIONNAIRE - PHQ9
SUM OF ALL RESPONSES TO PHQ QUESTIONS 1-9: 0
SUM OF ALL RESPONSES TO PHQ QUESTIONS 1-9: 0
2. FEELING DOWN, DEPRESSED OR HOPELESS: NOT AT ALL
SUM OF ALL RESPONSES TO PHQ9 QUESTIONS 1 & 2: 0
1. LITTLE INTEREST OR PLEASURE IN DOING THINGS: NOT AT ALL
SUM OF ALL RESPONSES TO PHQ QUESTIONS 1-9: 0
SUM OF ALL RESPONSES TO PHQ QUESTIONS 1-9: 0

## 2024-09-11 ASSESSMENT — ANXIETY QUESTIONNAIRES
4. TROUBLE RELAXING: NOT AT ALL
3. WORRYING TOO MUCH ABOUT DIFFERENT THINGS: NOT AT ALL
7. FEELING AFRAID AS IF SOMETHING AWFUL MIGHT HAPPEN: NOT AT ALL
2. NOT BEING ABLE TO STOP OR CONTROL WORRYING: NOT AT ALL
1. FEELING NERVOUS, ANXIOUS, OR ON EDGE: NOT AT ALL
6. BECOMING EASILY ANNOYED OR IRRITABLE: NOT AT ALL
5. BEING SO RESTLESS THAT IT IS HARD TO SIT STILL: NOT AT ALL
IF YOU CHECKED OFF ANY PROBLEMS ON THIS QUESTIONNAIRE, HOW DIFFICULT HAVE THESE PROBLEMS MADE IT FOR YOU TO DO YOUR WORK, TAKE CARE OF THINGS AT HOME, OR GET ALONG WITH OTHER PEOPLE: NOT DIFFICULT AT ALL
GAD7 TOTAL SCORE: 0

## 2024-09-12 LAB
ANION GAP SERPL CALC-SCNC: 4 MMOL/L (ref 2–12)
BUN SERPL-MCNC: 22 MG/DL (ref 6–20)
BUN/CREAT SERPL: 17 (ref 12–20)
CALCIUM SERPL-MCNC: 9.6 MG/DL (ref 8.5–10.1)
CHLORIDE SERPL-SCNC: 110 MMOL/L (ref 97–108)
CHOLEST SERPL-MCNC: 157 MG/DL
CO2 SERPL-SCNC: 27 MMOL/L (ref 21–32)
CREAT SERPL-MCNC: 1.33 MG/DL (ref 0.55–1.02)
EST. AVERAGE GLUCOSE BLD GHB EST-MCNC: 126 MG/DL
GLUCOSE SERPL-MCNC: 98 MG/DL (ref 65–100)
HBA1C MFR BLD: 6 % (ref 4–5.6)
HDLC SERPL-MCNC: 55 MG/DL
HDLC SERPL: 2.9 (ref 0–5)
LDLC SERPL CALC-MCNC: 79.2 MG/DL (ref 0–100)
POTASSIUM SERPL-SCNC: 4.1 MMOL/L (ref 3.5–5.1)
SODIUM SERPL-SCNC: 141 MMOL/L (ref 136–145)
TRIGL SERPL-MCNC: 114 MG/DL
VLDLC SERPL CALC-MCNC: 22.8 MG/DL

## 2024-09-19 ENCOUNTER — HOSPITAL ENCOUNTER (OUTPATIENT)
Facility: HOSPITAL | Age: 69
Discharge: HOME | End: 2024-09-22
Attending: INTERNAL MEDICINE
Payer: MEDICARE

## 2024-09-19 DIAGNOSIS — M54.16 LUMBAR RADICULOPATHY: ICD-10-CM

## 2024-09-19 DIAGNOSIS — M54.41 CHRONIC BILATERAL LOW BACK PAIN WITH BILATERAL SCIATICA: ICD-10-CM

## 2024-09-19 DIAGNOSIS — M54.42 CHRONIC BILATERAL LOW BACK PAIN WITH BILATERAL SCIATICA: ICD-10-CM

## 2024-09-19 DIAGNOSIS — G89.29 CHRONIC BILATERAL LOW BACK PAIN WITH BILATERAL SCIATICA: ICD-10-CM

## 2024-09-19 PROCEDURE — 72148 MRI LUMBAR SPINE W/O DYE: CPT

## 2024-10-10 ENCOUNTER — TELEPHONE (OUTPATIENT)
Facility: CLINIC | Age: 69
End: 2024-10-10

## 2024-10-10 NOTE — TELEPHONE ENCOUNTER
FYI update regarding patient's condition at [facility] location. She states \" elevated fever of 103, abdominal pain, diverticulitis, UTI, and pneumonia\"

## 2024-10-31 ENCOUNTER — OFFICE VISIT (OUTPATIENT)
Facility: CLINIC | Age: 69
End: 2024-10-31

## 2024-10-31 VITALS
BODY MASS INDEX: 32.06 KG/M2 | OXYGEN SATURATION: 98 % | RESPIRATION RATE: 14 BRPM | TEMPERATURE: 98.2 F | WEIGHT: 174.2 LBS | HEIGHT: 62 IN | SYSTOLIC BLOOD PRESSURE: 130 MMHG | DIASTOLIC BLOOD PRESSURE: 81 MMHG | HEART RATE: 58 BPM

## 2024-10-31 DIAGNOSIS — K57.92 DIVERTICULITIS: Primary | ICD-10-CM

## 2024-10-31 DIAGNOSIS — K76.0 HEPATIC STEATOSIS: ICD-10-CM

## 2024-10-31 DIAGNOSIS — I27.82 OTHER CHRONIC PULMONARY EMBOLISM WITHOUT ACUTE COR PULMONALE (HCC): ICD-10-CM

## 2024-10-31 DIAGNOSIS — Z79.01 ENCOUNTER FOR MONITORING COUMADIN THERAPY: ICD-10-CM

## 2024-10-31 DIAGNOSIS — R06.02 SOB (SHORTNESS OF BREATH): ICD-10-CM

## 2024-10-31 DIAGNOSIS — J18.9 PNEUMONIA OF RIGHT LOWER LOBE DUE TO INFECTIOUS ORGANISM: ICD-10-CM

## 2024-10-31 DIAGNOSIS — N18.31 STAGE 3A CHRONIC KIDNEY DISEASE (HCC): ICD-10-CM

## 2024-10-31 DIAGNOSIS — G89.29 OTHER CHRONIC PAIN: ICD-10-CM

## 2024-10-31 DIAGNOSIS — Z51.81 ENCOUNTER FOR MONITORING COUMADIN THERAPY: ICD-10-CM

## 2024-10-31 LAB
POC INR: 1.7
PROTHROMBIN TIME, POC: 20.8

## 2024-10-31 NOTE — PROGRESS NOTES
Identified pt with two pt identifiers(name and ). Reviewed record in preparation for visit and have obtained necessary documentation.  Chief Complaint   Patient presents with    Follow-Up from Hospital     Pt states she was admitted at [facility] for 9 days this month for diverticulitis, UTI, and Pneumonia     Diarrhea    Discuss Medications        Health Maintenance Due   Topic    DTaP/Tdap/Td vaccine (1 - Tdap)    Shingles vaccine (1 of 2)    Respiratory Syncytial Virus (RSV) Pregnant or age 60 yrs+ (1 - 1-dose 60+ series)    Pneumococcal 65+ years Vaccine (1 of 1 - PCV)    Flu vaccine (1)    COVID-19 Vaccine ( -  season)     Vitals:    10/31/24 1357   BP: 130/81   Site: Right Upper Arm   Position: Sitting   Cuff Size: Large Adult   Pulse: 58   Resp: 14   Temp: 98.2 °F (36.8 °C)   TempSrc: Oral   SpO2: 98%   Weight: 79 kg (174 lb 3.2 oz)   Height: 1.575 m (5' 2\")      Pain Scale: 7/10    Coordination of Care Questionnaire:  :   1. Have you been to the ER, urgent care clinic since your last visit?  Hospitalized since your last visit? See reason for visit    2. Have you seen or consulted any other health care providers outside of the [facility] since your last visit?  Include any pap smears or colon screening.  Yes, [facility]          Pt informed per Dr. Sommer, \" take Warfarin 5mg every Monday, Wednesday, and Friday and warfarin 2.5 mg all other days. Pt verbalized understanding of information discussed w/ no further questions at this time.       
[id number].. G. Parker    COLONOSCOPY      COLONOSCOPY N/A 4/10/2018    COLONOSCOPY,EGD performed by Manuel Quinones MD at [facility] ENDOSCOPY    COLONOSCOPY N/A 5/5/2022    COLONOSCOPY performed by Manuel Quinones MD at [facility] ENDOSCOPY    [facility] STEROTACTIC LOC BREAST BIOPSY LEFT Left 12/18/2020    WANDA STEROTACTIC LOC BREAST BIOPSY LEFT 12/18/2020 [facility] RAD MAMMO    ORTHOPEDIC SURGERY  2009    foot surgery-left - bunionectomy    ORTHOPEDIC SURGERY      cyst removed from left hand - ganglion cyst    OTHER SURGICAL HISTORY      right and left leg muscle biopsies - elevated CPK - muscle enzymes were elevated    CO UNLISTED PROCEDURE ABDOMEN PERITONEUM & OMENTUM      exploratory years ago    TUBAL LIGATION       Allergies   Allergen Reactions    Hydromorphone Anaphylaxis     Respiratory arrest and throat closes    Naproxen Sodium      Other reaction(s): Hoarseness    Atorvastatin Nausea Only     dizzy    Bempedoic Acid Myalgia    Morphine Other (See Comments)     Patch-vomiting,weakness, fainting    Pravastatin Other (See Comments)     myalgia    Repatha [Evolocumab] Myalgia    Rosuvastatin Nausea Only    Sulfa Antibiotics Hives, Other (See Comments) and Rash     fainting    Valsartan Palpitations    Zetia [Ezetimibe] Rash         REVIEW OF SYSTEMS:  General: negative for - chills or fever  ENT: negative for - headaches, nasal congestion or tinnitus  Respiratory: negative for - cough, hemoptysis, shortness of breath or wheezing  Cardiovascular : negative for - chest pain, edema, palpitations or shortness of breath  Gastrointestinal: negative for - abdominal pain, blood in stools, heartburn or nausea/vomiting  Genito-Urinary: no dysuria, trouble voiding, or hematuria  Musculoskeletal: negative for - gait disturbance, joint pain, joint stiffness or joint swelling  Neurological: no TIA or stroke symptoms  Hematologic: no bruises, no bleeding, no swollen glands  Integument: no lumps, mole changes, nail changes or

## 2024-11-01 LAB
ALBUMIN SERPL-MCNC: 3.6 G/DL (ref 3.5–5)
ALBUMIN/GLOB SERPL: 0.9 (ref 1.1–2.2)
ALP SERPL-CCNC: 55 U/L (ref 45–117)
ALT SERPL-CCNC: 42 U/L (ref 12–78)
ANION GAP SERPL CALC-SCNC: 7 MMOL/L (ref 2–12)
AST SERPL-CCNC: 26 U/L (ref 15–37)
BASOPHILS # BLD: 0.1 K/UL (ref 0–0.1)
BASOPHILS NFR BLD: 1 % (ref 0–1)
BILIRUB SERPL-MCNC: 0.4 MG/DL (ref 0.2–1)
BUN SERPL-MCNC: 31 MG/DL (ref 6–20)
BUN/CREAT SERPL: 20 (ref 12–20)
CALCIUM SERPL-MCNC: 9.6 MG/DL (ref 8.5–10.1)
CHLORIDE SERPL-SCNC: 106 MMOL/L (ref 97–108)
CO2 SERPL-SCNC: 25 MMOL/L (ref 21–32)
CREAT SERPL-MCNC: 1.52 MG/DL (ref 0.55–1.02)
DIFFERENTIAL METHOD BLD: ABNORMAL
EOSINOPHIL # BLD: 0.1 K/UL (ref 0–0.4)
EOSINOPHIL NFR BLD: 2 % (ref 0–7)
ERYTHROCYTE [DISTWIDTH] IN BLOOD BY AUTOMATED COUNT: 17.2 % (ref 11.5–14.5)
GLOBULIN SER CALC-MCNC: 3.8 G/DL (ref 2–4)
GLUCOSE SERPL-MCNC: 86 MG/DL (ref 65–100)
HCT VFR BLD AUTO: 37.1 % (ref 35–47)
HGB BLD-MCNC: 11.9 G/DL (ref 11.5–16)
IMM GRANULOCYTES # BLD AUTO: 0 K/UL (ref 0–0.04)
IMM GRANULOCYTES NFR BLD AUTO: 0 % (ref 0–0.5)
LYMPHOCYTES # BLD: 1.7 K/UL (ref 0.8–3.5)
LYMPHOCYTES NFR BLD: 41 % (ref 12–49)
MCH RBC QN AUTO: 29.5 PG (ref 26–34)
MCHC RBC AUTO-ENTMCNC: 32.1 G/DL (ref 30–36.5)
MCV RBC AUTO: 91.8 FL (ref 80–99)
MONOCYTES # BLD: 0.5 K/UL (ref 0–1)
MONOCYTES NFR BLD: 12 % (ref 5–13)
NEUTS SEG # BLD: 1.8 K/UL (ref 1.8–8)
NEUTS SEG NFR BLD: 44 % (ref 32–75)
NRBC # BLD: 0 K/UL (ref 0–0.01)
NRBC BLD-RTO: 0 PER 100 WBC
NT PRO BNP: 73 PG/ML
PLATELET # BLD AUTO: 174 K/UL (ref 150–400)
PMV BLD AUTO: 11.2 FL (ref 8.9–12.9)
POTASSIUM SERPL-SCNC: 3.8 MMOL/L (ref 3.5–5.1)
PROT SERPL-MCNC: 7.4 G/DL (ref 6.4–8.2)
RBC # BLD AUTO: 4.04 M/UL (ref 3.8–5.2)
SODIUM SERPL-SCNC: 138 MMOL/L (ref 136–145)
WBC # BLD AUTO: 4.2 K/UL (ref 3.6–11)

## 2024-11-03 RX ORDER — WARFARIN SODIUM 5 MG/1
5 TABLET ORAL
Qty: 40 TABLET | Refills: 3 | Status: SHIPPED | OUTPATIENT
Start: 2024-11-04

## 2024-11-08 RX ORDER — WARFARIN SODIUM 2.5 MG/1
2.5 TABLET ORAL DAILY
Qty: 90 TABLET | Refills: 3 | Status: SHIPPED | OUTPATIENT
Start: 2024-11-08

## 2024-11-11 RX ORDER — GUAIFENESIN/DEXTROMETHORPHAN 100-10MG/5
5 SYRUP ORAL 3 TIMES DAILY PRN
Qty: 120 ML | Refills: 3 | Status: SHIPPED | OUTPATIENT
Start: 2024-11-11

## 2024-11-12 ENCOUNTER — TELEPHONE (OUTPATIENT)
Facility: CLINIC | Age: 69
End: 2024-11-12

## 2024-11-12 NOTE — TELEPHONE ENCOUNTER
Patient called complaining of constant cough from pneumonia, per Dr. MAIER PATIENT TREATED WITH TESSALON PEARLS 200MG 1 TID X 10 DAYS #30

## 2024-11-13 RX ORDER — ALLOPURINOL 100 MG/1
100 TABLET ORAL
COMMUNITY
Start: 2024-11-08

## 2024-11-13 RX ORDER — PANTOPRAZOLE SODIUM 40 MG/1
40 TABLET, DELAYED RELEASE ORAL
COMMUNITY
Start: 2024-11-06

## 2024-11-13 RX ORDER — DICYCLOMINE HYDROCHLORIDE 10 MG/1
10 CAPSULE ORAL
COMMUNITY
Start: 2024-11-06

## 2024-11-13 ASSESSMENT — SLEEP AND FATIGUE QUESTIONNAIRES
HOW LIKELY ARE YOU TO NOD OFF OR FALL ASLEEP WHILE SITTING QUIETLY AFTER LUNCH WITHOUT ALCOHOL: WOULD NEVER DOZE
HOW LIKELY ARE YOU TO NOD OFF OR FALL ASLEEP WHILE SITTING INACTIVE IN A PUBLIC PLACE: WOULD NEVER DOZE
HOW LIKELY ARE YOU TO NOD OFF OR FALL ASLEEP WHILE WATCHING TV: MODERATE CHANCE OF DOZING
HOW LIKELY ARE YOU TO NOD OFF OR FALL ASLEEP WHILE SITTING AND TALKING TO SOMEONE: WOULD NEVER DOZE
HOW LIKELY ARE YOU TO NOD OFF OR FALL ASLEEP WHILE SITTING AND READING: WOULD NEVER DOZE
HOW LIKELY ARE YOU TO NOD OFF OR FALL ASLEEP WHILE LYING DOWN TO REST IN THE AFTERNOON WHEN CIRCUMSTANCES PERMIT: SLIGHT CHANCE OF DOZING
HOW LIKELY ARE YOU TO NOD OFF OR FALL ASLEEP WHEN YOU ARE A PASSENGER IN A CAR FOR AN HOUR WITHOUT A BREAK: MODERATE CHANCE OF DOZING
ESS TOTAL SCORE: 5
HOW LIKELY ARE YOU TO NOD OFF OR FALL ASLEEP IN A CAR, WHILE STOPPED FOR A FEW MINUTES IN TRAFFIC: WOULD NEVER DOZE

## 2024-11-14 ENCOUNTER — CLINICAL DOCUMENTATION (OUTPATIENT)
Age: 69
End: 2024-11-14

## 2024-11-14 ENCOUNTER — TELEMEDICINE (OUTPATIENT)
Age: 69
End: 2024-11-14

## 2024-11-14 DIAGNOSIS — G47.33 OBSTRUCTIVE SLEEP APNEA (ADULT) (PEDIATRIC): Primary | ICD-10-CM

## 2024-11-14 DIAGNOSIS — I10 ESSENTIAL (PRIMARY) HYPERTENSION: ICD-10-CM

## 2024-11-14 NOTE — PATIENT INSTRUCTIONS
[address]  [place] [address]  Tel.  [phone]  Fax. [phone] [address]  [place] [address]  Tel.  [phone]  Fax. [phone] [address] [place] [address]  Tel.  [phone]  Fax. [phone]     PROPER SLEEP HYGIENE    What to avoid  Do not have drinks with caffeine, such as coffee or black tea, for 8 hours before bed.  Do not smoke or use other types of tobacco near bedtime. Nicotine is a stimulant and can keep you awake.  Avoid drinking alcohol late in the evening, because it can cause you to wake in the middle of the night.  Do not eat a big meal close to bedtime. If you are hungry, eat a light snack.  Do not drink a lot of water close to bedtime, because the need to urinate may wake you up during the night.  Do not read or watch TV in bed. Use the bed only for sleeping and sexual activity.  What to try  Go to bed at the same time every night, and wake up at the same time every morning. Do not take naps during the day.  Keep your bedroom quiet, dark, and cool.  Get regular exercise, but not within 3 to 4 hours of your bedtime..  Sleep on a comfortable pillow and mattress.  If watching the clock makes you anxious, turn it facing away from you so you cannot see the time.  If you worry when you lie down, start a worry book. Well before bedtime, write down your worries, and then set the book and your concerns aside.  Try meditation or other relaxation techniques before you go to bed.  If you cannot fall asleep, get up and go to another room until you feel sleepy. Do something relaxing. Repeat your bedtime routine before you go to bed again.  Make your house quiet and calm about an hour before bedtime. Turn down the lights, turn off the TV, log off the computer, and turn down the volume on music. This can help you relax after a busy day.    Drowsy Driving  The [place] National Highway Traffic Safety Administration cites drowsiness as a

## 2024-11-14 NOTE — PROGRESS NOTES
97%  Weight from last visit 175 lb      Recovering from pneumonia. Was hospitalized. Finished antibiotics 3 weeks ago. Supplemental oxygen at home was not needed.  She admits that her sleep has improved on PAP therapy using nasal mask   Allergies   Allergen Reactions    Hydromorphone Anaphylaxis     Respiratory arrest and throat closes    Naproxen Sodium      Other reaction(s): Hoarseness    Atorvastatin Nausea Only     dizzy    Bempedoic Acid Myalgia    Morphine Other (See Comments)     Patch-vomiting,weakness, fainting    Pravastatin Other (See Comments)     myalgia    Repatha [Evolocumab] Myalgia    Rosuvastatin Nausea Only    Sulfa Antibiotics Hives, Other (See Comments) and Rash     fainting    Valsartan Palpitations    Zetia [Ezetimibe] Rash       She has a current medication list which includes the following prescription(s): allopurinol, pantoprazole, dicyclomine, guaifenesin-dextromethorphan, warfarin, warfarin, prednisone, evolocumab, bisoprolol-hydrochlorothiazide, nifedipine, losartan, vitamin d, cyanocobalamin, and diphenhydramine..      She  has a past medical history of Adrenal nodule (HCC), Adverse effect of anesthesia, Axillary pain, right, Bereavement, Chronic pain, CKD (chronic kidney disease) stage 3, GFR 30-59 ml/min (HCC), Coagulation disorder (HCC), COVID-19 virus infection, Depression with anxiety, Diverticulosis, DJD (degenerative joint disease), Dyslipidemia, Encounter for FIT (fecal immunochemical test) screening, Encounter for Hemoccult screening, Epicondylitis, lateral, right, Fibromyalgia, Gait instability, GERD (gastroesophageal reflux disease), Hepatic steatosis, Hepatic steatosis, Hypertension, IBS (irritable bowel syndrome), Ill-defined condition, Ill-defined condition, Ill-defined condition, Mastodynia, Menopause, Normal cardiac stress test, Positive D dimer, Prediabetes, Preventative health care, Pulmonary embolism (HCC), Restless leg syndrome, S/P colonoscopy, S/P colonoscopy, S/P

## 2024-11-15 ENCOUNTER — NURSE ONLY (OUTPATIENT)
Facility: CLINIC | Age: 69
End: 2024-11-15
Payer: MEDICARE

## 2024-11-15 DIAGNOSIS — E78.5 DYSLIPIDEMIA: ICD-10-CM

## 2024-11-15 DIAGNOSIS — Z79.01 ENCOUNTER FOR MONITORING COUMADIN THERAPY: Primary | ICD-10-CM

## 2024-11-15 DIAGNOSIS — Z51.81 ENCOUNTER FOR MONITORING COUMADIN THERAPY: Primary | ICD-10-CM

## 2024-11-15 LAB
CHOLEST SERPL-MCNC: 195 MG/DL
HDLC SERPL-MCNC: 49 MG/DL
HDLC SERPL: 4 (ref 0–5)
LDLC SERPL CALC-MCNC: 118.6 MG/DL (ref 0–100)
POC INR: 2.1
PROTHROMBIN TIME, POC: 24.6
TRIGL SERPL-MCNC: 137 MG/DL
VLDLC SERPL CALC-MCNC: 27.4 MG/DL

## 2024-11-15 PROCEDURE — 85610 PROTHROMBIN TIME: CPT | Performed by: INTERNAL MEDICINE

## 2024-11-15 PROCEDURE — 36415 COLL VENOUS BLD VENIPUNCTURE: CPT | Performed by: INTERNAL MEDICINE

## 2024-11-15 NOTE — PROGRESS NOTES
Chief Complaint   Patient presents with    Office Visit for Anticoagulation Management     Patient states \" she is currently taking Warfarin Mon, Wed, Fri 5mg and on Sat, Sun, Tues, Thurs 2.5 mg    Results for orders placed or performed in visit on 11/15/24   AMB POC PT/INR   Result Value Ref Range    Prothrombin time, POC 24.6     POC INR 2.1          Reviewed results with Dr. Kenny Sommer. And he advised patient no change one month.\"

## 2024-12-10 RX ORDER — LOSARTAN POTASSIUM 100 MG/1
100 TABLET ORAL DAILY
Qty: 90 TABLET | Refills: 3 | Status: SHIPPED | OUTPATIENT
Start: 2024-12-10

## 2024-12-12 ENCOUNTER — NURSE ONLY (OUTPATIENT)
Facility: CLINIC | Age: 69
End: 2024-12-12
Payer: MEDICARE

## 2024-12-12 DIAGNOSIS — Z51.81 ENCOUNTER FOR MONITORING COUMADIN THERAPY: Primary | ICD-10-CM

## 2024-12-12 DIAGNOSIS — Z79.01 ENCOUNTER FOR MONITORING COUMADIN THERAPY: Primary | ICD-10-CM

## 2024-12-12 LAB
POC INR: 1.8
PROTHROMBIN TIME, POC: 21.9

## 2024-12-12 PROCEDURE — 85610 PROTHROMBIN TIME: CPT | Performed by: INTERNAL MEDICINE

## 2024-12-12 NOTE — PROGRESS NOTES
Chief Complaint   Patient presents with    Office Visit for Anticoagulation Management     Patient states \" she is taking Warfarin Monday, Wednesday, Friday 5 mg and Tuesday, Thursday,Saturday, Sunday 2.5 mg.\"      Results for orders placed or performed in visit on 12/12/24   AMB POC PT/INR   Result Value Ref Range    Prothrombin time, POC 21.9     POC INR 1.8              Reviewed results with Dr. Kenny Sommer and he advises patient no change return in one month

## 2025-01-15 ENCOUNTER — NURSE ONLY (OUTPATIENT)
Facility: CLINIC | Age: 70
End: 2025-01-15
Payer: MEDICARE

## 2025-01-15 DIAGNOSIS — Z51.81 ENCOUNTER FOR MONITORING COUMADIN THERAPY: Primary | ICD-10-CM

## 2025-01-15 DIAGNOSIS — Z79.01 ENCOUNTER FOR MONITORING COUMADIN THERAPY: Primary | ICD-10-CM

## 2025-01-15 LAB
POC INR: 2.8
PROTHROMBIN TIME, POC: 33.7

## 2025-01-15 PROCEDURE — 85610 PROTHROMBIN TIME: CPT | Performed by: INTERNAL MEDICINE

## 2025-01-15 NOTE — PROGRESS NOTES
Patient states she's taking Coumadin 5 mg 1 tab Monday, Wednesday & Friday - Coumadin 2.5 mg 1 tab all other days. Per Dr. Smomer no adjustment needed at this time continue current doses and return to the office 1 month for repeat PT/INR.

## 2025-01-16 ENCOUNTER — HOSPITAL ENCOUNTER (OUTPATIENT)
Facility: HOSPITAL | Age: 70
Discharge: HOME | End: 2025-01-19
Attending: INTERNAL MEDICINE
Payer: MEDICARE

## 2025-01-16 DIAGNOSIS — J18.9 PNEUMONIA OF RIGHT LOWER LOBE DUE TO INFECTIOUS ORGANISM: ICD-10-CM

## 2025-01-16 DIAGNOSIS — K57.92 DIVERTICULITIS: ICD-10-CM

## 2025-01-16 PROCEDURE — 71250 CT THORAX DX C-: CPT

## 2025-01-22 PROBLEM — K22.5: Status: ACTIVE | Noted: 2025-01-22

## 2025-02-13 ENCOUNTER — NURSE ONLY (OUTPATIENT)
Facility: CLINIC | Age: 70
End: 2025-02-13
Payer: MEDICARE

## 2025-02-13 DIAGNOSIS — Z79.01 ENCOUNTER FOR MONITORING COUMADIN THERAPY: Primary | ICD-10-CM

## 2025-02-13 DIAGNOSIS — Z51.81 ENCOUNTER FOR MONITORING COUMADIN THERAPY: Primary | ICD-10-CM

## 2025-02-13 LAB
POC INR: 2.8
PROTHROMBIN TIME, POC: 33.7

## 2025-02-13 PROCEDURE — 85610 PROTHROMBIN TIME: CPT | Performed by: INTERNAL MEDICINE

## 2025-02-13 RX ORDER — BISOPROLOL FUMARATE AND HYDROCHLOROTHIAZIDE 10; 6.25 MG/1; MG/1
1 TABLET ORAL DAILY
Qty: 90 TABLET | Refills: 3 | Status: SHIPPED | OUTPATIENT
Start: 2025-02-13

## 2025-02-13 RX ORDER — WARFARIN SODIUM 5 MG/1
5 TABLET ORAL
Qty: 40 TABLET | Refills: 3 | Status: SHIPPED | OUTPATIENT
Start: 2025-02-14

## 2025-02-13 RX ORDER — WARFARIN SODIUM 2.5 MG/1
2.5 TABLET ORAL DAILY
Qty: 90 TABLET | Refills: 3 | Status: SHIPPED | OUTPATIENT
Start: 2025-02-13

## 2025-02-13 RX ORDER — LOSARTAN POTASSIUM 100 MG/1
100 TABLET ORAL DAILY
Qty: 90 TABLET | Refills: 3 | Status: SHIPPED | OUTPATIENT
Start: 2025-02-13

## 2025-02-13 NOTE — PROGRESS NOTES
Chief Complaint   Patient presents with    Office Visit for Anticoagulation Management     Patient states \" she is present for a PT INR check. Patient states \" she is taking Sunday - Thursday 5 mg and on Friday and Saturday 2.5 mg.\"     Results for orders placed or performed in visit on 02/13/25   AMB POC PT/INR   Result Value Ref Range    Prothrombin time, POC 33.7     POC INR 2.8          Reviewed results with Dr. Kenny Sommer and he advises patient no change and return in one month.\"

## 2025-02-20 ENCOUNTER — TRANSCRIBE ORDERS (OUTPATIENT)
Dept: SCHEDULING | Age: 70
End: 2025-02-20

## 2025-02-20 DIAGNOSIS — Z12.31 VISIT FOR SCREENING MAMMOGRAM: Primary | ICD-10-CM

## 2025-03-05 NOTE — PROGRESS NOTES
NNTOC:  Gastroesophageal reflux disease without esophagitis : Medication Refill f/u  Goals Addressed             Most Recent     Knowledge and adherence of prescribed medication (ie. action, side effects, missed dose, etc.). On track (5/3/2018)             5/3/2018:   NN spoke with Rosalinda Varghese then transferred to [facility] who consulted w/ pharmacy re Trazedone refill. Stated there was a request submitted on 4/18/2018 that was needed to be picked up. Spoke with [facility]  that amitriptyline is no more being taken;  Confirmation # invoice # [id number]; stated her confirmation ; stated medication w/ require a $10 co-pay but medication w/ be ready in 3-5 days and delivered; stated the process is completed. NN called patient to inform of the same.   .            Goal completion:  May 10, 2018 SW/CM Discharge Plan  Informed patient is ready for discharge.  Patient to be picked up by son  . Patient/interested person has been counseled for post hospitalization care.  Patient agrees and understands goals and plan. Initial implementation of the patient’s discharge plan has been arranged, including any devices/equipment needed for discharge. Discharge plan communicated to MD, RN, MARIA GUADALUPE, and CM.    Patient’s discharge goal and destination is Home. No further social service related needs identified. Please re-consult SW if pt's status changes and/or further needs arise. MARIO signing off case.    Della Hanson RN  Care Manager  [facility]  [address] [address] [phone]

## 2025-03-10 SDOH — ECONOMIC STABILITY: FOOD INSECURITY: WITHIN THE PAST 12 MONTHS, YOU WORRIED THAT YOUR FOOD WOULD RUN OUT BEFORE YOU GOT MONEY TO BUY MORE.: NEVER TRUE

## 2025-03-10 SDOH — ECONOMIC STABILITY: INCOME INSECURITY: IN THE LAST 12 MONTHS, WAS THERE A TIME WHEN YOU WERE NOT ABLE TO PAY THE MORTGAGE OR RENT ON TIME?: NO

## 2025-03-10 SDOH — ECONOMIC STABILITY: FOOD INSECURITY: WITHIN THE PAST 12 MONTHS, THE FOOD YOU BOUGHT JUST DIDN'T LAST AND YOU DIDN'T HAVE MONEY TO GET MORE.: NEVER TRUE

## 2025-03-12 ENCOUNTER — OFFICE VISIT (OUTPATIENT)
Facility: CLINIC | Age: 70
End: 2025-03-12

## 2025-03-12 ENCOUNTER — HOSPITAL ENCOUNTER (OUTPATIENT)
Facility: HOSPITAL | Age: 70
Discharge: HOME | End: 2025-03-15
Attending: INTERNAL MEDICINE
Payer: MEDICARE

## 2025-03-12 VITALS
HEIGHT: 62 IN | RESPIRATION RATE: 16 BRPM | WEIGHT: 181.8 LBS | BODY MASS INDEX: 33.45 KG/M2 | TEMPERATURE: 97.7 F | SYSTOLIC BLOOD PRESSURE: 135 MMHG | OXYGEN SATURATION: 96 % | DIASTOLIC BLOOD PRESSURE: 80 MMHG | HEART RATE: 55 BPM

## 2025-03-12 DIAGNOSIS — G89.29 OTHER CHRONIC PAIN: ICD-10-CM

## 2025-03-12 DIAGNOSIS — K76.0 HEPATIC STEATOSIS: ICD-10-CM

## 2025-03-12 DIAGNOSIS — I27.82 OTHER CHRONIC PULMONARY EMBOLISM WITHOUT ACUTE COR PULMONALE (HCC): ICD-10-CM

## 2025-03-12 DIAGNOSIS — Z00.00 MEDICARE ANNUAL WELLNESS VISIT, SUBSEQUENT: Primary | ICD-10-CM

## 2025-03-12 DIAGNOSIS — N18.31 STAGE 3A CHRONIC KIDNEY DISEASE (HCC): ICD-10-CM

## 2025-03-12 DIAGNOSIS — I10 ESSENTIAL HYPERTENSION, BENIGN: ICD-10-CM

## 2025-03-12 DIAGNOSIS — M1A.00X0 IDIOPATHIC CHRONIC GOUT WITHOUT TOPHUS, UNSPECIFIED SITE: ICD-10-CM

## 2025-03-12 DIAGNOSIS — E78.5 DYSLIPIDEMIA: ICD-10-CM

## 2025-03-12 DIAGNOSIS — Z12.31 VISIT FOR SCREENING MAMMOGRAM: ICD-10-CM

## 2025-03-12 DIAGNOSIS — R73.02 IGT (IMPAIRED GLUCOSE TOLERANCE): ICD-10-CM

## 2025-03-12 PROCEDURE — 77063 BREAST TOMOSYNTHESIS BI: CPT

## 2025-03-12 RX ORDER — WARFARIN SODIUM 2.5 MG/1
TABLET ORAL
Qty: 180 TABLET | Refills: 3 | Status: SHIPPED | OUTPATIENT
Start: 2025-03-12

## 2025-03-12 ASSESSMENT — ANXIETY QUESTIONNAIRES
7. FEELING AFRAID AS IF SOMETHING AWFUL MIGHT HAPPEN: SEVERAL DAYS
4. TROUBLE RELAXING: SEVERAL DAYS
1. FEELING NERVOUS, ANXIOUS, OR ON EDGE: SEVERAL DAYS
GAD7 TOTAL SCORE: 7
2. NOT BEING ABLE TO STOP OR CONTROL WORRYING: SEVERAL DAYS
3. WORRYING TOO MUCH ABOUT DIFFERENT THINGS: SEVERAL DAYS
5. BEING SO RESTLESS THAT IT IS HARD TO SIT STILL: SEVERAL DAYS
6. BECOMING EASILY ANNOYED OR IRRITABLE: SEVERAL DAYS
IF YOU CHECKED OFF ANY PROBLEMS ON THIS QUESTIONNAIRE, HOW DIFFICULT HAVE THESE PROBLEMS MADE IT FOR YOU TO DO YOUR WORK, TAKE CARE OF THINGS AT HOME, OR GET ALONG WITH OTHER PEOPLE: SOMEWHAT DIFFICULT

## 2025-03-12 ASSESSMENT — PATIENT HEALTH QUESTIONNAIRE - PHQ9
SUM OF ALL RESPONSES TO PHQ QUESTIONS 1-9: 0
1. LITTLE INTEREST OR PLEASURE IN DOING THINGS: NOT AT ALL
SUM OF ALL RESPONSES TO PHQ QUESTIONS 1-9: 0
2. FEELING DOWN, DEPRESSED OR HOPELESS: NOT AT ALL

## 2025-03-12 NOTE — PROGRESS NOTES
Medicare Annual Wellness Visit    Rachel Dorantes is here for Medicare AWV    Assessment & Plan   Medicare annual wellness visit, subsequent     No follow-ups on file.     Subjective       Patient's complete Health Risk Assessment and screening values have been reviewed and are found in Flowsheets. The following problems were reviewed today and where indicated follow up appointments were made and/or referrals ordered.    Positive Risk Factor Screenings with Interventions:             General HRA Questions:  Select all that apply: (!) New or Increased Fatigue  Interventions Fatigue:  See A/P for plan and any pertinent orders      Inactivity:  On average, how many days per week do you engage in moderate to strenuous exercise (like a brisk walk)?: 2 days (!) Abnormal  On average, how many minutes do you engage in exercise at this level?: 20 min  Interventions:  See A/P for plan and any pertinent orders     Abnormal BMI (obese):  Body mass index is 33.25 kg/m². (!) Abnormal  Interventions:  See A/P for plan and any pertinent orders             Advanced Directives:  Do you have a Living Will?: (!) No    Intervention:  has NO advanced directive - information provided                     Objective   Vitals:    03/12/25 1015   BP: 135/80   BP Site: Left Upper Arm   Patient Position: Sitting   BP Cuff Size: Large Adult   Pulse: 55   Resp: 16   Temp: 97.7 °F (36.5 °C)   TempSrc: Oral   SpO2: 96%   Weight: 82.5 kg (181 lb 12.8 oz)   Height: 1.575 m (5' 2\")      Body mass index is 33.25 kg/m².                    Allergies   Allergen Reactions    Hydromorphone Anaphylaxis     Respiratory arrest and throat closes    Naproxen Sodium      Other reaction(s): Hoarseness    Atorvastatin Nausea Only     dizzy    Bempedoic Acid Myalgia    Morphine Other (See Comments)     Patch-vomiting,weakness, fainting    Pravastatin Other (See Comments)     myalgia    Repatha [Evolocumab] Myalgia    Rosuvastatin Nausea Only    Sulfa Antibiotics

## 2025-03-12 NOTE — PROGRESS NOTES
SPORTS MEDICINE AND PRIMARY CARE  Kenny Sommer MD, FACP, CMD  [address] [facility] [address]  Phone:  [phone]  Fax: [phone]       Chief Complaint   Patient presents with    Medicare AWV   .      SUBJECTIVE:  History of Present Illness         Rachel Dorantes is a 69 y.o. female  patient is a 69-year-old female who returns today with a known history of chronic kidney disease stage 3, COPD, history of pulmonary embolism, gout, degenerative joint disease, hepatic steatosis, dyslipidemia, glucose intolerance, obesity, and primary hypertension. She is seen for an annual Medicare wellness exam.    She reports that her insurance provider, [employer], has transitioned her from [facility] to another prescription service, resulting in a significant increase in medication costs. She expresses concern about the cost of nifedipine and queries if there is a more affordable alternative. She recalls a previous trial of amlodipine, which was discontinued due to an adverse reaction.    She is currently on a regimen of warfarin, taking 5 mg twice weekly and 2.5 mg on the remaining days. She questions the necessity of continuing this medication long-term, given her history of two clotting episodes. She also mentions a past trial of Eliquis, which was not well-tolerated.    Since her last visit, she has been experiencing severe back pain, which she attributes to a recent bout of pneumonia. She did not seek medical attention for this issue as the pain was not debilitating. However, she has noticed an increase in leg cramps and persistent pain in her fingers and toes, particularly in the middle finger. She describes the pain as shooting and localized to her left arm, extending from the lower to mid-arm. As a left-handed individual, she finds this particularly concerning. She also reports pain in her right shoulder, which she believes is joint-related. An MRI of her shoulder revealed a partial thickness tear of the

## 2025-03-16 ENCOUNTER — RESULTS FOLLOW-UP (OUTPATIENT)
Facility: CLINIC | Age: 70
End: 2025-03-16

## 2025-03-16 LAB
ALBUMIN SERPL-MCNC: 4 G/DL (ref 3.5–5)
ALBUMIN/GLOB SERPL: 1.1 (ref 1.1–2.2)
ALP SERPL-CCNC: 47 U/L (ref 45–117)
ALT SERPL-CCNC: 50 U/L (ref 12–78)
ANION GAP SERPL CALC-SCNC: 5 MMOL/L (ref 2–12)
AST SERPL-CCNC: 34 U/L (ref 15–37)
BASOPHILS # BLD: 0.05 K/UL (ref 0–0.1)
BASOPHILS NFR BLD: 1.2 % (ref 0–1)
BILIRUB SERPL-MCNC: 0.3 MG/DL (ref 0.2–1)
BUN SERPL-MCNC: 27 MG/DL (ref 6–20)
BUN/CREAT SERPL: 22 (ref 12–20)
CALCIUM SERPL-MCNC: 9.8 MG/DL (ref 8.5–10.1)
CHLORIDE SERPL-SCNC: 108 MMOL/L (ref 97–108)
CHOLEST SERPL-MCNC: 212 MG/DL
CO2 SERPL-SCNC: 26 MMOL/L (ref 21–32)
CREAT SERPL-MCNC: 1.24 MG/DL (ref 0.55–1.02)
DIFFERENTIAL METHOD BLD: ABNORMAL
EOSINOPHIL # BLD: 0.07 K/UL (ref 0–0.4)
EOSINOPHIL NFR BLD: 1.7 % (ref 0–7)
ERYTHROCYTE [DISTWIDTH] IN BLOOD BY AUTOMATED COUNT: 14.3 % (ref 11.5–14.5)
EST. AVERAGE GLUCOSE BLD GHB EST-MCNC: 120 MG/DL
GLOBULIN SER CALC-MCNC: 3.6 G/DL (ref 2–4)
GLUCOSE SERPL-MCNC: 85 MG/DL (ref 65–100)
HBA1C MFR BLD: 5.8 % (ref 4–5.6)
HCT VFR BLD AUTO: 41.9 % (ref 35–47)
HDLC SERPL-MCNC: 56 MG/DL
HDLC SERPL: 3.8 (ref 0–5)
HGB BLD-MCNC: 13.7 G/DL (ref 11.5–16)
IMM GRANULOCYTES # BLD AUTO: 0 K/UL (ref 0–0.04)
IMM GRANULOCYTES NFR BLD AUTO: 0 % (ref 0–0.5)
LDLC SERPL CALC-MCNC: 140.4 MG/DL (ref 0–100)
LYMPHOCYTES # BLD: 1.71 K/UL (ref 0.8–3.5)
LYMPHOCYTES NFR BLD: 42.1 % (ref 12–49)
MCH RBC QN AUTO: 29.5 PG (ref 26–34)
MCHC RBC AUTO-ENTMCNC: 32.7 G/DL (ref 30–36.5)
MCV RBC AUTO: 90.1 FL (ref 80–99)
MONOCYTES # BLD: 0.43 K/UL (ref 0–1)
MONOCYTES NFR BLD: 10.6 % (ref 5–13)
NEUTS SEG # BLD: 1.8 K/UL (ref 1.8–8)
NEUTS SEG NFR BLD: 44.4 % (ref 32–75)
NRBC # BLD: 0 K/UL (ref 0–0.01)
NRBC BLD-RTO: 0 PER 100 WBC
PLATELET # BLD AUTO: 180 K/UL (ref 150–400)
PMV BLD AUTO: 12 FL (ref 8.9–12.9)
POTASSIUM SERPL-SCNC: 4.2 MMOL/L (ref 3.5–5.1)
PROT SERPL-MCNC: 7.6 G/DL (ref 6.4–8.2)
RBC # BLD AUTO: 4.65 M/UL (ref 3.8–5.2)
SODIUM SERPL-SCNC: 139 MMOL/L (ref 136–145)
TRIGL SERPL-MCNC: 78 MG/DL
TSH SERPL DL<=0.05 MIU/L-ACNC: 1.34 UIU/ML (ref 0.36–3.74)
VLDLC SERPL CALC-MCNC: 15.6 MG/DL
WBC # BLD AUTO: 4.1 K/UL (ref 3.6–11)

## 2025-04-14 RX ORDER — WARFARIN SODIUM 2.5 MG/1
TABLET ORAL
Qty: 108 TABLET | Refills: 3 | Status: SHIPPED | OUTPATIENT
Start: 2025-04-14

## 2025-04-16 ENCOUNTER — CLINICAL SUPPORT (OUTPATIENT)
Facility: CLINIC | Age: 70
End: 2025-04-16

## 2025-04-16 DIAGNOSIS — I10 ESSENTIAL HYPERTENSION, BENIGN: ICD-10-CM

## 2025-04-16 DIAGNOSIS — D68.9 COAGULATION DISORDER: Primary | ICD-10-CM

## 2025-04-16 NOTE — PROGRESS NOTES
Chief Complaint   Patient presents with    Coagulation Disorder     Patient is present today to get her PT/INR checked.     PT: 30.7  INR: 2.6    Patient takes Warfarin 2.5mg everyday except Monday and Friday. And takes 5mg on Monday and Friday.    No changes pt is to return in 1 month per .

## 2025-04-17 ENCOUNTER — RESULTS FOLLOW-UP (OUTPATIENT)
Facility: CLINIC | Age: 70
End: 2025-04-17

## 2025-04-17 LAB
APPEARANCE UR: CLEAR
BACTERIA URNS QL MICRO: NEGATIVE /HPF
BILIRUB UR QL: NEGATIVE
COLOR UR: NORMAL
EPITH CASTS URNS QL MICRO: NORMAL /LPF
GLUCOSE UR STRIP.AUTO-MCNC: NEGATIVE MG/DL
HGB UR QL STRIP: NEGATIVE
HYALINE CASTS URNS QL MICRO: NORMAL /LPF (ref 0–5)
KETONES UR QL STRIP.AUTO: NEGATIVE MG/DL
LEUKOCYTE ESTERASE UR QL STRIP.AUTO: NEGATIVE
NITRITE UR QL STRIP.AUTO: NEGATIVE
PH UR STRIP: 5.5 (ref 5–8)
PROT UR STRIP-MCNC: NEGATIVE MG/DL
RBC #/AREA URNS HPF: NORMAL /HPF (ref 0–5)
SP GR UR REFRACTOMETRY: 1.01 (ref 1–1.03)
UROBILINOGEN UR QL STRIP.AUTO: 0.2 EU/DL (ref 0.2–1)
WBC URNS QL MICRO: NORMAL /HPF (ref 0–4)

## 2025-05-19 ENCOUNTER — CLINICAL SUPPORT (OUTPATIENT)
Facility: CLINIC | Age: 70
End: 2025-05-19

## 2025-05-19 DIAGNOSIS — D68.9 COAGULATION DISORDER: Primary | ICD-10-CM

## 2025-05-19 RX ORDER — PREDNISONE 20 MG/1
40 TABLET ORAL
Qty: 60 TABLET | Refills: 0 | OUTPATIENT
Start: 2025-05-19

## 2025-05-19 NOTE — PROGRESS NOTES
Chief Complaint   Patient presents with    Coagulation Disorder     Patient is present today to get her PT/INR checked.     PT: 24.5  INR:2.0    Patient takes Warfarin 2.5mg everyday except Monday and Friday. And takes 5mg on Monday and Friday.     No change pt is to return in 1 month per Dr. Medina in  absence.

## 2025-05-22 RX ORDER — PREDNISONE 20 MG/1
TABLET ORAL
Qty: 60 TABLET | Refills: 0 | Status: SHIPPED | OUTPATIENT
Start: 2025-05-22

## 2025-05-27 ENCOUNTER — APPOINTMENT (OUTPATIENT)
Facility: HOSPITAL | Age: 70
End: 2025-05-27
Payer: MEDICARE

## 2025-05-27 ENCOUNTER — HOSPITAL ENCOUNTER (EMERGENCY)
Facility: HOSPITAL | Age: 70
Discharge: HOME | End: 2025-05-27
Attending: EMERGENCY MEDICINE
Payer: MEDICARE

## 2025-05-27 VITALS
HEART RATE: 64 BPM | RESPIRATION RATE: 20 BRPM | OXYGEN SATURATION: 98 % | DIASTOLIC BLOOD PRESSURE: 92 MMHG | TEMPERATURE: 97.9 F | SYSTOLIC BLOOD PRESSURE: 173 MMHG

## 2025-05-27 DIAGNOSIS — M10.9 ACUTE GOUT OF LEFT FOOT, UNSPECIFIED CAUSE: Primary | ICD-10-CM

## 2025-05-27 LAB
ALBUMIN SERPL-MCNC: 3.9 G/DL (ref 3.5–5)
ALBUMIN/GLOB SERPL: 1 (ref 1.1–2.2)
ALP SERPL-CCNC: 57 U/L (ref 45–117)
ALT SERPL-CCNC: 41 U/L (ref 12–78)
ANION GAP SERPL CALC-SCNC: 6 MMOL/L (ref 2–12)
AST SERPL-CCNC: 22 U/L (ref 15–37)
BASOPHILS # BLD: 0.07 K/UL (ref 0–0.1)
BASOPHILS NFR BLD: 1.4 % (ref 0–1)
BILIRUB SERPL-MCNC: 0.3 MG/DL (ref 0.2–1)
BUN SERPL-MCNC: 29 MG/DL (ref 6–20)
BUN/CREAT SERPL: 21 (ref 12–20)
CALCIUM SERPL-MCNC: 9.5 MG/DL (ref 8.5–10.1)
CHLORIDE SERPL-SCNC: 104 MMOL/L (ref 97–108)
CO2 SERPL-SCNC: 28 MMOL/L (ref 21–32)
CREAT SERPL-MCNC: 1.39 MG/DL (ref 0.55–1.02)
DIFFERENTIAL METHOD BLD: ABNORMAL
EKG ATRIAL RATE: 59 BPM
EKG DIAGNOSIS: NORMAL
EKG P AXIS: 64 DEGREES
EKG P-R INTERVAL: 166 MS
EKG Q-T INTERVAL: 424 MS
EKG QRS DURATION: 84 MS
EKG QTC CALCULATION (BAZETT): 419 MS
EKG R AXIS: 27 DEGREES
EKG T AXIS: 41 DEGREES
EKG VENTRICULAR RATE: 59 BPM
EOSINOPHIL # BLD: 0.05 K/UL (ref 0–0.4)
EOSINOPHIL NFR BLD: 1 % (ref 0–7)
ERYTHROCYTE [DISTWIDTH] IN BLOOD BY AUTOMATED COUNT: 14.9 % (ref 11.5–14.5)
GLOBULIN SER CALC-MCNC: 4 G/DL (ref 2–4)
GLUCOSE SERPL-MCNC: 98 MG/DL (ref 65–100)
HCT VFR BLD AUTO: 43.3 % (ref 35–47)
HGB BLD-MCNC: 13.7 G/DL (ref 11.5–16)
IMM GRANULOCYTES # BLD AUTO: 0 K/UL (ref 0–0.04)
IMM GRANULOCYTES NFR BLD AUTO: 0 % (ref 0–0.5)
LYMPHOCYTES # BLD: 2.03 K/UL (ref 0.8–3.5)
LYMPHOCYTES NFR BLD: 40.4 % (ref 12–49)
MAGNESIUM SERPL-MCNC: 2.2 MG/DL (ref 1.6–2.4)
MCH RBC QN AUTO: 28.7 PG (ref 26–34)
MCHC RBC AUTO-ENTMCNC: 31.6 G/DL (ref 30–36.5)
MCV RBC AUTO: 90.6 FL (ref 80–99)
MONOCYTES # BLD: 0.48 K/UL (ref 0–1)
MONOCYTES NFR BLD: 9.5 % (ref 5–13)
NEUTS SEG # BLD: 2.4 K/UL (ref 1.8–8)
NEUTS SEG NFR BLD: 47.7 % (ref 32–75)
NRBC # BLD: 0 K/UL (ref 0–0.01)
NRBC BLD-RTO: 0 PER 100 WBC
NT PRO BNP: 39 PG/ML
PLATELET # BLD AUTO: 214 K/UL (ref 150–400)
PMV BLD AUTO: 11 FL (ref 8.9–12.9)
POTASSIUM SERPL-SCNC: 4.4 MMOL/L (ref 3.5–5.1)
PROT SERPL-MCNC: 7.9 G/DL (ref 6.4–8.2)
RBC # BLD AUTO: 4.78 M/UL (ref 3.8–5.2)
SODIUM SERPL-SCNC: 138 MMOL/L (ref 136–145)
TROPONIN I SERPL HS-MCNC: 34 NG/L (ref 0–51)
WBC # BLD AUTO: 5 K/UL (ref 3.6–11)

## 2025-05-27 PROCEDURE — 80053 COMPREHEN METABOLIC PANEL: CPT

## 2025-05-27 PROCEDURE — 93970 EXTREMITY STUDY: CPT

## 2025-05-27 PROCEDURE — 99285 EMERGENCY DEPT VISIT HI MDM: CPT

## 2025-05-27 PROCEDURE — 71046 X-RAY EXAM CHEST 2 VIEWS: CPT

## 2025-05-27 PROCEDURE — 85025 COMPLETE CBC W/AUTO DIFF WBC: CPT

## 2025-05-27 PROCEDURE — 83880 ASSAY OF NATRIURETIC PEPTIDE: CPT

## 2025-05-27 PROCEDURE — 93005 ELECTROCARDIOGRAM TRACING: CPT | Performed by: EMERGENCY MEDICINE

## 2025-05-27 PROCEDURE — 73620 X-RAY EXAM OF FOOT: CPT

## 2025-05-27 PROCEDURE — 83735 ASSAY OF MAGNESIUM: CPT

## 2025-05-27 PROCEDURE — 36415 COLL VENOUS BLD VENIPUNCTURE: CPT

## 2025-05-27 PROCEDURE — 6370000000 HC RX 637 (ALT 250 FOR IP): Performed by: EMERGENCY MEDICINE

## 2025-05-27 PROCEDURE — 84484 ASSAY OF TROPONIN QUANT: CPT

## 2025-05-27 RX ORDER — CEPHALEXIN 500 MG/1
500 CAPSULE ORAL 4 TIMES DAILY
Qty: 28 CAPSULE | Refills: 0 | Status: SHIPPED | OUTPATIENT
Start: 2025-05-27 | End: 2025-06-03

## 2025-05-27 RX ORDER — ACETAMINOPHEN 500 MG
1000 TABLET ORAL
Status: DISCONTINUED | OUTPATIENT
Start: 2025-05-27 | End: 2025-05-27 | Stop reason: HOSPADM

## 2025-05-27 RX ORDER — PREDNISONE 10 MG/1
TABLET ORAL
Qty: 30 TABLET | Refills: 0 | Status: SHIPPED | OUTPATIENT
Start: 2025-05-27 | End: 2025-06-06

## 2025-05-27 ASSESSMENT — PAIN DESCRIPTION - DESCRIPTORS: DESCRIPTORS: ACHING

## 2025-05-27 ASSESSMENT — PAIN DESCRIPTION - LOCATION: LOCATION: LEG

## 2025-05-27 ASSESSMENT — PAIN DESCRIPTION - PAIN TYPE: TYPE: ACUTE PAIN

## 2025-05-27 ASSESSMENT — PAIN DESCRIPTION - ORIENTATION: ORIENTATION: RIGHT;LEFT

## 2025-05-27 ASSESSMENT — PAIN SCALES - GENERAL: PAINLEVEL_OUTOF10: 5

## 2025-05-27 ASSESSMENT — PAIN - FUNCTIONAL ASSESSMENT
PAIN_FUNCTIONAL_ASSESSMENT: PREVENTS OR INTERFERES SOME ACTIVE ACTIVITIES AND ADLS
PAIN_FUNCTIONAL_ASSESSMENT: 0-10

## 2025-05-27 ASSESSMENT — PAIN DESCRIPTION - ONSET: ONSET: ON-GOING

## 2025-05-27 ASSESSMENT — PAIN DESCRIPTION - FREQUENCY: FREQUENCY: INTERMITTENT

## 2025-05-27 NOTE — ED PROVIDER NOTES
[facility] EMERGENCY DEPARTMENT  EMERGENCY DEPARTMENT ENCOUNTER       Pt Name: Rachel Dorantes  MRN: [id number]  Birthdate 1955  Date of evaluation: 2025  Provider: Manuel Souza MD   PCP: Kenny Sommer MD  Note Started: 1:40 PM 25     CHIEF COMPLAINT       Chief Complaint   Patient presents with    Leg Pain     Pt in WC in [place]. Pt c/o L leg and L foot that is causing pain and swelling. Pt also concerned for R calf swelling . All these s/s x one week. Per pt, sent here to r/o DVT . Pt is on warfarin d/t of DVT and PE's. . Pt denies CP but is c/o SOB        HISTORY OF PRESENT ILLNESS: 1 or more elements      History From: Patient, History limited by: None     Rachel Dorantes is a 69 y.o. female with a history of gout, DVT/PE on warfarin who presents with extremity pain.  She reports about 1 week of left foot pain and swelling.  She is having difficulty weightbearing due to pain in the foot.  No injury to the foot. she also reports associated pain in her right thigh.  She is worried about recurrent DVT.  She takes warfarin daily, most recent INR 1 week ago was 2.0.  Reports mild shortness of breath, no chest pain no syncope or near syncope.     Nursing Notes were all reviewed and agreed with or any disagreements were addressed in the HPI.     REVIEW OF SYSTEMS        Positives and Pertinent negatives as per HPI.    PAST HISTORY     Past Medical History:  Past Medical History:   Diagnosis Date    Acquired epiphrenic diverticulum of esophagus 2025    ct    Adrenal nodule 2022    13 mm right adrenal noduladenoma. Further evaluation of this adrenal gland is not necessary.e    Adverse effect of anesthesia     \"hard time waking me up\"    Axillary pain, right     Bereavement 2019    85 yo - heart attack -  in sleep    Chronic pain     d/t fibromyalgia    CKD (chronic kidney disease) stage 3, GFR 30-59 ml/min ([facility]) 2020    Coagulation disorder     on eliquis -d/c due

## 2025-05-27 NOTE — DISCHARGE INSTRUCTIONS
Thank You!    It was a pleasure taking care of you in our Emergency Department today. We know that when you come to our Emergency Department, you are entrusting us with your health, comfort, and safety. Our physicians and nurses honor that trust, and truly appreciate the opportunity to care for you and your loved ones.      We also value your feedback. If you receive a survey about your Emergency Department experience today, please fill it out.  We care about our patients' feedback, and we listen to what you have to say.  Thank you.    Manuel Souza MD  ________________________________________________________________________  I have included a copy of your lab results and/or radiologic studies from today's visit so you can have them easily available at your follow-up visit. We hope you feel better and please do not hesitate to contact the ED if you have any questions at all!    Recent Results (from the past 12 hours)   EKG 12 Lead    Collection Time: 05/27/25 11:32 AM   Result Value Ref Range    Ventricular Rate 59 BPM    Atrial Rate 59 BPM    P-R Interval 166 ms    QRS Duration 84 ms    Q-T Interval 424 ms    QTc Calculation (Bazett) 419 ms    P Axis 64 degrees    R Axis 27 degrees    T Axis 41 degrees    Diagnosis       Sinus bradycardia  Abnormal ECG  When compared with ECG of 22-JUL-2016 15:36,  No significant change was found  Confirmed by MD Souza William ([id number]) on 5/27/2025 12:33:21 PM     CBC with Auto Differential    Collection Time: 05/27/25 11:40 AM   Result Value Ref Range    WBC 5.0 3.6 - 11.0 K/uL    RBC 4.78 3.80 - 5.20 M/uL    Hemoglobin 13.7 11.5 - 16.0 g/dL    Hematocrit 43.3 35.0 - 47.0 %    MCV 90.6 80.0 - 99.0 FL    MCH 28.7 26.0 - 34.0 PG    MCHC 31.6 30.0 - 36.5 g/dL    RDW 14.9 (H) 11.5 - 14.5 %    Platelets 214 150 - 400 K/uL    MPV 11.0 8.9 - 12.9 FL    Nucleated RBCs 0.0 0  WBC    nRBC 0.00 0.00 - 0.01 K/uL    Neutrophils % 47.7 32.0 - 75.0 %    Lymphocytes % 40.4 12.0 - 49.0 %

## 2025-06-12 ENCOUNTER — OFFICE VISIT (OUTPATIENT)
Facility: CLINIC | Age: 70
End: 2025-06-12
Payer: MEDICARE

## 2025-06-12 VITALS
TEMPERATURE: 98.1 F | RESPIRATION RATE: 16 BRPM | HEIGHT: 62 IN | HEART RATE: 59 BPM | WEIGHT: 177 LBS | DIASTOLIC BLOOD PRESSURE: 75 MMHG | SYSTOLIC BLOOD PRESSURE: 118 MMHG | BODY MASS INDEX: 32.57 KG/M2 | OXYGEN SATURATION: 97 %

## 2025-06-12 DIAGNOSIS — I27.82 OTHER CHRONIC PULMONARY EMBOLISM WITHOUT ACUTE COR PULMONALE (HCC): Primary | ICD-10-CM

## 2025-06-12 DIAGNOSIS — M1A.00X0 IDIOPATHIC CHRONIC GOUT WITHOUT TOPHUS, UNSPECIFIED SITE: ICD-10-CM

## 2025-06-12 DIAGNOSIS — G89.29 OTHER CHRONIC PAIN: ICD-10-CM

## 2025-06-12 DIAGNOSIS — R73.02 IGT (IMPAIRED GLUCOSE TOLERANCE): ICD-10-CM

## 2025-06-12 DIAGNOSIS — K76.0 HEPATIC STEATOSIS: ICD-10-CM

## 2025-06-12 DIAGNOSIS — N18.31 STAGE 3A CHRONIC KIDNEY DISEASE (HCC): ICD-10-CM

## 2025-06-12 DIAGNOSIS — M79.672 LEFT FOOT PAIN: ICD-10-CM

## 2025-06-12 PROCEDURE — 1036F TOBACCO NON-USER: CPT | Performed by: INTERNAL MEDICINE

## 2025-06-12 PROCEDURE — G8427 DOCREV CUR MEDS BY ELIG CLIN: HCPCS | Performed by: INTERNAL MEDICINE

## 2025-06-12 PROCEDURE — 1090F PRES/ABSN URINE INCON ASSESS: CPT | Performed by: INTERNAL MEDICINE

## 2025-06-12 PROCEDURE — 1159F MED LIST DOCD IN RCRD: CPT | Performed by: INTERNAL MEDICINE

## 2025-06-12 PROCEDURE — 3074F SYST BP LT 130 MM HG: CPT | Performed by: INTERNAL MEDICINE

## 2025-06-12 PROCEDURE — 1126F AMNT PAIN NOTED NONE PRSNT: CPT | Performed by: INTERNAL MEDICINE

## 2025-06-12 PROCEDURE — 3017F COLORECTAL CA SCREEN DOC REV: CPT | Performed by: INTERNAL MEDICINE

## 2025-06-12 PROCEDURE — 99214 OFFICE O/P EST MOD 30 MIN: CPT | Performed by: INTERNAL MEDICINE

## 2025-06-12 PROCEDURE — 3078F DIAST BP <80 MM HG: CPT | Performed by: INTERNAL MEDICINE

## 2025-06-12 PROCEDURE — G8399 PT W/DXA RESULTS DOCUMENT: HCPCS | Performed by: INTERNAL MEDICINE

## 2025-06-12 PROCEDURE — 36415 COLL VENOUS BLD VENIPUNCTURE: CPT | Performed by: INTERNAL MEDICINE

## 2025-06-12 PROCEDURE — 1123F ACP DISCUSS/DSCN MKR DOCD: CPT | Performed by: INTERNAL MEDICINE

## 2025-06-12 PROCEDURE — G8417 CALC BMI ABV UP PARAM F/U: HCPCS | Performed by: INTERNAL MEDICINE

## 2025-06-12 SDOH — ECONOMIC STABILITY: FOOD INSECURITY: WITHIN THE PAST 12 MONTHS, THE FOOD YOU BOUGHT JUST DIDN'T LAST AND YOU DIDN'T HAVE MONEY TO GET MORE.: NEVER TRUE

## 2025-06-12 SDOH — ECONOMIC STABILITY: FOOD INSECURITY: WITHIN THE PAST 12 MONTHS, YOU WORRIED THAT YOUR FOOD WOULD RUN OUT BEFORE YOU GOT MONEY TO BUY MORE.: NEVER TRUE

## 2025-06-12 ASSESSMENT — PATIENT HEALTH QUESTIONNAIRE - PHQ9
2. FEELING DOWN, DEPRESSED OR HOPELESS: NOT AT ALL
1. LITTLE INTEREST OR PLEASURE IN DOING THINGS: NOT AT ALL
SUM OF ALL RESPONSES TO PHQ QUESTIONS 1-9: 0

## 2025-06-12 NOTE — PROGRESS NOTES
Chief Complaint   Patient presents with    Follow-Up from Hospital     Have you been to the ER, urgent care clinic since your last visit?  Hospitalized since your last visit?   YES - When: approximately 16 days ago.  Where and Why: [facility] Emergency Department for leg pain.    Have you seen or consulted any other health care providers outside our system since your last visit?   NO    PT: 32.7  INR: 2.7    Patient takes Warfarin 2.5mg everyday except Monday and Friday. And takes 5mg on Monday and Friday.

## 2025-06-12 NOTE — PROGRESS NOTES
SPORTS MEDICINE AND PRIMARY CARE  Kenny Sommer MD, FACP, CMD  [address] [facility] [address]  Phone:  [phone]  Fax: [phone]       Chief Complaint   Patient presents with    Follow-Up from Hospital   .      SUBJECTIVE:       History of Present Illness  The patient is a 69-year-old black female who returns today after a visit to the emergency room on 05/27/2025. She saw Manuel Power MD, at [facility] for leg pain. She reported a week history of left foot pain and swelling and difficulty bearing weight due to the pain in the foot. She was worried about recurrent DVT; however, she takes warfarin and her most recent INR a week prior to that visit was 1.20. A chest x-ray was normal, and the left foot revealed no acute abnormalities. 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 No DVT was found on ultrasound, and she was diagnosed with acute gout of the left foot and subsequently discharged with Keflex 500 mg four times a day. She had a uric acid level on 08/07/2024, which was elevated at 7.9. She has a history of chronic kidney disease stage III, chronic pulmonary embolism without PE, chronic pain, gout, hepatic steatosis, and impaired glucose tolerance.    She has been experiencing persistent pain in her toe for the past month, which intensifies upon weight-bearing and is accompanied by swelling. Despite completing a course of antibiotics and a 7-day regimen of prednisone, the pain and swelling persist. She also reports a change in skin color to a darker shade. She expresses concern about potential elevation in her creatinine levels since her last blood work. She maintains a daily fluid intake of approximately 64 ounces. During her emergency room visit, she requested a uric acid test but was informed that it was unnecessary due to her gout diagnosis. She underwent foot surgery over 20 years ago.    She is considering discontinuing warfarin and

## 2025-06-13 ENCOUNTER — RESULTS FOLLOW-UP (OUTPATIENT)
Facility: CLINIC | Age: 70
End: 2025-06-13

## 2025-06-13 LAB
ANION GAP SERPL CALC-SCNC: 6 MMOL/L (ref 2–12)
BUN SERPL-MCNC: 34 MG/DL (ref 6–20)
BUN/CREAT SERPL: 24 (ref 12–20)
CALCIUM SERPL-MCNC: 9.6 MG/DL (ref 8.5–10.1)
CHLORIDE SERPL-SCNC: 109 MMOL/L (ref 97–108)
CO2 SERPL-SCNC: 26 MMOL/L (ref 21–32)
CREAT SERPL-MCNC: 1.43 MG/DL (ref 0.55–1.02)
GLUCOSE SERPL-MCNC: 91 MG/DL (ref 65–100)
POTASSIUM SERPL-SCNC: 4 MMOL/L (ref 3.5–5.1)
SODIUM SERPL-SCNC: 141 MMOL/L (ref 136–145)
URATE SERPL-MCNC: 9.4 MG/DL (ref 2.6–6)

## 2025-06-13 RX ORDER — ALLOPURINOL 100 MG/1
100 TABLET ORAL DAILY
Qty: 90 TABLET | Refills: 3 | Status: SHIPPED | OUTPATIENT
Start: 2025-06-13

## 2025-08-11 ENCOUNTER — TELEPHONE (OUTPATIENT)
Age: 70
End: 2025-08-11

## 2025-08-11 DIAGNOSIS — G47.33 OBSTRUCTIVE SLEEP APNEA (ADULT) (PEDIATRIC): Primary | ICD-10-CM

## 2025-08-13 ENCOUNTER — CLINICAL DOCUMENTATION (OUTPATIENT)
Age: 70
End: 2025-08-13

## (undated) DEVICE — SOLIDIFIER FLUID 3000 CC ABSORB

## (undated) DEVICE — ESOPHAGEAL BALLOON DILATATION CATHETER: Brand: CRE FIXED WIRE

## (undated) DEVICE — QUILTED PREMIUM COMFORT UNDERPAD,EXTRA HEAVY: Brand: WINGS

## (undated) DEVICE — BAG BELONG PT PERS CLEAR HANDL

## (undated) DEVICE — BAG SPEC BIOHZD LF 2MIL 6X10IN -- CONVERT TO ITEM 357326

## (undated) DEVICE — BITE BLK ENDOSCP AD 54FR GRN POLYETH ENDOSCP W STRP SLD

## (undated) DEVICE — Z DISCONTINUED NO SUB IDED SET EXTN W/ 4 W STPCOCK M SPIN LOK 36IN

## (undated) DEVICE — NEONATAL-ADULT SPO2 SENSOR: Brand: NELLCOR

## (undated) DEVICE — FORCEPS BX L240CM JAW DIA2.8MM L CAP W/ NDL MIC MESH TOOTH

## (undated) DEVICE — SET ADMIN 16ML TBNG L100IN 2 Y INJ SITE IV PIGGY BK DISP

## (undated) DEVICE — Z DISCONTINUED USE 2751540 TUBING IRRIG L10IN DISP PMP ENDOGATOR

## (undated) DEVICE — KIT IV STRT W CHLORAPREP PD 1ML

## (undated) DEVICE — TUBING HYDR IRR --

## (undated) DEVICE — ESOPHAGEAL/COLONIC/BILIARY WIREGUIDED BALLOON DILATATION CATHETER: Brand: CRE™ PRO

## (undated) DEVICE — ENDO CARRY-ON PROCEDURE KIT INCLUDES ENZYMATIC SPONGE, GAUZE, BIOHAZARD LABEL, TRAY, LUBRICANT, DIRTY SCOPE LABEL, WATER LABEL, TRAY, DRAWSTRING PAD, AND DEFENDO 4-PIECE KIT.: Brand: ENDO CARRY-ON PROCEDURE KIT

## (undated) DEVICE — NEEDLE HYPO 18GA L1.5IN PNK S STL HUB POLYPR SHLD REG BVL

## (undated) DEVICE — 1200 GUARD II KIT W/5MM TUBE W/O VAC TUBE: Brand: GUARDIAN

## (undated) DEVICE — CATH IV AUTOGRD BC BLU 22GA 25 -- INSYTE

## (undated) DEVICE — CANN NASAL O2 CAPNOGRAPHY AD -- FILTERLINE

## (undated) DEVICE — Z CONVERTED USE 2274299 CUFF BLD PRESSURE LNG MED AD 25-35 CM ARM FLEXIPORT DISP

## (undated) DEVICE — SYR ASSEMB INFL BLLN 60ML --

## (undated) DEVICE — BW-412T DISP COMBO CLEANING BRUSH: Brand: SINGLE USE COMBINATION CLEANING BRUSH

## (undated) DEVICE — CONNECTOR TBNG AUX H2O JET DISP FOR OLY 160/180 SER

## (undated) DEVICE — SYRINGE MED 20ML STD CLR PLAS LUERLOCK TIP N CTRL DISP

## (undated) DEVICE — CONTAINER SPEC 20 ML LID NEUT BUFF FORMALIN 10 % POLYPR STS

## (undated) DEVICE — KENDALL RADIOLUCENT FOAM MONITORING ELECTRODE -RECTANGULAR SHAPE: Brand: KENDALL

## (undated) DEVICE — WRISTBAND ID AD W1XL11.5IN RED POLY ALRG PREPRINTED PERM

## (undated) DEVICE — ESOPHAGEAL/PYLORIC/COLONIC/BILIARY WIREGUIDED BALLOON DILATATION CATHETER: Brand: CRE™ PRO

## (undated) DEVICE — AIRLIFE™ U/CONNECT-IT OXYGEN TUBING 7 FEET (2.1 M) CRUSH-RESISTANT OXYGEN TUBING, VINYL TIPPED: Brand: AIRLIFE™